# Patient Record
Sex: MALE | Race: WHITE | NOT HISPANIC OR LATINO | Employment: OTHER | URBAN - METROPOLITAN AREA
[De-identification: names, ages, dates, MRNs, and addresses within clinical notes are randomized per-mention and may not be internally consistent; named-entity substitution may affect disease eponyms.]

---

## 2017-02-02 ENCOUNTER — HOSPITAL ENCOUNTER (EMERGENCY)
Facility: HOSPITAL | Age: 52
Discharge: HOME | End: 2017-02-02
Attending: EMERGENCY MEDICINE | Admitting: EMERGENCY MEDICINE

## 2017-02-02 ENCOUNTER — APPOINTMENT (OUTPATIENT)
Dept: GENERAL RADIOLOGY | Facility: HOSPITAL | Age: 52
End: 2017-02-02

## 2017-02-02 VITALS
HEART RATE: 65 BPM | TEMPERATURE: 98.7 F | OXYGEN SATURATION: 97 % | RESPIRATION RATE: 18 BRPM | BODY MASS INDEX: 36.4 KG/M2 | SYSTOLIC BLOOD PRESSURE: 116 MMHG | WEIGHT: 260 LBS | HEIGHT: 71 IN | DIASTOLIC BLOOD PRESSURE: 80 MMHG

## 2017-02-02 DIAGNOSIS — J31.0 OTHER RHINITIS: Primary | ICD-10-CM

## 2017-02-02 LAB
ALBUMIN SERPL-MCNC: 4.5 G/DL (ref 3.5–5.2)
ALBUMIN/GLOB SERPL: 1.4 G/DL
ALP SERPL-CCNC: 53 U/L (ref 39–117)
ALT SERPL W P-5'-P-CCNC: 62 U/L (ref 1–41)
ANION GAP SERPL CALCULATED.3IONS-SCNC: 11.2 MMOL/L
AST SERPL-CCNC: 39 U/L (ref 1–40)
BASOPHILS # BLD AUTO: 0.02 10*3/MM3 (ref 0–0.2)
BASOPHILS NFR BLD AUTO: 0.3 % (ref 0–1.5)
BILIRUB SERPL-MCNC: 0.5 MG/DL (ref 0.1–1.2)
BUN BLD-MCNC: 14 MG/DL (ref 6–20)
BUN/CREAT SERPL: 13.5 (ref 7–25)
CALCIUM SPEC-SCNC: 9.5 MG/DL (ref 8.6–10.5)
CHLORIDE SERPL-SCNC: 102 MMOL/L (ref 98–107)
CO2 SERPL-SCNC: 26.8 MMOL/L (ref 22–29)
CREAT BLD-MCNC: 1.04 MG/DL (ref 0.76–1.27)
DEPRECATED RDW RBC AUTO: 39.8 FL (ref 37–54)
EOSINOPHIL # BLD AUTO: 0.04 10*3/MM3 (ref 0–0.7)
EOSINOPHIL NFR BLD AUTO: 0.7 % (ref 0.3–6.2)
ERYTHROCYTE [DISTWIDTH] IN BLOOD BY AUTOMATED COUNT: 12.6 % (ref 11.5–14.5)
GFR SERPL CREATININE-BSD FRML MDRD: 75 ML/MIN/1.73
GLOBULIN UR ELPH-MCNC: 3.2 GM/DL
GLUCOSE BLD-MCNC: 101 MG/DL (ref 65–99)
HCT VFR BLD AUTO: 44.2 % (ref 40.4–52.2)
HGB BLD-MCNC: 15.6 G/DL (ref 13.7–17.6)
HOLD SPECIMEN: NORMAL
IMM GRANULOCYTES # BLD: 0.02 10*3/MM3 (ref 0–0.03)
IMM GRANULOCYTES NFR BLD: 0.3 % (ref 0–0.5)
LYMPHOCYTES # BLD AUTO: 2.08 10*3/MM3 (ref 0.9–4.8)
LYMPHOCYTES NFR BLD AUTO: 35 % (ref 19.6–45.3)
MCH RBC QN AUTO: 30.5 PG (ref 27–32.7)
MCHC RBC AUTO-ENTMCNC: 35.3 G/DL (ref 32.6–36.4)
MCV RBC AUTO: 86.5 FL (ref 79.8–96.2)
MONOCYTES # BLD AUTO: 0.57 10*3/MM3 (ref 0.2–1.2)
MONOCYTES NFR BLD AUTO: 9.6 % (ref 5–12)
NEUTROPHILS # BLD AUTO: 3.21 10*3/MM3 (ref 1.9–8.1)
NEUTROPHILS NFR BLD AUTO: 54.1 % (ref 42.7–76)
NT-PROBNP SERPL-MCNC: 19 PG/ML (ref 5–900)
PLATELET # BLD AUTO: 195 10*3/MM3 (ref 140–500)
PMV BLD AUTO: 10.4 FL (ref 6–12)
POTASSIUM BLD-SCNC: 4 MMOL/L (ref 3.5–5.2)
PROT SERPL-MCNC: 7.7 G/DL (ref 6–8.5)
RBC # BLD AUTO: 5.11 10*6/MM3 (ref 4.6–6)
SODIUM BLD-SCNC: 140 MMOL/L (ref 136–145)
TROPONIN T SERPL-MCNC: <0.01 NG/ML (ref 0–0.03)
WBC NRBC COR # BLD: 5.94 10*3/MM3 (ref 4.5–10.7)
WHOLE BLOOD HOLD SPECIMEN: NORMAL

## 2017-02-02 PROCEDURE — 71020 HC CHEST PA AND LATERAL: CPT

## 2017-02-02 PROCEDURE — 85025 COMPLETE CBC W/AUTO DIFF WBC: CPT | Performed by: EMERGENCY MEDICINE

## 2017-02-02 PROCEDURE — 84484 ASSAY OF TROPONIN QUANT: CPT | Performed by: EMERGENCY MEDICINE

## 2017-02-02 PROCEDURE — 36415 COLL VENOUS BLD VENIPUNCTURE: CPT | Performed by: EMERGENCY MEDICINE

## 2017-02-02 PROCEDURE — 93010 ELECTROCARDIOGRAM REPORT: CPT | Performed by: INTERNAL MEDICINE

## 2017-02-02 PROCEDURE — 80053 COMPREHEN METABOLIC PANEL: CPT | Performed by: EMERGENCY MEDICINE

## 2017-02-02 PROCEDURE — 99284 EMERGENCY DEPT VISIT MOD MDM: CPT

## 2017-02-02 PROCEDURE — 83880 ASSAY OF NATRIURETIC PEPTIDE: CPT | Performed by: EMERGENCY MEDICINE

## 2017-02-02 PROCEDURE — 93005 ELECTROCARDIOGRAM TRACING: CPT

## 2017-02-02 RX ORDER — FLUTICASONE PROPIONATE 50 MCG
2 SPRAY, SUSPENSION (ML) NASAL DAILY
Qty: 1 EACH | Refills: 0 | Status: SHIPPED | OUTPATIENT
Start: 2017-02-02 | End: 2017-03-04

## 2017-02-02 RX ORDER — SODIUM CHLORIDE 0.9 % (FLUSH) 0.9 %
10 SYRINGE (ML) INJECTION AS NEEDED
Status: DISCONTINUED | OUTPATIENT
Start: 2017-02-02 | End: 2017-02-02 | Stop reason: HOSPADM

## 2017-02-02 NOTE — ED PROVIDER NOTES
Pt c/o productive cough which started in 11/2016. He has also had intermittent trouble breathing through his nose, sinus congestion, and wheezing which are exacerbated by taking breathing treatments. He has been on multiple rounds of abx and steroids without sx relief. He has felt very anxious about his sx and denies chest pain, fevers, chills, abd pain, and N/V/D. He has hx of environmental allergies.     Limited physical exam: lungs are clear to ausculation bilaterally, sinuses are nontender without inflammation, oropharynx is normal, neck is supple without lymphadenopathy, TMs are normal bilaterally     CXR shows no acute process. Labs are stable.     Discussed with pt at length that his ongoing sinus congestion and difficulty breathing through nose is due to seasonal allergies. Advised pt to follow up with his pulmonologist for recheck. RTER warnings given.     I supervised care provided by the midlevel provider.  We have discussed this patient's history, physical exam, and treatment plan.  I have reviewed the note and personally saw and examined the patient and agree with the plan of care.    Documentation assistance provided by vasile Martin for Dr Gan. Information recorded by the scribe was done at my direction and has been verified and validated by me.            Nicholas Martin  02/02/17 6231       Sterling Gan MD  02/02/17 3321

## 2017-02-02 NOTE — DISCHARGE INSTRUCTIONS
May continue Saline irrigations    Return for fever or worsening symptoms    Keep appointment with Pulmonologist

## 2017-02-02 NOTE — ED PROVIDER NOTES
EMERGENCY DEPARTMENT ENCOUNTER    CHIEF COMPLAINT  Chief Complaint: SOA  History given by: Pt  History limited by: nothing  Room Number: 01/01  PMD: Emir Ellis MD      HPI:  Pt is a 51 y.o. male who presents complaining of intermittent SOA two weeks ago.  Pt states he has a several year hx of chronic nasal congestion and post-nasal drainage, which makes it difficult to breath through his nose. Pt c/o diaphoresis of the face and chest pain/pressure secondary to SOA currently. Pt states he was last on abx, Z-pack, one week ago.     Duration:  Two weeks ago  Onset: gradual  Timing: intermittent  Location: nose  Quality: SOA  Intensity/Severity: moderate  Progression: worsening  Associated Symptoms: Chest pain/pressure, diaphoresis of the face.  Aggravating Factors: strong scents  Alleviating Factors: none  Previous Episodes: Pt has a hx of post nasal drip for several decades  Treatment before arrival: none    PAST MEDICAL HISTORY  Active Ambulatory Problems     Diagnosis Date Noted   • Bilateral hip pain 08/10/2016   • Right hip pain 08/10/2016   • Left hip pain 08/10/2016   • Bilateral hip bursitis 08/25/2016     Resolved Ambulatory Problems     Diagnosis Date Noted   • No Resolved Ambulatory Problems     Past Medical History   Diagnosis Date   • Fracture of ankle        PAST SURGICAL HISTORY  Past Surgical History   Procedure Laterality Date   • Tonsillectomy     • Hemorrhoidectomy     • Anal fistula repair         FAMILY HISTORY  History reviewed. No pertinent family history.    SOCIAL HISTORY  Social History     Social History   • Marital status: Single     Spouse name: N/A   • Number of children: N/A   • Years of education: N/A     Occupational History   • Not on file.     Social History Main Topics   • Smoking status: Never Smoker   • Smokeless tobacco: Not on file   • Alcohol use No   • Drug use: No   • Sexual activity: Defer     Other Topics Concern   • Not on file     Social History Narrative        ALLERGIES  Diclofenac; Medrol [methylprednisolone]; Mobic [meloxicam]; and Naproxen    REVIEW OF SYSTEMS  Review of Systems   Constitutional: Positive for diaphoresis (face). Negative for activity change, appetite change and fever.   HENT: Positive for postnasal drip. Negative for congestion and sore throat.    Eyes: Negative.    Respiratory: Positive for chest tightness and shortness of breath. Negative for cough.    Cardiovascular: Positive for chest pain. Negative for leg swelling.   Gastrointestinal: Negative for abdominal pain, diarrhea and vomiting.   Endocrine: Negative.    Genitourinary: Negative for decreased urine volume and dysuria.   Musculoskeletal: Negative for neck pain.   Skin: Negative for rash and wound.   Allergic/Immunologic: Positive for environmental allergies. Negative for food allergies.   Neurological: Negative for weakness, numbness and headaches.   Hematological: Negative.    Psychiatric/Behavioral: Negative.    All other systems reviewed and are negative.      PHYSICAL EXAM  ED Triage Vitals   Temp Heart Rate Resp BP SpO2   02/02/17 1212 02/02/17 1214 02/02/17 1214 02/02/17 1214 02/02/17 1214   98.2 °F (36.8 °C) 84 24 134/94 97 %      Temp src Heart Rate Source Patient Position BP Location FiO2 (%)   -- -- -- -- --              Physical Exam   Constitutional: He is oriented to person, place, and time and well-developed, well-nourished, and in no distress. No distress.   HENT:   Head: Normocephalic and atraumatic.   Right Ear: Tympanic membrane normal.   Left Ear: Tympanic membrane normal.   Nose: Right sinus exhibits no maxillary sinus tenderness and no frontal sinus tenderness. Left sinus exhibits no maxillary sinus tenderness and no frontal sinus tenderness.   Mouth/Throat: Oropharynx is clear and moist.   Eyes: EOM are normal. Pupils are equal, round, and reactive to light.   Neck: Normal range of motion. Neck supple.   Cardiovascular: Normal rate and regular rhythm.     Pulmonary/Chest: Effort normal and breath sounds normal.   Abdominal: Soft.   Musculoskeletal: Normal range of motion.   Neurological: He is alert and oriented to person, place, and time.   Psychiatric: Affect and judgment normal. His mood appears anxious.   Nursing note and vitals reviewed.      LAB RESULTS  Lab Results (last 24 hours)     Procedure Component Value Units Date/Time    CBC & Differential [[id number]] Collected:  02/02/17 1230    Specimen:  Blood Updated:  02/02/17 1257    Narrative:       The following orders were created for panel order CBC & Differential.  Procedure                               Abnormality         Status                     ---------                               -----------         ------                     CBC Auto Differential[[id number]]         Normal              Final result                 Please view results for these tests on the individual orders.    Comprehensive Metabolic Panel [[id number]]  (Abnormal) Collected:  02/02/17 1230    Specimen:  Blood Updated:  02/02/17 1310     Glucose 101 (H) mg/dL      BUN 14 mg/dL      Creatinine 1.04 mg/dL      Sodium 140 mmol/L      Potassium 4.0 mmol/L      Chloride 102 mmol/L      CO2 26.8 mmol/L      Calcium 9.5 mg/dL      Total Protein 7.7 g/dL      Albumin 4.50 g/dL      ALT (SGPT) 62 (H) U/L      AST (SGOT) 39 U/L      Alkaline Phosphatase 53 U/L      Total Bilirubin 0.5 mg/dL      eGFR Non African Amer 75 mL/min/1.73      Globulin 3.2 gm/dL      A/G Ratio 1.4 g/dL      BUN/Creatinine Ratio 13.5      Anion Gap 11.2 mmol/L     BNP [[id number]]  (Normal) Collected:  02/02/17 1230    Specimen:  Blood Updated:  02/02/17 1313     proBNP 19.0 pg/mL     Narrative:       Among patients with dyspnea, NT-proBNP is highly sensitive for the detection of acute congestive heart failure. In addition NT-proBNP of <300 pg/ml effectively rules out acute congestive heart failure with 99% negative predictive value.    Troponin [[id number]]  (Normal)  Collected:  02/02/17 1230    Specimen:  Blood Updated:  02/02/17 1313     Troponin T <0.010 ng/mL     Narrative:       Troponin T Reference Ranges:  Less than 0.03 ng/mL:    Negative for AMI  0.03 to 0.09 ng/mL:      Indeterminant for AMI  Greater than 0.09 ng/mL: Positive for AMI    CBC Auto Differential [[id number]]  (Normal) Collected:  02/02/17 1230    Specimen:  Blood Updated:  02/02/17 1257     WBC 5.94 10*3/mm3      RBC 5.11 10*6/mm3      Hemoglobin 15.6 g/dL      Hematocrit 44.2 %      MCV 86.5 fL      MCH 30.5 pg      MCHC 35.3 g/dL      RDW 12.6 %      RDW-SD 39.8 fl      MPV 10.4 fL      Platelets 195 10*3/mm3      Neutrophil % 54.1 %      Lymphocyte % 35.0 %      Monocyte % 9.6 %      Eosinophil % 0.7 %      Basophil % 0.3 %      Immature Grans % 0.3 %      Neutrophils, Absolute 3.21 10*3/mm3      Lymphocytes, Absolute 2.08 10*3/mm3      Monocytes, Absolute 0.57 10*3/mm3      Eosinophils, Absolute 0.04 10*3/mm3      Basophils, Absolute 0.02 10*3/mm3      Immature Grans, Absolute 0.02 10*3/mm3           I ordered the above labs and reviewed the results    RADIOLOGY  XR Chest 2 View   Final Result   No evidence for acute pulmonary process. Follow-up as   clinical indications persist.       This report was finalized on 2/2/2017 1:34 PM by Dr. Shane Ochoa MD.               I ordered the above noted radiological studies. Interpreted by radiologist. Reviewed by me in PACS.       PROCEDURES  Procedures      PROGRESS AND CONSULTS  ED Course   2:46 PM  Discussed the Pt's case with Dr. Gan, who informed the pt of the plan to discharge.     3:46 PM  Rechecked with pt who is resting comfortably. Discussed negative lab results and plan to discharge with a f/u with a specialist. Discussed plan to treat pt with over the counter afrin. Pt understands and agrees with the plan and all questions were addressed.     MEDICAL DECISION MAKING  Results were reviewed/discussed with the patient and they were also made  aware of online access. Pt also made aware that some labs, such as cultures, will not be resulted during ER visit and follow up with PMD is necessary.     MDM  Number of Diagnoses or Management Options  Other rhinitis:   Diagnosis management comments: Pt really complains of chronic nasal congestion and denies much in the way of true chest pain.  He has a negative cardiac work-up here.  He has an appt with pulmonary next week.  He is very insistent on nasal congestion causing him trouble breathing.  He has stable vitals.  Will discharge.  No suspicion of any cardiac or pulmonary component to his symptoms.         Amount and/or Complexity of Data Reviewed  Clinical lab tests: reviewed and ordered (Troponin and proBNP are normal)  Tests in the radiology section of CPT®: reviewed and ordered (CXR shows no evidence for acute pulmonary process.)           DIAGNOSIS  Final diagnoses:   Other rhinitis       DISPOSITION  DISCHARGE    Patient discharged in stable condition.    Reviewed implications of results, diagnosis, meds, responsibility to follow up, warning signs and symptoms of possible worsening, potential complications and reasons to return to ER.    Patient/Family voiced understanding of above instructions.    Discussed plan for discharge, as there is no emergent indication for admission.  Pt/family is agreeable and understands need for follow up and repeat testing.  Pt is aware that discharge does not mean that nothing is wrong but it indicates no emergency is present that requires admission and they must continue care with follow-up as given below or physician of their choice.     FOLLOW-UP  Emir Ellis MD  [address]  [address]  [place] [address] [phone]      As needed         Medication List      New Prescriptions          fluticasone 50 MCG/ACT nasal spray   Commonly known as:  FLONASE   2 sprays into each nostril Daily for 30 days.         Stop          ibuprofen 200 MG tablet   Commonly  known as:  SPI[place]ES               Latest Documented Vital Signs:  As of 4:09 PM  BP- 116/80 HR- 65 Temp- 98.7 °F (37.1 °C) (Tympanic) O2 sat- 97%    --  Documentation assistance provided by vasile Garner for ADÁN Greenwood.  Information recorded by the scribe was done at my direction and has been verified and validated by me.            Jose Maria Garner  02/02/17 1611       ADÁN Mcnulty  02/02/17 1824

## 2017-02-07 ENCOUNTER — TELEPHONE (OUTPATIENT)
Dept: SOCIAL WORK | Facility: HOSPITAL | Age: 52
End: 2017-02-07

## 2017-04-25 ENCOUNTER — TRANSCRIBE ORDERS (OUTPATIENT)
Dept: PHYSICAL THERAPY | Facility: HOSPITAL | Age: 52
End: 2017-04-25

## 2017-04-25 DIAGNOSIS — Z98.890 STATUS POST ARTHROSCOPY OF HIP: Primary | ICD-10-CM

## 2017-05-02 ENCOUNTER — TELEPHONE (OUTPATIENT)
Dept: ORTHOPEDIC SURGERY | Facility: CLINIC | Age: 52
End: 2017-05-02

## 2017-05-04 ENCOUNTER — HOSPITAL ENCOUNTER (OUTPATIENT)
Dept: PHYSICAL THERAPY | Facility: HOSPITAL | Age: 52
Setting detail: THERAPIES SERIES
Discharge: HOME | End: 2017-05-04
Attending: ORTHOPAEDIC SURGERY

## 2017-05-04 DIAGNOSIS — Z47.89 ORTHOPEDIC AFTERCARE: ICD-10-CM

## 2017-05-04 DIAGNOSIS — R29.898 WEAKNESS OF RIGHT HIP: ICD-10-CM

## 2017-05-04 DIAGNOSIS — R26.9 GAIT DISTURBANCE: ICD-10-CM

## 2017-05-04 DIAGNOSIS — M25.551 RIGHT HIP PAIN: Primary | ICD-10-CM

## 2017-05-04 PROCEDURE — 97161 PT EVAL LOW COMPLEX 20 MIN: CPT

## 2017-05-04 PROCEDURE — 97110 THERAPEUTIC EXERCISES: CPT

## 2017-05-11 ENCOUNTER — HOSPITAL ENCOUNTER (OUTPATIENT)
Dept: PHYSICAL THERAPY | Facility: HOSPITAL | Age: 52
Setting detail: THERAPIES SERIES
Discharge: HOME | End: 2017-05-11
Attending: ORTHOPAEDIC SURGERY

## 2017-05-11 DIAGNOSIS — M25.551 RIGHT HIP PAIN: Primary | ICD-10-CM

## 2017-05-11 DIAGNOSIS — R26.9 GAIT DISTURBANCE: ICD-10-CM

## 2017-05-11 DIAGNOSIS — Z47.89 ORTHOPEDIC AFTERCARE: ICD-10-CM

## 2017-05-11 DIAGNOSIS — R29.898 WEAKNESS OF RIGHT HIP: ICD-10-CM

## 2017-05-11 PROCEDURE — 97110 THERAPEUTIC EXERCISES: CPT

## 2017-05-18 ENCOUNTER — HOSPITAL ENCOUNTER (OUTPATIENT)
Dept: PHYSICAL THERAPY | Facility: HOSPITAL | Age: 52
Setting detail: THERAPIES SERIES
Discharge: HOME | End: 2017-05-18
Attending: ORTHOPAEDIC SURGERY

## 2017-05-18 DIAGNOSIS — Z47.89 ORTHOPEDIC AFTERCARE: ICD-10-CM

## 2017-05-18 DIAGNOSIS — R29.898 WEAKNESS OF RIGHT HIP: ICD-10-CM

## 2017-05-18 DIAGNOSIS — R26.9 GAIT DISTURBANCE: ICD-10-CM

## 2017-05-18 DIAGNOSIS — M25.551 RIGHT HIP PAIN: Primary | ICD-10-CM

## 2017-05-18 PROCEDURE — 97110 THERAPEUTIC EXERCISES: CPT

## 2017-05-18 PROCEDURE — 97116 GAIT TRAINING THERAPY: CPT

## 2017-05-25 ENCOUNTER — APPOINTMENT (OUTPATIENT)
Dept: PHYSICAL THERAPY | Facility: HOSPITAL | Age: 52
End: 2017-05-25
Attending: ORTHOPAEDIC SURGERY

## 2017-06-01 ENCOUNTER — APPOINTMENT (OUTPATIENT)
Dept: PHYSICAL THERAPY | Facility: HOSPITAL | Age: 52
End: 2017-06-01
Attending: ORTHOPAEDIC SURGERY

## 2017-06-08 ENCOUNTER — APPOINTMENT (OUTPATIENT)
Dept: PHYSICAL THERAPY | Facility: HOSPITAL | Age: 52
End: 2017-06-08
Attending: ORTHOPAEDIC SURGERY

## 2017-06-15 ENCOUNTER — APPOINTMENT (OUTPATIENT)
Dept: PHYSICAL THERAPY | Facility: HOSPITAL | Age: 52
End: 2017-06-15
Attending: ORTHOPAEDIC SURGERY

## 2017-06-16 ENCOUNTER — TELEPHONE (OUTPATIENT)
Dept: URGENT CARE | Facility: CLINIC | Age: 52
End: 2017-06-16

## 2017-06-16 PROBLEM — S76.319A HAMSTRING MUSCLE STRAIN: Status: ACTIVE | Noted: 2017-06-16

## 2017-06-16 PROBLEM — K62.89 ANAL BURNING: Status: ACTIVE | Noted: 2017-06-16

## 2017-06-22 ENCOUNTER — APPOINTMENT (OUTPATIENT)
Dept: PHYSICAL THERAPY | Facility: HOSPITAL | Age: 52
End: 2017-06-22
Attending: ORTHOPAEDIC SURGERY

## 2017-06-29 ENCOUNTER — APPOINTMENT (OUTPATIENT)
Dept: PHYSICAL THERAPY | Facility: HOSPITAL | Age: 52
End: 2017-06-29
Attending: ORTHOPAEDIC SURGERY

## 2017-07-06 ENCOUNTER — APPOINTMENT (OUTPATIENT)
Dept: PHYSICAL THERAPY | Facility: HOSPITAL | Age: 52
End: 2017-07-06
Attending: ORTHOPAEDIC SURGERY

## 2017-07-21 ENCOUNTER — OFFICE (OUTPATIENT)
Dept: URBAN - METROPOLITAN AREA CLINIC 75 | Facility: CLINIC | Age: 52
End: 2017-07-21
Payer: COMMERCIAL

## 2017-07-21 VITALS
SYSTOLIC BLOOD PRESSURE: 124 MMHG | HEART RATE: 68 BPM | WEIGHT: 235 LBS | HEIGHT: 71 IN | DIASTOLIC BLOOD PRESSURE: 86 MMHG

## 2017-07-21 DIAGNOSIS — K62.89 OTHER SPECIFIED DISEASES OF ANUS AND RECTUM: ICD-10-CM

## 2017-07-21 DIAGNOSIS — K62.3 RECTAL PROLAPSE: ICD-10-CM

## 2017-07-21 DIAGNOSIS — K60.2 ANAL FISSURE, UNSPECIFIED: ICD-10-CM

## 2017-07-21 PROCEDURE — 99214 OFFICE O/P EST MOD 30 MIN: CPT

## 2017-08-08 ENCOUNTER — OFFICE (OUTPATIENT)
Dept: URBAN - METROPOLITAN AREA CLINIC 75 | Facility: CLINIC | Age: 52
End: 2017-08-08
Payer: COMMERCIAL

## 2017-08-08 VITALS
DIASTOLIC BLOOD PRESSURE: 72 MMHG | WEIGHT: 227 LBS | HEIGHT: 71 IN | HEART RATE: 82 BPM | SYSTOLIC BLOOD PRESSURE: 130 MMHG

## 2017-08-08 DIAGNOSIS — K60.2 ANAL FISSURE, UNSPECIFIED: ICD-10-CM

## 2017-08-08 DIAGNOSIS — K62.89 OTHER SPECIFIED DISEASES OF ANUS AND RECTUM: ICD-10-CM

## 2017-08-08 PROCEDURE — 99214 OFFICE O/P EST MOD 30 MIN: CPT

## 2017-11-07 ENCOUNTER — OFFICE (OUTPATIENT)
Dept: URBAN - METROPOLITAN AREA CLINIC 75 | Facility: CLINIC | Age: 52
End: 2017-11-07
Payer: COMMERCIAL

## 2017-11-07 VITALS
SYSTOLIC BLOOD PRESSURE: 104 MMHG | WEIGHT: 217 LBS | DIASTOLIC BLOOD PRESSURE: 70 MMHG | HEIGHT: 71 IN | HEART RATE: 72 BPM

## 2017-11-07 DIAGNOSIS — K60.2 ANAL FISSURE, UNSPECIFIED: ICD-10-CM

## 2017-11-07 DIAGNOSIS — K62.89 OTHER SPECIFIED DISEASES OF ANUS AND RECTUM: ICD-10-CM

## 2017-11-07 PROCEDURE — 99213 OFFICE O/P EST LOW 20 MIN: CPT

## 2018-01-11 ENCOUNTER — APPOINTMENT (OUTPATIENT)
Dept: GENERAL RADIOLOGY | Facility: HOSPITAL | Age: 53
End: 2018-01-11

## 2018-01-11 PROCEDURE — 73070 X-RAY EXAM OF ELBOW: CPT | Performed by: EMERGENCY MEDICINE

## 2018-01-30 ENCOUNTER — OFFICE (OUTPATIENT)
Dept: URBAN - METROPOLITAN AREA CLINIC 75 | Facility: CLINIC | Age: 53
End: 2018-01-30
Payer: COMMERCIAL

## 2018-01-30 VITALS
WEIGHT: 226 LBS | HEART RATE: 78 BPM | HEIGHT: 71 IN | SYSTOLIC BLOOD PRESSURE: 124 MMHG | DIASTOLIC BLOOD PRESSURE: 78 MMHG

## 2018-01-30 DIAGNOSIS — K60.2 ANAL FISSURE, UNSPECIFIED: ICD-10-CM

## 2018-01-30 DIAGNOSIS — K62.3 RECTAL PROLAPSE: ICD-10-CM

## 2018-01-30 DIAGNOSIS — K62.89 OTHER SPECIFIED DISEASES OF ANUS AND RECTUM: ICD-10-CM

## 2018-01-30 PROCEDURE — 99214 OFFICE O/P EST MOD 30 MIN: CPT

## 2018-02-23 ENCOUNTER — OFFICE (OUTPATIENT)
Dept: URBAN - METROPOLITAN AREA CLINIC 75 | Facility: CLINIC | Age: 53
End: 2018-02-23
Payer: COMMERCIAL

## 2018-02-23 VITALS
DIASTOLIC BLOOD PRESSURE: 828 MMHG | SYSTOLIC BLOOD PRESSURE: 118 MMHG | WEIGHT: 227 LBS | HEART RATE: 84 BPM | HEIGHT: 71 IN

## 2018-02-23 DIAGNOSIS — K62.89 OTHER SPECIFIED DISEASES OF ANUS AND RECTUM: ICD-10-CM

## 2018-02-23 DIAGNOSIS — K62.5 HEMORRHAGE OF ANUS AND RECTUM: ICD-10-CM

## 2018-02-23 PROCEDURE — 99214 OFFICE O/P EST MOD 30 MIN: CPT

## 2018-06-08 ENCOUNTER — DOCUMENTATION (OUTPATIENT)
Dept: PHYSICAL THERAPY | Facility: HOSPITAL | Age: 53
End: 2018-06-08

## 2018-06-08 DIAGNOSIS — M25.551 RIGHT HIP PAIN: Primary | ICD-10-CM

## 2018-06-08 DIAGNOSIS — R26.9 GAIT DISTURBANCE: ICD-10-CM

## 2018-06-08 DIAGNOSIS — Z47.89 ORTHOPEDIC AFTERCARE: ICD-10-CM

## 2018-06-08 DIAGNOSIS — R29.898 WEAKNESS OF RIGHT HIP: ICD-10-CM

## 2018-06-08 NOTE — THERAPY DISCHARGE NOTE
Outpatient Physical Therapy Discharge Summary         Patient Name: Jose Maria Judge  : 1965  MRN: [id number]    Today's Date: 2018    Visit Dx:    ICD-10-CM ICD-9-CM   1. Right hip pain M25.551 719.45   2. Orthopedic aftercare Z47.89 V54.9   3. Weakness of right hip R29.898 729.89   4. Gait disturbance R26.9 781.2             PT OP Goals     Row Name 18 1400          PT Short Term Goals    STG Date to Achieve 17  -LISET     STG 1 Pt will be independent with HEP appropriate for his post-op time.  -LISET     STG 1 Progress Progressing;Partially Met  -LISET     STG 2 Pt will transition gait from bilateral crutches to single crutch.  -LISET     STG 2 Progress Met  -LISET     STG 3 Pt will ambulate 150' with normal heel strike to toe off with symmetrical stride and jerri with one crutch.  -LISET     STG 3 Progress Progressing;Partially Met  -LISET        Long Term Goals    LTG Date to Achieve 17  -LISET     LTG 1 Pt will demonstrate independence with aquatic ex for strengthening.  -LISET     LTG 1 Progress Not Met  -LISET     LTG 2 Pt will demonstrate increased hip flexion AROM to 105 degree, 20 deg ER.  -LISET     LTG 2 Progress Not Met  -JA     LTG 3 Pt will demonstrate increased R hip strength to 4-/5 throughout.  -JA     LTG 3 Progress Not Met  -JA     LTG 4 Pt will demonstrate normalized gait pattern without assistive device for 500'.  -LISET     LTG 4 Progress Not Met  -LISET     LTG 5 Pt will score 42 point or better on LEFS indicating decrease in perceived functional disability.  -LISET     LTG 5 Progress Not Met  -LISET       User Key  (r) = Recorded By, (t) = Taken By, (c) = Cosigned By    Initials Name Provider Type    LISET Dale, PT Physical Therapist          OP PT Discharge Summary  Date of Discharge: 18  Reason for Discharge: Unable to participate  Outcomes Achieved: Patient able to partially acheive established goals  Discharge Destination: Other (comment) (pt had HEP and protocol)  Discharge  Instructions/Additional Comments: Pt was seen for 3 visits from 5/4/2017 to 5/18/2017 but was unable to tolerate therapy at that time.        Time Calculation:                    Kirstin Dale, PT  6/8/2018

## 2018-07-17 ENCOUNTER — TRANSCRIBE ORDERS (OUTPATIENT)
Dept: PHYSICAL THERAPY | Facility: HOSPITAL | Age: 53
End: 2018-07-17

## 2018-07-17 DIAGNOSIS — M54.6 PAIN IN THORACIC SPINE: Primary | ICD-10-CM

## 2018-07-24 ENCOUNTER — HOSPITAL ENCOUNTER (OUTPATIENT)
Dept: PHYSICAL THERAPY | Facility: HOSPITAL | Age: 53
Setting detail: THERAPIES SERIES
Discharge: HOME | End: 2018-07-24

## 2018-07-24 DIAGNOSIS — R29.898 UPPER EXTREMITY WEAKNESS: ICD-10-CM

## 2018-07-24 DIAGNOSIS — M54.6 ACUTE BILATERAL THORACIC BACK PAIN: Primary | ICD-10-CM

## 2018-07-24 PROCEDURE — 97110 THERAPEUTIC EXERCISES: CPT

## 2018-07-24 PROCEDURE — 97161 PT EVAL LOW COMPLEX 20 MIN: CPT

## 2018-07-24 NOTE — THERAPY EVALUATION
Outpatient Physical Therapy Ortho Initial Evaluation  [facility]     Patient Name: Jose Maria Judge  : 1965  MRN: [id number]  Today's Date: 2018      Visit Date: 2018    Patient Active Problem List   Diagnosis   • Bilateral hip pain   • Right hip pain   • Left hip pain   • Trochanteric bursitis   • Tear of acetabular labrum   • Loss of hair   • Anal burning   • Anxiety   • Elevated blood pressure   • Hamstring strain   • Hypertension   • Radiculitis   • Hamstring muscle strain   • Rectal pain   • Herpes zoster   • Varicose veins of other specified sites        Past Medical History:   Diagnosis Date   • Fracture of ankle     Left        Past Surgical History:   Procedure Laterality Date   • ANAL FISTULA REPAIR     • HEMORRHOIDECTOMY     • HIP ARTHROSCOPY W/ LABRAL REPAIR     • TONSILLECTOMY         Visit Dx:     ICD-10-CM ICD-9-CM   1. Acute bilateral thoracic back pain M54.6 724.1   2. Upper extremity weakness R29.898 729.89             Patient History     Row Name 18 1300             History    Chief Complaint Pain;Muscle weakness  -JA      Type of Pain Back pain  -      Date Current Problem(s) Began --   gradual onset 1 month  -      Brief Description of Current Complaint Pt  is well-known to me.  He presents today with c/o pain in thoracic back pain.  PMH labral tear with surgery, anal fissure and surgery, more recent dx of  pudendal neuralgia.  He reports his previous problems makes sitting painful and continues to be painful daily; it is exacerbated and he reports it has caused pain to go up his spine.  He reports mid-back pain that has been severe.  He reports noticing in  that his neck was weak and doesn't know if this is connected.  Has noticed an itching sensation in the shoulder blade areas but knows he has no rash or skin irritation.  He states when he was a  he noticed the muscles of his back were different L and R.  -JA      Patient/Caregiver Goals Relieve  pain  -         Pain     Pain Location Back  -      Pain at Present 2  -JA      Pain at Best 2  -JA      Pain at Worst 7  -JA      Pain Frequency Constant/continuous  -JA         Fall Risk Assessment    Any falls in the past year: No  -JA         Services    Prior Rehab/Home Health Experiences No  -JA      Are you currently receiving Home Health services No  -JA        User Key  (r) = Recorded By, (t) = Taken By, (c) = Cosigned By    Initials Name Provider Type    LISET Dale, PT Physical Therapist                PT Ortho     Row Name 07/24/18 1300       Posture/Observations    Posture/Observations Comments forward head, rounded shoulders, protracted scap, increased thoracic kyphosis, compensatory lumbar lordosis  -       Myotomal Screen- Upper Quarter Clearing    Shoulder flexion (C5) Right:;3+ (Fair +);Left:;4+ (Good +)  -       General ROM    Head/Neck/Trunk Comments  -       Head/Neck/Trunk    Head/Neck/Trunk Comments flexion 75% of full, SB 50% radhames, rotation R 25% increased tightness, L 50%  -       MMT (Manual Muscle Testing)    Additional Documentation General Assessment (Manual Muscle Testing) (Group)  -       Upper Extremity (Manual Muscle Testing)    Comment, MMT: Upper Extremity mid trap R3+/5, L3/5, rhomboids 3/5 radhames; lower trap R3/5, L 3-/5  -      User Key  (r) = Recorded By, (t) = Taken By, (c) = Cosigned By    Initials Name Provider Type    LISET Dale, PT Physical Therapist                                  PT OP Goals     Row Name 07/24/18 1700          PT Short Term Goals    STG Date to Achieve 08/07/18  -     STG 1 Patient will be independent and compliant with initial HEP   -     STG 1 Progress New  -     STG 2 Pt will demonstrate correct posture in sitting and standing.  -LISET     STG 2 Progress New  -        Long Term Goals    LTG Date to Achieve 08/24/18  -     LTG 1 Pt will be independent with advanced HEP for strengthening to increase functional  mobility.  -     LTG 1 Progress New  -     LTG 2 Education for posture and body mechanics with home and work to reduce strain on back.  -     LTG 2 Progress New  -     LTG 3 Pt will score 48% on Oswestry Disability Index indicating decrease in perceived functional disability.   -     LTG 3 Progress New  -JA     LTG 4 Pt will be independent with self-management of symptoms.  -LISET     LTG 4 Progress New  -        Time Calculation    PT Goal Re-Cert Due Date 10/24/18  -LISET       User Key  (r) = Recorded By, (t) = Taken By, (c) = Cosigned By    Initials Name Provider Type    LISET Dale, PT Physical Therapist                PT Assessment/Plan     Row Name 07/24/18 1700          PT Assessment    Functional Limitations Decreased safety during functional activities;Impaired gait;Limitation in home management;Performance in leisure activities;Performance in self-care ADL  -LISET     Impairments Pain;Muscle strength;Posture;Poor body mechanics  -LISET     Assessment Comments Pt is a 52 y/o male with insidious, gradual onset of thoracic pain.  He has history of pudendal neuralgia which make sitting painful and he compensates with his posture.  He has forward head posture, rounded shoulders, increased thoracic kyphosis, compensatory lumbar lordosis.  Trunk ROM is decreased and he demonstrates weakness in radhames shoulder flexion as well as upper-mid back (parascapular) and core muscle weakness.  Pt's pain impairs his ADL's and limits participation in social/family/community activity.  He would benefit from skilled therapy services to address problem list.  -LISET     Please refer to paper survey for additional self-reported information Yes  -     Rehab Potential Good  -     Patient/caregiver participated in establishment of treatment plan and goals Yes  -     Patient would benefit from skilled therapy intervention Yes  -JA        PT Plan    PT Frequency 1x/week  -LISET     Predicted Duration of Therapy Intervention  (Therapy Eval) 4-6 weeks  -     Planned CPT's? PT EVAL LOW COMPLEXITY: 86990;PT THER PROC EA 15 MIN: 88316;PT THER ACT EA 15 MIN: 08783;PT MANUAL THERAPY EA 15 MIN: 85659;PT NEUROMUSC RE-EDUCATION EA 15 MIN: 02342;PT SELF CARE/HOME MGMT/TRAIN EA 15: 76391;PT HOT OR COLD PACK TREAT MCARE;PT ELECTRICAL STIM UNATTEND:   -     PT Plan Comments assess response to HEP, review HEP, posture, body mechanics  -       User Key  (r) = Recorded By, (t) = Taken By, (c) = Cosigned By    Initials Name Provider Type    LISET Dale, PT Physical Therapist                  Exercises     Row Name 07/24/18 1700             Total Minutes    42322 - PT Therapeutic Exercise Minutes 15  -JA         Exercise 1    Exercise Name 1 reverse shld rolls  -      Reps 1 10  -JA      Additional Comments small range  -         Exercise 2    Exercise Name 2 scap ret w/straight elbows  -      Reps 2 10  -JA         Exercise 3    Exercise Name 3 scap ret w/bent elbows  -      Reps 3 10  -JA         Exercise 4    Exercise Name 4 lower trap lift off standing facing the wall  -      Sets 4 3  -JA      Reps 4 3  -JA      Additional Comments do in sets of small reps  -         Exercise 5    Exercise Name 5 t.a.in hooklying  -      Reps 5 10  -JA         Exercise 6    Exercise Name 6 pec stretch in doorway  -      Reps 6 3  -JA      Time 6 20sec  -        User Key  (r) = Recorded By, (t) = Taken By, (c) = Cosigned By    Initials Name Provider Type    LISET Dale PT Physical Therapist                        Outcome Measure Options: Modifed Owestry  Modified Oswestry  Modified Oswestry Score/Comments: 58%      Time Calculation:     Therapy Suggested Charges     Code   Minutes Charges    67959 (CPT®) Hc Pt Neuromusc Re Education Ea 15 Min      57469 (CPT®) Hc Pt Ther Proc Ea 15 Min 15 1    53797 (CPT®) Hc Gait Training Ea 15 Min      31398 (CPT®) Hc Pt Therapeutic Act Ea 15 Min      59073 (CPT®) Hc Pt Manual  Therapy Ea 15 Min      97409 (CPT®) Hc Pt Ther Massage- Per 15 Min      03860 (CPT®) Hc Pt Iontophoresis Ea 15 Min      63419 (CPT®) Hc Pt Elec Stim Ea-Per 15 Min      00629 (CPT®) Hc Pt Ultrasound Ea 15 Min      09544 (CPT®) Hc Pt Self Care/Mgmt/Train Ea 15 Min      Total  15 1          Start Time: 1315  Stop Time: 1400  Time Calculation (min): 45 min     Therapy Charges for Today     Code Description Service Date Service Provider Modifiers Qty    74459871799 HC PT THER PROC EA 15 MIN 7/24/2018 Kirstin Dale, PT GP 1    [id number] HC PT EVAL LOW COMPLEXITY 2 7/24/2018 Kirstin Dale, PT GP 1          PT G-Codes  Outcome Measure Options: Bulmaro Dale, PT  7/24/2018

## 2018-07-30 ENCOUNTER — APPOINTMENT (OUTPATIENT)
Dept: PHYSICAL THERAPY | Facility: HOSPITAL | Age: 53
End: 2018-07-30

## 2018-07-30 ENCOUNTER — OFFICE VISIT (OUTPATIENT)
Dept: SURGERY | Facility: CLINIC | Age: 53
End: 2018-07-30

## 2018-07-30 VITALS — OXYGEN SATURATION: 97 % | HEIGHT: 71 IN | HEART RATE: 70 BPM | WEIGHT: 225 LBS | BODY MASS INDEX: 31.5 KG/M2

## 2018-07-30 DIAGNOSIS — R59.9 PALPABLE LYMPH NODE: Primary | ICD-10-CM

## 2018-07-30 PROCEDURE — 99244 OFF/OP CNSLTJ NEW/EST MOD 40: CPT | Performed by: SURGERY

## 2018-07-30 RX ORDER — LANOLIN ALCOHOL/MO/W.PET/CERES
50 CREAM (GRAM) TOPICAL 2 TIMES DAILY
COMMUNITY
End: 2018-08-29

## 2018-07-30 RX ORDER — ZINC GLUCONATE 50 MG
50 TABLET ORAL DAILY
COMMUNITY
End: 2018-10-10 | Stop reason: HOSPADM

## 2018-07-30 RX ORDER — ACETAMINOPHEN 160 MG
2000 TABLET,DISINTEGRATING ORAL DAILY
COMMUNITY
End: 2018-10-10 | Stop reason: HOSPADM

## 2018-07-31 NOTE — PROGRESS NOTES
SUMMARY (A/P):    53-year-old gentleman with 5 cm firm palpable mass in left axilla which is suspicious by palpable and ultrasonographic features.  Biopsy is warranted and I have scheduled him for ultrasound guided core biopsy of this lesion.  He understands if core biopsy is nondiagnostic open excision will be necessary.      CC:    Referred for consultation by Dr. Ellis regarding axillary adenopathy    HPI:    53-year-old gentleman who noticed mild left axillary pain associated with palpable mass in the left axilla 1 month ago.  Pain is worse with direct contact.    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  Negative for fever or chills.  Positive for mild night sweats for over 15 years. All other systems reviewed and negative other than presenting complaints.    RADIOLOGY/ENDOSCOPY:    Ultrasound left axilla 7/23/2018 [facility]: 5 cm suspicious lymph node    PHYSICAL EXAM:   Constitutional: Well-developed well-nourished, no acute distress  Vital signs: Heart rate 70, weight 225 pounds, height 71 inches, BMI 31.4  Eyes: Conjunctiva normal, sclera nonicteric  ENMT: Hearing grossly normal, oral mucosa moist  Neck: Supple, no palpable mass, normal thyroid, trachea midline  Respiratory: Clear to auscultation, normal inspiratory effort  Cardiovascular: Regular rate, no murmur, no carotid bruit, no peripheral edema, no jugular venous distention  Gastrointestinal: Soft, nontender, no palpable mass, no hepatosplenomegaly  Lymphatics (palpable nodes):  cervical-negative, axillary 5 cm palpable firm mass in left axilla which is mobile, right axilla is normal,  inguinal-negative  Skin:  Warm, dry, no rash on visualized skin surfaces  Musculoskeletal: Symmetric strength, normal gait  Psychiatric: Alert and oriented ×3, normal affect     MELISSA QUINTANILLA M.D.

## 2018-08-01 ENCOUNTER — TELEPHONE (OUTPATIENT)
Dept: SURGERY | Facility: CLINIC | Age: 53
End: 2018-08-01

## 2018-08-01 ENCOUNTER — HOSPITAL ENCOUNTER (OUTPATIENT)
Dept: PHYSICAL THERAPY | Facility: HOSPITAL | Age: 53
Setting detail: THERAPIES SERIES
Discharge: HOME | End: 2018-08-01

## 2018-08-01 DIAGNOSIS — M54.6 ACUTE BILATERAL THORACIC BACK PAIN: Primary | ICD-10-CM

## 2018-08-01 PROCEDURE — 97110 THERAPEUTIC EXERCISES: CPT

## 2018-08-01 NOTE — TELEPHONE ENCOUNTER
Patient called and wanted to relay some information that he forgot to mention at this office visit. Patient reports being bit by a cat approx 8 years ago-he did not have any symptoms then but was wondering if cat scratch fever could be the cause of his enlarged lymph nodes. Is this a possibility?

## 2018-08-02 NOTE — THERAPY TREATMENT NOTE
"    Outpatient Physical Therapy Ortho Treatment Note  [facility]     Patient Name: Jose Maria Judge  : 1965  MRN: [id number]  Today's Date: 2018      Visit Date: 2018    Visit Dx:    ICD-10-CM ICD-9-CM   1. Acute bilateral thoracic back pain M54.6 724.1       Patient Active Problem List   Diagnosis   • Bilateral hip pain   • Right hip pain   • Left hip pain   • Trochanteric bursitis   • Tear of acetabular labrum   • Loss of hair   • Anal burning   • Anxiety   • Elevated blood pressure   • Hamstring strain   • Hypertension   • Radiculitis   • Hamstring muscle strain   • Rectal pain   • Herpes zoster   • Varicose veins of other specified sites        Past Medical History:   Diagnosis Date   • Fracture of ankle     Left   • Pudendal neuralgia         Past Surgical History:   Procedure Laterality Date   • ANAL FISTULA REPAIR N/A 2012, 10/2012   • FLEXIBLE SIGMOIDOSCOPY N/A 2003    Normal-Dr. Cezar Aviles   • HEMORRHOIDECTOMY N/A    • HIP ARTHROSCOPY W/ LABRAL REPAIR Right 2017    Dr. Lonnie Lemons   • TONSILLECTOMY Bilateral                              PT Assessment/Plan     Row Name 18 1500          PT Assessment    Assessment Comments Pt reports increased tightness and tension and discomfort after HEP so he decreased to every other day.  This pt is known to me and we have found that \"less is more\" certainly applies when strengthening to balance the line between progressing and over-extending.  Frequent reminders to keep intensity mild to moderate and never maximal.  Due to his pudendal neuralgia muscle irritation and pain is triggered at a lower level with a greater response, therefore we are keeping reps often below ten but may be able to increase sets as he feels ready.  We will assess response to new ther ex begun today and consider adding more to HEP.  He noted good response to SL thor rotation and radhames shoulder wall slides so these were added to HEP every other day.  " "-JA        PT Plan    PT Plan Comments assess response to last visit, cont posture and body mechanics work   Pt has pudendal neuralgia that has sensitized his general pain reaction to be lower--need to keep intensity and reps lower than typical and assess response next visit before increasing.  -JA       User Key  (r) = Recorded By, (t) = Taken By, (c) = Cosigned By    Initials Name Provider Type    Kirstin Chao, PT Physical Therapist                    Exercises     Row Name 08/01/18 1500             Subjective Comments    Subjective Comments I got more tension and tightness after the exercises.  I had to do them every other day.  My muscle memory is there with some of the exercises.    -JA         Total Minutes    48947 - PT Therapeutic Exercise Minutes 40  -JA         Exercise 1    Exercise Name 1 reverse shld rolls  -JA      Cueing 1 Verbal;Tactile;Demo  -JA      Reps 1 10  -JA      Additional Comments slow, small range, at mirror for visual feedback  -JA         Exercise 2    Exercise Name 2 scap ret w/straight elbows  -JA      Cueing 2 Verbal;Tactile  -JA      Reps 2 10  -JA         Exercise 3    Exercise Name 3 towel slide radhames shld flex on mirror  -JA      Cueing 3 Verbal;Demo  -JA      Reps 3 7  -JA      Additional Comments to tolerance  -JA         Exercise 4    Exercise Name 4 doorway pec stretch  -JA      Reps 4 4   2 with R foot through door, 2 with L  -JA      Time 4 20 sec  -JA      Additional Comments did a few rev rolls after to relax shlds  -JA         Exercise 5    Exercise Name 5 Lower trap lift off/stand at wall both arms in flexion alternating lifting one hand away 1\" then the other  -JA      Cueing 5 Demo;Verbal  -JA      Sets 5 1  -JA      Reps 5 5  -JA      Additional Comments too fatigued to do second set; more difficult on R  -JA         Exercise 6    Exercise Name 6 t.a. in hooklying  -JA      Cueing 6 Verbal  -JA      Reps 6 5  -JA      Time 6 3 sec  -JA         Exercise 7    " Exercise Name 7 elongation stretch radhames shoulder flex in supine  -JA      Cueing 7 Verbal  -JA      Reps 7 10  -JA      Time 7 3 sec  -JA         Exercise 8    Exercise Name 8 Shld Hz Abd in hooklying  -JA      Cueing 8 Verbal  -JA      Reps 8 10  -JA         Exercise 9    Exercise Name 9 SL thoracic rotation  -JA      Cueing 9 Verbal;Demo  -JA      Reps 9 10  -JA      Additional Comments cued to watch hand so that cervical spine stays with thoracic rot   -JA         Exercise 10    Exercise Name 10 Supine Serratus Punch  -JA      Cueing 10 Verbal;Demo  -JA      Reps 10 10  -JA      Time 10 3 sec  -JA      Additional Comments Alternate L/R  -JA        User Key  (r) = Recorded By, (t) = Taken By, (c) = Cosigned By    Initials Name Provider Type    Kirstin Chao, PT Physical Therapist                             Therapy Education  Education Details: Cues to keep intensity lower to reduce risk of increasing irritation/pain.   Given: HEP, Symptoms/condition management, Pain management, Posture/body mechanics  Program: Progressed  How Provided: Verbal, Demonstration, Written  Provided to: Patient  Level of Understanding: Teach back education performed              Time Calculation:   Start Time: 1445  Stop Time: 1530  Time Calculation (min): 45 min  Therapy Suggested Charges     Code   Minutes Charges    26292 (CPT®) Hc Pt Neuromusc Re Education Ea 15 Min      26056 (CPT®) Hc Pt Ther Proc Ea 15 Min 40 3    78089 (CPT®) Hc Gait Training Ea 15 Min      69998 (CPT®) Hc Pt Therapeutic Act Ea 15 Min      41189 (CPT®) Hc Pt Manual Therapy Ea 15 Min      24125 (CPT®) Hc Pt Ther Massage- Per 15 Min      39058 (CPT®) Hc Pt Iontophoresis Ea 15 Min      96423 (CPT®) Hc Pt Elec Stim Ea-Per 15 Min      25285 (CPT®) Hc Pt Ultrasound Ea 15 Min      18505 (CPT®) Hc Pt Self Care/Mgmt/Train Ea 15 Min      Total  40 3        Therapy Charges for Today     Code Description Service Date Service Provider Modifiers Qty    10932977104   PT THER PROC EA 15 MIN 8/1/2018 Amend, Kirstin AZUL, PT GP 3                    Kirstin AZUL. Chito, PT  8/2/2018

## 2018-08-06 ENCOUNTER — APPOINTMENT (OUTPATIENT)
Dept: PHYSICAL THERAPY | Facility: HOSPITAL | Age: 53
End: 2018-08-06

## 2018-08-08 ENCOUNTER — APPOINTMENT (OUTPATIENT)
Dept: PHYSICAL THERAPY | Facility: HOSPITAL | Age: 53
End: 2018-08-08

## 2018-08-10 ENCOUNTER — OFFICE (OUTPATIENT)
Dept: URBAN - METROPOLITAN AREA CLINIC 75 | Facility: CLINIC | Age: 53
End: 2018-08-10
Payer: COMMERCIAL

## 2018-08-10 VITALS
WEIGHT: 225 LBS | HEART RATE: 86 BPM | HEIGHT: 71 IN | SYSTOLIC BLOOD PRESSURE: 126 MMHG | DIASTOLIC BLOOD PRESSURE: 80 MMHG

## 2018-08-10 DIAGNOSIS — K62.89 OTHER SPECIFIED DISEASES OF ANUS AND RECTUM: ICD-10-CM

## 2018-08-10 DIAGNOSIS — K62.5 HEMORRHAGE OF ANUS AND RECTUM: ICD-10-CM

## 2018-08-10 DIAGNOSIS — K60.2 ANAL FISSURE, UNSPECIFIED: ICD-10-CM

## 2018-08-10 PROCEDURE — 99214 OFFICE O/P EST MOD 30 MIN: CPT

## 2018-08-14 ENCOUNTER — APPOINTMENT (OUTPATIENT)
Dept: PHYSICAL THERAPY | Facility: HOSPITAL | Age: 53
End: 2018-08-14

## 2018-08-14 ENCOUNTER — TELEPHONE (OUTPATIENT)
Dept: SURGERY | Facility: CLINIC | Age: 53
End: 2018-08-14

## 2018-08-14 ENCOUNTER — PREP FOR SURGERY (OUTPATIENT)
Dept: OTHER | Facility: HOSPITAL | Age: 53
End: 2018-08-14

## 2018-08-14 DIAGNOSIS — R59.9 LYMPH NODE ENLARGEMENT: Primary | ICD-10-CM

## 2018-08-14 RX ORDER — CEFAZOLIN SODIUM 2 G/100ML
2 INJECTION, SOLUTION INTRAVENOUS ONCE
Status: CANCELLED | OUTPATIENT
Start: 2018-08-14 | End: 2018-08-14

## 2018-08-14 NOTE — TELEPHONE ENCOUNTER
Mr Judge called stating he wanted to talk with Dr Nguyễn prior to scheduling his surgery.  Has multiple questions and concern about not being able to take pain med following surgery. Appt made for 8/20

## 2018-08-14 NOTE — TELEPHONE ENCOUNTER
I spoke with the patient and informed him of Dr. Nguyễn's response. Basically without removing the LN and submitting it for pathology, there can not be a definitive reasoning why the LN under the arm is swollen or if the swollen LN under the arm and the swollen LN in the pelvic region are related.  Patient verbalized understanding.

## 2018-08-14 NOTE — TELEPHONE ENCOUNTER
----- Message from Amando Nguyễn MD sent at 8/14/2018  8:13 AM EDT -----  Spoke with him regarding results of fine-needle aspiration performed at [facility].  Recommended proceeding with excision of left axillary lymph node.  All questions answered.  Please scheduled for excision of left axillary lymph node.

## 2018-08-14 NOTE — TELEPHONE ENCOUNTER
----- Message from Amando Nguyễn MD sent at 8/14/2018  6:21 AM EDT -----  Regarding: RE: additional questions  Contact: [phone]  1. We don't know, that's what we're hoping to find out with the pathology report after we remove it  2. Possibly, but we'll only know after we remove it  ----- Message -----  From: Margareth Eduardo MA  Sent: 8/13/2018  10:19 AM  To: Amando Nguyễn MD  Subject: additional questions                             Patient states he just spoke with you and has a few additional questions:  1.) What could have caused his LN to swell under arm?  2.) Is the swollen LN under his arm related to the swollen LN in the pelvic region?    Margareth

## 2018-08-15 ENCOUNTER — HOSPITAL ENCOUNTER (OUTPATIENT)
Dept: PHYSICAL THERAPY | Facility: HOSPITAL | Age: 53
Setting detail: THERAPIES SERIES
Discharge: HOME | End: 2018-08-15

## 2018-08-15 DIAGNOSIS — M54.6 ACUTE BILATERAL THORACIC BACK PAIN: Primary | ICD-10-CM

## 2018-08-15 DIAGNOSIS — M25.551 RIGHT HIP PAIN: ICD-10-CM

## 2018-08-15 DIAGNOSIS — R29.898 UPPER EXTREMITY WEAKNESS: ICD-10-CM

## 2018-08-15 PROCEDURE — 97110 THERAPEUTIC EXERCISES: CPT

## 2018-08-16 NOTE — THERAPY TREATMENT NOTE
Outpatient Physical Therapy Ortho Treatment Note  [facility]     Patient Name: Jose Maria Judge  : 1965  MRN: [id number]  Today's Date: 8/15/2018      Visit Date: 08/15/2018    Visit Dx:    ICD-10-CM ICD-9-CM   1. Acute bilateral thoracic back pain M54.6 724.1   2. Upper extremity weakness R29.898 729.89   3. Right hip pain M25.551 719.45       Patient Active Problem List   Diagnosis   • Bilateral hip pain   • Right hip pain   • Left hip pain   • Trochanteric bursitis   • Tear of acetabular labrum   • Loss of hair   • Anal burning   • Anxiety   • Elevated blood pressure   • Hamstring strain   • Hypertension   • Radiculitis   • Hamstring muscle strain   • Rectal pain   • Herpes zoster   • Varicose veins of other specified sites        Past Medical History:   Diagnosis Date   • Fracture of ankle     Left   • Pudendal neuralgia         Past Surgical History:   Procedure Laterality Date   • ANAL FISTULA REPAIR N/A 2012, 10/2012   • FLEXIBLE SIGMOIDOSCOPY N/A 2003    Normal-Dr. Cezar Aviles   • HEMORRHOIDECTOMY N/A    • HIP ARTHROSCOPY W/ LABRAL REPAIR Right 2017    Dr. Lonnie Lemons   • TONSILLECTOMY Bilateral                              PT Assessment/Plan     Row Name 08/15/18 1300          PT Assessment    Assessment Comments Mr. Judge reports the exercises have been helping him and notes improving tolerance to standing at the sink doing dishes. He was able to tolerate addition of light resistance to supine shld Hz Abd.  We are continuing to progress slowly due to pudendal neuralgia that is easily exacerbated.   -LISET        PT Plan    PT Plan Comments assess response to new ex's  -LISET       User Key  (r) = Recorded By, (t) = Taken By, (c) = Cosigned By    Initials Name Provider Type    Kirstin Chao, PT Physical Therapist                    Exercises     Row Name 08/15/18 1300             Subjective Comments    Subjective Comments Sometimes I wassh dishes and it's not hurting  "me.  -JA         Total Minutes    67393 - PT Therapeutic Exercise Minutes 40  -JA         Exercise 1    Exercise Name 1 reverse shld rolls  -JA      Cueing 1 Verbal;Tactile;Demo  -JA      Reps 1 10  -JA         Exercise 2    Exercise Name 2 scap ret w/straight elbows  -JA      Cueing 2 Verbal;Tactile  -JA      Reps 2 10  -JA         Exercise 3    Exercise Name 3 towel slide radhames shld flex on mirror  -JA      Cueing 3 Verbal;Demo  -JA      Reps 3 9  -JA         Exercise 4    Exercise Name 4 doorway pec stretch  -JA      Reps 4 2   2 with R foot through door, 2 with L  -JA      Time 4 20 sec  -JA         Exercise 5    Exercise Name 5 Lower trap lift off/stand at wall both arms in flexion alternating lifting one hand away 1\" then the other  -JA      Cueing 5 Demo;Verbal  -JA      Sets 5 1  -JA      Reps 5 9  -JA         Exercise 6    Exercise Name 6 t.a. in hooklying  -JA      Cueing 6 Verbal  -JA      Reps 6 10  -JA      Time 6 3 sec  -JA         Exercise 7    Exercise Name 7 elongation stretch radhames shoulder flex in supine  -JA      Cueing 7 Verbal  -JA      Reps 7 10  -JA      Time 7 3 sec  -JA         Exercise 8    Exercise Name 8 Shld Hz Abd in hooklying  -JA      Cueing 8 Verbal  -JA      Reps 8 10  -JA      Additional Comments progressed w/yellow tband  -JA         Exercise 9    Exercise Name 9 SL thoracic rotation  -JA      Cueing 9 Verbal;Demo  -JA      Reps 9 10  -JA      Additional Comments cued to watch hand so that cervical spine stays with thoracic rot   -JA         Exercise 10    Exercise Name 10 Supine Serratus Punch  -JA      Cueing 10 Verbal;Demo  -JA      Reps 10 10  -JA      Time 10 3 sec  -JA      Additional Comments Alternate L/R  -JA         Exercise 11    Exercise Name 11 Quadruped cat/camel  -JA      Reps 11 5  -JA         Exercise 12    Exercise Name 12 quadruped w/t.a.  -JA      Reps 12 5  -JA         Exercise 13    Exercise Name 13 prayer stretch  -JA      Reps 13 3  -JA         Exercise 14    " Exercise Name 14 supine ER w/t.a.  -LISET      Cueing 14 Verbal;Demo  -LISET      Reps 14 10  -JA      Additional Comments Yellow  -LISET         Exercise 15    Exercise Name 15 t.a. with alt LE's  -LISET      Reps 15 5  -JA      Additional Comments cued mild isometric  -LISET        User Key  (r) = Recorded By, (t) = Taken By, (c) = Cosigned By    Initials Name Provider Type    Kirstin Chao, PT Physical Therapist                               PT OP Goals     Row Name 08/15/18 1300          PT Short Term Goals    STG Date to Achieve 08/07/18  -LISET     STG 1 Patient will be independent and compliant with initial HEP   -LISET     STG 1 Progress Ongoing;Progressing  -LISET     STG 2 Pt will demonstrate correct posture in sitting and standing.  -LISET     STG 2 Progress Partially Met  -LISET     STG 2 Progress Comments observed correct standing posture, pt does not sit much due to pain  -LISET        Long Term Goals    LTG Date to Achieve 08/24/18  -LISET     LTG 1 Pt will be independent with advanced HEP for strengthening to increase functional mobility.  -LISET     LTG 1 Progress New  -LISET     LTG 2 Education for posture and body mechanics with home and work to reduce strain on back.  -LISET     LTG 2 Progress New  -LISET     LTG 3 Pt will score 48% on Oswestry Disability Index indicating decrease in perceived functional disability.   -LISET     LTG 3 Progress New  -LISET     LTG 4 Pt will be independent with self-management of symptoms.  -LISET     LTG 4 Progress New  -LISET       User Key  (r) = Recorded By, (t) = Taken By, (c) = Cosigned By    Initials Name Provider Type    Kirstin Chao, PT Physical Therapist                         Time Calculation:   Start Time: 1315  Stop Time: 1400  Time Calculation (min): 45 min  Therapy Suggested Charges     Code   Minutes Charges    08919 (CPT®) Hc Pt Neuromusc Re Education Ea 15 Min      69322 (CPT®) Hc Pt Ther Proc Ea 15 Min 40 3    36898 (CPT®) Hc Gait Training Ea 15 Min      20785 (CPT®) Hc Pt Therapeutic  Act Ea 15 Min      68303 (CPT®) Hc Pt Manual Therapy Ea 15 Min      98128 (CPT®) Hc Pt Ther Massage- Per 15 Min      15523 (CPT®) Hc Pt Iontophoresis Ea 15 Min      91314 (CPT®) Hc Pt Elec Stim Ea-Per 15 Min      94405 (CPT®) Hc Pt Ultrasound Ea 15 Min      19518 (CPT®) Hc Pt Self Care/Mgmt/Train Ea 15 Min      06464 (CPT®) Hc Pt Prosthetic (S) Train Initial Encounter, Each 15 Min      71356 (CPT®) Hc Orthotic(S) Mgmt/Train Initial Encounter, Each 15min      65607 (CPT®) Hc Pt Aquatic Therapy Ea 15 Min      34985 (CPT®) Hc Pt Orthotic(S)/Prosthetic(S) Encounter, Each 15 Min      Total  40 3        Therapy Charges for Today     Code Description Service Date Service Provider Modifiers Qty    81807392359 HC PT THER PROC EA 15 MIN 8/15/2018 Kirstin Dale, PT GP 3                    Kirstin Dale PT  8/15/2018

## 2018-08-20 ENCOUNTER — TRANSCRIBE ORDERS (OUTPATIENT)
Dept: SURGERY | Facility: CLINIC | Age: 53
End: 2018-08-20

## 2018-08-20 ENCOUNTER — APPOINTMENT (OUTPATIENT)
Dept: PHYSICAL THERAPY | Facility: HOSPITAL | Age: 53
End: 2018-08-20

## 2018-08-20 ENCOUNTER — OFFICE VISIT (OUTPATIENT)
Dept: SURGERY | Facility: CLINIC | Age: 53
End: 2018-08-20

## 2018-08-20 ENCOUNTER — TELEPHONE (OUTPATIENT)
Dept: SURGERY | Facility: CLINIC | Age: 53
End: 2018-08-20

## 2018-08-20 DIAGNOSIS — R59.9 LYMPH NODE ENLARGEMENT: Primary | ICD-10-CM

## 2018-08-20 DIAGNOSIS — G58.8 PUDENDAL NEURALGIA: Primary | ICD-10-CM

## 2018-08-20 PROCEDURE — 99214 OFFICE O/P EST MOD 30 MIN: CPT | Performed by: SURGERY

## 2018-08-20 RX ORDER — ACETAMINOPHEN 500 MG
500 TABLET ORAL EVERY 6 HOURS PRN
COMMUNITY
End: 2018-08-29 | Stop reason: SINTOL

## 2018-08-20 NOTE — PROGRESS NOTES
CC:  Follow-up enlarged left axillary lymph node    HPI:  53-year-old gentleman whom I saw initially on 7/31/2018 with 5 cm lymph node in the left axilla and I sent her for ultrasound guided biopsy.  Ultrasound-guided FNA was performed at [facility] on 8/9/2018 and pathology was not diagnostic of malignancy.  Although morphologic findings were compatible with reactive lymph node the possibility of lymphoma could not be excluded based on the limited tissue sampling.    PE:    Awake, alert  VS: Heart rate 70, weight 225 pounds, height 71 inches, BMI 31.4  Lymph nodes: 5 cm palpable mass in left axilla which remains mobile, no significant change from previous office visit    IMPRESSION & PLAN:  53-year-old gentleman with suspicious lymph node in left axilla for which I have recommended surgical excision.  He has significant anxiety about proceeding with surgical excision based on previous surgical intervention for his hip leading to significant constipation which markedly worsened his pudendal neuralgia.  He was previously referred to pain clinic for evaluation of this but was not satisfied with his experience at that pain clinic.  We discussed the following today:  -Use of MiraLAX starting day before surgical intervention and continuing for at least a week perioperatively to decrease the risk of constipation.  -Performing the surgery under monitored sedation to avoid the impact of general anesthetic and use of short and long-acting local anesthetic to decrease his need for narcotic pain medications perioperatively.  -Percocet has worked well for him in the past, he cannot tolerate hydrocodone.  We discussed using Percocet in limited amounts and nonnarcotic pain medication as an adjunct.  -Pain management appointment with different physician for second opinion regarding pudendal neuralgia-I made arrangements for him.  -Lastly, I discussed with him the option of waiting another month or so to see if there is any clinical  change in the size of this node that would allow us to perhaps avoid surgical intervention (he understands that I do not think that this is likely but in the worst case snare that this represents a lymphoma delaying an additional month should have no real medical impact on him).  Duration of office visit 25 minutes, 90% spent in counseling    Amando Nguyễn M.D.

## 2018-08-20 NOTE — TELEPHONE ENCOUNTER
Called pt informed of recommendation Dr Garcia office hrs 7:15-2:15 will call tomorrow to see about appt.will call pt with status.

## 2018-08-21 NOTE — TELEPHONE ENCOUNTER
That sounds good, just make sure he makes appointment to see me in about a month to make sure the mass is not getting much bigger

## 2018-08-21 NOTE — TELEPHONE ENCOUNTER
Dr. Garcia does not accept his ins.  I can have pt call his ins to see who is in network and schedule accordingly if that is ok and ask if they are able to see for an earlier date. If not please advise

## 2018-08-21 NOTE — TELEPHONE ENCOUNTER
Latex:  Patient denies allergy to latex.  Medications reviewed with patient.  Tobacco use verified.  Allergies verified.      THORACIC SPINE EVALUATION    CHIEF COMPLAINT:   Chief Complaint   Patient presents with   • Back Pain     NP back pain - BSP       HISTORY OF PRESENT ILLNESS    Occupation:         Current work status:  working regular duty  1.  When did the main problem begin?   4 years  2.  Describe the injury and/or pain or other complaints:  Shooting pain between shoulder blades and goes down back  3.  Symptoms:  pain  4.  Is there any other pertinent background information?   None  5.  Has the patient had any treatment yet?   medications including NSAID, acetaminophen for more than six weeks      REFERRING PHYSICIAN:  Dr Reeys  PMD: MARYANNE Navarro       PAST MEDICAL HISTORY  Past Medical History   Diagnosis Date   • ADHD (attention deficit hyperactivity disorder)    • Anxiety    • Depression    • Scoliosis      mild   • Tailbone injury        PAST SURGICAL HISTORY  Past Surgical History   Procedure Laterality Date   • Mouth surgery     • [place] tooth extraction  05/2016          Pt informed of recommendation to call ins for in network provider he said that he would just see  I called office to check if there was any way they could possibly see pt sooner as he was very anxious and having pain. Spoke to Flor she said that if pt wouldn't mind that she would be able to schedule with APRN in 2-3 weeks. I informed pt of this he stated he would do that but wanted to see Dr. Velez at some point I told him when they call with appt to ask their policy. He acknowledged understanding.

## 2018-08-22 ENCOUNTER — HOSPITAL ENCOUNTER (OUTPATIENT)
Dept: PHYSICAL THERAPY | Facility: HOSPITAL | Age: 53
Setting detail: THERAPIES SERIES
Discharge: HOME | End: 2018-08-22

## 2018-08-22 DIAGNOSIS — R29.898 UPPER EXTREMITY WEAKNESS: ICD-10-CM

## 2018-08-22 DIAGNOSIS — M54.6 ACUTE BILATERAL THORACIC BACK PAIN: Primary | ICD-10-CM

## 2018-08-22 PROCEDURE — 97110 THERAPEUTIC EXERCISES: CPT

## 2018-08-23 NOTE — THERAPY TREATMENT NOTE
Outpatient Physical Therapy Ortho Treatment Note  [facility]     Patient Name: Jose Maria Judge  : 1965  MRN: [id number]  Today's Date: 2018      Visit Date: 2018    Visit Dx:    ICD-10-CM ICD-9-CM   1. Acute bilateral thoracic back pain M54.6 724.1   2. Upper extremity weakness R29.898 729.89       Patient Active Problem List   Diagnosis   • Bilateral hip pain   • Right hip pain   • Left hip pain   • Trochanteric bursitis   • Tear of acetabular labrum   • Loss of hair   • Anal burning   • Anxiety   • Elevated blood pressure   • Hamstring strain   • Hypertension   • Radiculitis   • Hamstring muscle strain   • Rectal pain   • Herpes zoster   • Varicose veins of other specified sites        Past Medical History:   Diagnosis Date   • Fracture of ankle     Left   • Pudendal neuralgia         Past Surgical History:   Procedure Laterality Date   • ANAL FISTULA REPAIR N/A 2012, 10/2012   • FINE NEEDLE ASPIRATION Left 2018    Left axillary lymph node FNA   • FLEXIBLE SIGMOIDOSCOPY N/A 2003    Normal-Dr. Cezar Aviles   • HEMORRHOIDECTOMY N/A    • HIP ARTHROSCOPY W/ LABRAL REPAIR Right 2017    Dr. Lonnie Lemons   • TONSILLECTOMY Bilateral                              PT Assessment/Plan     Row Name 18 1400          PT Assessment    Assessment Comments Pt reported increased pudendal neuraliga in response to the new exercises.  We discussed that can happen as exercises affect the core and the close proximity to pelvic floor muscles.  Had him stop the new ex's (he had) and go back to previous program only.  -LISET        PT Plan    PT Plan Comments assess response  -LISET       User Key  (r) = Recorded By, (t) = Taken By, (c) = Cosigned By    Initials Name Provider Type    Kirstin Chao, PT Physical Therapist                    Exercises     Row Name 18 1400             Total Minutes    24645 - PT Therapeutic Exercise Minutes 32  -LISET         Exercise 1    Exercise  "Name 1 reverse shld rolls  -JA      Cueing 1 Verbal;Tactile;Demo  -JA      Reps 1 10  -JA         Exercise 2    Exercise Name 2 scap ret w/straight elbows  -JA      Cueing 2 Verbal;Tactile  -JA      Reps 2 10  -JA         Exercise 3    Exercise Name 3 towel slide radhames shld flex on mirror  -JA      Cueing 3 Verbal;Demo  -JA      Reps 3 8  -JA         Exercise 4    Exercise Name 4 doorway pec stretch  -JA      Reps 4 2   2 with R foot through door, 2 with L  -JA      Time 4 20 sec  -JA         Exercise 6    Exercise Name 6 t.a. in hooklying  -JA      Cueing 6 Verbal  -JA      Reps 6 10  -JA      Time 6 3 sec  -JA         Exercise 7    Exercise Name 7 elongation stretch radhames shoulder flex in supine  -JA      Cueing 7 Verbal  -JA      Reps 7 10  -JA      Time 7 3 sec  -JA         Exercise 8    Exercise Name 8 Shld Hz Abd in hooklying  -JA      Cueing 8 Verbal  -JA      Reps 8 10  -JA         Exercise 9    Exercise Name 9 SL thoracic rotation  -JA      Cueing 9 Verbal;Demo  -JA      Reps 9 10  -JA         Exercise 10    Exercise Name 10 Supine Serratus Punch  -JA      Cueing 10 Verbal;Demo  -JA      Reps 10 10  -JA      Time 10 3 sec  -JA        User Key  (r) = Recorded By, (t) = Taken By, (c) = Cosigned By    Initials Name Provider Type    Kirstin Chao, PT Physical Therapist                             Therapy Education  Education Details: Discussed stopping new ex's and when re-starting to reduce intensity and reps; discussed iiritability of nerve/neuralgia  Given: HEP, Symptoms/condition management, Pain management, Posture/body mechanics  Program: Modified  How Provided: Verbal, Demonstration  Provided to: Patient  Level of Understanding: Teach back education performed      gave pt name of MD who teaches mindfulness meditation and recommended short meditation exercises found on [employer] by \"[employer]         Time Calculation:   Start Time: 1445  Stop Time: 1515  Time Calculation (min): 30 " min  Therapy Suggested Charges     Code   Minutes Charges    08111 (CPT®) Hc Pt Neuromusc Re Education Ea 15 Min      10426 (CPT®) Hc Pt Ther Proc Ea 15 Min 32 2    39120 (CPT®) Hc Gait Training Ea 15 Min      38896 (CPT®) Hc Pt Therapeutic Act Ea 15 Min      03264 (CPT®) Hc Pt Manual Therapy Ea 15 Min      25295 (CPT®) Hc Pt Ther Massage- Per 15 Min      61076 (CPT®) Hc Pt Iontophoresis Ea 15 Min      78256 (CPT®) Hc Pt Elec Stim Ea-Per 15 Min      12084 (CPT®) Hc Pt Ultrasound Ea 15 Min      55403 (CPT®) Hc Pt Self Care/Mgmt/Train Ea 15 Min      02788 (CPT®) Hc Pt Prosthetic (S) Train Initial Encounter, Each 15 Min      31744 (CPT®) Hc Orthotic(S) Mgmt/Train Initial Encounter, Each 15min      35832 (CPT®) Hc Pt Aquatic Therapy Ea 15 Min      26983 (CPT®) Hc Pt Orthotic(S)/Prosthetic(S) Encounter, Each 15 Min      Total  32 2        Therapy Charges for Today     Code Description Service Date Service Provider Modifiers Qty    24760768762 HC PT THER PROC EA 15 MIN 8/22/2018 Kirstin Dale, PT GP 2                    Kirstin Dale PT  8/22/2018

## 2018-08-27 ENCOUNTER — TELEPHONE (OUTPATIENT)
Dept: SURGERY | Facility: CLINIC | Age: 53
End: 2018-08-27

## 2018-08-27 NOTE — TELEPHONE ENCOUNTER
I called the patient and advised per Dr. Nguyễn, that his appointment on Thursday September 6th is appropriate. The patient is very concerned and wants to know if there is something he should be doing in the mean time. Please advise.

## 2018-08-29 ENCOUNTER — OFFICE VISIT (OUTPATIENT)
Dept: PAIN MEDICINE | Facility: CLINIC | Age: 53
End: 2018-08-29

## 2018-08-29 ENCOUNTER — APPOINTMENT (OUTPATIENT)
Dept: PHYSICAL THERAPY | Facility: HOSPITAL | Age: 53
End: 2018-08-29

## 2018-08-29 VITALS
TEMPERATURE: 98.3 F | BODY MASS INDEX: 30.6 KG/M2 | WEIGHT: 218.6 LBS | HEART RATE: 77 BPM | HEIGHT: 71 IN | RESPIRATION RATE: 15 BRPM | OXYGEN SATURATION: 96 % | SYSTOLIC BLOOD PRESSURE: 115 MMHG | DIASTOLIC BLOOD PRESSURE: 85 MMHG

## 2018-08-29 DIAGNOSIS — G58.8 PUDENDAL NEURALGIA: ICD-10-CM

## 2018-08-29 DIAGNOSIS — G89.4 CHRONIC PAIN SYNDROME: Primary | ICD-10-CM

## 2018-08-29 LAB
POC AMPHETAMINES: NEGATIVE
POC BARBITURATES: NEGATIVE
POC BENZODIAZEPHINES: NEGATIVE
POC COCAINE: NEGATIVE
POC METHADONE: NEGATIVE
POC METHAMPHETAMINE SCREEN URINE: NEGATIVE
POC OPIATES: NEGATIVE
POC OXYCODONE: NEGATIVE
POC PHENCYCLIDINE: NEGATIVE
POC PROPOXYPHENE: NEGATIVE
POC THC: NEGATIVE
POC TRICYCLIC ANTIDEPRESSANTS: NEGATIVE

## 2018-08-29 PROCEDURE — 80305 DRUG TEST PRSMV DIR OPT OBS: CPT | Performed by: ANESTHESIOLOGY

## 2018-08-29 PROCEDURE — 99203 OFFICE O/P NEW LOW 30 MIN: CPT | Performed by: ANESTHESIOLOGY

## 2018-08-30 ENCOUNTER — APPOINTMENT (OUTPATIENT)
Dept: PHYSICAL THERAPY | Facility: HOSPITAL | Age: 53
End: 2018-08-30

## 2018-09-03 ENCOUNTER — RESULTS ENCOUNTER (OUTPATIENT)
Dept: PAIN MEDICINE | Facility: CLINIC | Age: 53
End: 2018-09-03

## 2018-09-03 DIAGNOSIS — G89.4 CHRONIC PAIN SYNDROME: ICD-10-CM

## 2018-09-03 DIAGNOSIS — G58.8 PUDENDAL NEURALGIA: ICD-10-CM

## 2018-09-05 ENCOUNTER — APPOINTMENT (OUTPATIENT)
Dept: PHYSICAL THERAPY | Facility: HOSPITAL | Age: 53
End: 2018-09-05

## 2018-09-06 ENCOUNTER — OFFICE VISIT (OUTPATIENT)
Dept: SURGERY | Facility: CLINIC | Age: 53
End: 2018-09-06

## 2018-09-06 VITALS — BODY MASS INDEX: 30.94 KG/M2 | WEIGHT: 221 LBS | HEART RATE: 75 BPM | OXYGEN SATURATION: 98 % | HEIGHT: 71 IN

## 2018-09-06 DIAGNOSIS — K62.5 RECTAL BLEEDING: Primary | ICD-10-CM

## 2018-09-06 PROCEDURE — 99212 OFFICE O/P EST SF 10 MIN: CPT | Performed by: SURGERY

## 2018-09-06 NOTE — PROGRESS NOTES
CC:  Rectal bleeding    HPI:  53-year-old gentleman who is scheduled later this month for axillary mass excision presents with 2 week history of bright red blood per rectum.  Sitz baths and MiraLAX use have resolved the symptoms.  He reports no new or unusual anal pain, no palpable mass.    PE:    Awake, alert  Abdomen: Soft and nontender  Rectal: Normal anal canal with no external anal abnormalities visible or palpable, declines digital exam due to discomfort he associates with pudendal neuralgia    IMPRESSION & PLAN:  Rectal bleeding of short duration which has resolved with conservative measures.  His last colonoscopy in 2013 was at [facility] and was normal per his report.  Recommended expectant observation for now with colonoscopy if bleeding recurs.

## 2018-09-12 ENCOUNTER — HOSPITAL ENCOUNTER (OUTPATIENT)
Dept: PHYSICAL THERAPY | Facility: HOSPITAL | Age: 53
Setting detail: THERAPIES SERIES
Discharge: HOME | End: 2018-09-12

## 2018-09-12 DIAGNOSIS — R29.898 UPPER EXTREMITY WEAKNESS: ICD-10-CM

## 2018-09-12 DIAGNOSIS — M54.6 ACUTE BILATERAL THORACIC BACK PAIN: Primary | ICD-10-CM

## 2018-09-12 PROCEDURE — 97110 THERAPEUTIC EXERCISES: CPT

## 2018-09-12 PROCEDURE — G8978 MOBILITY CURRENT STATUS: HCPCS

## 2018-09-12 PROCEDURE — G8979 MOBILITY GOAL STATUS: HCPCS

## 2018-09-12 NOTE — THERAPY PROGRESS REPORT/RE-CERT
Outpatient Physical Therapy Ortho Progress Note  [facility]     Patient Name: Jose Maria Judge  : 1965  MRN: [id number]  Today's Date: 2018      Visit Date: 2018    Patient Active Problem List   Diagnosis   • Bilateral hip pain   • Right hip pain   • Left hip pain   • Trochanteric bursitis   • Tear of acetabular labrum   • Loss of hair   • Anal burning   • Anxiety   • Elevated blood pressure   • Hamstring strain   • Hypertension   • Radiculitis   • Hamstring muscle strain   • Rectal pain   • Herpes zoster   • Varicose veins of other specified sites        Past Medical History:   Diagnosis Date   • Pudendal neuralgia         Past Surgical History:   Procedure Laterality Date   • ABSCESS DRAINAGE  2012   • ANAL FISTULA REPAIR N/A 2012, 10/2012   • COLONOSCOPY     • FINE NEEDLE ASPIRATION Left 2018    Left axillary lymph node FNA   • FLEXIBLE SIGMOIDOSCOPY N/A 2003    Normal-Dr. Cezar Aviles   • HEMORRHOIDECTOMY N/A    • HIP ARTHROSCOPY W/ LABRAL REPAIR Right 2017    Dr. Lonnie Lemons   • TONSILLECTOMY Bilateral        Visit Dx:     ICD-10-CM ICD-9-CM   1. Acute bilateral thoracic back pain M54.6 724.1   2. Upper extremity weakness R29.898 729.89                           Therapy Education  Education Details: Discussed increasing reps as able at home.  Pt not yet ready to progress tband due to symptoms however we were able to add new strengthening ex's and modified lower trap lift off to rolling ball up wall.  Given: HEP, Symptoms/condition management, Pain management, Posture/body mechanics  Program: Reinforced, Progressed  How Provided: Verbal, Demonstration  Provided to: Patient  Level of Understanding: Teach back education performed           PT OP Goals     Row Name 18 1300          PT Short Term Goals    STG Date to Achieve 18  -JA     STG 1 Patient will be independent and compliant with initial HEP   -JA     STG 1 Progress  Progressing;Partially Met  -LISET     STG 1 Progress Comments required tactile and verbal cuing  -     STG 2 Pt will demonstrate correct posture in sitting and standing.  -     STG 2 Progress Partially Met  -LISET        Long Term Goals    LTG Date to Achieve 08/24/18  -LISET     LTG 1 Pt will be independent with advanced HEP for strengthening to increase functional mobility.  -LISET     LTG 1 Progress Ongoing  -LISET     LTG 1 Progress Comments pain has limited pt, progression has been slow to avoid exacerbating his pain  -     LTG 2 Education for posture and body mechanics with home and work to reduce strain on back.  -     LTG 2 Progress Progressing  -     LTG 2 Progress Comments worked on body mechanics with ADL's such as doing laundry and other household chores  -     LTG 3 Pt will score 48% on Oswestry Disability Index indicating decrease in perceived functional disability.   -LISET     LTG 3 Progress New  -     LTG 4 Pt will be independent with self-management of symptoms.  -     LTG 4 Progress New  -       User Key  (r) = Recorded By, (t) = Taken By, (c) = Cosigned By    Initials Name Provider Type    Kirstin Chao, PT Physical Therapist                PT Assessment/Plan     Row Name 09/12/18 1600          PT Assessment    Functional Limitations Decreased safety during functional activities;Impaired gait;Limitation in home management;Performance in leisure activities;Performance in self-care ADL  -     Impairments Pain;Muscle strength;Posture;Poor body mechanics  -     Assessment Comments Mr Judge has been seen for a total of 5 visits for insidious onset of thoracic pain.  He has hx of pudendal neuralgia that is easily exacerbated by activity and this has caused his progression in therapy to be slow.  He does report that he had less pain over the last week during household ADL's however he is not able to tolerate more than 5-8 reps for strengthening with light or no resistance.  He would benefit  from continuing therapy 1x/week to gradually progress strength to facilitate return to prior level of function.  -LISET     Please refer to paper survey for additional self-reported information Yes  -LISET     Rehab Potential Good  -LISET     Patient/caregiver participated in establishment of treatment plan and goals Yes  -LISET     Patient would benefit from skilled therapy intervention Yes  -JA        PT Plan    PT Frequency 1x/week  -LISET     Predicted Duration of Therapy Intervention (Therapy Eval) 4 weeks  -LISET     Planned CPT's? PT THER PROC EA 15 MIN: 35381;PT THER ACT EA 15 MIN: 39547;PT MANUAL THERAPY EA 15 MIN: 78080;PT NEUROMUSC RE-EDUCATION EA 15 MIN: 64794;PT GAIT TRAINING EA 15 MIN: 62605;PT SELF CARE/HOME MGMT/TRAIN EA 15: 73054;PT HOT OR COLD PACK TREAT MCARE;PT ELECTRICAL STIM UNATTEND:   -LISET     PT Plan Comments assess response to new ex's; cont progressing but be aware of limits to avoid exacerbating pudendal neuralgia  -LISET       User Key  (r) = Recorded By, (t) = Taken By, (c) = Cosigned By    Initials Name Provider Type    Kirstin Chao, PT Physical Therapist                  Exercises     Row Name 09/12/18 1300             Subjective Comments    Subjective Comments I found I had pan after exrcising but day after that it was okay.  -LISET         Subjective Pain    Able to rate subjective pain? yes  -LISET      Pre-Treatment Pain Level 2  -LISET         Total Minutes    77575 - PT Therapeutic Exercise Minutes 45  -JA         Exercise 1    Exercise Name 1 reverse shld rolls  -JA      Cueing 1 Verbal;Tactile;Demo  -JA      Reps 1 10  -JA         Exercise 2    Exercise Name 2 scap ret w/straight elbows  -JA      Cueing 2 Verbal;Tactile  -JA      Reps 2 10  -JA         Exercise 3    Exercise Name 3 towel slide radhames shld flex on mirror  -JA      Cueing 3 Verbal;Demo  -JA      Reps 3 8  -JA         Exercise 4    Exercise Name 4 doorway pec stretch  -JA      Reps 4 2   2 with R foot through door, 2 with L  -JA       Time 4 20 sec  -JA         Exercise 5    Exercise Name 5 rolling ball up wall   small green ball  -JA      Reps 5 5  -JA      Additional Comments alternating hands to roll ball  -JA         Exercise 6    Exercise Name 6 t.a. in hooklying  -JA      Cueing 6 Verbal  -JA      Reps 6 10  -JA      Time 6 3 sec  -JA         Exercise 7    Exercise Name 7 elongation stretch radhames shoulder flex in supine  -JA      Cueing 7 Verbal  -JA      Reps 7 10  -JA      Time 7 3 sec  -JA         Exercise 8    Exercise Name 8 Shld Hz Abd in hooklying  -JA      Cueing 8 Verbal  -JA      Reps 8 5  -JA      Additional Comments YTB, reports he cannot pull full range b/c it increases his pain  -JA         Exercise 9    Exercise Name 9 SL thoracic rotation  -JA      Cueing 9 Verbal;Demo  -JA      Sets 9 1 set ea R/L  -JA      Reps 9 5  -JA         Exercise 10    Exercise Name 10 Supine Serratus Punch  -JA      Cueing 10 Verbal;Demo  -JA      Reps 10 10  -JA      Time 10 3 sec  -JA      Additional Comments Alternate L/R  -JA         Exercise 11    Exercise Name 11 supine ER w/tband  -JA      Cueing 11 Verbal;Demo  -JA      Reps 11 5  -JA      Additional Comments yellow; pt reports 5 was mx he could do without flaring his pain  -JA         Exercise 12    Exercise Name 12 LTR  -JA      Reps 12 5  -JA      Additional Comments small range  -JA         Exercise 13    Exercise Name 13 pt c/o foot/ankle bothering him, discussion of basic ankle circles, towel scrunching and alphabet.  -JA        User Key  (r) = Recorded By, (t) = Taken By, (c) = Cosigned By    Initials Name Provider Type    Kirstin Chao, PT Physical Therapist                        Outcome Measure Options: Bulmaro Sweet         Time Calculation:     Therapy Suggested Charges     Code   Minutes Charges    40032 (CPT®) Hc Pt Neuromusc Re Education Ea 15 Min      10698 (CPT®) Hc Pt Ther Proc Ea 15 Min 45 3    91321 (CPT®) Hc Gait Training Ea 15 Min      35304 (CPT®) Hc Pt  Therapeutic Act Ea 15 Min      32909 (CPT®) Hc Pt Manual Therapy Ea 15 Min      29358 (CPT®) Hc Pt Ther Massage- Per 15 Min      42083 (CPT®) Hc Pt Iontophoresis Ea 15 Min      23537 (CPT®) Hc Pt Elec Stim Ea-Per 15 Min      99095 (CPT®) Hc Pt Ultrasound Ea 15 Min      21808 (CPT®) Hc Pt Self Care/Mgmt/Train Ea 15 Min      96213 (CPT®) Hc Pt Prosthetic (S) Train Initial Encounter, Each 15 Min      40157 (CPT®) Hc Orthotic(S) Mgmt/Train Initial Encounter, Each 15min      87754 (CPT®) Hc Pt Aquatic Therapy Ea 15 Min      31522 (CPT®) Hc Pt Orthotic(S)/Prosthetic(S) Encounter, Each 15 Min      Total  45 3          Start Time: 1315  Stop Time: 1400  Time Calculation (min): 45 min     Therapy Charges for Today     Code Description Service Date Service Provider Modifiers Qty    03177619314 HC PT MOBILITY CURRENT 9/12/2018 Kirstin Dale, PT GP, CL 1    93579052913 HC PT MOBILITY PROJECTED 9/12/2018 Kirstin Dale, PT GP, CJ 1    89353582553 HC PT THER PROC EA 15 MIN 9/12/2018 Kirstin Dale, PT GP 3          PT G-Codes  PT Professional Judgement Used?: Yes  Outcome Measure Options: Bulmaro Sweet  Functional Limitation: Mobility: Walking and moving around  Mobility: Walking and Moving Around Current Status (): At least 60 percent but less than 80 percent impaired, limited or restricted  Mobility: Walking and Moving Around Goal Status (): At least 20 percent but less than 40 percent impaired, limited or restricted         Kirstin Dale PT  9/12/2018

## 2018-09-18 ENCOUNTER — OFFICE VISIT (OUTPATIENT)
Dept: SPORTS MEDICINE | Facility: CLINIC | Age: 53
End: 2018-09-18

## 2018-09-18 VITALS
SYSTOLIC BLOOD PRESSURE: 106 MMHG | WEIGHT: 224 LBS | DIASTOLIC BLOOD PRESSURE: 68 MMHG | HEIGHT: 71 IN | HEART RATE: 73 BPM | BODY MASS INDEX: 31.36 KG/M2 | TEMPERATURE: 98.7 F | OXYGEN SATURATION: 98 %

## 2018-09-18 DIAGNOSIS — M79.671 RIGHT FOOT PAIN: Primary | ICD-10-CM

## 2018-09-18 PROCEDURE — 99214 OFFICE O/P EST MOD 30 MIN: CPT | Performed by: FAMILY MEDICINE

## 2018-09-18 PROCEDURE — 73630 X-RAY EXAM OF FOOT: CPT | Performed by: FAMILY MEDICINE

## 2018-09-18 NOTE — PROGRESS NOTES
"Jose Maria is a 53 y.o. year old male    Chief Complaint   Patient presents with   • Right Foot - Pain, Numbness, Edema       History of Present Illness   HPI   Patient has had a 20 year history of bilateral foot pain.  Over the past several months he has noted increased pain in particular over the right distal lateral forefoot.  Also periods of numbness and tingling between the web space.  He has not been able to wear regular shoes for the past 4 years.  He has tried numerous pairs of shoes which are just all too uncomfortable.  Patient also has a history of significant varicosities bilaterally.  He has tried to wear medium compression stockings.    I have reviewed the patient's medical, family, and social history in detail and updated the computerized patient record.    Review of Systems   Constitutional: Negative for fever.   Skin: Negative for wound.   Neurological: Negative for numbness.   All other systems reviewed and are negative.      /68 (BP Location: Right arm, Patient Position: Sitting, Cuff Size: Large Adult)   Pulse 73   Temp 98.7 °F (37.1 °C) (Oral)   Ht 180.3 cm (70.98\")   Wt 102 kg (224 lb)   SpO2 98%   BMI 31.26 kg/m²      Physical Exam   Constitutional: He is oriented to person, place, and time. He appears well-developed and well-nourished.   HENT:   Head: Normocephalic and atraumatic.   Eyes: Pupils are equal, round, and reactive to light. Conjunctivae and EOM are normal.   Cardiovascular:   No peripheral edema   Pulmonary/Chest: Effort normal.   Musculoskeletal:   Bilateral lower extremities with significant varicosities, also spider veins around the ankles.  Patient has some lateral laxity bilateral ankles.  No effusion.  There is trace of soft tissue edema that is pitting around the ankles.  Examination of the foot otherwise is normal in general appearance.  Patient has tenderness between the fourth webspace and compression here causes paresthesias into the webspace.  He does have mild " pes planus and hindfoot valgus.   Neurological: He is alert and oriented to person, place, and time.   Skin: Skin is warm and dry.   Psychiatric: He has a normal mood and affect. His behavior is normal.   Vitals reviewed.  Right Foot X-Ray  Indication: Pain  AP, Lateral, and Oblique views    Findings:  No fracture  No bony lesion  Normal soft tissues  Mild arthritis changes around the first MTP    No prior studies were available for comparison.       Diagnoses and all orders for this visit:    Right foot pain  -     XR Foot 3+ View Right    Other orders  -     hydrocortisone (ANUSOL-HC) 2.5 % rectal cream;        Patient appears to have possibility of the beginnings of a neuroma in the fourth interdigital space.  He also has some discomfort around the fourth and fifth metatarsal heads.  He has some bilateral ankle laxity.  He also has significant varicosities with trace edema.  I have given him a set of home exercises to see if we can strengthen his ankles but also the intrinsic foot muscles as well.  We'll also try metatarsal pad to see if it is helpful.  If no significant improvement then may need to be referred to podiatry.      EMR Dragon/Transcription disclaimer:    Much of this encounter note is an electronic transcription/translation of spoken language to printed text.  The electronic translation of spoken language may permit erroneous, or at times, nonsensical words or phrases to be inadvertently transcribed.  Although I have reviewed the note for such errors some may still exist.

## 2018-09-24 ENCOUNTER — OFFICE VISIT (OUTPATIENT)
Dept: SURGERY | Facility: CLINIC | Age: 53
End: 2018-09-24

## 2018-09-24 DIAGNOSIS — R59.9 LYMPH NODE ENLARGEMENT: Primary | ICD-10-CM

## 2018-09-24 PROCEDURE — 99212 OFFICE O/P EST SF 10 MIN: CPT | Performed by: SURGERY

## 2018-09-24 NOTE — PROGRESS NOTES
CC:  Follow-up left axillary adenopathy    HPI:  He has noted no significant change on self-examination.    ROS:  Negative for fever or chills    PE:    Awake, alert  Lymphatic: In the left axilla he has what now appears to be a 3-4 cm palpable movable probable lymph node, right axilla is free of palpable adenopathy, no cervical adenopathy    IMPRESSION & PLAN:  53-year-old gentleman with large left axillary lymph node which on previous FNA was benign and on current physical examination appears to be decreasing in size.  I've scheduled him for an ultrasound of the left axilla to evaluate more objectively and if it is indeed decreased from previous measurements than follow-up ultrasound in 2-3 months would also be in order.

## 2018-09-26 ENCOUNTER — HOSPITAL ENCOUNTER (OUTPATIENT)
Dept: PHYSICAL THERAPY | Facility: HOSPITAL | Age: 53
Setting detail: THERAPIES SERIES
Discharge: HOME | End: 2018-09-26

## 2018-09-26 DIAGNOSIS — R29.898 UPPER EXTREMITY WEAKNESS: ICD-10-CM

## 2018-09-26 DIAGNOSIS — M54.6 ACUTE BILATERAL THORACIC BACK PAIN: Primary | ICD-10-CM

## 2018-09-26 PROCEDURE — 97110 THERAPEUTIC EXERCISES: CPT

## 2018-09-26 NOTE — THERAPY TREATMENT NOTE
Outpatient Physical Therapy Ortho Treatment Note  [facility]     Patient Name: Jose Maria Judge  : 1965  MRN: [id number]  Today's Date: 2018      Visit Date: 2018    Visit Dx:    ICD-10-CM ICD-9-CM   1. Acute bilateral thoracic back pain M54.6 724.1   2. Upper extremity weakness R29.898 729.89       Patient Active Problem List   Diagnosis   • Bilateral hip pain   • Right hip pain   • Left hip pain   • Trochanteric bursitis   • Tear of acetabular labrum   • Loss of hair   • Anal burning   • Anxiety   • Elevated blood pressure   • Hamstring strain   • Hypertension   • Radiculitis   • Hamstring muscle strain   • Rectal pain   • Herpes zoster   • Varicose veins of other specified sites        Past Medical History:   Diagnosis Date   • Acid reflux    • Pudendal neuralgia         Past Surgical History:   Procedure Laterality Date   • ABSCESS DRAINAGE  2012   • ANAL FISTULA REPAIR N/A 2012, 10/2012   • COLONOSCOPY N/A     done at [facility]   • FINE NEEDLE ASPIRATION Left 2018    Left axillary lymph node FNA   • FLEXIBLE SIGMOIDOSCOPY N/A 2003    Normal-Dr. Cezar Aviles   • HEMORRHOIDECTOMY N/A    • HIP ARTHROSCOPY W/ LABRAL REPAIR Right 2017    Dr. Lonnie Lemons   • TONSILLECTOMY Bilateral                              PT Assessment/Plan     Row Name 18 1417          PT Assessment    Assessment Comments Pt reports the gentle beginner exercises have been the best and most helpful.  He is able to tolerate HEP every other day without it flaring up.  Observed improved ease with SL trunk rotation and hz abd in supine.  Performing his ther ex every other day has worked the best in preventing exacerbation of his neuralgia as well as thoracic pain.  He stated several times that he can now tell therapy is helping.  -LISET        PT Plan    PT Plan Comments assess response to chin tuck, cont slow/gradual progression for strengthening, educate for body mechanics  -LISET        User Key  (r) = Recorded By, (t) = Taken By, (c) = Cosigned By    Initials Name Provider Type    Kirstin Chao, PT Physical Therapist                    Exercises     Row Name 09/26/18 1400             Subjective Comments    Subjective Comments I saw a special on a football player I admired who is having severe arthritis and going through something as bad as me.  It helped me to see that.  I used to be so strong.  -JA         Subjective Pain    Able to rate subjective pain? yes  -JA      Pre-Treatment Pain Level 3  -JA      Subjective Pain Comment if not moving doesn't hurt  -JA         Total Minutes    25769 - PT Therapeutic Exercise Minutes 43  -JA         Exercise 1    Exercise Name 1 reverse shld rolls  -JA      Cueing 1 Verbal;Tactile;Demo  -JA      Reps 1 10  -JA         Exercise 2    Exercise Name 2 scap ret w/straight elbows  -JA      Cueing 2 Verbal;Tactile  -JA      Reps 2 10  -JA         Exercise 3    Exercise Name 3 towel slide radhames shld flex on mirror  -JA      Cueing 3 Verbal;Demo  -JA      Reps 3 8  -JA         Exercise 4    Exercise Name 4 doorway pec stretch  -JA      Reps 4 2  -JA      Time 4 20sec  -JA         Exercise 5    Exercise Name 5 rolling ball up wall   small green ball  -JA      Reps 5 8  -JA      Additional Comments alternating hands to roll ball  -JA         Exercise 6    Exercise Name 6 t.a. in hooklying  -JA      Cueing 6 Verbal  -JA      Reps 6 10  -JA      Time 6 3 sec  -JA         Exercise 7    Exercise Name 7 elongation stretch radhames shoulder flex in supine  -JA      Cueing 7 Verbal  -JA      Reps 7 10  -JA      Time 7 3 sec  -JA         Exercise 8    Exercise Name 8 Shld Hz Abd in hooklying  -JA      Cueing 8 Verbal  -JA      Reps 8 5  -JA         Exercise 9    Exercise Name 9 SL thoracic rotation  -JA      Cueing 9 Verbal;Demo  -JA      Sets 9 1 set ea R/L  -JA      Reps 9 5  -JA         Exercise 10    Exercise Name 10 Supine Serratus Punch  -JA      Cueing 10  Verbal;Demo  -JA      Reps 10 10  -JA      Time 10 3 sec  -JA         Exercise 11    Exercise Name 11 supine ER w/tband  -JA      Cueing 11 Verbal;Demo  -JA      Additional Comments stopped due to increased pain  -JA         Exercise 12    Exercise Name 12 LTR  -JA      Reps 12 5  -JA      Additional Comments small range  -JA         Exercise 13    Exercise Name 13 added chin tucks in supine  -JA      Reps 13 10  -JA        User Key  (r) = Recorded By, (t) = Taken By, (c) = Cosigned By    Initials Name Provider Type    Kirstin Chao, PT Physical Therapist                               PT OP Goals     Row Name 09/26/18 1600          PT Short Term Goals    STG Date to Achieve 08/07/18  -LISET     STG 1 Patient will be independent and compliant with initial HEP   -LISET     STG 1 Progress Met  -     STG 1 Progress Comments pt demonstrates good understanding of ex's and appropriate frequency  -LISET     STG 2 Pt will demonstrate correct posture in sitting and standing.  -LISET     STG 2 Progress Partially Met  -     STG 2 Progress Comments observed pt with correct posture 75% of time in clinic  -        Long Term Goals    LTG Date to Achieve 08/24/18  -LISET     LTG 1 Pt will be independent with advanced HEP for strengthening to increase functional mobility.  -LISET     LTG 1 Progress Ongoing  -LISET     LTG 2 Education for posture and body mechanics with home and work to reduce strain on back.  -LISET     LTG 2 Progress Progressing  -LISET     LTG 3 Pt will score 48% on Oswestry Disability Index indicating decrease in perceived functional disability.   -LISET     LTG 3 Progress Ongoing  -     LTG 4 Pt will be independent with self-management of symptoms.  -LISET     LTG 4 Progress Ongoing  -       User Key  (r) = Recorded By, (t) = Taken By, (c) = Cosigned By    Initials Name Provider Type    Kirstin Chao, PT Physical Therapist          Therapy Education  Education Details: added chin tuck  Given: Posture/body  mechanics  Program: Reinforced  How Provided: Demonstration, Verbal  Provided to: Patient  Level of Understanding: Teach back education performed              Time Calculation:   Start Time: 1400  Stop Time: 1445  Time Calculation (min): 45 min  Therapy Suggested Charges     Code   Minutes Charges    59420 (CPT®) Hc Pt Neuromusc Re Education Ea 15 Min      83465 (CPT®) Hc Pt Ther Proc Ea 15 Min 43 3    17411 (CPT®) Hc Gait Training Ea 15 Min      10971 (CPT®) Hc Pt Therapeutic Act Ea 15 Min      90058 (CPT®) Hc Pt Manual Therapy Ea 15 Min      07671 (CPT®) Hc Pt Ther Massage- Per 15 Min      87909 (CPT®) Hc Pt Iontophoresis Ea 15 Min      05181 (CPT®) Hc Pt Elec Stim Ea-Per 15 Min      31551 (CPT®) Hc Pt Ultrasound Ea 15 Min      64269 (CPT®) Hc Pt Self Care/Mgmt/Train Ea 15 Min      38518 (CPT®) Hc Pt Prosthetic (S) Train Initial Encounter, Each 15 Min      53714 (CPT®) Hc Orthotic(S) Mgmt/Train Initial Encounter, Each 15min      68761 (CPT®) Hc Pt Aquatic Therapy Ea 15 Min      77392 (CPT®) Hc Pt Orthotic(S)/Prosthetic(S) Encounter, Each 15 Min      Total  43 3        Therapy Charges for Today     Code Description Service Date Service Provider Modifiers Qty    87299348711 HC PT THER PROC EA 15 MIN 9/26/2018 Kirstin Dale, PT GP 3                    Kirstin Dale PT  9/26/2018

## 2018-10-01 ENCOUNTER — HOSPITAL ENCOUNTER (OUTPATIENT)
Dept: ULTRASOUND IMAGING | Facility: HOSPITAL | Age: 53
Discharge: HOME | End: 2018-10-01
Attending: SURGERY | Admitting: SURGERY

## 2018-10-01 DIAGNOSIS — R59.9 LYMPH NODE ENLARGEMENT: ICD-10-CM

## 2018-10-01 PROCEDURE — 76882 US LMTD JT/FCL EVL NVASC XTR: CPT

## 2018-10-03 ENCOUNTER — PREP FOR SURGERY (OUTPATIENT)
Dept: OTHER | Facility: HOSPITAL | Age: 53
End: 2018-10-03

## 2018-10-03 ENCOUNTER — APPOINTMENT (OUTPATIENT)
Dept: PHYSICAL THERAPY | Facility: HOSPITAL | Age: 53
End: 2018-10-03

## 2018-10-03 DIAGNOSIS — R59.9 LYMPH NODE ENLARGEMENT: Primary | ICD-10-CM

## 2018-10-03 RX ORDER — CEFAZOLIN SODIUM 2 G/100ML
2 INJECTION, SOLUTION INTRAVENOUS ONCE
Status: CANCELLED | OUTPATIENT
Start: 2018-10-26 | End: 2018-10-26

## 2018-10-04 PROBLEM — R59.9 LYMPH NODE ENLARGEMENT: Status: ACTIVE | Noted: 2018-10-04

## 2018-10-10 ENCOUNTER — OFFICE VISIT (OUTPATIENT)
Dept: PAIN MEDICINE | Facility: CLINIC | Age: 53
End: 2018-10-10

## 2018-10-10 ENCOUNTER — HOSPITAL ENCOUNTER (OUTPATIENT)
Dept: PHYSICAL THERAPY | Facility: HOSPITAL | Age: 53
Setting detail: THERAPIES SERIES
Discharge: HOME | End: 2018-10-10

## 2018-10-10 VITALS
HEIGHT: 71 IN | HEART RATE: 70 BPM | OXYGEN SATURATION: 96 % | SYSTOLIC BLOOD PRESSURE: 119 MMHG | BODY MASS INDEX: 31.22 KG/M2 | TEMPERATURE: 98.1 F | RESPIRATION RATE: 18 BRPM | WEIGHT: 223 LBS | DIASTOLIC BLOOD PRESSURE: 74 MMHG

## 2018-10-10 DIAGNOSIS — G58.8 PUDENDAL NEURALGIA: Primary | ICD-10-CM

## 2018-10-10 DIAGNOSIS — G89.4 CHRONIC PAIN SYNDROME: ICD-10-CM

## 2018-10-10 DIAGNOSIS — M54.6 ACUTE BILATERAL THORACIC BACK PAIN: Primary | ICD-10-CM

## 2018-10-10 DIAGNOSIS — R29.898 UPPER EXTREMITY WEAKNESS: ICD-10-CM

## 2018-10-10 PROCEDURE — 97110 THERAPEUTIC EXERCISES: CPT

## 2018-10-10 PROCEDURE — 99213 OFFICE O/P EST LOW 20 MIN: CPT | Performed by: ANESTHESIOLOGY

## 2018-10-10 NOTE — THERAPY TREATMENT NOTE
Outpatient Physical Therapy Ortho Treatment Note  [facility]     Patient Name: Jose Maria Judge  : 1965  MRN: [id number]  Today's Date: 10/10/2018      Visit Date: 10/10/2018    Visit Dx:    ICD-10-CM ICD-9-CM   1. Acute bilateral thoracic back pain M54.6 724.1   2. Upper extremity weakness R29.898 729.89       Patient Active Problem List   Diagnosis   • Bilateral hip pain   • Right hip pain   • Left hip pain   • Trochanteric bursitis   • Tear of acetabular labrum   • Loss of hair   • Anal burning   • Anxiety   • Elevated blood pressure   • Hamstring strain   • Hypertension   • Radiculitis   • Hamstring muscle strain   • Rectal pain   • Herpes zoster   • Varicose veins of other specified sites   • Lymph node enlargement        Past Medical History:   Diagnosis Date   • Acid reflux    • Pudendal neuralgia         Past Surgical History:   Procedure Laterality Date   • ABSCESS DRAINAGE  2012   • ANAL FISTULA REPAIR N/A 2012, 10/2012   • COLONOSCOPY N/A     done at [facility]   • FINE NEEDLE ASPIRATION Left 2018    Left axillary lymph node FNA   • FLEXIBLE SIGMOIDOSCOPY N/A 2003    Normal-Dr. Cezar Aviles   • HEMORRHOIDECTOMY N/A    • HIP ARTHROSCOPY W/ LABRAL REPAIR Right 2017    Dr. Lonnie Lemons   • TONSILLECTOMY Bilateral                              PT Assessment/Plan     Row Name 10/10/18 1600          PT Assessment    Assessment Comments Pt notes an exacerbation of pudendal neuralgia with rolling ball on wall exercise so he discontinued that.  He did tolerate supine heel slide and DLS balance ex (both for gentle core strengthening).  He notes doing the exercises every other day has been good although he missed a couple days due to other issues going on in life right now.    -JA        PT Plan    PT Plan Comments assess response to heel slides, cont carefull progression of core   -JA       User Key  (r) = Recorded By, (t) = Taken By, (c) = Cosigned By    Initials  Name Provider Type    Kirstin Chao, PT Physical Therapist                    Exercises     Row Name 10/10/18 1000             Subjective Comments    Subjective Comments The ball rolling on the wall caused my pudendal neuralgia to flare up .  -JA         Subjective Pain    Able to rate subjective pain? yes  -JA      Pre-Treatment Pain Level 3  -JA         Total Minutes    05698 - PT Therapeutic Exercise Minutes 40  -JA         Exercise 1    Exercise Name 1 reverse shld rolls  -JA      Cueing 1 Verbal;Tactile;Demo  -JA      Reps 1 10  -JA         Exercise 2    Exercise Name 2 scap ret w/straight elbows  -JA      Cueing 2 Verbal;Tactile  -JA      Reps 2 10  -JA         Exercise 3    Exercise Name 3 towel slide radhames shld flex on mirror  -JA      Cueing 3 Verbal;Demo  -JA      Reps 3 8  -JA         Exercise 4    Exercise Name 4 doorway pec stretch  -JA      Reps 4 2  -JA      Time 4 20sec  -JA      Additional Comments also stretched shld singly  -JA         Exercise 5    Exercise Name 5 rolling ball up wall   small green ball  -JA      Additional Comments pt reports having increased pudendal neuralgia pain after this exercise  -JA         Exercise 6    Exercise Name 6 t.a. in hooklying  -JA      Cueing 6 Verbal  -JA      Additional Comments stopped due to fear of exacerbating pudendal neuralgia pain  -JA         Exercise 7    Exercise Name 7 elongation stretch radhames shoulder flex in supine  -JA      Cueing 7 Verbal  -JA      Reps 7 10  -JA      Time 7 3 sec  -JA         Exercise 8    Exercise Name 8 Shld Hz Abd in hooklying  -JA      Cueing 8 Verbal  -JA      Reps 8 5  -JA         Exercise 9    Exercise Name 9 SL thoracic rotation  -JA      Cueing 9 Verbal;Demo  -JA      Sets 9 1 set ea R/L  -JA      Reps 9 5  -JA         Exercise 10    Exercise Name 10 Supine Serratus Punch  -JA      Cueing 10 Verbal;Demo  -JA      Reps 10 10  -JA      Time 10 3 sec  -JA         Exercise 11    Exercise Name 11 standing radhames ER no  tband  -JA      Cueing 11 Verbal;Demo  -JA      Reps 11 10  -JA         Exercise 12    Exercise Name 12 LTR  -JA      Reps 12 5  -JA      Additional Comments small range  -JA         Exercise 13    Exercise Name 13 chin tucks in supine  -JA      Reps 13 10  -JA         Exercise 14    Exercise Name 14 hooklying heel slide  -JA      Reps 14 5  -JA      Additional Comments had to use black wedge to slide--pt noted no increase in pain  -JA         Exercise 15    Exercise Name 15 DLS balance--with head turns  -JA      Sets 15 4  -JA      Time 15 20 sec  -JA      Additional Comments to engage core in gentle way  -JA        User Key  (r) = Recorded By, (t) = Taken By, (c) = Cosigned By    Initials Name Provider Type    Kirstin Chao, PT Physical Therapist                             Therapy Education  Education Details: heel slides and DLS  Given: HEP, Symptoms/condition management, Posture/body mechanics, Pain management  Program: Progressed  How Provided: Verbal, Demonstration, Written  Provided to: Patient  Level of Understanding: Teach back education performed              Time Calculation:   Start Time: 1000  Stop Time: 1040  Time Calculation (min): 40 min  Therapy Suggested Charges     Code   Minutes Charges    33455 (CPT®) Hc Pt Neuromusc Re Education Ea 15 Min      29365 (CPT®) Hc Pt Ther Proc Ea 15 Min 40 3    82438 (CPT®) Hc Gait Training Ea 15 Min      04173 (CPT®) Hc Pt Therapeutic Act Ea 15 Min      00498 (CPT®) Hc Pt Manual Therapy Ea 15 Min      26816 (CPT®) Hc Pt Ther Massage- Per 15 Min      32124 (CPT®) Hc Pt Iontophoresis Ea 15 Min      04543 (CPT®) Hc Pt Elec Stim Ea-Per 15 Min      14448 (CPT®) Hc Pt Ultrasound Ea 15 Min      03931 (CPT®) Hc Pt Self Care/Mgmt/Train Ea 15 Min      83860 (CPT®) Hc Pt Prosthetic (S) Train Initial Encounter, Each 15 Min      38539 (CPT®) Hc Orthotic(S) Mgmt/Train Initial Encounter, Each 15min      47911 (CPT®) Hc Pt Aquatic Therapy Ea 15 Min      85378 (CPT®) Hc  Pt Orthotic(S)/Prosthetic(S) Encounter, Each 15 Min       (CPT®) Hc Pt Electrical Stim Unattended      Total  40 3        Therapy Charges for Today     Code Description Service Date Service Provider Modifiers Qty    22420491049 HC PT THER PROC EA 15 MIN 10/10/2018 Kirstin Dale, PT GP 3                    Kirstin Dale, PT  10/10/2018

## 2018-10-10 NOTE — PROGRESS NOTES
"CHIEF COMPLAINT  Pudendal neuralgia is unchanged since last office visit.    Subjective   Jose Maria Judge is a 53 y.o. male  who presents to the office for follow-up.He has a history of groin pain.    He was previously scheduled for Pudendal Block, but he decided to cancel and leave the surgery center because he expected deep sedation.  The case was scheduled for normal sedation and not as a MAC anesthesia case.     He had some type of \"tailbone\" injection series in the past at [facility]... Unsure if this was a Caudal or a GOI or a LTFESI or something else.... And it seems that he had MAC anesthesia, propofol seems most likely by his description.    In review... He has been having worsening of perineal pain. Severe.  Burning and stinging.  Increases with valsalva.  The context and history are above.  His pain is worsening over >1 yr.  He can get some relief with lying down in fetal-position.   Increases with ejaculation.  His pain originated as right sided pain, but now he is starting to have left sided pain also.  No pain of the penis.  There is right scrotal pain in addition to perineal pain.    No change since last visit.    History of Present Illness     PEG Assessment   What number best describes your pain on average in the past week?7  What number best describes how, during the past week, pain has interfered with your enjoyment of life?9  What number best describes how, during the past week, pain has interfered with your general activity?  8    --  The aforementioned information the Chief Complaint section and above subjective data including any HPI data has been personally reviewed and affirmed.  --        The following portions of the patient's history were reviewed and updated as appropriate: allergies, current medications, past family history, past medical history, past social history, past surgical history and problem list.    Review of Systems   Constitutional: Negative for chills and fever. " "  Respiratory: Negative for shortness of breath.    Gastrointestinal: Negative for constipation, diarrhea, nausea and vomiting.   Genitourinary: Negative for difficulty urinating, dysuria and enuresis.   Neurological: Negative for dizziness, weakness, light-headedness, numbness and headaches.   Psychiatric/Behavioral: Negative for confusion, hallucinations, self-injury, sleep disturbance and suicidal ideas. The patient is not nervous/anxious.        Vitals:    10/10/18 1504   BP: 119/74   Pulse: 70   Resp: 18   Temp: 98.1 °F (36.7 °C)   SpO2: 96%   Weight: 101 kg (223 lb)   Height: 180.3 cm (71\")   PainSc:   7   PainLoc: Groin         Objective   Physical Exam   Constitutional: He is oriented to person, place, and time. He appears well-developed and well-nourished.   HENT:   Head: Normocephalic.   Neurological: He is alert and oriented to person, place, and time.   Psychiatric: His speech is normal and behavior is normal. Judgment normal. His mood appears anxious.   Nursing note and vitals reviewed.          Assessment/Plan   Diagnoses and all orders for this visit:    Pudendal neuralgia    Chronic pain syndrome    This was an extended visit of 20 minutes in duration.  The entire visit was education and counseling.  We discussed reasonable expectations about anesthetics, which are intended as sedatives, and not general anesthesia.  He expresses extreme anxiety and also high levels of sensitivity.  He feels that he cannot proceed with the procedure with this assurance.  To provide this care, I will need to request assistance as Monitored Anesthesia Care.    Risks and complications were discussed with the patient, including but not to limited bleeding and ecchymosis and hematoma, infection, visceral injury, fistula formaion, localized irritation, risk of nerve injury and paralysis, risk of pudendal artery injection including but not limited to risk of sexual dysfunction, risks of coma and seizure and death, risk of a " lack of pain relief, and risks of postprocedural soreness or painful flare.  Verbalization of Informed consent was obtained.        We discussed the added risks of the procedure with higher levels of anesthetics, which may mask procedural pain, which increases the above risks.  In weighing risk/benefit analysis, he chooses MAC anesthesia care.     --- Follow-up for procedure... Pudendal nerve block (RIght, vs Bilateral) with MAC anesthesia care, x2, 4 wks apart  (case request is in)               EMR Dragon/Transcription disclaimer:   Much of this encounter note is an electronic transcription/translation of spoken language to printed text. The electronic translation of spoken language may permit erroneous, or at times, nonsensical words or phrases to be inadvertently transcribed; Although I have reviewed the note for such errors, some may still exist.

## 2018-10-17 ENCOUNTER — HOSPITAL ENCOUNTER (OUTPATIENT)
Dept: PHYSICAL THERAPY | Facility: HOSPITAL | Age: 53
Setting detail: THERAPIES SERIES
Discharge: HOME | End: 2018-10-17

## 2018-10-17 ENCOUNTER — TRANSCRIBE ORDERS (OUTPATIENT)
Dept: ADMINISTRATIVE | Facility: HOSPITAL | Age: 53
End: 2018-10-17

## 2018-10-17 ENCOUNTER — TELEPHONE (OUTPATIENT)
Dept: SURGERY | Facility: CLINIC | Age: 53
End: 2018-10-17

## 2018-10-17 DIAGNOSIS — R29.898 UPPER EXTREMITY WEAKNESS: ICD-10-CM

## 2018-10-17 DIAGNOSIS — R59.1 LYMPHADENOPATHY: Primary | ICD-10-CM

## 2018-10-17 DIAGNOSIS — M54.6 ACUTE BILATERAL THORACIC BACK PAIN: Primary | ICD-10-CM

## 2018-10-17 PROCEDURE — G8979 MOBILITY GOAL STATUS: HCPCS

## 2018-10-17 PROCEDURE — 97110 THERAPEUTIC EXERCISES: CPT

## 2018-10-17 PROCEDURE — G8978 MOBILITY CURRENT STATUS: HCPCS

## 2018-10-17 NOTE — THERAPY EVALUATION
Outpatient Physical Therapy Ortho Progress Note  [facility]     Patient Name: Jose Maria Judge  : 1965  MRN: [id number]  Today's Date: 10/17/2018      Visit Date: 10/17/2018    Patient Active Problem List   Diagnosis   • Bilateral hip pain   • Right hip pain   • Left hip pain   • Trochanteric bursitis   • Tear of acetabular labrum   • Loss of hair   • Anal burning   • Anxiety   • Elevated blood pressure   • Hamstring strain   • Hypertension   • Radiculitis   • Hamstring muscle strain   • Rectal pain   • Herpes zoster   • Varicose veins of other specified sites   • Lymph node enlargement        Past Medical History:   Diagnosis Date   • Abdominal lymphadenopathy    • Acid reflux    • Anxiety    • Bilateral carpal tunnel syndrome    • Chronic pain    • DDD (degenerative disc disease), lumbar    • Esophageal reflux    • Fatigue    • Hematuria    • Irritable bowel syndrome    • Low back pain    • Male pattern alopecia    • Pudendal neuralgia    • Right hip pain    • Unexplained night sweats         Past Surgical History:   Procedure Laterality Date   • ABSCESS DRAINAGE  2012   • ANAL FISTULA REPAIR N/A 2012, 10/2012   • COLONOSCOPY N/A     done at [facility]   • FINE NEEDLE ASPIRATION Left 2018    Left axillary lymph node FNA   • FLEXIBLE SIGMOIDOSCOPY N/A 2003    Normal-Dr. Cezar Aviles   • HEMORRHOIDECTOMY N/A    • HIP ARTHROSCOPY W/ LABRAL REPAIR Right 2017    Dr. Lonnie Lemons   • TONSILLECTOMY Bilateral        Visit Dx:     ICD-10-CM ICD-9-CM   1. Acute bilateral thoracic back pain M54.6 724.1   2. Upper extremity weakness R29.898 729.89                 PT Ortho     Row Name 10/17/18 1300       Posture/Observations    Posture/Observations Comments improving posture with decreased forward head and rounded shoulders however his sitting posture continues to demosntrate protracted scap and increased kyphosis  -JA       Myotomal Screen- Upper Quarter Clearing    Shoulder  flexion (C5) Right:;4- (Good -);Left:;4+ (Good +)   MMT increased thoracic pain (muscular)  -LISET      User Key  (r) = Recorded By, (t) = Taken By, (c) = Cosigned By    Initials Name Provider Type    Kirstin Chao, PT Physical Therapist                      Therapy Education  Education Details: Added rhomberg and tandem stance for indirect core strengthening. Discussed deep breathing and meditation to reduce stress.  Given: HEP, Symptoms/condition management, Posture/body mechanics, Pain management  Program: Progressed  How Provided: Verbal, Demonstration  Provided to: Patient  Level of Understanding: Teach back education performed           PT OP Goals     Row Name 10/17/18 1700          PT Short Term Goals    STG Date to Achieve 08/07/18  -LISET     STG 1 Patient will be independent and compliant with initial HEP   -LISET     STG 1 Progress Met  -LISET     STG 2 Pt will demonstrate correct posture in sitting and standing.  -LISET     STG 2 Progress Partially Met  -LISET     STG 2 Progress Comments observed poor sitting posture, however standing posture is correct  -LISET        Long Term Goals    LTG Date to Achieve 08/24/18  -LISET     LTG 1 Pt will be independent with advanced HEP for strengthening to increase functional mobility.  -LISET     LTG 1 Progress Progressing  -LISET     LTG 2 Education for posture and body mechanics with home and work to reduce strain on back.  -LISET     LTG 2 Progress Progressing  -LISET     LTG 2 Progress Comments worked on body mechanics with household chores  -LISET     LTG 3 Pt will score 48% on Oswestry Disability Index indicating decrease in perceived functional disability.   -LISET     LTG 3 Progress Ongoing  -LISET     LTG 3 Progress Comments 58%  -LISET     LTG 4 Pt will be independent with self-management of symptoms.  -LISET     LTG 4 Progress Ongoing;Progressing  -LISET     LTG 4 Progress Comments pt reports using some of his HEP stretches to reduce his pain   -LISET       User Key  (r) = Recorded By, (t) = Taken By,  (c) = Cosigned By    Initials Name Provider Type    Kirstin Chao, PT Physical Therapist                PT Assessment/Plan     Row Name 10/17/18 1600          PT Assessment    Functional Limitations Decreased safety during functional activities;Impaired gait;Limitation in home management;Performance in leisure activities;Performance in self-care ADL  -LISET     Impairments Pain;Muscle strength;Posture;Poor body mechanics  -LISET     Assessment Comments Pt has been seen for thoracic pain of insidious and gradual onset.  He has history of pudendal neuralgia that makes sitting painful and is also exacerbated with certain strengthening exercises making progression of ther ex challenging.  He demonstrates slowly progressing strength through increasing tolerance reps of exercise and additional gentle core strengthening, however performing a manual muscle test was enough to exacerbate his pain today.  His Modified Oswestry score is 58%, no change from initial yet.  His thoracic back pain impairs some ADL's and limits his ability to peform household chores.  He would benefit from continuing therapy for 4 more weeks but may have a gap in therapy due to an upcoming surgery.  He will keep us informed.  -LISET     Please refer to paper survey for additional self-reported information Yes  -LISET     Rehab Potential Good  -LISET     Patient/caregiver participated in establishment of treatment plan and goals Yes  -LISET     Patient would benefit from skilled therapy intervention Yes  -LISET        PT Plan    PT Frequency 1x/week  -LISET     Predicted Duration of Therapy Intervention (Therapy Eval) 4 weeks  -LISET     Planned CPT's? PT THER PROC EA 15 MIN: 07111;PT THER ACT EA 15 MIN: 02203;PT MANUAL THERAPY EA 15 MIN: 16781;PT NEUROMUSC RE-EDUCATION EA 15 MIN: 85398;PT SELF CARE/HOME MGMT/TRAIN EA 15: 49223;PT HOT OR COLD PACK TREAT MCARE;PT ELECTRICAL STIM UNATTEND:   -LISET     PT Plan Comments assess response to standing balance/core work  -LISET        User Key  (r) = Recorded By, (t) = Taken By, (c) = Cosigned By    Initials Name Provider Type    Kirstin Chao, PT Physical Therapist                  Exercises     Row Name 10/17/18 1300             Subjective Comments    Subjective Comments I did okay with the heel slides.  I have a lot of stress going on; I was tested for some more things.  -JA         Subjective Pain    Able to rate subjective pain? yes  -JA      Pre-Treatment Pain Level 2  -JA      Post-Treatment Pain Level 1  -JA      Subjective Pain Comment pain increased to 5-6 with exercise but decreased after  -JA         Total Minutes    20264 - PT Therapeutic Exercise Minutes 42  -JA         Exercise 1    Exercise Name 1 reverse shld rolls  -JA      Cueing 1 Verbal;Tactile;Demo  -JA      Reps 1 10  -JA         Exercise 2    Exercise Name 2 scap ret w/straight elbows  -JA      Cueing 2 Verbal;Tactile  -JA      Reps 2 5  -JA      Additional Comments after 5 reps observed pt's UT engage, pt noted increasing pain  -JA         Exercise 3    Exercise Name 3 towel slide radhames shld flex on mirror  -JA      Cueing 3 Verbal;Demo  -JA      Reps 3 10  -JA         Exercise 4    Exercise Name 4 doorway pec stretch and shld flex stretch  -JA      Reps 4 2  -JA      Time 4 10-20sec  -JA      Additional Comments 2 reps ea stretch  -JA         Exercise 5    Exercise Name 5 standing scap ret with bent elbows  -JA      Reps 5 10  -JA      Additional Comments no issue with UT during ret with elbows bent  -JA         Exercise 6    Exercise Name 6 t.a. in hooklying  -JA         Exercise 7    Exercise Name 7 elongation stretch radhames shoulder flex in supine  -JA      Cueing 7 Verbal  -JA      Reps 7 10  -JA      Time 7 3 sec  -JA         Exercise 8    Exercise Name 8 Shld Hz Abd in hooklying  -JA      Cueing 8 Verbal  -JA      Reps 8 5  -JA         Exercise 9    Exercise Name 9 SL thoracic rotation  -JA      Cueing 9 Verbal;Demo  -JA      Sets 9 1 set ea R/L  -JA       Reps 9 5  -JA         Exercise 10    Exercise Name 10 Supine Serratus Punch  -JA      Cueing 10 Verbal;Demo  -JA      Reps 10 10  -JA      Time 10 3 sec  -JA      Additional Comments alternate L/R  -JA         Exercise 11    Exercise Name 11 standing radhames ER no tband  -JA      Cueing 11 Verbal;Demo  -JA      Reps 11 10  -JA         Exercise 12    Exercise Name 12 LTR  -JA      Reps 12 5  -JA      Additional Comments small range  -JA         Exercise 13    Exercise Name 13 chin tucks in supine  -JA      Reps 13 10  -JA         Exercise 14    Exercise Name 14 hooklying heel slide  -JA      Reps 14 5  -JA      Additional Comments black wedge  -JA         Exercise 15    Exercise Name 15 DLS balance--with head turns  -JA      Sets 15 4  -JA      Time 15 20 sec  -JA      Additional Comments to engage core in gentle way  -JA         Exercise 16    Exercise Name 16 rhomberg stance  -JA      Reps 16 10  -JA      Additional Comments head turns  -JA         Exercise 17    Exercise Name 17 tandem stance  -JA      Reps 17 2  -JA      Time 17 60 sec  -JA        User Key  (r) = Recorded By, (t) = Taken By, (c) = Cosigned By    Initials Name Provider Type    Kirstin Chao, PT Physical Therapist                        Outcome Measure Options: Modifed Owestry  Modified Oswestry  Modified Oswestry Score/Comments: 58%      Time Calculation:     Therapy Suggested Charges     Code   Minutes Charges    56648 (CPT®) Hc Pt Neuromusc Re Education Ea 15 Min      21974 (CPT®) Hc Pt Ther Proc Ea 15 Min 42 3    95014 (CPT®) Hc Gait Training Ea 15 Min      19791 (CPT®) Hc Pt Therapeutic Act Ea 15 Min      30757 (CPT®) Hc Pt Manual Therapy Ea 15 Min      67541 (CPT®) Hc Pt Ther Massage- Per 15 Min      96737 (CPT®) Hc Pt Iontophoresis Ea 15 Min      77884 (CPT®) Hc Pt Elec Stim Ea-Per 15 Min      39174 (CPT®) Hc Pt Ultrasound Ea 15 Min      48801 (CPT®) Hc Pt Self Care/Mgmt/Train Ea 15 Min      34871 (CPT®) Hc Pt Prosthetic (S) Train  Initial Encounter, Each 15 Min      19331 (CPT®) Hc Orthotic(S) Mgmt/Train Initial Encounter, Each 15min      70463 (CPT®) Hc Pt Aquatic Therapy Ea 15 Min      07803 (CPT®) Hc Pt Orthotic(S)/Prosthetic(S) Encounter, Each 15 Min       (CPT®) Hc Pt Electrical Stim Unattended      Total  42 3          Start Time: 1315  Stop Time: 1400  Time Calculation (min): 45 min     Therapy Charges for Today     Code Description Service Date Service Provider Modifiers Qty    84360613206 HC PT MOBILITY CURRENT 10/17/2018 Kirstin Dale, PT GP, CL 1    56036696159 HC PT MOBILITY PROJECTED 10/17/2018 Kirstin Dale, PT GP, CJ 1    68862961336 HC PT THER PROC EA 15 MIN 10/17/2018 Kirstin Dale, PT GP 3          PT G-Codes  Outcome Measure Options: Modifed Owestry  Modified Oswestry Score/Comments: 58%  Functional Limitation: Mobility: Walking and moving around  Mobility: Walking and Moving Around Current Status (): At least 60 percent but less than 80 percent impaired, limited or restricted  Mobility: Walking and Moving Around Goal Status (): At least 20 percent but less than 40 percent impaired, limited or restricted         Kirstin Dale, PT  10/17/2018

## 2018-10-22 ENCOUNTER — APPOINTMENT (OUTPATIENT)
Dept: PREADMISSION TESTING | Facility: HOSPITAL | Age: 53
End: 2018-10-22

## 2018-10-23 ENCOUNTER — HOSPITAL ENCOUNTER (OUTPATIENT)
Dept: MRI IMAGING | Facility: HOSPITAL | Age: 53
Discharge: HOME | End: 2018-10-23
Admitting: FAMILY MEDICINE

## 2018-10-23 ENCOUNTER — APPOINTMENT (OUTPATIENT)
Dept: PREADMISSION TESTING | Facility: HOSPITAL | Age: 53
End: 2018-10-23

## 2018-10-23 VITALS
RESPIRATION RATE: 18 BRPM | HEIGHT: 71 IN | DIASTOLIC BLOOD PRESSURE: 72 MMHG | BODY MASS INDEX: 31.4 KG/M2 | TEMPERATURE: 99.4 F | HEART RATE: 80 BPM | WEIGHT: 224.3 LBS | SYSTOLIC BLOOD PRESSURE: 113 MMHG | OXYGEN SATURATION: 98 %

## 2018-10-23 DIAGNOSIS — R59.1 LYMPHADENOPATHY: ICD-10-CM

## 2018-10-23 LAB — CREAT BLDA-MCNC: 1 MG/DL (ref 0.6–1.3)

## 2018-10-23 PROCEDURE — A9577 INJ MULTIHANCE: HCPCS | Performed by: FAMILY MEDICINE

## 2018-10-23 PROCEDURE — 0 GADOBENATE DIMEGLUMINE 529 MG/ML SOLUTION: Performed by: FAMILY MEDICINE

## 2018-10-23 PROCEDURE — 72197 MRI PELVIS W/O & W/DYE: CPT

## 2018-10-23 PROCEDURE — 82565 ASSAY OF CREATININE: CPT

## 2018-10-23 RX ADMIN — GADOBENATE DIMEGLUMINE 20 ML: 529 INJECTION, SOLUTION INTRAVENOUS at 07:36

## 2018-10-25 ENCOUNTER — TRANSCRIBE ORDERS (OUTPATIENT)
Dept: ADMINISTRATIVE | Facility: HOSPITAL | Age: 53
End: 2018-10-25

## 2018-10-25 DIAGNOSIS — R59.0 ABDOMINAL LYMPHADENOPATHY: Primary | ICD-10-CM

## 2018-10-31 NOTE — TELEPHONE ENCOUNTER
Please get me his all of his lab work from [facility] (done recently). His PCP is Emir Ellis

## 2018-11-01 ENCOUNTER — HOSPITAL ENCOUNTER (OUTPATIENT)
Dept: MRI IMAGING | Facility: HOSPITAL | Age: 53
Discharge: HOME | End: 2018-11-01

## 2018-11-01 DIAGNOSIS — R59.0 ABDOMINAL LYMPHADENOPATHY: ICD-10-CM

## 2018-11-06 ENCOUNTER — TRANSCRIBE ORDERS (OUTPATIENT)
Dept: ADMINISTRATIVE | Facility: HOSPITAL | Age: 53
End: 2018-11-06

## 2018-11-06 DIAGNOSIS — R59.0 ABDOMINAL LYMPHADENOPATHY: Primary | ICD-10-CM

## 2018-11-07 ENCOUNTER — HOSPITAL ENCOUNTER (OUTPATIENT)
Dept: CT IMAGING | Facility: HOSPITAL | Age: 53
Discharge: HOME | End: 2018-11-07
Admitting: FAMILY MEDICINE

## 2018-11-07 DIAGNOSIS — R59.0 ABDOMINAL LYMPHADENOPATHY: ICD-10-CM

## 2018-11-07 PROCEDURE — 74177 CT ABD & PELVIS W/CONTRAST: CPT

## 2018-11-07 PROCEDURE — 0 DIATRIZOATE MEGLUMINE & SODIUM PER 1 ML: Performed by: FAMILY MEDICINE

## 2018-11-07 PROCEDURE — 82565 ASSAY OF CREATININE: CPT

## 2018-11-07 PROCEDURE — 25010000002 IOPAMIDOL 61 % SOLUTION: Performed by: FAMILY MEDICINE

## 2018-11-07 RX ADMIN — DIATRIZOATE MEGLUMINE AND DIATRIZOATE SODIUM 30 ML: 660; 100 LIQUID ORAL; RECTAL at 08:30

## 2018-11-07 RX ADMIN — IOPAMIDOL 85 ML: 612 INJECTION, SOLUTION INTRAVENOUS at 09:40

## 2018-11-08 LAB — CREAT BLDA-MCNC: 0.9 MG/DL (ref 0.6–1.3)

## 2018-11-09 ENCOUNTER — HOSPITAL ENCOUNTER (OUTPATIENT)
Facility: HOSPITAL | Age: 53
Setting detail: HOSPITAL OUTPATIENT SURGERY
Discharge: HOME | End: 2018-11-09
Attending: SURGERY | Admitting: SURGERY

## 2018-11-09 ENCOUNTER — ANESTHESIA EVENT (OUTPATIENT)
Dept: PERIOP | Facility: HOSPITAL | Age: 53
End: 2018-11-09

## 2018-11-09 ENCOUNTER — ANESTHESIA (OUTPATIENT)
Dept: PERIOP | Facility: HOSPITAL | Age: 53
End: 2018-11-09

## 2018-11-09 VITALS
SYSTOLIC BLOOD PRESSURE: 129 MMHG | HEART RATE: 64 BPM | WEIGHT: 225.75 LBS | RESPIRATION RATE: 16 BRPM | TEMPERATURE: 97.6 F | OXYGEN SATURATION: 96 % | BODY MASS INDEX: 31.49 KG/M2 | DIASTOLIC BLOOD PRESSURE: 92 MMHG

## 2018-11-09 DIAGNOSIS — R59.9 LYMPH NODE ENLARGEMENT: ICD-10-CM

## 2018-11-09 PROCEDURE — 25010000002 MIDAZOLAM PER 1 MG: Performed by: ANESTHESIOLOGY

## 2018-11-09 PROCEDURE — 25010000003 CEFAZOLIN IN DEXTROSE 2-4 GM/100ML-% SOLUTION: Performed by: SURGERY

## 2018-11-09 PROCEDURE — 25010000002 FENTANYL CITRATE (PF) 100 MCG/2ML SOLUTION: Performed by: NURSE ANESTHETIST, CERTIFIED REGISTERED

## 2018-11-09 PROCEDURE — 25010000002 PROPOFOL 10 MG/ML EMULSION: Performed by: NURSE ANESTHETIST, CERTIFIED REGISTERED

## 2018-11-09 PROCEDURE — 25010000002 ONDANSETRON PER 1 MG: Performed by: NURSE ANESTHETIST, CERTIFIED REGISTERED

## 2018-11-09 PROCEDURE — 38525 BIOPSY/REMOVAL LYMPH NODES: CPT | Performed by: SURGERY

## 2018-11-09 PROCEDURE — 25010000002 FENTANYL CITRATE (PF) 100 MCG/2ML SOLUTION: Performed by: ANESTHESIOLOGY

## 2018-11-09 PROCEDURE — 88305 TISSUE EXAM BY PATHOLOGIST: CPT | Performed by: SURGERY

## 2018-11-09 PROCEDURE — 88342 IMHCHEM/IMCYTCHM 1ST ANTB: CPT | Performed by: SURGERY

## 2018-11-09 PROCEDURE — 88341 IMHCHEM/IMCYTCHM EA ADD ANTB: CPT

## 2018-11-09 DEVICE — CLIP LIGAT VASC HORIZON TI MD BLU 6CT: Type: IMPLANTABLE DEVICE | Site: AXILLA | Status: FUNCTIONAL

## 2018-11-09 RX ORDER — LIDOCAINE HYDROCHLORIDE 20 MG/ML
INJECTION, SOLUTION INFILTRATION; PERINEURAL AS NEEDED
Status: DISCONTINUED | OUTPATIENT
Start: 2018-11-09 | End: 2018-11-09 | Stop reason: SURG

## 2018-11-09 RX ORDER — FENTANYL CITRATE 50 UG/ML
100 INJECTION, SOLUTION INTRAMUSCULAR; INTRAVENOUS
Status: DISCONTINUED | OUTPATIENT
Start: 2018-11-09 | End: 2018-11-09 | Stop reason: HOSPADM

## 2018-11-09 RX ORDER — PROMETHAZINE HYDROCHLORIDE 25 MG/ML
6.25 INJECTION, SOLUTION INTRAMUSCULAR; INTRAVENOUS ONCE AS NEEDED
Status: DISCONTINUED | OUTPATIENT
Start: 2018-11-09 | End: 2018-11-09 | Stop reason: HOSPADM

## 2018-11-09 RX ORDER — ONDANSETRON 2 MG/ML
4 INJECTION INTRAMUSCULAR; INTRAVENOUS ONCE AS NEEDED
Status: DISCONTINUED | OUTPATIENT
Start: 2018-11-09 | End: 2018-11-09 | Stop reason: HOSPADM

## 2018-11-09 RX ORDER — SODIUM CHLORIDE 0.9 % (FLUSH) 0.9 %
3 SYRINGE (ML) INJECTION EVERY 12 HOURS SCHEDULED
Status: DISCONTINUED | OUTPATIENT
Start: 2018-11-09 | End: 2018-11-09 | Stop reason: HOSPADM

## 2018-11-09 RX ORDER — TRAMADOL HYDROCHLORIDE 50 MG/1
50 TABLET ORAL
Qty: 24 TABLET | Refills: 0 | Status: SHIPPED | OUTPATIENT
Start: 2018-11-09 | End: 2018-11-15

## 2018-11-09 RX ORDER — SODIUM CHLORIDE, SODIUM LACTATE, POTASSIUM CHLORIDE, CALCIUM CHLORIDE 600; 310; 30; 20 MG/100ML; MG/100ML; MG/100ML; MG/100ML
9 INJECTION, SOLUTION INTRAVENOUS CONTINUOUS
Status: DISCONTINUED | OUTPATIENT
Start: 2018-11-09 | End: 2018-11-09 | Stop reason: HOSPADM

## 2018-11-09 RX ORDER — ONDANSETRON 2 MG/ML
INJECTION INTRAMUSCULAR; INTRAVENOUS AS NEEDED
Status: DISCONTINUED | OUTPATIENT
Start: 2018-11-09 | End: 2018-11-09 | Stop reason: SURG

## 2018-11-09 RX ORDER — OXYCODONE HYDROCHLORIDE AND ACETAMINOPHEN 5; 325 MG/1; MG/1
TABLET ORAL
Qty: 40 TABLET | Refills: 0 | Status: SHIPPED | OUTPATIENT
Start: 2018-11-09 | End: 2018-11-15

## 2018-11-09 RX ORDER — ONDANSETRON 4 MG/1
4 TABLET, FILM COATED ORAL EVERY 6 HOURS PRN
Qty: 10 TABLET | Refills: 1 | Status: SHIPPED | OUTPATIENT
Start: 2018-11-09 | End: 2018-12-20

## 2018-11-09 RX ORDER — FLUMAZENIL 0.1 MG/ML
0.2 INJECTION INTRAVENOUS AS NEEDED
Status: DISCONTINUED | OUTPATIENT
Start: 2018-11-09 | End: 2018-11-09 | Stop reason: HOSPADM

## 2018-11-09 RX ORDER — CEFAZOLIN SODIUM 2 G/100ML
2 INJECTION, SOLUTION INTRAVENOUS ONCE
Status: COMPLETED | OUTPATIENT
Start: 2018-11-09 | End: 2018-11-09

## 2018-11-09 RX ORDER — BUPIVACAINE HYDROCHLORIDE AND EPINEPHRINE 5; 5 MG/ML; UG/ML
INJECTION, SOLUTION PERINEURAL AS NEEDED
Status: DISCONTINUED | OUTPATIENT
Start: 2018-11-09 | End: 2018-11-09 | Stop reason: HOSPADM

## 2018-11-09 RX ORDER — OXYCODONE HYDROCHLORIDE AND ACETAMINOPHEN 5; 325 MG/1; MG/1
1 TABLET ORAL ONCE
Status: COMPLETED | OUTPATIENT
Start: 2018-11-09 | End: 2018-11-09

## 2018-11-09 RX ORDER — PROMETHAZINE HYDROCHLORIDE 25 MG/1
25 TABLET ORAL ONCE AS NEEDED
Status: DISCONTINUED | OUTPATIENT
Start: 2018-11-09 | End: 2018-11-09 | Stop reason: HOSPADM

## 2018-11-09 RX ORDER — SODIUM CHLORIDE 0.9 % (FLUSH) 0.9 %
1-10 SYRINGE (ML) INJECTION AS NEEDED
Status: DISCONTINUED | OUTPATIENT
Start: 2018-11-09 | End: 2018-11-09 | Stop reason: HOSPADM

## 2018-11-09 RX ORDER — EPHEDRINE SULFATE 50 MG/ML
5 INJECTION, SOLUTION INTRAVENOUS ONCE AS NEEDED
Status: DISCONTINUED | OUTPATIENT
Start: 2018-11-09 | End: 2018-11-09 | Stop reason: HOSPADM

## 2018-11-09 RX ORDER — PROPOFOL 10 MG/ML
VIAL (ML) INTRAVENOUS AS NEEDED
Status: DISCONTINUED | OUTPATIENT
Start: 2018-11-09 | End: 2018-11-09 | Stop reason: SURG

## 2018-11-09 RX ORDER — MAGNESIUM HYDROXIDE 1200 MG/15ML
LIQUID ORAL AS NEEDED
Status: DISCONTINUED | OUTPATIENT
Start: 2018-11-09 | End: 2018-11-09 | Stop reason: HOSPADM

## 2018-11-09 RX ORDER — FENTANYL CITRATE 50 UG/ML
50 INJECTION, SOLUTION INTRAMUSCULAR; INTRAVENOUS
Status: COMPLETED | OUTPATIENT
Start: 2018-11-09 | End: 2018-11-09

## 2018-11-09 RX ORDER — LIDOCAINE HYDROCHLORIDE 10 MG/ML
0.5 INJECTION, SOLUTION EPIDURAL; INFILTRATION; INTRACAUDAL; PERINEURAL ONCE AS NEEDED
Status: DISCONTINUED | OUTPATIENT
Start: 2018-11-09 | End: 2018-11-09 | Stop reason: HOSPADM

## 2018-11-09 RX ORDER — PROMETHAZINE HYDROCHLORIDE 25 MG/1
25 SUPPOSITORY RECTAL ONCE AS NEEDED
Status: DISCONTINUED | OUTPATIENT
Start: 2018-11-09 | End: 2018-11-09 | Stop reason: HOSPADM

## 2018-11-09 RX ORDER — MIDAZOLAM HYDROCHLORIDE 1 MG/ML
2 INJECTION INTRAMUSCULAR; INTRAVENOUS
Status: DISCONTINUED | OUTPATIENT
Start: 2018-11-09 | End: 2018-11-09 | Stop reason: HOSPADM

## 2018-11-09 RX ORDER — FAMOTIDINE 10 MG/ML
20 INJECTION, SOLUTION INTRAVENOUS ONCE
Status: COMPLETED | OUTPATIENT
Start: 2018-11-09 | End: 2018-11-09

## 2018-11-09 RX ORDER — MIDAZOLAM HYDROCHLORIDE 1 MG/ML
1 INJECTION INTRAMUSCULAR; INTRAVENOUS
Status: DISCONTINUED | OUTPATIENT
Start: 2018-11-09 | End: 2018-11-09 | Stop reason: HOSPADM

## 2018-11-09 RX ORDER — LABETALOL HYDROCHLORIDE 5 MG/ML
5 INJECTION, SOLUTION INTRAVENOUS
Status: DISCONTINUED | OUTPATIENT
Start: 2018-11-09 | End: 2018-11-09 | Stop reason: HOSPADM

## 2018-11-09 RX ORDER — FENTANYL CITRATE 50 UG/ML
INJECTION, SOLUTION INTRAMUSCULAR; INTRAVENOUS AS NEEDED
Status: DISCONTINUED | OUTPATIENT
Start: 2018-11-09 | End: 2018-11-09 | Stop reason: SURG

## 2018-11-09 RX ADMIN — FENTANYL CITRATE 50 MCG: 50 INJECTION, SOLUTION INTRAMUSCULAR; INTRAVENOUS at 12:16

## 2018-11-09 RX ADMIN — FENTANYL CITRATE 50 MCG: 50 INJECTION, SOLUTION INTRAMUSCULAR; INTRAVENOUS at 11:34

## 2018-11-09 RX ADMIN — MIDAZOLAM 1 MG: 1 INJECTION INTRAMUSCULAR; INTRAVENOUS at 09:10

## 2018-11-09 RX ADMIN — ONDANSETRON 4 MG: 2 INJECTION INTRAMUSCULAR; INTRAVENOUS at 10:34

## 2018-11-09 RX ADMIN — SODIUM CHLORIDE, POTASSIUM CHLORIDE, SODIUM LACTATE AND CALCIUM CHLORIDE 9 ML/HR: 600; 310; 30; 20 INJECTION, SOLUTION INTRAVENOUS at 09:09

## 2018-11-09 RX ADMIN — FENTANYL CITRATE 50 MCG: 50 INJECTION, SOLUTION INTRAMUSCULAR; INTRAVENOUS at 11:43

## 2018-11-09 RX ADMIN — OXYCODONE HYDROCHLORIDE AND ACETAMINOPHEN 1 TABLET: 5; 325 TABLET ORAL at 11:44

## 2018-11-09 RX ADMIN — LIDOCAINE HYDROCHLORIDE 60 MG: 20 INJECTION, SOLUTION INFILTRATION; PERINEURAL at 10:24

## 2018-11-09 RX ADMIN — FENTANYL CITRATE 50 MCG: 50 INJECTION INTRAMUSCULAR; INTRAVENOUS at 11:02

## 2018-11-09 RX ADMIN — FENTANYL CITRATE 50 MCG: 50 INJECTION, SOLUTION INTRAMUSCULAR; INTRAVENOUS at 12:01

## 2018-11-09 RX ADMIN — PROPOFOL 200 MG: 10 INJECTION, EMULSION INTRAVENOUS at 10:24

## 2018-11-09 RX ADMIN — FAMOTIDINE 20 MG: 10 INJECTION, SOLUTION INTRAVENOUS at 09:10

## 2018-11-09 RX ADMIN — FENTANYL CITRATE 100 MCG: 50 INJECTION INTRAMUSCULAR; INTRAVENOUS at 10:24

## 2018-11-09 RX ADMIN — CEFAZOLIN SODIUM 2 G: 2 INJECTION, SOLUTION INTRAVENOUS at 10:29

## 2018-11-09 RX ADMIN — FENTANYL CITRATE 50 MCG: 50 INJECTION INTRAMUSCULAR; INTRAVENOUS at 11:16

## 2018-11-09 RX ADMIN — SODIUM CHLORIDE, POTASSIUM CHLORIDE, SODIUM LACTATE AND CALCIUM CHLORIDE 9 ML/HR: 600; 310; 30; 20 INJECTION, SOLUTION INTRAVENOUS at 12:15

## 2018-11-09 NOTE — ANESTHESIA PROCEDURE NOTES
Airway  Urgency: elective      General Information and Staff    Patient location during procedure: OR  Anesthesiologist: ANA CALLEJAS  CRNA: SARAH BEASLEY    Indications and Patient Condition    Preoxygenated: yes  Mask difficulty assessment: 0 - not attempted    Final Airway Details  Final airway type: supraglottic airway      Successful airway: unique  Size 5    Number of attempts at approach: 1    Additional Comments  Atraumatic, adeq seal, secured, MV/AV until SV

## 2018-11-09 NOTE — OP NOTE
PREOPERATIVE DIAGNOSIS:  Left axillary pathologic adenopathy    POSTOPERATIVE DIAGNOSIS (FINDINGS):  Same    PROCEDURE:  Excision of deep left axillary lymph node    SURGEON:  Amando Nguyễn MD    ASSISTANT:  None    ANESTHESIA:  General    EBL:  Minimal    SPECIMEN(S):  Lymph node    DESCRIPTION:  In supine position under general anesthetic prepped and draped usual sterile manner.  Half percent Marcaine with epinephrine infiltrated locally.  Transverse incision made at the axillary hairline and carried through the clavipectoral fascia.  A deep approximately 5 cm lymph node was visible and palpable.  A traction stitch was placed and the lymph node was progressively dissected from the surrounding tissue with electrocautery staying immediately adjacent to the lymph node and clipping all possible efferent and affarent lymphatics.  Axilla was irrigated and good hemostasis was noted.  15 Italian Ronal drain was placed.  Skin was closed with 3-0 Vicryl deep dermal and 5-0 Vicryl subcuticular followed by Dermabond.  Tolerated well.    Amando Nguyễn M.D.

## 2018-11-09 NOTE — ANESTHESIA POSTPROCEDURE EVALUATION
Patient: Jose Maria Judge    Procedure Summary     Date:  11/09/18 Room / Location:   [facility]  [facility]    Anesthesia Start:  1021 Anesthesia Stop:  1120    Procedure:  AXILLARY LYMPH NODE BIOPSY/EXCISION (Left Axilla) Diagnosis:       Lymph node enlargement      (Lymph node enlargement [R59.9])    Surgeon:  Amando Nguyễn MD Provider:  Singh Germain MD    Anesthesia Type:  general ASA Status:  2          Anesthesia Type: general  Last vitals  BP   129/92 (11/09/18 1245)   Temp   36.4 °C (97.6 °F) (11/09/18 1200)   Pulse   64 (11/09/18 1245)   Resp   16 (11/09/18 1245)     SpO2   96 % (11/09/18 1245)     Post Anesthesia Care and Evaluation    Patient location during evaluation: bedside  Patient participation: complete - patient participated  Level of consciousness: awake  Pain score: 1  Pain management: adequate  Airway patency: patent  Anesthetic complications: No anesthetic complications    Cardiovascular status: acceptable  Respiratory status: acceptable  Hydration status: acceptable    Comments: --------------------            11/09/18               1245     --------------------   BP:       129/92     Pulse:      64       Resp:       16       Temp:                SpO2:      96%      --------------------

## 2018-11-09 NOTE — H&P
HPI:  Progressive enlargement of pathologic left axillary lymph node    PMH, PSH, MEDS AND ALLERGIES reviewed and reconciled with EPIC    PHYSICAL EXAM:  -  Constitutional:  no acute distress  -  Respiratory:  normal inspiratory effort  -  Cardiovascular: regular rate  -  Gastrointestinal: Soft    ASSESSMENT/PLAN:    Excision left axillary lymph node    Amando Nguyễn M.D.

## 2018-11-09 NOTE — DISCHARGE INSTRUCTIONS
Dr. Amando Nguyễn  [address]  [place], [place] [address] [phone]    Discharge Instructions for Minor Surgery      1. Go home, rest and take it easy today; however, you should get up and move about several times today to reduce the risk of developing a clot in your legs.      2. You may experience some dizziness or memory loss from the anesthesia.  This may last for the next 24 hours.  Someone should plan on staying with you for the first 24 hours for your safety.    3. Do not make any important legal decisions or sign any legal papers for the next 24 hours.      4. Eat and drink lightly today.  Start off with liquids, jello, soup, crackers or other bland foods at first. You may advance your diet tomorrow as tolerated as long as you do not experience any nausea or vomiting.     5. If skin glue (Dermabond) was used, your incisions are protected and covered.  The invisible glue will dissolve on its own as your incision heals. If dressings were used, you may remove your outer dressings in 3-4 days.   Please leave the little white tapes known as steri-strips alone.  They usually fall off in 1-2 weeks.  Do not worry if they come off sooner.     6. If dressings were used, you may notice some bleeding/drainage on your outer dressings. A little bloody drainage is normal. If the bleeding/drainage is such that the bandage cannot absorb it, remove the dressing, apply clean gauze and apply firm pressure for a full 15 minutes.  If the bleeding continues, please call me.    7. You may shower tomorrow allowing water to run over your incisions; however, do not scrub your incisions.  No tub baths until your incisions are completely healed.    8. You have received a prescription for a narcotic pain medicine, as you may have some pain/discomfort following surgery.   You will not be totally pain free, but your pain medicine should make the pain tolerable.  Please take your pain medicine as prescribed and always take  your pills with food to prevent nausea. If you are having severe pain that cannot be controlled by the pain medicine, please contact me.  If the pain is such that narcotic pain medicine is not required, you may take Tylenol or Ibuprofen as directed unless indicated otherwise.      9. You have also received a prescription for an anti-nausea medicine.  Please take this as prescribed for any nausea or vomiting.  Nausea could be a result of the anesthesia or a result of the narcotic pain medicine.  If you experience severe nausea and vomiting that cannot be controlled by the nausea medicine, please call me.      10. No driving for 24 hours and for as long as you are taking your prescription pain medicine.      11. Please call the office at [phone] to schedule a follow-up in about 1 week.      12. Remember to contact me for any of the following:    • Fever> 101 degrees  • Severe pain that cannot be controlled by taking your pain pills  • Severe nausea or vomiting that cannot be controlled by taking your nausea medicine  • Significant bleeding from your incision  • Drainage that has a bad smell or is yellow or green in appearance  • Any other questions or concerns      JEFF Drain Log      You received pain medication, oxycodone-acetaminophen 5-325 mg, one tablet, at 11:44 am.

## 2018-11-12 LAB — REF LAB TEST METHOD: NORMAL

## 2018-11-15 ENCOUNTER — OFFICE VISIT (OUTPATIENT)
Dept: SURGERY | Facility: CLINIC | Age: 53
End: 2018-11-15

## 2018-11-15 DIAGNOSIS — Z09 FOLLOW UP: Primary | ICD-10-CM

## 2018-11-15 PROCEDURE — 99024 POSTOP FOLLOW-UP VISIT: CPT | Performed by: SURGERY

## 2018-11-15 NOTE — PROGRESS NOTES
Excision of deep left axillary lymph node 11/9/2018    Pathology: Follicular hyperplasia, no evidence of lymphoma on flow cytometry, outside report from [place] pending    Incision is healing well, JEFF is putting out minimal serous fluid and was removed.  He'll follow-up as needed.

## 2018-11-21 LAB
CYTO UR: NORMAL
LAB AP CASE REPORT: NORMAL
LAB AP CLINICAL INFORMATION: NORMAL
LAB AP DIAGNOSIS COMMENT: NORMAL
LAB AP FLOW CYTOMETRY SUMMARY: NORMAL
LAB AP SPECIAL STAINS: NORMAL
PATH REPORT.ADDENDUM SPEC: NORMAL
PATH REPORT.FINAL DX SPEC: NORMAL
PATH REPORT.GROSS SPEC: NORMAL

## 2018-12-04 ENCOUNTER — TRANSCRIBE ORDERS (OUTPATIENT)
Dept: SURGERY | Facility: CLINIC | Age: 53
End: 2018-12-04

## 2018-12-04 DIAGNOSIS — C85.90 LYMPHOMA, UNSPECIFIED BODY REGION, UNSPECIFIED LYMPHOMA TYPE (HCC): Primary | ICD-10-CM

## 2018-12-14 ENCOUNTER — OFFICE (OUTPATIENT)
Dept: URBAN - METROPOLITAN AREA CLINIC 75 | Facility: CLINIC | Age: 53
End: 2018-12-14
Payer: COMMERCIAL

## 2018-12-14 ENCOUNTER — APPOINTMENT (OUTPATIENT)
Dept: LAB | Facility: HOSPITAL | Age: 53
End: 2018-12-14

## 2018-12-14 ENCOUNTER — APPOINTMENT (OUTPATIENT)
Dept: ONCOLOGY | Facility: CLINIC | Age: 53
End: 2018-12-14

## 2018-12-14 VITALS
SYSTOLIC BLOOD PRESSURE: 128 MMHG | HEIGHT: 71 IN | WEIGHT: 221 LBS | RESPIRATION RATE: 16 BRPM | DIASTOLIC BLOOD PRESSURE: 88 MMHG | HEART RATE: 78 BPM

## 2018-12-14 DIAGNOSIS — R11.0 NAUSEA: ICD-10-CM

## 2018-12-14 DIAGNOSIS — K62.89 OTHER SPECIFIED DISEASES OF ANUS AND RECTUM: ICD-10-CM

## 2018-12-14 DIAGNOSIS — K62.5 HEMORRHAGE OF ANUS AND RECTUM: ICD-10-CM

## 2018-12-14 DIAGNOSIS — R10.13 EPIGASTRIC PAIN: ICD-10-CM

## 2018-12-14 DIAGNOSIS — K62.3 RECTAL PROLAPSE: ICD-10-CM

## 2018-12-14 DIAGNOSIS — K60.2 ANAL FISSURE, UNSPECIFIED: ICD-10-CM

## 2018-12-14 PROCEDURE — 99214 OFFICE O/P EST MOD 30 MIN: CPT

## 2018-12-17 ENCOUNTER — TELEPHONE (OUTPATIENT)
Dept: SURGERY | Facility: CLINIC | Age: 53
End: 2018-12-17

## 2018-12-20 ENCOUNTER — OFFICE VISIT (OUTPATIENT)
Dept: SURGERY | Facility: CLINIC | Age: 53
End: 2018-12-20

## 2018-12-20 VITALS — HEART RATE: 74 BPM | BODY MASS INDEX: 31.05 KG/M2 | WEIGHT: 221.8 LBS | OXYGEN SATURATION: 96 % | HEIGHT: 71 IN

## 2018-12-20 DIAGNOSIS — R10.13 EPIGASTRIC PAIN: Primary | ICD-10-CM

## 2018-12-20 PROCEDURE — 99214 OFFICE O/P EST MOD 30 MIN: CPT | Performed by: SURGERY

## 2018-12-21 NOTE — PROGRESS NOTES
SUMMARY (A/P):    53-year-old gentleman with fairly typical symptoms of biliary colic and questionable cholelithiasis on CT scan.  I believe further workup is in order and have scheduled him for ultrasound of the gallbladder.  If he does indeed have gallstones on ultrasound and I think laparoscopic cholecystectomy would be appropriate next step.  Additionally, I have ordered a scheduled him appointment with [facility] oncology for further opinion regarding findings on his recently excised lymph node pathology (he had an appointment scheduled for earlier this month that he canceled).  Lastly, I've asked him to start taking Zantac twice a day and will determine whether this helps at all with his symptoms when I call him with results of his ultrasound.      CC:    Abdominal pain    HPI:    53-year-old gentleman who started having moderately severe midepigastric abdominal pain following Thanksgiving.  He's had similar symptoms since the summer on an intermittent basis usually associated with fattier foods.  Associated symptoms include excess belching and bloating.    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  All other systems reviewed and negative other than presenting complaints.    RADIOLOGY/ENDOSCOPY:    CT abdomen pelvis 11/7/2018 demonstrated no acute intra-abdominal pathology, incidental note of cholelithiasis made I can see what is being referred to his cholelithiasis I'm not completely convinced that the findings are definitive.    PHYSICAL EXAM:    Constitutional: Well-developed well-nourished, no acute distress  Vital signs: Heart rate 74, weight 221 pounds, height 71 inches  Eyes: Conjunctiva normal, sclera nonicteric  ENMT: Hearing grossly normal, oral mucosa moist  Neck: Supple, no palpable mass, normal thyroid, trachea midline  Respiratory: Clear to auscultation, normal inspiratory effort  Cardiovascular: Regular rate, no murmur, no carotid bruit, no peripheral edema, no jugular venous distention  Gastrointestinal: Soft, nontender, no palpable mass, no hepatosplenomegaly, negative for hernia, bowel sounds normal  Lymphatics (palpable nodes):  cervical-negative, axillary-negative (well-healed incision in the left axilla from recent node excision), inguinal-negative  Skin:  Warm, dry, no rash on visualized skin surfaces  Musculoskeletal: Symmetric strength, normal gait  Psychiatric: Alert and oriented ×3, normal affect     MELISSA QUINTANILLA M.D.

## 2018-12-26 ENCOUNTER — HOSPITAL ENCOUNTER (OUTPATIENT)
Dept: ULTRASOUND IMAGING | Facility: HOSPITAL | Age: 53
Discharge: HOME | End: 2018-12-26
Attending: SURGERY | Admitting: SURGERY

## 2018-12-26 DIAGNOSIS — R10.13 EPIGASTRIC PAIN: ICD-10-CM

## 2018-12-26 PROCEDURE — 76705 ECHO EXAM OF ABDOMEN: CPT

## 2018-12-28 DIAGNOSIS — R10.13 EPIGASTRIC PAIN: Primary | ICD-10-CM

## 2018-12-29 ENCOUNTER — HOSPITAL ENCOUNTER (OUTPATIENT)
Facility: HOSPITAL | Age: 53
Setting detail: OBSERVATION
Discharge: HOME | End: 2018-12-31
Attending: SURGERY | Admitting: SURGERY

## 2018-12-29 ENCOUNTER — APPOINTMENT (OUTPATIENT)
Dept: GENERAL RADIOLOGY | Facility: HOSPITAL | Age: 53
End: 2018-12-29

## 2018-12-29 PROBLEM — K56.600 PARTIAL SMALL BOWEL OBSTRUCTION: Status: ACTIVE | Noted: 2018-12-29

## 2018-12-29 LAB
ADV 40+41 DNA STL QL NAA+NON-PROBE: NOT DETECTED
ALBUMIN SERPL-MCNC: 4 G/DL (ref 3.5–5.2)
ALBUMIN/GLOB SERPL: 1.2 G/DL
ALP SERPL-CCNC: 48 U/L (ref 39–117)
ALT SERPL W P-5'-P-CCNC: 11 U/L (ref 1–41)
AMYLASE SERPL-CCNC: 49 U/L (ref 28–100)
ANION GAP SERPL CALCULATED.3IONS-SCNC: 11 MMOL/L
AST SERPL-CCNC: 13 U/L (ref 1–40)
ASTRO TYP 1-8 RNA STL QL NAA+NON-PROBE: NOT DETECTED
BASOPHILS # BLD AUTO: 0.01 10*3/MM3 (ref 0–0.2)
BASOPHILS NFR BLD AUTO: 0.2 % (ref 0–1.5)
BILIRUB SERPL-MCNC: 1 MG/DL (ref 0.1–1.2)
BUN BLD-MCNC: 15 MG/DL (ref 6–20)
BUN/CREAT SERPL: 16.3 (ref 7–25)
C CAYETANENSIS DNA STL QL NAA+NON-PROBE: NOT DETECTED
CALCIUM SPEC-SCNC: 9.2 MG/DL (ref 8.6–10.5)
CAMPY SP DNA.DIARRHEA STL QL NAA+PROBE: NOT DETECTED
CHLORIDE SERPL-SCNC: 105 MMOL/L (ref 98–107)
CO2 SERPL-SCNC: 25 MMOL/L (ref 22–29)
CREAT BLD-MCNC: 0.92 MG/DL (ref 0.76–1.27)
CRYPTOSP STL CULT: NOT DETECTED
DEPRECATED RDW RBC AUTO: 40.6 FL (ref 37–54)
E COLI DNA SPEC QL NAA+PROBE: NOT DETECTED
E HISTOLYT AG STL-ACNC: NOT DETECTED
EAEC PAA PLAS AGGR+AATA ST NAA+NON-PRB: NOT DETECTED
EC STX1 + STX2 GENES STL NAA+PROBE: NOT DETECTED
EOSINOPHIL # BLD AUTO: 0.03 10*3/MM3 (ref 0–0.7)
EOSINOPHIL NFR BLD AUTO: 0.6 % (ref 0.3–6.2)
EPEC EAE GENE STL QL NAA+NON-PROBE: NOT DETECTED
ERYTHROCYTE [DISTWIDTH] IN BLOOD BY AUTOMATED COUNT: 12.7 % (ref 11.5–14.5)
ETEC LTA+ST1A+ST1B TOX ST NAA+NON-PROBE: NOT DETECTED
G LAMBLIA DNA SPEC QL NAA+PROBE: NOT DETECTED
GFR SERPL CREATININE-BSD FRML MDRD: 86 ML/MIN/1.73
GLOBULIN UR ELPH-MCNC: 3.4 GM/DL
GLUCOSE BLD-MCNC: 89 MG/DL (ref 65–99)
HCT VFR BLD AUTO: 43.1 % (ref 40.4–52.2)
HGB BLD-MCNC: 15 G/DL (ref 13.7–17.6)
IMM GRANULOCYTES # BLD AUTO: 0.01 10*3/MM3 (ref 0–0.03)
IMM GRANULOCYTES NFR BLD AUTO: 0.2 % (ref 0–0.5)
LIPASE SERPL-CCNC: 13 U/L (ref 13–60)
LYMPHOCYTES # BLD AUTO: 0.61 10*3/MM3 (ref 0.9–4.8)
LYMPHOCYTES NFR BLD AUTO: 11.7 % (ref 19.6–45.3)
MCH RBC QN AUTO: 30.2 PG (ref 27–32.7)
MCHC RBC AUTO-ENTMCNC: 34.8 G/DL (ref 32.6–36.4)
MCV RBC AUTO: 86.9 FL (ref 79.8–96.2)
MONOCYTES # BLD AUTO: 0.4 10*3/MM3 (ref 0.2–1.2)
MONOCYTES NFR BLD AUTO: 7.6 % (ref 5–12)
NEUTROPHILS # BLD AUTO: 4.18 10*3/MM3 (ref 1.9–8.1)
NEUTROPHILS NFR BLD AUTO: 79.9 % (ref 42.7–76)
NOROVIRUS GI+II RNA STL QL NAA+NON-PROBE: DETECTED
P SHIGELLOIDES DNA STL QL NAA+NON-PROBE: NOT DETECTED
PLATELET # BLD AUTO: 156 10*3/MM3 (ref 140–500)
PMV BLD AUTO: 10.6 FL (ref 6–12)
POTASSIUM BLD-SCNC: 4.1 MMOL/L (ref 3.5–5.2)
PROT SERPL-MCNC: 7.4 G/DL (ref 6–8.5)
RBC # BLD AUTO: 4.96 10*6/MM3 (ref 4.6–6)
RV RNA STL NAA+PROBE: NOT DETECTED
SALMONELLA DNA SPEC QL NAA+PROBE: NOT DETECTED
SAPO I+II+IV+V RNA STL QL NAA+NON-PROBE: NOT DETECTED
SHIGELLA SP+EIEC IPAH STL QL NAA+PROBE: NOT DETECTED
SODIUM BLD-SCNC: 141 MMOL/L (ref 136–145)
V CHOLERAE DNA SPEC QL NAA+PROBE: NOT DETECTED
VIBRIO DNA SPEC NAA+PROBE: NOT DETECTED
WBC NRBC COR # BLD: 5.23 10*3/MM3 (ref 4.5–10.7)
YERSINIA STL CULT: NOT DETECTED

## 2018-12-29 PROCEDURE — 82150 ASSAY OF AMYLASE: CPT | Performed by: SURGERY

## 2018-12-29 PROCEDURE — 74019 RADEX ABDOMEN 2 VIEWS: CPT

## 2018-12-29 PROCEDURE — G0379 DIRECT REFER HOSPITAL OBSERV: HCPCS

## 2018-12-29 PROCEDURE — 83690 ASSAY OF LIPASE: CPT | Performed by: SURGERY

## 2018-12-29 PROCEDURE — G0378 HOSPITAL OBSERVATION PER HR: HCPCS

## 2018-12-29 PROCEDURE — 87507 IADNA-DNA/RNA PROBE TQ 12-25: CPT | Performed by: SURGERY

## 2018-12-29 PROCEDURE — 80053 COMPREHEN METABOLIC PANEL: CPT | Performed by: SURGERY

## 2018-12-29 PROCEDURE — 85025 COMPLETE CBC W/AUTO DIFF WBC: CPT | Performed by: SURGERY

## 2018-12-29 PROCEDURE — 99219 PR INITIAL OBSERVATION CARE/DAY 50 MINUTES: CPT | Performed by: SURGERY

## 2018-12-29 RX ORDER — HYDROMORPHONE HYDROCHLORIDE 1 MG/ML
0.5 INJECTION, SOLUTION INTRAMUSCULAR; INTRAVENOUS; SUBCUTANEOUS
Status: DISCONTINUED | OUTPATIENT
Start: 2018-12-29 | End: 2018-12-31 | Stop reason: HOSPADM

## 2018-12-29 RX ORDER — SODIUM CHLORIDE 0.9 % (FLUSH) 0.9 %
3-10 SYRINGE (ML) INJECTION AS NEEDED
Status: DISCONTINUED | OUTPATIENT
Start: 2018-12-29 | End: 2018-12-31 | Stop reason: HOSPADM

## 2018-12-29 RX ORDER — SODIUM CHLORIDE, SODIUM LACTATE, POTASSIUM CHLORIDE, CALCIUM CHLORIDE 600; 310; 30; 20 MG/100ML; MG/100ML; MG/100ML; MG/100ML
100 INJECTION, SOLUTION INTRAVENOUS CONTINUOUS
Status: DISCONTINUED | OUTPATIENT
Start: 2018-12-29 | End: 2018-12-31 | Stop reason: HOSPADM

## 2018-12-29 RX ORDER — ONDANSETRON 2 MG/ML
4 INJECTION INTRAMUSCULAR; INTRAVENOUS EVERY 6 HOURS PRN
Status: DISCONTINUED | OUTPATIENT
Start: 2018-12-29 | End: 2018-12-31 | Stop reason: HOSPADM

## 2018-12-29 RX ORDER — SODIUM CHLORIDE 0.9 % (FLUSH) 0.9 %
3 SYRINGE (ML) INJECTION EVERY 12 HOURS SCHEDULED
Status: DISCONTINUED | OUTPATIENT
Start: 2018-12-29 | End: 2018-12-31 | Stop reason: HOSPADM

## 2018-12-29 RX ORDER — ACETAMINOPHEN 325 MG/1
650 TABLET ORAL EVERY 4 HOURS PRN
Status: DISCONTINUED | OUTPATIENT
Start: 2018-12-29 | End: 2018-12-31 | Stop reason: HOSPADM

## 2018-12-29 RX ADMIN — SODIUM CHLORIDE, POTASSIUM CHLORIDE, SODIUM LACTATE AND CALCIUM CHLORIDE 100 ML/HR: 600; 310; 30; 20 INJECTION, SOLUTION INTRAVENOUS at 13:31

## 2018-12-29 RX ADMIN — ACETAMINOPHEN 650 MG: 325 TABLET, FILM COATED ORAL at 10:42

## 2018-12-29 RX ADMIN — ACETAMINOPHEN 650 MG: 325 TABLET, FILM COATED ORAL at 18:00

## 2018-12-29 RX ADMIN — SODIUM CHLORIDE, PRESERVATIVE FREE 3 ML: 5 INJECTION INTRAVENOUS at 13:31

## 2018-12-29 RX ADMIN — SODIUM CHLORIDE, PRESERVATIVE FREE 3 ML: 5 INJECTION INTRAVENOUS at 20:43

## 2018-12-30 LAB
ANION GAP SERPL CALCULATED.3IONS-SCNC: 13.2 MMOL/L
BASOPHILS # BLD AUTO: 0.01 10*3/MM3 (ref 0–0.2)
BASOPHILS NFR BLD AUTO: 0.2 % (ref 0–1.5)
BUN BLD-MCNC: 13 MG/DL (ref 6–20)
BUN/CREAT SERPL: 13.4 (ref 7–25)
CALCIUM SPEC-SCNC: 9.4 MG/DL (ref 8.6–10.5)
CHLORIDE SERPL-SCNC: 103 MMOL/L (ref 98–107)
CO2 SERPL-SCNC: 22.8 MMOL/L (ref 22–29)
CREAT BLD-MCNC: 0.97 MG/DL (ref 0.76–1.27)
DEPRECATED RDW RBC AUTO: 42 FL (ref 37–54)
EOSINOPHIL # BLD AUTO: 0.07 10*3/MM3 (ref 0–0.7)
EOSINOPHIL NFR BLD AUTO: 1.6 % (ref 0.3–6.2)
ERYTHROCYTE [DISTWIDTH] IN BLOOD BY AUTOMATED COUNT: 12.6 % (ref 11.5–14.5)
GFR SERPL CREATININE-BSD FRML MDRD: 81 ML/MIN/1.73
GLUCOSE BLD-MCNC: 83 MG/DL (ref 65–99)
HCT VFR BLD AUTO: 45.1 % (ref 40.4–52.2)
HGB BLD-MCNC: 15.1 G/DL (ref 13.7–17.6)
IMM GRANULOCYTES # BLD AUTO: 0 10*3/MM3 (ref 0–0.03)
IMM GRANULOCYTES NFR BLD AUTO: 0 % (ref 0–0.5)
LYMPHOCYTES # BLD AUTO: 1.36 10*3/MM3 (ref 0.9–4.8)
LYMPHOCYTES NFR BLD AUTO: 30.7 % (ref 19.6–45.3)
MCH RBC QN AUTO: 30.4 PG (ref 27–32.7)
MCHC RBC AUTO-ENTMCNC: 33.5 G/DL (ref 32.6–36.4)
MCV RBC AUTO: 90.9 FL (ref 79.8–96.2)
MONOCYTES # BLD AUTO: 0.64 10*3/MM3 (ref 0.2–1.2)
MONOCYTES NFR BLD AUTO: 14.4 % (ref 5–12)
NEUTROPHILS # BLD AUTO: 2.35 10*3/MM3 (ref 1.9–8.1)
NEUTROPHILS NFR BLD AUTO: 53.1 % (ref 42.7–76)
PLATELET # BLD AUTO: 161 10*3/MM3 (ref 140–500)
PMV BLD AUTO: 10.3 FL (ref 6–12)
POTASSIUM BLD-SCNC: 3.8 MMOL/L (ref 3.5–5.2)
RBC # BLD AUTO: 4.96 10*6/MM3 (ref 4.6–6)
SODIUM BLD-SCNC: 139 MMOL/L (ref 136–145)
WBC NRBC COR # BLD: 4.43 10*3/MM3 (ref 4.5–10.7)

## 2018-12-30 PROCEDURE — 85025 COMPLETE CBC W/AUTO DIFF WBC: CPT | Performed by: SURGERY

## 2018-12-30 PROCEDURE — G0378 HOSPITAL OBSERVATION PER HR: HCPCS

## 2018-12-30 PROCEDURE — 80048 BASIC METABOLIC PNL TOTAL CA: CPT | Performed by: SURGERY

## 2018-12-30 PROCEDURE — 99225 PR SBSQ OBSERVATION CARE/DAY 25 MINUTES: CPT | Performed by: SURGERY

## 2018-12-31 ENCOUNTER — APPOINTMENT (OUTPATIENT)
Dept: NUCLEAR MEDICINE | Facility: HOSPITAL | Age: 53
End: 2018-12-31

## 2018-12-31 VITALS
RESPIRATION RATE: 18 BRPM | OXYGEN SATURATION: 99 % | TEMPERATURE: 98.1 F | SYSTOLIC BLOOD PRESSURE: 130 MMHG | HEART RATE: 72 BPM | WEIGHT: 212.96 LBS | HEIGHT: 71 IN | BODY MASS INDEX: 29.81 KG/M2 | DIASTOLIC BLOOD PRESSURE: 66 MMHG

## 2018-12-31 LAB
ANION GAP SERPL CALCULATED.3IONS-SCNC: 12.5 MMOL/L
BASOPHILS # BLD AUTO: 0 10*3/MM3 (ref 0–0.2)
BASOPHILS NFR BLD AUTO: 0 % (ref 0–1.5)
BUN BLD-MCNC: 12 MG/DL (ref 6–20)
BUN/CREAT SERPL: 13.8 (ref 7–25)
CALCIUM SPEC-SCNC: 9 MG/DL (ref 8.6–10.5)
CHLORIDE SERPL-SCNC: 103 MMOL/L (ref 98–107)
CO2 SERPL-SCNC: 26.5 MMOL/L (ref 22–29)
CREAT BLD-MCNC: 0.87 MG/DL (ref 0.76–1.27)
DEPRECATED RDW RBC AUTO: 41.3 FL (ref 37–54)
EOSINOPHIL # BLD AUTO: 0.06 10*3/MM3 (ref 0–0.7)
EOSINOPHIL NFR BLD AUTO: 1.8 % (ref 0.3–6.2)
ERYTHROCYTE [DISTWIDTH] IN BLOOD BY AUTOMATED COUNT: 12.4 % (ref 11.5–14.5)
GFR SERPL CREATININE-BSD FRML MDRD: 91 ML/MIN/1.73
GLUCOSE BLD-MCNC: 81 MG/DL (ref 65–99)
HCT VFR BLD AUTO: 43 % (ref 40.4–52.2)
HGB BLD-MCNC: 14.3 G/DL (ref 13.7–17.6)
IMM GRANULOCYTES # BLD AUTO: 0 10*3/MM3 (ref 0–0.03)
IMM GRANULOCYTES NFR BLD AUTO: 0 % (ref 0–0.5)
LYMPHOCYTES # BLD AUTO: 1.28 10*3/MM3 (ref 0.9–4.8)
LYMPHOCYTES NFR BLD AUTO: 39.3 % (ref 19.6–45.3)
MCH RBC QN AUTO: 30.1 PG (ref 27–32.7)
MCHC RBC AUTO-ENTMCNC: 33.3 G/DL (ref 32.6–36.4)
MCV RBC AUTO: 90.5 FL (ref 79.8–96.2)
MONOCYTES # BLD AUTO: 0.59 10*3/MM3 (ref 0.2–1.2)
MONOCYTES NFR BLD AUTO: 18.1 % (ref 5–12)
NEUTROPHILS # BLD AUTO: 1.33 10*3/MM3 (ref 1.9–8.1)
NEUTROPHILS NFR BLD AUTO: 40.8 % (ref 42.7–76)
PLATELET # BLD AUTO: 151 10*3/MM3 (ref 140–500)
PMV BLD AUTO: 9.9 FL (ref 6–12)
POTASSIUM BLD-SCNC: 3.8 MMOL/L (ref 3.5–5.2)
RBC # BLD AUTO: 4.75 10*6/MM3 (ref 4.6–6)
SODIUM BLD-SCNC: 142 MMOL/L (ref 136–145)
WBC NRBC COR # BLD: 3.26 10*3/MM3 (ref 4.5–10.7)

## 2018-12-31 PROCEDURE — A9537 TC99M MEBROFENIN: HCPCS | Performed by: SURGERY

## 2018-12-31 PROCEDURE — 96360 HYDRATION IV INFUSION INIT: CPT

## 2018-12-31 PROCEDURE — G0378 HOSPITAL OBSERVATION PER HR: HCPCS

## 2018-12-31 PROCEDURE — 0 TECHNETIUM TC 99M MEBROFENIN KIT: Performed by: SURGERY

## 2018-12-31 PROCEDURE — 78227 HEPATOBIL SYST IMAGE W/DRUG: CPT

## 2018-12-31 PROCEDURE — 78226 HEPATOBILIARY SYSTEM IMAGING: CPT

## 2018-12-31 PROCEDURE — 85025 COMPLETE CBC W/AUTO DIFF WBC: CPT | Performed by: SURGERY

## 2018-12-31 PROCEDURE — 99217 PR OBSERVATION CARE DISCHARGE MANAGEMENT: CPT | Performed by: SURGERY

## 2018-12-31 PROCEDURE — 80048 BASIC METABOLIC PNL TOTAL CA: CPT | Performed by: SURGERY

## 2018-12-31 RX ORDER — KIT FOR THE PREPARATION OF TECHNETIUM TC 99M MEBROFENIN 45 MG/10ML
1 INJECTION, POWDER, LYOPHILIZED, FOR SOLUTION INTRAVENOUS
Status: COMPLETED | OUTPATIENT
Start: 2018-12-31 | End: 2018-12-31

## 2018-12-31 RX ADMIN — SODIUM CHLORIDE, PRESERVATIVE FREE 3 ML: 5 INJECTION INTRAVENOUS at 08:46

## 2018-12-31 RX ADMIN — MEBROFENIN 1 DOSE: 45 INJECTION, POWDER, LYOPHILIZED, FOR SOLUTION INTRAVENOUS at 06:30

## 2018-12-31 RX ADMIN — SODIUM CHLORIDE, POTASSIUM CHLORIDE, SODIUM LACTATE AND CALCIUM CHLORIDE 100 ML/HR: 600; 310; 30; 20 INJECTION, SOLUTION INTRAVENOUS at 10:09

## 2019-01-02 ENCOUNTER — TELEPHONE (OUTPATIENT)
Dept: SURGERY | Facility: CLINIC | Age: 54
End: 2019-01-02

## 2019-01-02 NOTE — TELEPHONE ENCOUNTER
Patient would like to know how he can differentiate between the Norovirus and Gallstones since both have similar symptoms. I informed him of the symptoms of Gallstones and that the only way to determine if his symptoms were still from the Norovirus would be to do a GI panel recheck. He also asked about his Hida scan results, which I informed him off and per Dr. Nguyễn note, the patient wanted to go home and think about surgery then would contact us if he wished to proceed.

## 2019-01-03 ENCOUNTER — TELEPHONE (OUTPATIENT)
Dept: SURGERY | Facility: CLINIC | Age: 54
End: 2019-01-03

## 2019-01-04 PROBLEM — R10.13 EPIGASTRIC PAIN: Status: ACTIVE | Noted: 2019-01-04

## 2019-01-09 ENCOUNTER — HOSPITAL ENCOUNTER (OUTPATIENT)
Facility: HOSPITAL | Age: 54
Setting detail: HOSPITAL OUTPATIENT SURGERY
Discharge: HOME | End: 2019-01-09
Attending: SURGERY | Admitting: SURGERY

## 2019-01-09 ENCOUNTER — ANESTHESIA EVENT (OUTPATIENT)
Dept: GASTROENTEROLOGY | Facility: HOSPITAL | Age: 54
End: 2019-01-09

## 2019-01-09 ENCOUNTER — ANESTHESIA (OUTPATIENT)
Dept: GASTROENTEROLOGY | Facility: HOSPITAL | Age: 54
End: 2019-01-09

## 2019-01-09 VITALS
HEART RATE: 53 BPM | RESPIRATION RATE: 14 BRPM | OXYGEN SATURATION: 95 % | BODY MASS INDEX: 29.5 KG/M2 | SYSTOLIC BLOOD PRESSURE: 111 MMHG | DIASTOLIC BLOOD PRESSURE: 59 MMHG | TEMPERATURE: 98.3 F | HEIGHT: 71 IN | WEIGHT: 210.7 LBS

## 2019-01-09 PROCEDURE — 25010000002 PROPOFOL 10 MG/ML EMULSION: Performed by: ANESTHESIOLOGY

## 2019-01-09 PROCEDURE — 45378 DIAGNOSTIC COLONOSCOPY: CPT | Performed by: SURGERY

## 2019-01-09 RX ORDER — LIDOCAINE HYDROCHLORIDE 20 MG/ML
INJECTION, SOLUTION INFILTRATION; PERINEURAL AS NEEDED
Status: DISCONTINUED | OUTPATIENT
Start: 2019-01-09 | End: 2019-01-09 | Stop reason: SURG

## 2019-01-09 RX ORDER — PROPOFOL 10 MG/ML
VIAL (ML) INTRAVENOUS AS NEEDED
Status: DISCONTINUED | OUTPATIENT
Start: 2019-01-09 | End: 2019-01-09 | Stop reason: SURG

## 2019-01-09 RX ORDER — SODIUM CHLORIDE, SODIUM LACTATE, POTASSIUM CHLORIDE, CALCIUM CHLORIDE 600; 310; 30; 20 MG/100ML; MG/100ML; MG/100ML; MG/100ML
30 INJECTION, SOLUTION INTRAVENOUS CONTINUOUS PRN
Status: DISCONTINUED | OUTPATIENT
Start: 2019-01-09 | End: 2019-01-09 | Stop reason: HOSPADM

## 2019-01-09 RX ORDER — SODIUM CHLORIDE 0.9 % (FLUSH) 0.9 %
3-10 SYRINGE (ML) INJECTION AS NEEDED
Status: DISCONTINUED | OUTPATIENT
Start: 2019-01-09 | End: 2019-01-09 | Stop reason: HOSPADM

## 2019-01-09 RX ORDER — SODIUM CHLORIDE 0.9 % (FLUSH) 0.9 %
3 SYRINGE (ML) INJECTION EVERY 12 HOURS SCHEDULED
Status: DISCONTINUED | OUTPATIENT
Start: 2019-01-09 | End: 2019-01-09 | Stop reason: HOSPADM

## 2019-01-09 RX ADMIN — LIDOCAINE HYDROCHLORIDE 50 MG: 20 INJECTION, SOLUTION INFILTRATION; PERINEURAL at 08:35

## 2019-01-09 RX ADMIN — SODIUM CHLORIDE, POTASSIUM CHLORIDE, SODIUM LACTATE AND CALCIUM CHLORIDE: 600; 310; 30; 20 INJECTION, SOLUTION INTRAVENOUS at 08:35

## 2019-01-09 RX ADMIN — PROPOFOL 150 MG: 10 INJECTION, EMULSION INTRAVENOUS at 08:35

## 2019-01-09 RX ADMIN — SODIUM CHLORIDE, POTASSIUM CHLORIDE, SODIUM LACTATE AND CALCIUM CHLORIDE 30 ML/HR: 600; 310; 30; 20 INJECTION, SOLUTION INTRAVENOUS at 07:52

## 2019-01-09 NOTE — ANESTHESIA PREPROCEDURE EVALUATION
Anesthesia Evaluation     Patient summary reviewed and Nursing notes reviewed   NPO Solid Status: > 8 hours  NPO Liquid Status: > 8 hours           Airway   Mallampati: II  TM distance: >3 FB  Neck ROM: full  no difficulty expected  Dental - normal exam     Pulmonary - normal exam   Cardiovascular - normal exam    (+) hypertension,       Neuro/Psych  (+) numbness, psychiatric history Anxiety,     GI/Hepatic/Renal/Endo    (+)  GERD,      Musculoskeletal     Abdominal  - normal exam   Substance History      OB/GYN          Other   (+) arthritis                     Anesthesia Plan    ASA 2     MAC     Anesthetic plan, all risks, benefits, and alternatives have been provided, discussed and informed consent has been obtained with: patient.

## 2019-01-09 NOTE — OP NOTE
PREOPERATIVE DIAGNOSIS:  Epigastric and right upper quadrant pain    POSTOPERATIVE DIAGNOSIS AND FINDINGS:  Normal    PROCEDURE:  EGD    SURGEON:  Amando Nguyễn MD    ANESTHESIA:  MAC    SPECIMEN:  none    DESCRIPTION:  In decubitus position endoscope was inserted into the esophagus, stomach and duodenum. Antegrade and retroflex view of stomach performed.    There were no gross abnormalities of the first and second  portions of the duodenum.    Gastric antrum, body and retroflexed view of stomach were normal.    GE junction and esophagus were normal.    Tolerated well.    Amando Nguyễn M.D.

## 2019-01-09 NOTE — ANESTHESIA POSTPROCEDURE EVALUATION
"Patient: Jose Maria Judge    Procedure Summary     Date:  01/09/19 Room / Location:   [facility]    Anesthesia Start:  0836 Anesthesia Stop:  0846    Procedure:  ESOPHAGOGASTRODUODENOSCOPY (N/A Esophagus) Diagnosis:       Epigastric pain      (Epigastric pain [R10.13])    Surgeon:  Amando Nguyễn MD Provider:  Singh Brown MD    Anesthesia Type:  MAC ASA Status:  2          Anesthesia Type: MAC  Last vitals  BP   98/65 (01/09/19 0901)   Temp   36.8 °C (98.3 °F) (01/09/19 0901)   Pulse   50 (01/09/19 0901)   Resp   14 (01/09/19 0742)     SpO2   96 % (01/09/19 0901)     Post Anesthesia Care and Evaluation    Patient location during evaluation: bedside  Patient participation: complete - patient participated  Level of consciousness: awake and alert  Pain management: adequate  Airway patency: patent  Anesthetic complications: No anesthetic complications    Cardiovascular status: acceptable  Respiratory status: acceptable  Hydration status: acceptable    Comments: BP 98/65 (BP Location: Left arm, Patient Position: Lying)   Pulse 50   Temp 36.8 °C (98.3 °F) (Oral)   Resp 14   Ht 180.3 cm (71\")   Wt 95.6 kg (210 lb 11.2 oz)   SpO2 96%   BMI 29.39 kg/m²       "

## 2019-03-11 ENCOUNTER — TELEPHONE (OUTPATIENT)
Dept: SURGERY | Facility: CLINIC | Age: 54
End: 2019-03-11

## 2019-04-15 ENCOUNTER — APPOINTMENT (OUTPATIENT)
Dept: LAB | Facility: HOSPITAL | Age: 54
End: 2019-04-15

## 2019-04-15 ENCOUNTER — APPOINTMENT (OUTPATIENT)
Dept: ONCOLOGY | Facility: CLINIC | Age: 54
End: 2019-04-15

## 2019-04-28 ENCOUNTER — HOSPITAL ENCOUNTER (EMERGENCY)
Facility: HOSPITAL | Age: 54
Discharge: HOME | End: 2019-04-28
Attending: EMERGENCY MEDICINE | Admitting: EMERGENCY MEDICINE

## 2019-04-28 VITALS
TEMPERATURE: 97.8 F | RESPIRATION RATE: 18 BRPM | BODY MASS INDEX: 30.66 KG/M2 | HEIGHT: 71 IN | SYSTOLIC BLOOD PRESSURE: 108 MMHG | WEIGHT: 219 LBS | OXYGEN SATURATION: 96 % | HEART RATE: 62 BPM | DIASTOLIC BLOOD PRESSURE: 64 MMHG

## 2019-04-28 DIAGNOSIS — R11.0 NAUSEA: ICD-10-CM

## 2019-04-28 DIAGNOSIS — R10.13 EPIGASTRIC PAIN: Primary | ICD-10-CM

## 2019-04-28 LAB
ADV 40+41 DNA STL QL NAA+NON-PROBE: NOT DETECTED
ALBUMIN SERPL-MCNC: 4.3 G/DL (ref 3.5–5.2)
ALBUMIN/GLOB SERPL: 1.3 G/DL
ALP SERPL-CCNC: 51 U/L (ref 39–117)
ALT SERPL W P-5'-P-CCNC: 20 U/L (ref 1–41)
ANION GAP SERPL CALCULATED.3IONS-SCNC: 12.1 MMOL/L
AST SERPL-CCNC: 19 U/L (ref 1–40)
ASTRO TYP 1-8 RNA STL QL NAA+NON-PROBE: NOT DETECTED
BACTERIA UR QL AUTO: ABNORMAL /HPF
BASOPHILS # BLD AUTO: 0.02 10*3/MM3 (ref 0–0.2)
BASOPHILS NFR BLD AUTO: 0.4 % (ref 0–1.5)
BILIRUB SERPL-MCNC: 0.6 MG/DL (ref 0.2–1.2)
BILIRUB UR QL STRIP: NEGATIVE
BUN BLD-MCNC: 10 MG/DL (ref 6–20)
BUN/CREAT SERPL: 11.1 (ref 7–25)
C CAYETANENSIS DNA STL QL NAA+NON-PROBE: NOT DETECTED
CALCIUM SPEC-SCNC: 9.7 MG/DL (ref 8.6–10.5)
CAMPY SP DNA.DIARRHEA STL QL NAA+PROBE: NOT DETECTED
CHLORIDE SERPL-SCNC: 105 MMOL/L (ref 98–107)
CLARITY UR: CLEAR
CO2 SERPL-SCNC: 23.9 MMOL/L (ref 22–29)
COLOR UR: YELLOW
CREAT BLD-MCNC: 0.9 MG/DL (ref 0.76–1.27)
CRYPTOSP STL CULT: NOT DETECTED
DEPRECATED RDW RBC AUTO: 41 FL (ref 37–54)
E COLI DNA SPEC QL NAA+PROBE: NOT DETECTED
E HISTOLYT AG STL-ACNC: NOT DETECTED
EAEC PAA PLAS AGGR+AATA ST NAA+NON-PRB: NOT DETECTED
EC STX1 + STX2 GENES STL NAA+PROBE: NOT DETECTED
EOSINOPHIL # BLD AUTO: 0.05 10*3/MM3 (ref 0–0.4)
EOSINOPHIL NFR BLD AUTO: 1.1 % (ref 0.3–6.2)
EPEC EAE GENE STL QL NAA+NON-PROBE: NOT DETECTED
ERYTHROCYTE [DISTWIDTH] IN BLOOD BY AUTOMATED COUNT: 12.7 % (ref 12.3–15.4)
ETEC LTA+ST1A+ST1B TOX ST NAA+NON-PROBE: NOT DETECTED
G LAMBLIA DNA SPEC QL NAA+PROBE: NOT DETECTED
GFR SERPL CREATININE-BSD FRML MDRD: 88 ML/MIN/1.73
GLOBULIN UR ELPH-MCNC: 3.4 GM/DL
GLUCOSE BLD-MCNC: 87 MG/DL (ref 65–99)
GLUCOSE UR STRIP-MCNC: NEGATIVE MG/DL
HCT VFR BLD AUTO: 44.7 % (ref 37.5–51)
HGB BLD-MCNC: 14.9 G/DL (ref 13–17.7)
HGB UR QL STRIP.AUTO: ABNORMAL
HOLD SPECIMEN: NORMAL
HOLD SPECIMEN: NORMAL
HYALINE CASTS UR QL AUTO: ABNORMAL /LPF
IMM GRANULOCYTES # BLD AUTO: 0.01 10*3/MM3 (ref 0–0.05)
IMM GRANULOCYTES NFR BLD AUTO: 0.2 % (ref 0–0.5)
KETONES UR QL STRIP: NEGATIVE
LEUKOCYTE ESTERASE UR QL STRIP.AUTO: NEGATIVE
LIPASE SERPL-CCNC: 17 U/L (ref 13–60)
LYMPHOCYTES # BLD AUTO: 1.33 10*3/MM3 (ref 0.7–3.1)
LYMPHOCYTES NFR BLD AUTO: 28.4 % (ref 19.6–45.3)
MCH RBC QN AUTO: 29.5 PG (ref 26.6–33)
MCHC RBC AUTO-ENTMCNC: 33.3 G/DL (ref 31.5–35.7)
MCV RBC AUTO: 88.5 FL (ref 79–97)
MONOCYTES # BLD AUTO: 0.54 10*3/MM3 (ref 0.1–0.9)
MONOCYTES NFR BLD AUTO: 11.5 % (ref 5–12)
NEUTROPHILS # BLD AUTO: 2.74 10*3/MM3 (ref 1.7–7)
NEUTROPHILS NFR BLD AUTO: 58.4 % (ref 42.7–76)
NITRITE UR QL STRIP: NEGATIVE
NOROVIRUS GI+II RNA STL QL NAA+NON-PROBE: NOT DETECTED
NRBC BLD AUTO-RTO: 0 /100 WBC (ref 0–0.2)
P SHIGELLOIDES DNA STL QL NAA+NON-PROBE: NOT DETECTED
PH UR STRIP.AUTO: 6.5 [PH] (ref 5–8)
PLATELET # BLD AUTO: 170 10*3/MM3 (ref 140–450)
PMV BLD AUTO: 10.6 FL (ref 6–12)
POTASSIUM BLD-SCNC: 4 MMOL/L (ref 3.5–5.2)
PROT SERPL-MCNC: 7.7 G/DL (ref 6–8.5)
PROT UR QL STRIP: NEGATIVE
RBC # BLD AUTO: 5.05 10*6/MM3 (ref 4.14–5.8)
RBC # UR: ABNORMAL /HPF
REF LAB TEST METHOD: ABNORMAL
RV RNA STL NAA+PROBE: NOT DETECTED
SALMONELLA DNA SPEC QL NAA+PROBE: NOT DETECTED
SAPO I+II+IV+V RNA STL QL NAA+NON-PROBE: NOT DETECTED
SHIGELLA SP+EIEC IPAH STL QL NAA+PROBE: NOT DETECTED
SODIUM BLD-SCNC: 141 MMOL/L (ref 136–145)
SP GR UR STRIP: 1.02 (ref 1–1.03)
SQUAMOUS #/AREA URNS HPF: ABNORMAL /HPF
UROBILINOGEN UR QL STRIP: ABNORMAL
V CHOLERAE DNA SPEC QL NAA+PROBE: NOT DETECTED
VIBRIO DNA SPEC NAA+PROBE: NOT DETECTED
WBC NRBC COR # BLD: 4.69 10*3/MM3 (ref 3.4–10.8)
WBC UR QL AUTO: ABNORMAL /HPF
WHOLE BLOOD HOLD SPECIMEN: NORMAL
WHOLE BLOOD HOLD SPECIMEN: NORMAL
YERSINIA STL CULT: NOT DETECTED

## 2019-04-28 PROCEDURE — 81001 URINALYSIS AUTO W/SCOPE: CPT | Performed by: PHYSICIAN ASSISTANT

## 2019-04-28 PROCEDURE — 83690 ASSAY OF LIPASE: CPT | Performed by: PHYSICIAN ASSISTANT

## 2019-04-28 PROCEDURE — 96374 THER/PROPH/DIAG INJ IV PUSH: CPT

## 2019-04-28 PROCEDURE — 85025 COMPLETE CBC W/AUTO DIFF WBC: CPT | Performed by: PHYSICIAN ASSISTANT

## 2019-04-28 PROCEDURE — 87507 IADNA-DNA/RNA PROBE TQ 12-25: CPT | Performed by: PHYSICIAN ASSISTANT

## 2019-04-28 PROCEDURE — 99283 EMERGENCY DEPT VISIT LOW MDM: CPT

## 2019-04-28 PROCEDURE — 80053 COMPREHEN METABOLIC PANEL: CPT | Performed by: PHYSICIAN ASSISTANT

## 2019-04-28 PROCEDURE — 25010000002 ONDANSETRON PER 1 MG: Performed by: PHYSICIAN ASSISTANT

## 2019-04-28 RX ORDER — ONDANSETRON 2 MG/ML
4 INJECTION INTRAMUSCULAR; INTRAVENOUS ONCE
Status: COMPLETED | OUTPATIENT
Start: 2019-04-28 | End: 2019-04-28

## 2019-04-28 RX ORDER — ONDANSETRON 4 MG/1
4-8 TABLET, ORALLY DISINTEGRATING ORAL EVERY 8 HOURS PRN
Qty: 10 TABLET | Refills: 0 | Status: SHIPPED | OUTPATIENT
Start: 2019-04-28 | End: 2019-05-30

## 2019-04-28 RX ORDER — FAMOTIDINE 20 MG/1
20 TABLET, FILM COATED ORAL 2 TIMES DAILY
Qty: 20 TABLET | Refills: 0 | Status: SHIPPED | OUTPATIENT
Start: 2019-04-28 | End: 2019-05-30

## 2019-04-28 RX ADMIN — SODIUM CHLORIDE, POTASSIUM CHLORIDE, SODIUM LACTATE AND CALCIUM CHLORIDE 1000 ML: 600; 310; 30; 20 INJECTION, SOLUTION INTRAVENOUS at 10:39

## 2019-04-28 RX ADMIN — ONDANSETRON 4 MG: 2 INJECTION INTRAMUSCULAR; INTRAVENOUS at 10:41

## 2019-05-08 ENCOUNTER — TRANSCRIBE ORDERS (OUTPATIENT)
Dept: ADMINISTRATIVE | Facility: HOSPITAL | Age: 54
End: 2019-05-08

## 2019-05-08 DIAGNOSIS — M54.5 LOW BACK PAIN, UNSPECIFIED BACK PAIN LATERALITY, UNSPECIFIED CHRONICITY, WITH SCIATICA PRESENCE UNSPECIFIED: Primary | ICD-10-CM

## 2019-05-15 NOTE — PROGRESS NOTES
Subjective Describes somewhat convoluted history beginning November 2018.    REASON FOR CONSULTATION: Left axillary adenopathy with potential evidence of lymph abdominal Hodgkin's disease  Provide an opinion on any further workup or treatment                             REQUESTING PHYSICIAN: Emir Ellis MD, Amando Nguyễn MD    RECORDS OBTAINED:  Records of the patients history including those obtained from the referring provider were reviewed and summarized in detail.    HISTORY OF PRESENT ILLNESS:  The patient is a 53 y.o. year old male who is here for an opinion about the above issue.    History of Present Illness      The patient is a 53-year-old male who was recently been assessed by surgery having worsening epigastric discomfort since November.  He been seen by Dr. Nguyễn with gallbladder ultrasound and CT scan of abdomen pelvis done in late November demonstrating a possible calcified tiny gallstones within the lumen of the gallbladder.  The same report documents enlarged right external iliac adenopathy that was unchanged from January 2016. he had associated intermittent nausea and ultimately required admission to [facility].  He was admitted December 29-December 31.  His subsequent testing revealed a GI panel that was positive for norovirus and his symptoms improved with gut rest and hydration.  HIDA scan redemonstrated 50% ejection fraction and cholecystectomy was discussed with follow-up planned.   Additional testing continued as outpatient in January including EGD that demonstrated no gross abnormalities of the first and second portions of the duodenum nor the gastric antrum, body and retroflexed view of the stomach which were thought to be normal.      Importantly the patient and also been reviewed for enlarged left axillary lymph node in November leading to an excision of a deep left axillary lymph node November 9, 2018.  This had initially been evaluated by needle biopsy August  2018 revealing a polymorphous lymphoid population with histiocytes.  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  We have discussed the patient's history extensively and that we were to see him potentially initially in November but as a result of his additional issues medically he is only been seen now approximately 6 months later.  He has no fevers, chills, does have weight loss result of change in his diet.  He describes in particular worsening of a anal fissure that has caused him pain for quite some time as well as symptoms that could potentially be referable to pudendal neuralgia.    Past Medical History:   Diagnosis Date   • Abdominal lymphadenopathy    • Acid reflux     HX   • Anxiety     R/T PAIN   • Carpal tunnel syndrome, left    • Chronic pain    • DDD (degenerative disc disease), lumbar    • Fatigue    • H/O Hypersensitivity to odor     Burning sensation in nasal passage   • Hemorrhoids     internal fistula   • Irritable bowel syndrome    • Low back pain    • Lymphadenopathy, axillary     LEFT   • Male pattern alopecia    • Pudendal neuralgia    • Right hip pain    • Unexplained night sweats         Past Surgical History:   Procedure Laterality Date   • ABSCESS DRAINAGE  08/2012   • ANAL FISTULA REPAIR N/A 09/2012, 10/2012   • AXILLARY LYMPH NODE BIOPSY/EXCISION Left 11/9/2018    Procedure: AXILLARY LYMPH NODE BIOPSY/EXCISION;  Surgeon: Amando Nguyễn MD;  Location: [facility] [place] [facility];  Service: General   • COLONOSCOPY N/A 2013    done at [facility]   • ENDOSCOPY N/A 1/9/2019    Procedure: ESOPHAGOGASTRODUODENOSCOPY;  Surgeon: Amando Nguyễn MD;  Location: [facility] ENDOSCOPY;  Service: General   • FINE NEEDLE ASPIRATION Left 08/09/2018    Left axillary lymph node FNA   • FLEXIBLE SIGMOIDOSCOPY N/A 06/25/2003    Normal-Dr. Robb  Traci   • HEMORRHOIDECTOMY N/A 1996   • HIP ARTHROSCOPY W/ LABRAL REPAIR Right 04/03/2017    Dr. Lonnie Lemons   • TONSILLECTOMY Bilateral         Current Outpatient Medications on File Prior to Visit   Medication Sig Dispense Refill   • famotidine (PEPCID) 20 MG tablet Take 1 tablet by mouth 2 (Two) Times a Day. 20 tablet 0   • ondansetron ODT (ZOFRAN-ODT) 4 MG disintegrating tablet Take 1-2 tablets by mouth Every 8 (Eight) Hours As Needed for Nausea or Vomiting. 10 tablet 0     No current facility-administered medications on file prior to visit.         ALLERGIES:    Allergies   Allergen Reactions   • Bactrim [Sulfamethoxazole-Trimethoprim] Shortness Of Breath   • Hydrocodone Shortness Of Breath   • Naproxen Shortness Of Breath   • Nsaids Other (See Comments)     Can take Ibuprofen.. Causes constipation   • Diclofenac Nausea Only and Other (See Comments)     Severe heartburn   • Levaquin [Levofloxacin] Other (See Comments)     Made tendons tight   • Medrol [Methylprednisolone] Other (See Comments)     Rectal bleeding   • Mobic [Meloxicam] Nausea Only and Other (See Comments)     Severe heartburn        Social History     Socioeconomic History   • Marital status: Single     Spouse name: Not on file   • Number of children: Not on file   • Years of education: Not on file   • Highest education level: Not on file   Tobacco Use   • Smoking status: Never Smoker   • Smokeless tobacco: Never Used   • Tobacco comment: caff use   Substance and Sexual Activity   • Alcohol use: No   • Drug use: No   • Sexual activity: Defer        Family History   Problem Relation Age of Onset   • Atrial fibrillation Father    • Aneurysm Father    • Hypertension Father    • Malig Hyperthermia Neg Hx         Review of Systems   Constitutional: Positive for activity change, appetite change and unexpected weight change.   HENT: Positive for congestion and rhinorrhea.    Respiratory: Negative.    Cardiovascular: Negative.    Gastrointestinal:  "Positive for rectal pain.   Genitourinary: Negative.    Musculoskeletal: Positive for back pain.   Skin: Negative.    Neurological: Negative.    Hematological: Negative.    Psychiatric/Behavioral: Negative.              Objective     Vitals:    05/16/19 1523   BP: 136/84   Pulse: 60   Resp: 18   Temp: 98.6 °F (37 °C)   TempSrc: Oral   SpO2: 96%   Weight: 98.7 kg (217 lb 9.6 oz)   Height: 180.3 cm (70.98\")  Comment: new patient height   PainSc: 0-No pain     Current Status 5/16/2019   ECOG score 0       Physical Exam   Constitutional: He is oriented to person, place, and time. He appears well-developed and well-nourished.   HENT:   Head: Normocephalic and atraumatic.   Right Ear: External ear normal.   Left Ear: External ear normal.   Nose: Nose normal.   Mouth/Throat: Oropharynx is clear and moist.   Eyes: EOM are normal. Pupils are equal, round, and reactive to light.   Neck: Normal range of motion. Neck supple. No JVD present. No thyromegaly present.   No additional adenopathy in evaluating bilateral infraclavicular, supraclavicular, axillary regions, inguinal regions or femoral regions.   Cardiovascular: Normal rate, regular rhythm, normal heart sounds and intact distal pulses.   Pulmonary/Chest: Effort normal and breath sounds normal.   Abdominal: Soft. Bowel sounds are normal.   Musculoskeletal: Normal range of motion.   Lymphadenopathy:     He has no cervical adenopathy.   Neurological: He is alert and oriented to person, place, and time.   Skin: Skin is warm and dry.           RECENT LABS:  Hematology WBC   Date Value Ref Range Status   05/16/2019 6.15 3.40 - 10.80 10*3/mm3 Final     RBC   Date Value Ref Range Status   05/16/2019 5.31 4.14 - 5.80 10*6/mm3 Final     Hemoglobin   Date Value Ref Range Status   05/16/2019 15.6 13.0 - 17.7 g/dL Final     Hematocrit   Date Value Ref Range Status   05/16/2019 45.7 37.5 - 51.0 % Final     Platelets   Date Value Ref Range Status   05/16/2019 174 140 - 450 10*3/mm3 " Final          Assessment/Plan      The patient is a 53-year-old male who had previously been assessed for enlarged left axillary adenopathy in November leading to a biopsy that was nondiagnostic but suspicious and eventually an excisional biopsy that was eventually felt to be consistent with follicular hyperplasia and progressive transformation of germinal centers with 2 foci suspicious for early involvement by nodules lymphocyte predominant Hodgkin's lymphoma.  This was likely treated by excision and further therapy is not necessary in this patient though he has a number of symptoms that are at least suspicious for potential recurrence.  As he was admitted for his GI symptoms, described above, he underwent additional testing that demonstrated right external iliac adenopathy that have been unchanged from 2016 and further endoscopy was negative as well.  Again we had a long discussion today as to how he might of already been treated under the circumstances but that additional surveillance would be necessary in approximately 6 months from his last exam and he is now seen May 16 thus allowing us to reexamine him via scan in the next several months.  He and his mother are agreeable with this plan.  Otherwise plan:    *Baseline CMP, sed rate, CRP, TONY, PE and serum free light chains  *CT scan of chest abdomen pelvis in approximately 7 weeks  *8-week follow-up

## 2019-05-16 ENCOUNTER — APPOINTMENT (OUTPATIENT)
Dept: LAB | Facility: HOSPITAL | Age: 54
End: 2019-05-16

## 2019-05-16 ENCOUNTER — CONSULT (OUTPATIENT)
Dept: ONCOLOGY | Facility: CLINIC | Age: 54
End: 2019-05-16

## 2019-05-16 VITALS
HEART RATE: 60 BPM | HEIGHT: 71 IN | WEIGHT: 217.6 LBS | OXYGEN SATURATION: 96 % | DIASTOLIC BLOOD PRESSURE: 84 MMHG | BODY MASS INDEX: 30.46 KG/M2 | SYSTOLIC BLOOD PRESSURE: 136 MMHG | RESPIRATION RATE: 18 BRPM | TEMPERATURE: 98.6 F

## 2019-05-16 DIAGNOSIS — C85.90 LYMPHOMA, UNSPECIFIED BODY REGION, UNSPECIFIED LYMPHOMA TYPE (HCC): Primary | ICD-10-CM

## 2019-05-16 DIAGNOSIS — R59.0 LYMPHADENOPATHY, AXILLARY: Primary | ICD-10-CM

## 2019-05-16 LAB
ALBUMIN SERPL-MCNC: 4.6 G/DL (ref 3.5–5.2)
ALBUMIN/GLOB SERPL: 1.4 G/DL (ref 1.1–2.4)
ALP SERPL-CCNC: 51 U/L (ref 38–116)
ALT SERPL W P-5'-P-CCNC: 15 U/L (ref 0–41)
ANION GAP SERPL CALCULATED.3IONS-SCNC: 9.4 MMOL/L
AST SERPL-CCNC: 17 U/L (ref 0–40)
BASOPHILS # BLD AUTO: 0.04 10*3/MM3 (ref 0–0.2)
BASOPHILS NFR BLD AUTO: 0.7 % (ref 0–1.5)
BILIRUB SERPL-MCNC: 0.4 MG/DL (ref 0.2–1.2)
BUN BLD-MCNC: 16 MG/DL (ref 6–20)
BUN/CREAT SERPL: 17.8 (ref 7.3–30)
CALCIUM SPEC-SCNC: 9.6 MG/DL (ref 8.5–10.2)
CHLORIDE SERPL-SCNC: 102 MMOL/L (ref 98–107)
CO2 SERPL-SCNC: 28.6 MMOL/L (ref 22–29)
CREAT BLD-MCNC: 0.9 MG/DL (ref 0.7–1.3)
CRP SERPL-MCNC: <0.03 MG/DL (ref 0–0.5)
DEPRECATED RDW RBC AUTO: 38.5 FL (ref 37–54)
EOSINOPHIL # BLD AUTO: 0.08 10*3/MM3 (ref 0–0.4)
EOSINOPHIL NFR BLD AUTO: 1.3 % (ref 0.3–6.2)
ERYTHROCYTE [DISTWIDTH] IN BLOOD BY AUTOMATED COUNT: 12.4 % (ref 12.3–15.4)
ERYTHROCYTE [SEDIMENTATION RATE] IN BLOOD: 5 MM/HR (ref 0–20)
GFR SERPL CREATININE-BSD FRML MDRD: 88 ML/MIN/1.73
GLOBULIN UR ELPH-MCNC: 3.2 GM/DL (ref 1.8–3.5)
GLUCOSE BLD-MCNC: 83 MG/DL (ref 74–124)
HCT VFR BLD AUTO: 45.7 % (ref 37.5–51)
HGB BLD-MCNC: 15.6 G/DL (ref 13–17.7)
IMM GRANULOCYTES # BLD AUTO: 0.04 10*3/MM3 (ref 0–0.05)
IMM GRANULOCYTES NFR BLD AUTO: 0.7 % (ref 0–0.5)
LYMPHOCYTES # BLD AUTO: 2.19 10*3/MM3 (ref 0.7–3.1)
LYMPHOCYTES NFR BLD AUTO: 35.6 % (ref 19.6–45.3)
MCH RBC QN AUTO: 29.4 PG (ref 26.6–33)
MCHC RBC AUTO-ENTMCNC: 34.1 G/DL (ref 31.5–35.7)
MCV RBC AUTO: 86.1 FL (ref 79–97)
MONOCYTES # BLD AUTO: 0.58 10*3/MM3 (ref 0.1–0.9)
MONOCYTES NFR BLD AUTO: 9.4 % (ref 5–12)
NEUTROPHILS # BLD AUTO: 3.22 10*3/MM3 (ref 1.7–7)
NEUTROPHILS NFR BLD AUTO: 52.3 % (ref 42.7–76)
NRBC BLD AUTO-RTO: 0 /100 WBC (ref 0–0.2)
PLATELET # BLD AUTO: 174 10*3/MM3 (ref 140–450)
PMV BLD AUTO: 10.3 FL (ref 6–12)
POTASSIUM BLD-SCNC: 4.5 MMOL/L (ref 3.5–4.7)
PROT SERPL-MCNC: 7.8 G/DL (ref 6.3–8)
RBC # BLD AUTO: 5.31 10*6/MM3 (ref 4.14–5.8)
SODIUM BLD-SCNC: 140 MMOL/L (ref 134–145)
WBC NRBC COR # BLD: 6.15 10*3/MM3 (ref 3.4–10.8)

## 2019-05-16 PROCEDURE — 86140 C-REACTIVE PROTEIN: CPT | Performed by: INTERNAL MEDICINE

## 2019-05-16 PROCEDURE — 85025 COMPLETE CBC W/AUTO DIFF WBC: CPT | Performed by: INTERNAL MEDICINE

## 2019-05-16 PROCEDURE — 99214 OFFICE O/P EST MOD 30 MIN: CPT | Performed by: INTERNAL MEDICINE

## 2019-05-16 PROCEDURE — 36416 COLLJ CAPILLARY BLOOD SPEC: CPT | Performed by: INTERNAL MEDICINE

## 2019-05-16 PROCEDURE — 85652 RBC SED RATE AUTOMATED: CPT | Performed by: INTERNAL MEDICINE

## 2019-05-16 PROCEDURE — 80053 COMPREHEN METABOLIC PANEL: CPT | Performed by: INTERNAL MEDICINE

## 2019-05-16 PROCEDURE — 36415 COLL VENOUS BLD VENIPUNCTURE: CPT | Performed by: INTERNAL MEDICINE

## 2019-05-17 LAB
ALBUMIN SERPL-MCNC: 4.1 G/DL (ref 2.9–4.4)
ALBUMIN/GLOB SERPL: 1.4 {RATIO} (ref 0.7–1.7)
ALPHA1 GLOB FLD ELPH-MCNC: 0.2 G/DL (ref 0–0.4)
ALPHA2 GLOB SERPL ELPH-MCNC: 0.6 G/DL (ref 0.4–1)
B-GLOBULIN SERPL ELPH-MCNC: 1 G/DL (ref 0.7–1.3)
GAMMA GLOB SERPL ELPH-MCNC: 1.3 G/DL (ref 0.4–1.8)
GLOBULIN SER CALC-MCNC: 3.1 G/DL (ref 2.2–3.9)
IGA SERPL-MCNC: 182 MG/DL (ref 90–386)
IGG SERPL-MCNC: 1296 MG/DL (ref 700–1600)
IGM SERPL-MCNC: 45 MG/DL (ref 20–172)
INTERPRETATION SERPL IEP-IMP: ABNORMAL
KAPPA LC SERPL-MCNC: 17.7 MG/L (ref 3.3–19.4)
KAPPA LC/LAMBDA SER: 1.67 {RATIO} (ref 0.26–1.65)
LAMBDA LC FREE SERPL-MCNC: 10.6 MG/L (ref 5.7–26.3)
Lab: ABNORMAL
M-SPIKE: ABNORMAL G/DL
PROT SERPL-MCNC: 7.2 G/DL (ref 6–8.5)

## 2019-05-25 ENCOUNTER — HOSPITAL ENCOUNTER (EMERGENCY)
Facility: HOSPITAL | Age: 54
Discharge: HOME | End: 2019-05-26
Attending: EMERGENCY MEDICINE | Admitting: EMERGENCY MEDICINE

## 2019-05-25 DIAGNOSIS — K62.89 RECTAL PAIN: Primary | ICD-10-CM

## 2019-05-25 PROCEDURE — 80053 COMPREHEN METABOLIC PANEL: CPT | Performed by: EMERGENCY MEDICINE

## 2019-05-25 PROCEDURE — 85025 COMPLETE CBC W/AUTO DIFF WBC: CPT | Performed by: EMERGENCY MEDICINE

## 2019-05-25 PROCEDURE — 99284 EMERGENCY DEPT VISIT MOD MDM: CPT

## 2019-05-25 RX ORDER — SODIUM CHLORIDE 0.9 % (FLUSH) 0.9 %
10 SYRINGE (ML) INJECTION AS NEEDED
Status: DISCONTINUED | OUTPATIENT
Start: 2019-05-25 | End: 2019-05-26 | Stop reason: HOSPADM

## 2019-05-26 ENCOUNTER — APPOINTMENT (OUTPATIENT)
Dept: CT IMAGING | Facility: HOSPITAL | Age: 54
End: 2019-05-26

## 2019-05-26 VITALS
DIASTOLIC BLOOD PRESSURE: 91 MMHG | OXYGEN SATURATION: 96 % | HEART RATE: 58 BPM | RESPIRATION RATE: 16 BRPM | SYSTOLIC BLOOD PRESSURE: 126 MMHG | TEMPERATURE: 97.4 F | WEIGHT: 216.49 LBS | HEIGHT: 71 IN | BODY MASS INDEX: 30.31 KG/M2

## 2019-05-26 LAB
ALBUMIN SERPL-MCNC: 4.4 G/DL (ref 3.5–5.2)
ALBUMIN/GLOB SERPL: 1.6 G/DL
ALP SERPL-CCNC: 46 U/L (ref 39–117)
ALT SERPL W P-5'-P-CCNC: 13 U/L (ref 1–41)
ANION GAP SERPL CALCULATED.3IONS-SCNC: 11.6 MMOL/L
AST SERPL-CCNC: 14 U/L (ref 1–40)
BASOPHILS # BLD AUTO: 0.02 10*3/MM3 (ref 0–0.2)
BASOPHILS NFR BLD AUTO: 0.4 % (ref 0–1.5)
BILIRUB SERPL-MCNC: 0.3 MG/DL (ref 0.2–1.2)
BUN BLD-MCNC: 13 MG/DL (ref 6–20)
BUN/CREAT SERPL: 14.9 (ref 7–25)
CALCIUM SPEC-SCNC: 9.2 MG/DL (ref 8.6–10.5)
CHLORIDE SERPL-SCNC: 108 MMOL/L (ref 98–107)
CO2 SERPL-SCNC: 25.4 MMOL/L (ref 22–29)
CREAT BLD-MCNC: 0.87 MG/DL (ref 0.76–1.27)
DEPRECATED RDW RBC AUTO: 39.5 FL (ref 37–54)
EOSINOPHIL # BLD AUTO: 0.04 10*3/MM3 (ref 0–0.4)
EOSINOPHIL NFR BLD AUTO: 0.8 % (ref 0.3–6.2)
ERYTHROCYTE [DISTWIDTH] IN BLOOD BY AUTOMATED COUNT: 12.4 % (ref 12.3–15.4)
GFR SERPL CREATININE-BSD FRML MDRD: 91 ML/MIN/1.73
GLOBULIN UR ELPH-MCNC: 2.7 GM/DL
GLUCOSE BLD-MCNC: 107 MG/DL (ref 65–99)
HCT VFR BLD AUTO: 42.2 % (ref 37.5–51)
HGB BLD-MCNC: 14.3 G/DL (ref 13–17.7)
IMM GRANULOCYTES # BLD AUTO: 0.01 10*3/MM3 (ref 0–0.05)
IMM GRANULOCYTES NFR BLD AUTO: 0.2 % (ref 0–0.5)
LYMPHOCYTES # BLD AUTO: 1.39 10*3/MM3 (ref 0.7–3.1)
LYMPHOCYTES NFR BLD AUTO: 26.2 % (ref 19.6–45.3)
MCH RBC QN AUTO: 29.4 PG (ref 26.6–33)
MCHC RBC AUTO-ENTMCNC: 33.9 G/DL (ref 31.5–35.7)
MCV RBC AUTO: 86.7 FL (ref 79–97)
MONOCYTES # BLD AUTO: 0.49 10*3/MM3 (ref 0.1–0.9)
MONOCYTES NFR BLD AUTO: 9.2 % (ref 5–12)
NEUTROPHILS # BLD AUTO: 3.35 10*3/MM3 (ref 1.7–7)
NEUTROPHILS NFR BLD AUTO: 63.2 % (ref 42.7–76)
NRBC BLD AUTO-RTO: 0 /100 WBC (ref 0–0.2)
PLATELET # BLD AUTO: 153 10*3/MM3 (ref 140–450)
PMV BLD AUTO: 11 FL (ref 6–12)
POTASSIUM BLD-SCNC: 4 MMOL/L (ref 3.5–5.2)
PROT SERPL-MCNC: 7.1 G/DL (ref 6–8.5)
RBC # BLD AUTO: 4.87 10*6/MM3 (ref 4.14–5.8)
SODIUM BLD-SCNC: 145 MMOL/L (ref 136–145)
WBC NRBC COR # BLD: 5.3 10*3/MM3 (ref 3.4–10.8)

## 2019-05-26 PROCEDURE — 25010000002 IOPAMIDOL 61 % SOLUTION: Performed by: EMERGENCY MEDICINE

## 2019-05-26 PROCEDURE — 74177 CT ABD & PELVIS W/CONTRAST: CPT

## 2019-05-26 RX ADMIN — IOPAMIDOL 85 ML: 612 INJECTION, SOLUTION INTRAVENOUS at 01:04

## 2019-05-29 ENCOUNTER — DOCUMENTATION (OUTPATIENT)
Dept: ONCOLOGY | Facility: CLINIC | Age: 54
End: 2019-05-29

## 2019-05-29 NOTE — PROGRESS NOTES
OSW initiated a phone call to the pt today to discuss his Distress Questionnaire completed on 5/16/19 on which he scored 6/10. The pt had an initial medical oncology consultation with Dr. Alvarado; he will return on 7/11/19 for results of tests to assist with the diagnostic process. OSW left the pt a voicemail as he did not answer. A second attempt will be made to reach the pt on another day.    Jacquelyn Waldron [facility]  Oncology Social Worker

## 2019-05-29 NOTE — PROGRESS NOTES
OSW received a callback from the pt. The pt stated that he does not have cancer. However, he clearly articulated his meed for mental health counseling and requested assistance with locating a provider who is qualified to assist him. OSW completed a brief needs assessment and agreed to match the pt with a mental health provider who has the necessary expertise and takes [employer]. The pt requested that he have a [employer] counselor, if at all possible. Pt has had counseling in the past at [facility].    The pt has SSI ($771 monthly) and has transportation. He resides locally with his parents who are retired; the pt will be 54 next month. He is currently without a partner. The pt has never been  and hs no children.   He verbalizes eagerness to address the emotional barriers that he states are preventing him from being able to be stable enough to be employed. 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     It was agreed that this OSW will contact him next week with referral for mental health counseling.     Jacquleyn Waldron, [facility]  Oncology Social Worker

## 2019-05-30 ENCOUNTER — OFFICE VISIT (OUTPATIENT)
Dept: SURGERY | Facility: CLINIC | Age: 54
End: 2019-05-30

## 2019-05-30 VITALS — HEART RATE: 66 BPM | WEIGHT: 211 LBS | OXYGEN SATURATION: 98 % | BODY MASS INDEX: 29.54 KG/M2 | HEIGHT: 71 IN

## 2019-05-30 DIAGNOSIS — K62.89 RECTAL PAIN: Primary | ICD-10-CM

## 2019-05-30 PROCEDURE — 99214 OFFICE O/P EST MOD 30 MIN: CPT | Performed by: SURGERY

## 2019-05-30 RX ORDER — ACETAMINOPHEN 500 MG
500 TABLET ORAL EVERY 6 HOURS PRN
COMMUNITY
End: 2023-01-20

## 2019-05-31 NOTE — PROGRESS NOTES
SUMMARY (A/P):    53-year-old gentleman whom I have taken care of in the past for enlarging left axillary lymph node which ultimately was excised and demonstrated small foci of lymphoma.  This is being managed currently with observation given the limited nature of the disease.  He also has a long history of what he calls pudendal neuralgia since undergoing hemorrhoidectomy and anal fistulotomy in the past.  He has been seen by pain management with no benefit and [facility] without findings that would lend to surgical correction.  It has been at least 5 years since his last colonoscopy and given his persistent symptoms of pelvic/rectal discomfort worsened with constipation, I have recommended colonoscopy with anal exam under anesthesia to evaluate further.  He understands the rationale for the procedure, the nature of the procedure, and the risks as well as the possibility if not probability that we are unlikely to find a condition that is amenable to surgical correction.      CC:    Rectal pain    HPI:    53-year-old gentleman who has a several year history of intermittently moderate to severe rectal/pelvic/anal pain that is worse before and after a bowel movement but does not have pain during bowel movement passage.  He has not had associated rectal bleeding.    PSH:    EGD 1/9/2019  Left axillary lymph node excision 11/9/2018  Colonoscopy 2013  Hemorrhoidectomy 1996  Anal fistula x2 in 2012    PMH:    Lymphoma  Chronic pain/pudendal neuralgia  Degenerative disc disease    FAMILY HISTORY:    Negative for colorectal cancer    SOCIAL HISTORY:   Denies tobacco use  Denies alcohol use    ALLERGIES: reviewed, in Epic    MEDICATIONS: reviewed, in Epic    ROS:  No chest pain or shortness of air.  All other systems reviewed and negative other than presenting complaints.    RADIOLOGY/ENDOSCOPY:    CT abdomen pelvis 5/26/2019 demonstrated no acute  intra-abdominal or intrapelvic process, stable pelvic adenopathy.  I reviewed the images and concur that there is no obvious abnormality particularly in the pelvic, rectal or perianal region    PHYSICAL EXAM:   Constitutional: Well-developed well-nourished, no acute distress  Vital signs: HR 66, weight 211 pounds, height 70 inches, BMI 29.8  Eyes: Conjunctiva normal, sclera nonicteric  ENMT: Hearing grossly normal, oral mucosa moist  Neck: Supple, no palpable mass, normal thyroid, trachea midline  Respiratory: Clear to auscultation, normal inspiratory effort  Cardiovascular: Regular rate, no murmur  Gastrointestinal: Soft, nontender, no palpable mass, no hepatosplenomegaly, negative for hernia, bowel sounds normal.  Rectal: Normal anal orifice with no visible or palpable abnormality, digital rectal exam not performed secondary to patient's concern for discomfort  Lymphatics (palpable nodes):  cervical-negative, inguinal-negative  Skin:  Warm, dry, no rash on visualized skin surfaces  Musculoskeletal: Symmetric strength, normal gait  Psychiatric: Alert and oriented ×3, normal affect     MELISSA QUINTANILLA M.D.

## 2019-06-05 ENCOUNTER — DOCUMENTATION (OUTPATIENT)
Dept: ONCOLOGY | Facility: CLINIC | Age: 54
End: 2019-06-05

## 2019-06-10 DIAGNOSIS — G89.29 CHRONIC BILATERAL LOW BACK PAIN, WITH SCIATICA PRESENCE UNSPECIFIED: Primary | ICD-10-CM

## 2019-06-10 DIAGNOSIS — M54.5 CHRONIC BILATERAL LOW BACK PAIN, WITH SCIATICA PRESENCE UNSPECIFIED: Primary | ICD-10-CM

## 2019-07-03 ENCOUNTER — HOSPITAL ENCOUNTER (OUTPATIENT)
Dept: PET IMAGING | Facility: HOSPITAL | Age: 54
Discharge: HOME | End: 2019-07-03
Admitting: INTERNAL MEDICINE

## 2019-07-03 DIAGNOSIS — R59.0 LYMPHADENOPATHY, AXILLARY: ICD-10-CM

## 2019-07-03 LAB — CREAT BLDA-MCNC: 0.9 MG/DL (ref 0.6–1.3)

## 2019-07-03 PROCEDURE — 74177 CT ABD & PELVIS W/CONTRAST: CPT

## 2019-07-03 PROCEDURE — 0 DIATRIZOATE MEGLUMINE & SODIUM PER 1 ML: Performed by: INTERNAL MEDICINE

## 2019-07-03 PROCEDURE — 25010000002 IOPAMIDOL 61 % SOLUTION: Performed by: INTERNAL MEDICINE

## 2019-07-03 PROCEDURE — 82565 ASSAY OF CREATININE: CPT

## 2019-07-03 PROCEDURE — 71260 CT THORAX DX C+: CPT

## 2019-07-03 RX ADMIN — DIATRIZOATE MEGLUMINE AND DIATRIZOATE SODIUM 30 ML: 660; 100 LIQUID ORAL; RECTAL at 07:35

## 2019-07-03 RX ADMIN — IOPAMIDOL 85 ML: 612 INJECTION, SOLUTION INTRAVENOUS at 08:25

## 2019-07-05 NOTE — PROGRESS NOTES
Subjective Hyperosmia    REASON FOR CONSULTATION: Left axillary adenopathy with potential evidence of lymph abdominal Hodgkin's disease  Provide an opinion on any further workup or treatment                             REQUESTING PHYSICIAN: Emir Ellis MD, Amando Nguyễn MD    RECORDS OBTAINED:  Records of the patients history including those obtained from the referring provider were reviewed and summarized in detail.    HISTORY OF PRESENT ILLNESS:  The patient is a 54 y.o. year old male who is here for an opinion about the above issue.    History of Present Illness      The patient is a 53-year-old male who was recently been assessed by surgery having worsening epigastric discomfort since November.  He been seen by Dr. Nguyễn with gallbladder ultrasound and CT scan of abdomen pelvis done in late November demonstrating a possible calcified tiny gallstones within the lumen of the gallbladder.  The same report documents enlarged right external iliac adenopathy that was unchanged from January 2016. he had associated intermittent nausea and ultimately required admission to [facility].  He was admitted December 29-December 31.  His subsequent testing revealed a GI panel that was positive for norovirus and his symptoms improved with gut rest and hydration.  HIDA scan redemonstrated 50% ejection fraction and cholecystectomy was discussed with follow-up planned.   Additional testing continued as outpatient in January including EGD that demonstrated no gross abnormalities of the first and second portions of the duodenum nor the gastric antrum, body and retroflexed view of the stomach which were thought to be normal.      Importantly the patient and also been reviewed for enlarged left axillary lymph node in November leading to an excision of a deep left axillary lymph node November 9, 2018.  This had initially been evaluated by needle biopsy August 2018 revealing a polymorphous lymphoid population with  histiocytes.  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  We have discussed the patient's history extensively and that we were to see him potentially initially in November but as a result of his additional issues medically he is only been seen now approximately 6 months later.  He has no fevers, chills, does have weight loss result of change in his diet.  He describes in particular worsening of a anal fissure that has caused him pain for quite some time as well as symptoms that could potentially be referable to pudendal neuralgia.  The patient was assessed for potential lymphoproliferative disorder with a number of studies including CRP, sed rate, paraprotein analysis,  negative.  Subsequently repeat CT scan of chest and pelvis July 3, 2019 also failed to show any additional lymphadenopathy with a stable appearance to pelvic adenopathy and no intra-abdominal or intrapelvic process seen, no pathologic lymphadenopathy seen in the chest.  He is seen back July 11, 2019 indicating that his major issue has been hyperosmia and he wishes an ENT assessment.    Past Medical History:   Diagnosis Date   • Abdominal lymphadenopathy    • Acid reflux     HX   • Anxiety     R/T PAIN   • Carpal tunnel syndrome, left    • Chronic pain    • DDD (degenerative disc disease), lumbar    • Fatigue    • H/O Hypersensitivity to odor     Burning sensation in nasal passage   • Hemorrhoids     internal fistula   • Irritable bowel syndrome    • Low back pain    • Lymphadenopathy, axillary     LEFT   • Male pattern alopecia    • Pudendal neuralgia    • Right hip pain    • Unexplained night sweats         Past Surgical History:   Procedure Laterality Date   • ABSCESS DRAINAGE  08/2012   • ANAL FISTULA REPAIR N/A 09/2012, 10/2012   • AXILLARY LYMPH NODE BIOPSY/EXCISION Left  11/9/2018    Procedure: AXILLARY LYMPH NODE BIOPSY/EXCISION;  Surgeon: Amando Nguyễn MD;  Location:  [facility];  Service: General   • COLONOSCOPY N/A 2013    done at [facility]   • COLONOSCOPY N/A 7/10/2019    Procedure: COLONOSCOPY to CECUM;  Surgeon: Amando Nguyễn MD;  Location: [facility] ENDOSCOPY;  Service: General   • ENDOSCOPY N/A 1/9/2019    Procedure: ESOPHAGOGASTRODUODENOSCOPY;  Surgeon: Amando Nguyễn MD;  Location: [facility] ENDOSCOPY;  Service: General   • FINE NEEDLE ASPIRATION Left 08/09/2018    Left axillary lymph node FNA   • FLEXIBLE SIGMOIDOSCOPY N/A 06/25/2003    Normal-Dr. Cezar Aviles   • HEMORRHOIDECTOMY N/A 1996   • HIP ARTHROSCOPY W/ LABRAL REPAIR Right 04/03/2017    Dr. Lonnie Lemons   • TONSILLECTOMY Bilateral         Current Outpatient Medications on File Prior to Visit   Medication Sig Dispense Refill   • acetaminophen (TYLENOL) 325 MG tablet Take 650 mg by mouth Every 6 (Six) Hours As Needed for Mild Pain .     • Digestive Enzymes (SUPER ENZYMES PO) Take 1 tablet by mouth As Needed.       No current facility-administered medications on file prior to visit.         ALLERGIES:    Allergies   Allergen Reactions   • Bactrim [Sulfamethoxazole-Trimethoprim] Shortness Of Breath   • Hydrocodone Shortness Of Breath   • Naproxen Shortness Of Breath   • Nsaids Other (See Comments)     Can take Ibuprofen.. Causes constipation   • Diclofenac Nausea Only and Other (See Comments)     Severe heartburn   • Levaquin [Levofloxacin] Other (See Comments)     Made tendons tight   • Medrol [Methylprednisolone] Other (See Comments)     Rectal bleeding   • Mobic [Meloxicam] Nausea Only and Other (See Comments)     Severe heartburn   • Sulfa Antibiotics Other (See Comments)     TROUBLE BREATHING        Social History     Socioeconomic History   • Marital status: Single     Spouse name: Not on file   • Number of children: 0   • Years of education: COLLEGE   • Highest education level: Not asked  "  Occupational History   • Occupation: RETIRED   Social Needs   • Financial resource strain: Patient refused   • Food insecurity:     Worry: Patient refused     Inability: Patient refused   • Transportation needs:     Medical: Patient refused     Non-medical: Patient refused   Tobacco Use   • Smoking status: Never Smoker   • Smokeless tobacco: Never Used   • Tobacco comment: caff use   Substance and Sexual Activity   • Alcohol use: No   • Drug use: No   • Sexual activity: Defer        Family History   Problem Relation Age of Onset   • Atrial fibrillation Father    • Aneurysm Father    • Hypertension Father    • Malig Hyperthermia Neg Hx         Review of Systems   Constitutional: Positive for activity change and unexpected weight change. Negative for appetite change.   HENT: Negative for congestion and rhinorrhea.    Respiratory: Negative.  Negative for chest tightness, shortness of breath and wheezing.    Cardiovascular: Negative.    Gastrointestinal: Positive for rectal pain. Negative for abdominal pain, constipation, diarrhea, nausea and vomiting.   Genitourinary: Negative.    Musculoskeletal: Positive for back pain.   Skin: Negative.    Neurological: Negative.    Hematological: Negative.    Psychiatric/Behavioral: Negative.              Objective     Vitals:    07/11/19 1526   BP: 114/77   Pulse: 62   Resp: 18   Temp: 98.8 °F (37.1 °C)   TempSrc: Oral   SpO2: 95%   Weight: 97.7 kg (215 lb 4.8 oz)   Height: 179.1 cm (70.51\")   PainSc: 0-No pain     Current Status 7/11/2019   ECOG score 0       Physical Exam   Constitutional: He is oriented to person, place, and time. He appears well-developed and well-nourished.   HENT:   Head: Normocephalic and atraumatic.   Right Ear: External ear normal.   Left Ear: External ear normal.   Nose: Nose normal.   Mouth/Throat: Oropharynx is clear and moist.   Eyes: EOM are normal. Pupils are equal, round, and reactive to light.   Neck: Normal range of motion. Neck supple. No JVD " present. No thyromegaly present.   No additional adenopathy in evaluating bilateral infraclavicular, supraclavicular, axillary regions, inguinal regions or femoral regions.   Cardiovascular: Normal rate, regular rhythm, normal heart sounds and intact distal pulses.   Pulmonary/Chest: Effort normal and breath sounds normal.   Abdominal: Soft. Bowel sounds are normal.   Musculoskeletal: Normal range of motion.   Lymphadenopathy:     He has no cervical adenopathy.   Neurological: He is alert and oriented to person, place, and time.   Skin: Skin is warm and dry.           RECENT LABS:  Hematology WBC   Date Value Ref Range Status   07/11/2019 5.39 3.40 - 10.80 10*3/mm3 Final     RBC   Date Value Ref Range Status   07/11/2019 4.94 4.14 - 5.80 10*6/mm3 Final     Hemoglobin   Date Value Ref Range Status   07/11/2019 14.7 13.0 - 17.7 g/dL Final     Hematocrit   Date Value Ref Range Status   07/11/2019 42.8 37.5 - 51.0 % Final     Platelets   Date Value Ref Range Status   07/11/2019 157 140 - 450 10*3/mm3 Final      CT OF THE CHEST, ABDOMEN AND PELVIS WITH CONTRAST 07/03/2019    FINDINGS:   Axial images were obtained from the lung apices through the symphysis  pubis after intravenous and oral contrast.     No lung masses or significant pulmonary infiltrates are seen. There  appear to be surgical clips in the left axilla. No pathologically  enlarged bilateral axillary lymph nodes are seen.     Shotty mediastinal nodes are seen. No pathologically enlarged hilar or  mediastinal lymph nodes are seen.     The liver, gallbladder, spleen, pancreas, adrenals and kidneys appear  unremarkable except for a tiny left renal cyst.     No bowel wall thickening or bowel dilatation is seen.     A few shotty mesenteric nodes are seen. These appear stable from the  previous study of 05/26/2019. Small amount of right iliac  lymphadenopathy is seen. Largest node measures 1.9 cm x 2.5 cm and  appears similar in size to the previous study of  05/26/2019. A 1.5 cm  lymph node is seen slightly more inferiorly on image 132 also stable.  Urinary bladder and prostate gland appear normal.     IMPRESSION:  1. No pathologic lymphadenopathy is seen in the chest.  2. Right pelvic lymphadenopathy as described. This appears stable from  the previous study of 05/26/2019 and could be related to  lymphoproliferative disease.  3. Several small mesenteric nodes largest measuring up to approximately  11 mm. These also appear stable.    Assessment/Plan      The patient is a 54-year-old male who had previously been assessed for enlarged left axillary adenopathy in November leading to a biopsy that was nondiagnostic but suspicious and eventually an excisional biopsy that was eventually felt to be consistent with follicular hyperplasia and progressive transformation of germinal centers with 2 foci suspicious for early involvement by nodules lymphocyte predominant Hodgkin's lymphoma.  This was likely treated by excision and further therapy is not necessary in this patient though he has a number of symptoms that are at least suspicious for potential recurrence.  As he was admitted for his GI symptoms, described above, he underwent additional testing that demonstrated right external iliac adenopathy that have been unchanged from 2016 and further endoscopy was negative as well.  Again we had a long discussion today as to how he might of already been treated under the circumstances but that additional surveillance would be necessary in approximately 6 months from his last exam and he is now seen May 16 thus allowing us to reexamine him via scan in the next several months.  He and his mother are agreeable with this plan.  We had the patient proceed with a number of studies that were fortunately negative for evidence of lymphoproliferative disorder.  As he is seen July 11, 2019 his physical exam again does not reveal new lymphadenopathy or hepatosplenomegaly and CBC is normal.   After discussion plan:    *Refer to ENT  *6-month MD CBC  *Consider reevaluation via CT in the next 12 months

## 2019-07-10 ENCOUNTER — ANESTHESIA (OUTPATIENT)
Dept: GASTROENTEROLOGY | Facility: HOSPITAL | Age: 54
End: 2019-07-10

## 2019-07-10 ENCOUNTER — TELEPHONE (OUTPATIENT)
Dept: SURGERY | Facility: CLINIC | Age: 54
End: 2019-07-10

## 2019-07-10 ENCOUNTER — ANESTHESIA EVENT (OUTPATIENT)
Dept: GASTROENTEROLOGY | Facility: HOSPITAL | Age: 54
End: 2019-07-10

## 2019-07-10 ENCOUNTER — HOSPITAL ENCOUNTER (OUTPATIENT)
Facility: HOSPITAL | Age: 54
Setting detail: HOSPITAL OUTPATIENT SURGERY
Discharge: HOME | End: 2019-07-10
Attending: SURGERY | Admitting: SURGERY

## 2019-07-10 VITALS
HEART RATE: 58 BPM | RESPIRATION RATE: 16 BRPM | BODY MASS INDEX: 29.62 KG/M2 | WEIGHT: 209.38 LBS | TEMPERATURE: 98.6 F | DIASTOLIC BLOOD PRESSURE: 82 MMHG | SYSTOLIC BLOOD PRESSURE: 107 MMHG | OXYGEN SATURATION: 96 %

## 2019-07-10 PROCEDURE — 45378 DIAGNOSTIC COLONOSCOPY: CPT | Performed by: SURGERY

## 2019-07-10 PROCEDURE — 25010000002 PROPOFOL 10 MG/ML EMULSION: Performed by: ANESTHESIOLOGY

## 2019-07-10 RX ORDER — LIDOCAINE HYDROCHLORIDE 20 MG/ML
INJECTION, SOLUTION INFILTRATION; PERINEURAL AS NEEDED
Status: DISCONTINUED | OUTPATIENT
Start: 2019-07-10 | End: 2019-07-10 | Stop reason: SURG

## 2019-07-10 RX ORDER — PROPOFOL 10 MG/ML
VIAL (ML) INTRAVENOUS AS NEEDED
Status: DISCONTINUED | OUTPATIENT
Start: 2019-07-10 | End: 2019-07-10 | Stop reason: SURG

## 2019-07-10 RX ORDER — PROPOFOL 10 MG/ML
VIAL (ML) INTRAVENOUS CONTINUOUS PRN
Status: DISCONTINUED | OUTPATIENT
Start: 2019-07-10 | End: 2019-07-10 | Stop reason: SURG

## 2019-07-10 RX ORDER — SODIUM CHLORIDE, SODIUM LACTATE, POTASSIUM CHLORIDE, CALCIUM CHLORIDE 600; 310; 30; 20 MG/100ML; MG/100ML; MG/100ML; MG/100ML
1000 INJECTION, SOLUTION INTRAVENOUS CONTINUOUS
Status: DISCONTINUED | OUTPATIENT
Start: 2019-07-10 | End: 2019-07-10 | Stop reason: HOSPADM

## 2019-07-10 RX ADMIN — PROPOFOL 125 MG: 10 INJECTION, EMULSION INTRAVENOUS at 09:48

## 2019-07-10 RX ADMIN — SODIUM CHLORIDE, POTASSIUM CHLORIDE, SODIUM LACTATE AND CALCIUM CHLORIDE 1000 ML: 600; 310; 30; 20 INJECTION, SOLUTION INTRAVENOUS at 09:10

## 2019-07-10 RX ADMIN — PROPOFOL 140 MCG/KG/MIN: 10 INJECTION, EMULSION INTRAVENOUS at 09:48

## 2019-07-10 RX ADMIN — LIDOCAINE HYDROCHLORIDE 50 MG: 20 INJECTION, SOLUTION INFILTRATION; PERINEURAL at 09:48

## 2019-07-10 NOTE — ANESTHESIA POSTPROCEDURE EVALUATION
Patient: Jose Maria Judge    Procedure Summary     Date:  07/10/19 Room / Location:   [facility]    Anesthesia Start:  0944 Anesthesia Stop:  1001    Procedure:  COLONOSCOPY to CECUM (N/A ) Diagnosis:       Rectal pain      (Rectal pain [K62.89])    Surgeon:  Amando Nguyễn MD Provider:  Singh Roberto MD    Anesthesia Type:  MAC ASA Status:  2          Anesthesia Type: MAC  Last vitals  BP   (!) 82/53 (07/10/19 1007)   Temp   37 °C (98.6 °F) (07/10/19 0908)   Pulse   59 (07/10/19 1007)   Resp   14 (07/10/19 1007)     SpO2   96 % (07/10/19 1007)     Post Anesthesia Care and Evaluation    Patient location during evaluation: bedside  Patient participation: complete - patient participated  Level of consciousness: awake and alert  Pain score: 0  Pain management: adequate  Airway patency: patent  Anesthetic complications: No anesthetic complications  PONV Status: none  Cardiovascular status: acceptable  Respiratory status: acceptable  Hydration status: acceptable  Post Neuraxial Block status: Motor and sensory function returned to baseline

## 2019-07-10 NOTE — H&P
HPI: Pelvic pain, discomfort, and constipation    PMH, PSH, MEDS AND ALLERGIES reviewed and reconciled with EPIC    PHYSICAL EXAM:  -  Constitutional:  no acute distress  -  Respiratory:  normal inspiratory effort  -  Cardiovascular: regular rate  -  Gastrointestinal: Soft    ASSESSMENT/PLAN:    Colonoscopy    Amando Nguyễn M.D.

## 2019-07-10 NOTE — ANESTHESIA PREPROCEDURE EVALUATION
Anesthesia Evaluation     Patient summary reviewed   NPO Solid Status: > 8 hours  NPO Liquid Status: > 8 hours           Airway   Mallampati: II  TM distance: >3 FB  Neck ROM: full  No difficulty expected  Dental - normal exam     Pulmonary     breath sounds clear to auscultation  Cardiovascular   Exercise tolerance: good (4-7 METS)    Rhythm: regular  Rate: normal        Neuro/Psych  GI/Hepatic/Renal/Endo      Musculoskeletal     Abdominal    Substance History      OB/GYN          Other                        Anesthesia Plan    ASA 2     MAC     intravenous induction   Anesthetic plan, all risks, benefits, and alternatives have been provided, discussed and informed consent has been obtained with: patient.

## 2019-07-10 NOTE — OP NOTE
PREOPERATIVE DIAGNOSIS:  Pelvic pain  Anal pain  Constipation    POSTOPERATIVE DIAGNOSIS AND FINDINGS:  Normal:  Normal terminal ileum    PROCEDURE:  Colonoscopy to terminal ileum    SURGEON:  Amando Nguyễn MD    ANESTHESIA:  MAC    SPECIMEN(S):  none    DESCRIPTION:  In decubitus position digital rectal exam was normal. Colonoscope inserted under direct visualization of lumen to cecum confirmed by visualization of ileocecal valve and appendiceal orifice.  Ileocecal valve was intubated and terminal ileum was grossly normal.    Scope slowly withdrawn circumferentially examining all mucosal surfaces.    Quality of bowel preparation good.    There were no mucosal abnormalities.     Tolerated well.    RECOMMENDATION FOR FUTURE SURVEILLANCE:  10 years    Amando Nguyễn M.D.

## 2019-07-10 NOTE — DISCHARGE INSTRUCTIONS
For the next 24 hours patient needs to be with a responsible adult.    For 24 hours DO NOT drive, operate machinery, appliances, drink alcohol, make important decisions or sign legal documents.    Start with a light or bland diet if you are feeling sick to your stomach otherwise advance to regular diet as tolerated.    Follow recommendations on procedure report if provided by your doctor.    Call Dr. Nguyễn for problems [phone]    Problems may include but not limited to: large amounts of bleeding, trouble breathing, repeated vomiting, severe unrelieved pain, fever or chills.

## 2019-07-11 ENCOUNTER — OFFICE VISIT (OUTPATIENT)
Dept: ONCOLOGY | Facility: CLINIC | Age: 54
End: 2019-07-11

## 2019-07-11 ENCOUNTER — LAB (OUTPATIENT)
Dept: LAB | Facility: HOSPITAL | Age: 54
End: 2019-07-11

## 2019-07-11 VITALS
SYSTOLIC BLOOD PRESSURE: 114 MMHG | RESPIRATION RATE: 18 BRPM | OXYGEN SATURATION: 95 % | HEIGHT: 71 IN | BODY MASS INDEX: 30.14 KG/M2 | HEART RATE: 62 BPM | DIASTOLIC BLOOD PRESSURE: 77 MMHG | TEMPERATURE: 98.8 F | WEIGHT: 215.3 LBS

## 2019-07-11 DIAGNOSIS — R59.0 LYMPHADENOPATHY, AXILLARY: ICD-10-CM

## 2019-07-11 DIAGNOSIS — R59.9 LYMPH NODE ENLARGEMENT: Primary | ICD-10-CM

## 2019-07-11 DIAGNOSIS — R43.1: Primary | ICD-10-CM

## 2019-07-11 LAB
BASOPHILS # BLD AUTO: 0.03 10*3/MM3 (ref 0–0.2)
BASOPHILS NFR BLD AUTO: 0.6 % (ref 0–1.5)
DEPRECATED RDW RBC AUTO: 39.8 FL (ref 37–54)
EOSINOPHIL # BLD AUTO: 0.11 10*3/MM3 (ref 0–0.4)
EOSINOPHIL NFR BLD AUTO: 2 % (ref 0.3–6.2)
ERYTHROCYTE [DISTWIDTH] IN BLOOD BY AUTOMATED COUNT: 12.7 % (ref 12.3–15.4)
HCT VFR BLD AUTO: 42.8 % (ref 37.5–51)
HGB BLD-MCNC: 14.7 G/DL (ref 13–17.7)
IMM GRANULOCYTES # BLD AUTO: 0.02 10*3/MM3 (ref 0–0.05)
IMM GRANULOCYTES NFR BLD AUTO: 0.4 % (ref 0–0.5)
LYMPHOCYTES # BLD AUTO: 1.92 10*3/MM3 (ref 0.7–3.1)
LYMPHOCYTES NFR BLD AUTO: 35.6 % (ref 19.6–45.3)
MCH RBC QN AUTO: 29.8 PG (ref 26.6–33)
MCHC RBC AUTO-ENTMCNC: 34.3 G/DL (ref 31.5–35.7)
MCV RBC AUTO: 86.6 FL (ref 79–97)
MONOCYTES # BLD AUTO: 0.47 10*3/MM3 (ref 0.1–0.9)
MONOCYTES NFR BLD AUTO: 8.7 % (ref 5–12)
NEUTROPHILS # BLD AUTO: 2.84 10*3/MM3 (ref 1.7–7)
NEUTROPHILS NFR BLD AUTO: 52.7 % (ref 42.7–76)
NRBC BLD AUTO-RTO: 0 /100 WBC (ref 0–0.2)
PLATELET # BLD AUTO: 157 10*3/MM3 (ref 140–450)
PMV BLD AUTO: 10.6 FL (ref 6–12)
RBC # BLD AUTO: 4.94 10*6/MM3 (ref 4.14–5.8)
WBC NRBC COR # BLD: 5.39 10*3/MM3 (ref 3.4–10.8)

## 2019-07-11 PROCEDURE — 36415 COLL VENOUS BLD VENIPUNCTURE: CPT | Performed by: INTERNAL MEDICINE

## 2019-07-11 PROCEDURE — 99214 OFFICE O/P EST MOD 30 MIN: CPT | Performed by: INTERNAL MEDICINE

## 2019-07-11 PROCEDURE — 85025 COMPLETE CBC W/AUTO DIFF WBC: CPT | Performed by: INTERNAL MEDICINE

## 2019-07-19 NOTE — PROGRESS NOTES
Subjective   Patient ID: Jose Maria Judge is a 54 y.o. male is being seen for consultation today at the request of Amando Nguyễn MD for back pain and swelling that radiates into his rectal region and radiates into his posterior right lower extremity. He also has pain in his right foot. He has had CAMELIA in 2016.    History of Present Illness     This patient has been having pain in his back.  Sometimes his back become swollen and the pain radiates into his rectal region.  He has some occasional pain in his right hip but he has had previous surgery on his right hip and he thinks the right hip pain comes from that.  He has some swelling and pain in his feet as well.  He has no difficulty with bowel or bladder control or other associated symptoms.  Activity seems to make the pain worse.    The following portions of the patient's history were reviewed and updated as appropriate: allergies, current medications, past family history, past medical history, past social history, past surgical history and problem list.    Review of Systems   Constitutional: Positive for activity change.   Respiratory: Negative for chest tightness and shortness of breath.    Cardiovascular: Negative for chest pain.   Musculoskeletal: Positive for back pain, gait problem and myalgias.        Right posterior leg pain, right foot pain   All other systems reviewed and are negative.      Objective   Physical Exam   Constitutional: He is oriented to person, place, and time. He appears well-developed and well-nourished.   HENT:   Head: Normocephalic and atraumatic.   Eyes: Conjunctivae and EOM are normal. Pupils are equal, round, and reactive to light.   Fundoscopic exam:       The right eye shows no papilledema. The right eye shows venous pulsations.        The left eye shows no papilledema. The left eye shows venous pulsations.   Neck: Carotid bruit is not present.   Neurological: He is oriented to person, place, and time. He has a normal  Finger-Nose-Finger Test and a normal Heel to Shin Test. Gait normal.   Reflex Scores:       Tricep reflexes are 2+ on the right side and 2+ on the left side.       Bicep reflexes are 2+ on the right side and 2+ on the left side.       Brachioradialis reflexes are 2+ on the right side and 2+ on the left side.       Patellar reflexes are 2+ on the right side and 2+ on the left side.       Achilles reflexes are 2+ on the right side and 2+ on the left side.  Psychiatric: His speech is normal.     Neurologic Exam     Mental Status   Oriented to person, place, and time.   Registration of memory: Good recent and remote memory.   Attention: normal. Concentration: normal.   Speech: speech is normal   Level of consciousness: alert  Knowledge: consistent with education.     Cranial Nerves     CN II   Visual fields full to confrontation.   Visual acuity: normal    CN III, IV, VI   Pupils are equal, round, and reactive to light.  Extraocular motions are normal.     CN V   Facial sensation intact.   Right corneal reflex: normal  Left corneal reflex: normal    CN VII   Facial expression full, symmetric.   Right facial weakness: none  Left facial weakness: none    CN VIII   Hearing: intact    CN IX, X   Palate: symmetric    CN XI   Right sternocleidomastoid strength: normal  Left sternocleidomastoid strength: normal    CN XII   Tongue: not atrophic  Tongue deviation: none    Motor Exam   Muscle bulk: normal  Right arm tone: normal  Left arm tone: normal  Right leg tone: normal  Left leg tone: normal    Strength   Strength 5/5 except as noted.     Sensory Exam   Light touch normal.     Gait, Coordination, and Reflexes     Gait  Gait: normal    Coordination   Finger to nose coordination: normal  Heel to shin coordination: normal    Reflexes   Right brachioradialis: 2+  Left brachioradialis: 2+  Right biceps: 2+  Left biceps: 2+  Right triceps: 2+  Left triceps: 2+  Right patellar: 2+  Left patellar: 2+  Right achilles: 2+  Left  achilles: 2+  Right : 2+  Left : 2+      Assessment/Plan   Independent Review of Radiographic Studies:      I reviewed an MRI of his lumbar spine done in May of this year.  This shows a widely patent canal and neuroforamina at L1-2 and L2-3.  L3-4 is also pretty open as is L4-5.  L5-S1 also is open.    Medical Decision Making:      I told the patient about the MRI and showed him the films.  I told him that from my point of view I do not see anything here that I could fix with surgery.  Consequently I recommended that we consider some nonsurgical treatment in the form of physical therapy.  If that is not helping or making him worse then I recommended we consider adding in a pain management consult.    Jose Maria was seen today for back pain.    Diagnoses and all orders for this visit:    Chronic bilateral low back pain without sciatica  -     Ambulatory Referral to Physical Therapy      Return if symptoms worsen or fail to improve.

## 2019-07-25 ENCOUNTER — OFFICE VISIT (OUTPATIENT)
Dept: NEUROSURGERY | Facility: CLINIC | Age: 54
End: 2019-07-25

## 2019-07-25 VITALS
HEART RATE: 63 BPM | DIASTOLIC BLOOD PRESSURE: 79 MMHG | WEIGHT: 215 LBS | HEIGHT: 71 IN | BODY MASS INDEX: 30.1 KG/M2 | SYSTOLIC BLOOD PRESSURE: 119 MMHG

## 2019-07-25 DIAGNOSIS — M54.50 CHRONIC BILATERAL LOW BACK PAIN WITHOUT SCIATICA: Primary | ICD-10-CM

## 2019-07-25 DIAGNOSIS — G89.29 CHRONIC BILATERAL LOW BACK PAIN WITHOUT SCIATICA: Primary | ICD-10-CM

## 2019-07-25 PROCEDURE — 99203 OFFICE O/P NEW LOW 30 MIN: CPT | Performed by: NEUROLOGICAL SURGERY

## 2019-08-08 ENCOUNTER — HOSPITAL ENCOUNTER (OUTPATIENT)
Dept: PHYSICAL THERAPY | Facility: HOSPITAL | Age: 54
Setting detail: THERAPIES SERIES
Discharge: HOME | End: 2019-08-08

## 2019-08-08 ENCOUNTER — TELEPHONE (OUTPATIENT)
Dept: SURGERY | Facility: CLINIC | Age: 54
End: 2019-08-08

## 2019-08-08 DIAGNOSIS — M54.50 CHRONIC BILATERAL LOW BACK PAIN WITHOUT SCIATICA: Primary | ICD-10-CM

## 2019-08-08 DIAGNOSIS — G89.29 CHRONIC BILATERAL LOW BACK PAIN WITHOUT SCIATICA: Primary | ICD-10-CM

## 2019-08-08 DIAGNOSIS — R29.898 WEAKNESS OF BOTH LOWER EXTREMITIES: ICD-10-CM

## 2019-08-08 DIAGNOSIS — M25.60 RANGE OF MOTION DEFICIT: ICD-10-CM

## 2019-08-08 PROCEDURE — 97110 THERAPEUTIC EXERCISES: CPT

## 2019-08-08 PROCEDURE — 97161 PT EVAL LOW COMPLEX 20 MIN: CPT

## 2019-08-08 NOTE — TELEPHONE ENCOUNTER
Pt is considering removing his varicose veins in his lt leg.  who would you recommend? The [facility]? Dr. Lynn?

## 2019-08-09 NOTE — THERAPY EVALUATION
Outpatient Physical Therapy Ortho Initial Evaluation  [facility]     Patient Name: Jose Maria Judge  : 1965  MRN: [id number]  Today's Date: 2019      Visit Date: 2019    Patient Active Problem List   Diagnosis   • Bilateral hip pain   • Right hip pain   • Left hip pain   • Trochanteric bursitis   • Tear of acetabular labrum   • Loss of hair   • Anal burning   • Anxiety   • Elevated blood pressure   • Hamstring strain   • Hypertension   • Radiculitis   • Hamstring muscle strain   • Rectal pain   • Herpes zoster   • Varicose veins of other specified sites   • Lymph node enlargement   • Partial small bowel obstruction (CMS/HCC)   • Epigastric pain   • Lymphadenopathy, axillary   • Hypersensitivity to odor   • Chronic bilateral low back pain without sciatica        Past Medical History:   Diagnosis Date   • Abdominal lymphadenopathy    • Acid reflux     HX   • Anxiety     R/T PAIN   • Carpal tunnel syndrome, left    • Chronic pain    • DDD (degenerative disc disease), lumbar    • Fatigue    • H/O Hypersensitivity to odor     Burning sensation in nasal passage   • Hemorrhoids     internal fistula   • Irritable bowel syndrome    • Low back pain    • Lymphadenopathy, axillary     LEFT   • Male pattern alopecia    • Pudendal neuralgia    • Right hip pain    • Unexplained night sweats         Past Surgical History:   Procedure Laterality Date   • ABSCESS DRAINAGE  2012   • ANAL FISTULA REPAIR N/A 2012, 10/2012   • AXILLARY LYMPH NODE BIOPSY/EXCISION Left 2018    Procedure: AXILLARY LYMPH NODE BIOPSY/EXCISION;  Surgeon: mAando Nguyễn MD;  [place] [facility] [place] [facility] [place]  Service: General   • COLONOSCOPY N/A     done at [facility]   • COLONOSCOPY N/A 7/10/2019    Procedure: COLONOSCOPY to CECUM;  Surgeon: Amando Nguyễn MD;  Location: [facility] ENDOSCOPY;  Service: General   • ENDOSCOPY N/A 2019    Procedure: ESOPHAGOGASTRODUODENOSCOPY;  Surgeon: Amando Nguyễn MD;  Location: [facility]  ENDOSCOPY;  Service: General   • FINE NEEDLE ASPIRATION Left 08/09/2018    Left axillary lymph node FNA   • FLEXIBLE SIGMOIDOSCOPY N/A 06/25/2003    Normal-Dr. Cezar Aviles   • HEMORRHOIDECTOMY N/A 1996   • HIP ARTHROSCOPY W/ LABRAL REPAIR Right 04/03/2017    Dr. Lonnie Lemons   • TONSILLECTOMY Bilateral        Visit Dx:     ICD-10-CM ICD-9-CM   1. Chronic bilateral low back pain without sciatica M54.5 724.2    G89.29 338.29   2. Range of motion deficit M25.60 719.50   3. Weakness of both lower extremities R29.898 729.89         Patient History     Row Name 08/08/19 1400             History    Chief Complaint  Pain  -JA      Type of Pain  Back pain  -JA      Date Current Problem(s) Began  -- gradual increase in back strain  -JA      Brief Description of Current Complaint  Mr. Judge is well-known to this therapist.  Pt states he strained his back over time--had never fully recovered from pudendal neurlgia pain but had gotten to feeling a little better so he did some work in the yard digging and lifting patch of grass as well as he was lifting water bottles from the grocery--lifting into/out of cart and from truck to home.  Reports his pain is constant and due to history of severe episodes of back pain along with his pudendal neuralgia, he is fearful of doing much activity because of potential for increased pain.  As a young adult he was a  and worked out rregularly and intensely leading to some injuries and pain.  His pudendal neuralgia pain has limited his ability to exercise.  He would like to strengthen his muscles to help reduce his backk pain and pelvic pain.  He c/o pain in radhames lumbar region, occasional swelling in LB as well as in feet at times.   Also notes he has varicose veins that are painful.  Pt states MD read MRI and did not see a need for surgical intervention and recommended physical therapy and if not helpful then possibly pain management.  He has PMH R hip labral surgery and rectal  surgery, L quad injury w/martial arts 2005, latissimus tear/injury during workout years ago.  -JA      Occupation/sports/leisure activities  retired  -JA         Pain     Pain Location  Back  -JA      Pain at Present  3;4  -JA      Pain at Best  2  -JA      Pain at Worst  7;8  -JA         Fall Risk Assessment    Any falls in the past year:  No  -JA         Services    Prior Rehab/Home Health Experiences  No  -JA         Daily Activities    Primary Language  English  -LISET      Does patient have problems with the following?  None  -JA      Recommended Referrals  Physical Therapy  -LISET      Pt Participated in POC and Goals  Yes  -JA         Safety    Are you being hurt, hit, or frightened by anyone at home or in your life?  No  -JA      Are you being neglected by a caregiver  No  -JA        User Key  (r) = Recorded By, (t) = Taken By, (c) = Cosigned By    Initials Name Provider Type    Kirstin Chao, PT Physical Therapist          PT Ortho     Row Name 08/08/19 1400       Subjective Pain    Subjective Pain Comment  c/o pain on R lat when SL on L, from mid to low back  -JA       Quarter Clearing    Quarter Clearing  Lower Quarter Clearing  -JA       Neural Tension Signs- Lower Quarter Clearing    SLR  Bilateral:;Negative  -JA       Myotomal Screen- Lower Quarter Clearing    Hip flexion (L2)  Right:;4 (Good);Left:;4- (Good -) pain on R for ea  -JA    Knee extension (L3)  Right:;4- (Good -);Left:;4 (Good) Rsided pain with R MMT, no pn with L  -JA    Ankle DF (L4)  Bilateral:;4 (Good) no pain  -JA    Great toe extension (L5)  Bilateral:;4 (Good);4+ (Good +)  -JA    Ankle PF (S1)  Bilateral:;4 (Good)  -JA    Knee flexion (S2)  Bilateral:;4 (Good)  -JA       Lumbar ROM Screen- Lower Quarter Clearing    Lumbar Flexion  Impaired 25% pain on R  -JA    Lumbar Extension  Impaired 25% R sided pain  -JA    Lumbar Lateral Flexion  Impaired 25% radhames, pin on R with ea sidebend  -JA    Lumbar Rotation  Impaired 25%radhames, R rot lumb  pain on R, L rot thor/lat pain on L  -JA       Special Tests/Palpation    Special Tests/Palpation  Lumbar/SI  -JA       Lumbosacral Palpation    Lumbosacral Palpation?  -- L pvm facilitated; TTP on R (lack of tone on R?)  -LISET       MMT (Manual Muscle Testing)    Rt Lower Ext  Comments  -    Lt Lower Ext  Comments  -LISET       MMT Right Lower Ext    Rt Lower Extremity Comments   SL abd 4-; pain; prone ext  -       MMT Left Lower Ext    Lt Lower Extremity Comments   SL abd 4-; prone  -      User Key  (r) = Recorded By, (t) = Taken By, (c) = Cosigned By    Initials Name Provider Type    Kirstin Chao, PT Physical Therapist                      Therapy Education  Education Details: Initiated core and LE strengthening.  Discussed use of mindfulness meditation for pain management.  Discussed that he needs to listen to his body while performing HEP to understand where limits are--strengthen in ranges that do not exacerbate pain.  Discussed benefits of gentle aquatic exercise (he just joined [employer]).  Given: HEP, Symptoms/condition management, Pain management  Program: New  How Provided: Verbal, Demonstration, Written  Provided to: Patient     PT OP Goals     Row Name 08/08/19 1500 08/08/19 1400       PT Short Term Goals    STG Date to Achieve  --  -  08/22/19  -LISET    STG 1  --  -LISET  Patient will be independent and compliant with initial HEP   -LISET    STG 1 Progress  --  -LISET  New  -    STG 2  --  Pt will demonstrate correct posture in sitting and standing.  -LISET    STG 2 Progress  --  New  -LISET    STG 3  --  Pt will demonstrate correct forward bend (hip hinge v. spine flexion) to reduce strain to LB.  -LISET    STG 3 Progress  --  New  -LISET       Long Term Goals    LTG Date to Achieve  --  -  09/08/19  -LISET    LTG 1  --  -LISET  Pt will be independent with advanced HEP for strengthening to increase functional mobility.  -LISET    LTG 1 Progress  --  -LISET  New  -LISET    LTG 2  --  Education for posture and body mechanics  with home and work to reduce strain on back.  -LISET    LTG 2 Progress  --  New  -LISET    LTG 3  --  Pt will score 34% on Oswestry Disability Index indicating decrease in perceived functional disability.   -LISET    LTG 3 Progress  --  New  -LISET       Time Calculation    PT Goal Re-Cert Due Date  --  -LISET  11/08/19  -LISET      User Key  (r) = Recorded By, (t) = Taken By, (c) = Cosigned By    Initials Name Provider Type    Kirstin Chao, PT Physical Therapist          PT Assessment/Plan     Row Name 08/08/19 1400          PT Assessment    Functional Limitations  Limitation in home management;Limitations in community activities;Limitations in functional capacity and performance;Performance in leisure activities;Performance in self-care ADL;Impaired gait  -LISET     Impairments  Pain;Muscle strength;Range of motion;Posture;Poor body mechanics  -LISET     Assessment Comments  Jose Maria Judge is a 54 y.o. male referred to outpatient physical therapy for evaluation and treatment of chronic bilateral LBP without sciatica.  Patient presents with pain-limited trunk ROM in all planes, weakness in radhames LE's (R hip muscles particularly), point tenderness in radhames lumbar pvm, R gluteals, palpable facilitation of pvm, and pain with transitional movements.  These symptoms impair his functional mobility and limit participation in community activity. Signs and symptoms are consistent with referring diagnosis.  Pertinent comorbidities and personal factors that may affect progress include, but are not limited to, history of LBP and pudendal neuralgia.  This condition is evolving. Recommend skilled PT to address functional deficits. Thank you for this referral.  -LISET     Please refer to paper survey for additional self-reported information  Yes  -LISET     Rehab Potential  Good  -LISET     Patient/caregiver participated in establishment of treatment plan and goals  Yes  -LISET     Patient would benefit from skilled therapy intervention  Yes  -LISET        PT  Plan    PT Frequency  2x/week;3x/week  -LISET     Predicted Duration of Therapy Intervention (Therapy Eval)  3-4 weeks  -LISET     Planned CPT's?  PT EVAL LOW COMPLEXITY: 73634;PT THER PROC EA 15 MIN: 42984;PT THER ACT EA 15 MIN: 69239;PT MANUAL THERAPY EA 15 MIN: 67549;PT NEUROMUSC RE-EDUCATION EA 15 MIN: 27660;PT AQUATIC THERAPY EA 15 MIN: 02912;PT SELF CARE/HOME MGMT/TRAIN EA 15: 36608;PT HOT OR COLD PACK TREAT MCARE  -JA     PT Plan Comments  consider nustep or bike or TM w/short stride; review HEP; add hip isometrics for abd (with gait belt around ankles and 1set abd in neutral the other in ER) and add with ball; stannding DLS on floor with D2 ball for core work, consider ice after ex, discuss DOMS  -LISET       User Key  (r) = Recorded By, (t) = Taken By, (c) = Cosigned By    Initials Name Provider Type    Kirstin Chao, PT Physical Therapist            Exercises     Row Name 08/08/19 1400             Subjective Comments    Subjective Comments  initial eval  -JA         Subjective Pain    Able to rate subjective pain?  yes  -JA      Pre-Treatment Pain Level  4  -JA      Subjective Pain Comment  c/o pain on R lat when SL on L, from mid to low back  -LISET         Total Minutes    89717 - PT Therapeutic Exercise Minutes  15  -JA         Exercise 1    Exercise Name 1  HL t.a.  -LISET      Reps 1  10  -JA      Time 1  3sec  -JA         Exercise 2    Exercise Name 2  LTR in comfortable range  -LISET      Cueing 2  Verbal;Tactile  -LISET      Reps 2  10  -JA         Exercise 3    Exercise Name 3  prone glute sets  -LISET      Reps 3  10  -JA      Time 3  3sec  -JA      Additional Comments  alternate R/L--difficulty recruiting R; discussed he can do in supine or standing  -LISET        User Key  (r) = Recorded By, (t) = Taken By, (c) = Cosigned By    Initials Name Provider Type    Kirstin Chao, PT Physical Therapist                        Outcome Measure Options: Modifed Owestry  Modified Oswestry  Modified Oswestry  Score/Comments: 44%      Time Calculation:     Start Time: 1400  Stop Time: 1445  Time Calculation (min): 45 min     Therapy Charges for Today     Code Description Service Date Service Provider Modifiers Qty    35494791640 HC PT THER PROC EA 15 MIN 8/8/2019 Kirstin Dale, PT GP 1    37815316776 HC PT EVAL LOW COMPLEXITY 2 8/8/2019 Kirstin Dale, PT GP 1          PT G-Codes  Outcome Measure Options: Modifed Owestry  Modified Oswestry Score/Comments: 44%         Kirstin Dale, PT  8/8/2019

## 2019-08-13 ENCOUNTER — HOSPITAL ENCOUNTER (OUTPATIENT)
Dept: PHYSICAL THERAPY | Facility: HOSPITAL | Age: 54
Setting detail: THERAPIES SERIES
Discharge: HOME | End: 2019-08-13

## 2019-08-13 DIAGNOSIS — G89.29 CHRONIC BILATERAL LOW BACK PAIN WITHOUT SCIATICA: Primary | ICD-10-CM

## 2019-08-13 DIAGNOSIS — M54.50 CHRONIC BILATERAL LOW BACK PAIN WITHOUT SCIATICA: Primary | ICD-10-CM

## 2019-08-13 DIAGNOSIS — M25.60 RANGE OF MOTION DEFICIT: ICD-10-CM

## 2019-08-13 PROCEDURE — 97110 THERAPEUTIC EXERCISES: CPT

## 2019-08-13 NOTE — THERAPY TREATMENT NOTE
Outpatient Physical Therapy Ortho Treatment Note  [facility]     Patient Name: Jose Maria Judge  : 1965  MRN: [id number]  Today's Date: 2019      Visit Date: 2019    Visit Dx:    ICD-10-CM ICD-9-CM   1. Chronic bilateral low back pain without sciatica M54.5 724.2    G89.29 338.29   2. Range of motion deficit M25.60 719.50       Patient Active Problem List   Diagnosis   • Bilateral hip pain   • Right hip pain   • Left hip pain   • Trochanteric bursitis   • Tear of acetabular labrum   • Loss of hair   • Anal burning   • Anxiety   • Elevated blood pressure   • Hamstring strain   • Hypertension   • Radiculitis   • Hamstring muscle strain   • Rectal pain   • Herpes zoster   • Varicose veins of other specified sites   • Lymph node enlargement   • Partial small bowel obstruction (CMS/HCC)   • Epigastric pain   • Lymphadenopathy, axillary   • Hypersensitivity to odor   • Chronic bilateral low back pain without sciatica        Past Medical History:   Diagnosis Date   • Abdominal lymphadenopathy    • Acid reflux     HX   • Anxiety     R/T PAIN   • Carpal tunnel syndrome, left    • Chronic pain    • DDD (degenerative disc disease), lumbar    • Fatigue    • H/O Hypersensitivity to odor     Burning sensation in nasal passage   • Hemorrhoids     internal fistula   • Irritable bowel syndrome    • Low back pain    • Lymphadenopathy, axillary     LEFT   • Male pattern alopecia    • Pudendal neuralgia    • Right hip pain    • Unexplained night sweats         Past Surgical History:   Procedure Laterality Date   • ABSCESS DRAINAGE  2012   • ANAL FISTULA REPAIR N/A 2012, 10/2012   • AXILLARY LYMPH NODE BIOPSY/EXCISION Left 2018    Procedure: AXILLARY LYMPH NODE BIOPSY/EXCISION;  Surgeon: Aamndo Nguyễn MD;  [place] [facility] [place] [facility] [place]: General   • COLONOSCOPY N/A     done at [facility]   • COLONOSCOPY N/A 7/10/2019    Procedure: COLONOSCOPY to CECUM;  Surgeon: Amando Nguyễn MD;   Location: [facility] ENDOSCOPY;  Service: General   • ENDOSCOPY N/A 1/9/2019    Procedure: ESOPHAGOGASTRODUODENOSCOPY;  Surgeon: Amando Nguyễn MD;  Location: [facility] ENDOSCOPY;  Service: General   • FINE NEEDLE ASPIRATION Left 08/09/2018    Left axillary lymph node FNA   • FLEXIBLE SIGMOIDOSCOPY N/A 06/25/2003    Normal-Dr. Cezar Aviles   • HEMORRHOIDECTOMY N/A 1996   • HIP ARTHROSCOPY W/ LABRAL REPAIR Right 04/03/2017    Dr. Lonnie Lemons   • TONSILLECTOMY Bilateral                        PT Assessment/Plan     Row Name 08/13/19 1614          PT Assessment    Assessment Comments  Pt was unable to tolerate gluteal isometrics on HEP.  He did tolerate addition of core strengthening in standing and hooklying positions.  Attempted to add 1-2# weighted ball however it was too irritating to his symptoms.    -JA        PT Plan    PT Plan Comments  assess response to last visit  -LISET       User Key  (r) = Recorded By, (t) = Taken By, (c) = Cosigned By    Initials Name Provider Type    Kirstin Chao, PT Physical Therapist          Modalities     Row Name 08/13/19 1500             Ice    Ice Applied  Yes  -LISET      Location  lats on R (attempted to use on LB/hips but pt noted increasing signs of neuralgia sx)  -JA      Rx Minutes  Other: 8 min  -JA      Ice S/P Rx  Yes  -LISET        User Key  (r) = Recorded By, (t) = Taken By, (c) = Cosigned By    Initials Name Provider Type    Kirstin Chao, PT Physical Therapist        Exercises     Row Name 08/13/19 1500             Subjective Comments    Subjective Comments  The glute sets caused the neuarlgia to feel like a spark, I had to stop that one.  -LISET         Subjective Pain    Able to rate subjective pain?  yes  -LISET      Pre-Treatment Pain Level  4  -JA         Total Minutes    64956 - PT Therapeutic Exercise Minutes  40  -JA         Exercise 1    Exercise Name 1  NUstep  -JA      Time 1  3 1/2 min  -JA      Additional Comments  seat 8, L3  -JA         Exercise  "2    Exercise Name 2  D2 radhames UEs standing t.a./posture  -JA      Additional Comments  sm green ball  -JA         Exercise 3    Exercise Name 3  shld circles on mirror \"wax onwax off\"  -JA      Reps 3  10  -JA      Additional Comments  towel w/each hand  -JA         Exercise 4    Exercise Name 4  standing elongation stretch: radhames shld stretch wall slide  -JA      Reps 4  3  -JA      Time 4  5sec  -JA         Exercise 5    Exercise Name 5  scap ret w/tband  -JA      Reps 5  15  -JA      Additional Comments  rtb  -JA         Exercise 6    Exercise Name 6  LTR in comfortable range  -JA      Reps 6  10  -JA         Exercise 7    Exercise Name 7  HL circles with sm green ball  -JA      Reps 7  10 ea  -JA      Additional Comments  CW/CCW  -JA         Exercise 8    Exercise Name 8  t.a. in HL  -JA      Reps 8  10  -JA         Exercise 9    Exercise Name 9  hip abd iso in 2 angles  -JA      Sets 9  3  -JA      Reps 9  5ea  -JA      Time 9  5sec  -JA      Additional Comments  neutral and ER (cannot do IR due to pudendal neuralgia)  -JA        User Key  (r) = Recorded By, (t) = Taken By, (c) = Cosigned By    Initials Name Provider Type    Kirstin Chao, PT Physical Therapist                           Therapy Education  Education Details: added several new ex's but only assignes 3 for HEP to be done every other day to avoid exacerbation of pudendal neuralgia.  Given: HEP, Symptoms/condition management, Pain management  Program: Reinforced, Progressed, Modified  How Provided: Verbal, Demonstration, Written  Provided to: Patient  Level of Understanding: Teach back education performed              Time Calculation:   Start Time: 1445  Stop Time: 1535  Time Calculation (min): 50 min  Therapy Charges for Today     Code Description Service Date Service Provider Modifiers Qty    19055645192 HC PT THER PROC EA 15 MIN 8/13/2019 Kirstin Dale, PT GP 3    49804887409 HC PT HOT OR COLD PACK TREAT MCARE 8/13/2019 Amend, " Kirstin AZUL, PT GP 1                    Kirstin Dale, PT  8/13/2019

## 2019-08-15 ENCOUNTER — HOSPITAL ENCOUNTER (OUTPATIENT)
Dept: PHYSICAL THERAPY | Facility: HOSPITAL | Age: 54
Setting detail: THERAPIES SERIES
Discharge: HOME | End: 2019-08-15

## 2019-08-15 DIAGNOSIS — G89.29 CHRONIC BILATERAL LOW BACK PAIN WITHOUT SCIATICA: Primary | ICD-10-CM

## 2019-08-15 DIAGNOSIS — R29.898 WEAKNESS OF BOTH LOWER EXTREMITIES: ICD-10-CM

## 2019-08-15 DIAGNOSIS — M54.50 CHRONIC BILATERAL LOW BACK PAIN WITHOUT SCIATICA: Primary | ICD-10-CM

## 2019-08-15 DIAGNOSIS — M25.60 RANGE OF MOTION DEFICIT: ICD-10-CM

## 2019-08-15 PROCEDURE — 97110 THERAPEUTIC EXERCISES: CPT

## 2019-08-15 NOTE — THERAPY TREATMENT NOTE
Outpatient Physical Therapy Ortho Treatment Note  [facility]     Patient Name: Jose Maria Judge  : 1965  MRN: [id number]  Today's Date: 8/15/2019      Visit Date: 08/15/2019    Visit Dx:    ICD-10-CM ICD-9-CM   1. Chronic bilateral low back pain without sciatica M54.5 724.2    G89.29 338.29   2. Range of motion deficit M25.60 719.50   3. Weakness of both lower extremities R29.898 729.89       Patient Active Problem List   Diagnosis   • Bilateral hip pain   • Right hip pain   • Left hip pain   • Trochanteric bursitis   • Tear of acetabular labrum   • Loss of hair   • Anal burning   • Anxiety   • Elevated blood pressure   • Hamstring strain   • Hypertension   • Radiculitis   • Hamstring muscle strain   • Rectal pain   • Herpes zoster   • Varicose veins of other specified sites   • Lymph node enlargement   • Partial small bowel obstruction (CMS/HCC)   • Epigastric pain   • Lymphadenopathy, axillary   • Hypersensitivity to odor   • Chronic bilateral low back pain without sciatica        Past Medical History:   Diagnosis Date   • Abdominal lymphadenopathy    • Acid reflux     HX   • Anxiety     R/T PAIN   • Carpal tunnel syndrome, left    • Chronic pain    • DDD (degenerative disc disease), lumbar    • Fatigue    • H/O Hypersensitivity to odor     Burning sensation in nasal passage   • Hemorrhoids     internal fistula   • Irritable bowel syndrome    • Low back pain    • Lymphadenopathy, axillary     LEFT   • Male pattern alopecia    • Pudendal neuralgia    • Right hip pain    • Unexplained night sweats         Past Surgical History:   Procedure Laterality Date   • ABSCESS DRAINAGE  2012   • ANAL FISTULA REPAIR N/A 2012, 10/2012   • AXILLARY LYMPH NODE BIOPSY/EXCISION Left 2018    Procedure: AXILLARY LYMPH NODE BIOPSY/EXCISION;  Surgeon: Amando Nguyễn MD;  Location: [facility] [place] [facility];  Service: General   • COLONOSCOPY N/A     done at [facility]   • COLONOSCOPY N/A 7/10/2019    Procedure:  COLONOSCOPY to CECUM;  Surgeon: Amando Nguyễn MD;  Location: [facility] ENDOSCOPY;  Service: General   • ENDOSCOPY N/A 1/9/2019    Procedure: ESOPHAGOGASTRODUODENOSCOPY;  Surgeon: Amando Nguyễn MD;  Location: [facility] ENDOSCOPY;  Service: General   • FINE NEEDLE ASPIRATION Left 08/09/2018    Left axillary lymph node FNA   • FLEXIBLE SIGMOIDOSCOPY N/A 06/25/2003    Normal-Dr. Cezar Aviles   • HEMORRHOIDECTOMY N/A 1996   • HIP ARTHROSCOPY W/ LABRAL REPAIR Right 04/03/2017    Dr. Lonnie Lemons   • TONSILLECTOMY Bilateral                        PT Assessment/Plan     Row Name 08/15/19 1300          PT Assessment    Assessment Comments  Pt reports gentle hip isometric ex irritated his R hip (labral repair a couple years ago) so we discontnued it for now; pt does have sensitivity to ex's that we have to carefully design his ther ex program to avoid exacerbating symptoms.   Discussed learning physiological quieting techniqeus for pain control and pt to do some reading about it. Dsicussed mindfulness meditation.   -JA        PT Plan    PT Plan Comments  cont progressing lumbar/core strenthening and flexibility, LE strength; (future progression heel slides with t.a. -partial then full as able; t.a. with alt UE, t.a with marching legs in small range if R hip tolerates, partial brigding)  -LISET       User Key  (r) = Recorded By, (t) = Taken By, (c) = Cosigned By    Initials Name Provider Type    Kirstin Chao, PT Physical Therapist            Exercises     Row Name 08/15/19 1300             Subjective Comments    Subjective Comments  I have been waking up with a tightness in my back.  The exercise (hip isometric) irritated my R hip.  -JA         Subjective Pain    Able to rate subjective pain?  yes  -JA      Pre-Treatment Pain Level  4  -JA      Subjective Pain Comment  6/10 pain this morning  -JA         Total Minutes    53203 - PT Therapeutic Exercise Minutes  40  -JA         Exercise 1    Exercise Name 1   "NUstep  -JA      Time 1  6 min  -JA      Additional Comments  seat 10, UE/LE , L3  -JA         Exercise 2    Exercise Name 2  D2 radhames UEs standing t.a./posture  -JA      Reps 2  10  -JA      Additional Comments  sm green ball  -JA         Exercise 3    Exercise Name 3  shld circles on mirror \"wax onwax off\"  -JA      Sets 3  CW/CCW on R and L  -JA      Reps 3  10  -JA      Additional Comments  towel w/each hand  -JA         Exercise 4    Exercise Name 4  standing elongation stretch: radhames shld stretch wall slide  -JA      Reps 4  5  -JA      Time 4  5sec  -JA      Additional Comments  pt reported this felt good  -JA         Exercise 5    Exercise Name 5  scap ret w/tband  -JA      Reps 5  15  -JA      Additional Comments  RTB  -JA         Exercise 6    Exercise Name 6  LTR in comfortable range  -JA      Reps 6  10  -JA      Time 6  3-5 sec  -JA         Exercise 7    Exercise Name 7  HL circles with sm green ball  -JA      Reps 7  10 ea  -JA      Additional Comments  CW/CCW  -JA         Exercise 8    Exercise Name 8  t.a. in HL w/deep breathing  -JA      Reps 8  10  -JA         Exercise 9    Exercise Name 9  hip abd iso in 2 angles  -JA      Sets 9  --  -JA      Reps 9  --  -JA      Time 9  --  -JA      Additional Comments  stopped due to pain  -JA         Exercise 10    Exercise Name 10  HL chest press w/o wt  -JA      Additional Comments  dowel only  -JA        User Key  (r) = Recorded By, (t) = Taken By, (c) = Cosigned By    Initials Name Provider Type    Kirstin Chao, PT Physical Therapist                       PT OP Goals     Row Name 08/15/19 1300          PT Short Term Goals    STG Date to Achieve  08/22/19  -JA     STG 1  Patient will be independent and compliant with initial HEP   -JA     STG 1 Progress  Ongoing  -JA     STG 1 Progress Comments  modified  -JA     STG 2  Pt will demonstrate correct posture in sitting and standing.  -JA     STG 2 Progress  Ongoing;Progressing  -JA     STG 2 Progress " Comments  increasing awareness verbalized  -LISTE     STG 3  Pt will demonstrate correct forward bend (hip hinge v. spine flexion) to reduce strain to LB.  -LISET     STG 3 Progress  Ongoing  -LISET        Long Term Goals    LTG Date to Achieve  09/08/19  -LISET     LTG 1  Pt will be independent with advanced HEP for strengthening to increase functional mobility.  -LISET     LTG 1 Progress  New  -LISET     LTG 2  Education for posture and body mechanics with home and work to reduce strain on back.  -LISET     LTG 2 Progress  New  -LISET     LTG 3  Pt will score 34% on Oswestry Disability Index indicating decrease in perceived functional disability.   -LISET     LTG 3 Progress  New  -LISET       User Key  (r) = Recorded By, (t) = Taken By, (c) = Cosigned By    Initials Name Provider Type    Kirstin Chao, PT Physical Therapist          Therapy Education  Education Details: Recommended pt read up on physiological quieting.              Time Calculation:   Start Time: 1315  Stop Time: 1400  Time Calculation (min): 45 min  Therapy Charges for Today     Code Description Service Date Service Provider Modifiers Qty    [id number] HC PT THER PROC EA 15 MIN 8/15/2019 Kirstin Dale PT GP 3                    Kirstin Dale PT  8/15/2019

## 2019-08-20 ENCOUNTER — APPOINTMENT (OUTPATIENT)
Dept: PHYSICAL THERAPY | Facility: HOSPITAL | Age: 54
End: 2019-08-20

## 2019-08-22 ENCOUNTER — APPOINTMENT (OUTPATIENT)
Dept: PHYSICAL THERAPY | Facility: HOSPITAL | Age: 54
End: 2019-08-22

## 2019-08-26 ENCOUNTER — APPOINTMENT (OUTPATIENT)
Dept: PHYSICAL THERAPY | Facility: HOSPITAL | Age: 54
End: 2019-08-26

## 2019-08-29 ENCOUNTER — APPOINTMENT (OUTPATIENT)
Dept: PHYSICAL THERAPY | Facility: HOSPITAL | Age: 54
End: 2019-08-29

## 2019-09-03 ENCOUNTER — APPOINTMENT (OUTPATIENT)
Dept: PHYSICAL THERAPY | Facility: HOSPITAL | Age: 54
End: 2019-09-03

## 2019-09-05 ENCOUNTER — APPOINTMENT (OUTPATIENT)
Dept: PHYSICAL THERAPY | Facility: HOSPITAL | Age: 54
End: 2019-09-05

## 2019-09-10 ENCOUNTER — APPOINTMENT (OUTPATIENT)
Dept: PHYSICAL THERAPY | Facility: HOSPITAL | Age: 54
End: 2019-09-10

## 2019-09-12 ENCOUNTER — APPOINTMENT (OUTPATIENT)
Dept: PHYSICAL THERAPY | Facility: HOSPITAL | Age: 54
End: 2019-09-12

## 2019-09-17 ENCOUNTER — APPOINTMENT (OUTPATIENT)
Dept: PHYSICAL THERAPY | Facility: HOSPITAL | Age: 54
End: 2019-09-17

## 2019-09-19 ENCOUNTER — APPOINTMENT (OUTPATIENT)
Dept: PHYSICAL THERAPY | Facility: HOSPITAL | Age: 54
End: 2019-09-19

## 2019-09-19 ENCOUNTER — TELEPHONE (OUTPATIENT)
Dept: PHYSICAL THERAPY | Facility: HOSPITAL | Age: 54
End: 2019-09-19

## 2019-09-19 NOTE — TELEPHONE ENCOUNTER
Spoke with pt who reports he has been really sick and has had other medical appts and that is why he has had to cancel so many appointments.  I mentioned that our clinic policy for cancellations is after 3 we need to cancel appts and schedule one at a time.  He reports he wants to keep the appts but is awaiting a visit to eye doctor tomorrow and he will call us afterward to let us know if he will attend either visit next week.

## 2019-09-24 ENCOUNTER — APPOINTMENT (OUTPATIENT)
Dept: PHYSICAL THERAPY | Facility: HOSPITAL | Age: 54
End: 2019-09-24

## 2019-09-26 ENCOUNTER — APPOINTMENT (OUTPATIENT)
Dept: PHYSICAL THERAPY | Facility: HOSPITAL | Age: 54
End: 2019-09-26

## 2019-09-30 ENCOUNTER — TELEPHONE (OUTPATIENT)
Dept: PHYSICAL THERAPY | Facility: HOSPITAL | Age: 54
End: 2019-09-30

## 2019-10-01 ENCOUNTER — TELEPHONE (OUTPATIENT)
Dept: ONCOLOGY | Facility: HOSPITAL | Age: 54
End: 2019-10-01

## 2019-10-01 ENCOUNTER — APPOINTMENT (OUTPATIENT)
Dept: PHYSICAL THERAPY | Facility: HOSPITAL | Age: 54
End: 2019-10-01

## 2019-10-01 NOTE — TELEPHONE ENCOUNTER
----- Message from Irina Chapa sent at 10/1/2019  2:02 PM EDT -----  Contact: [phone]  Wants to talk to a nurse because ENT did order a MRI

## 2019-10-01 NOTE — TELEPHONE ENCOUNTER
Returned patient's call and he said that he had appointment with  earlier this month and had MRI. He states that when the PA reviewed results of MRI, he was told that he would be referred to a neurologist. He calls here today upset that he has not received call from Dr. Vital's office about neurologist referral. I called Dr. Vital's office and spoke to Michelle. She said patient's chart indicated that he did not want to see neurologist. I told her that he said he was expecting an appointment to be set up. She said she would send message for that to happen. I called patient to let him know. He is unhappy with the treatment he has received in that office, but consents to neurologist referral.               ----- Message from Irina Chapa sent at 10/1/2019  2:02 PM EDT -----  Contact: [phone]  Wants to talk to a nurse because ENT did order a MRI

## 2019-10-09 ENCOUNTER — TELEPHONE (OUTPATIENT)
Dept: ONCOLOGY | Facility: HOSPITAL | Age: 54
End: 2019-10-09

## 2019-10-09 NOTE — TELEPHONE ENCOUNTER
----- Message from Seferino Gilmore sent at 10/9/2019 10:37 AM EDT -----  Pt is calling to update with the nurse about what another discussed with him    [phone]      Pt is very upset with Dr. Vital (ENT) and their office. He states it has been a struggle to get scans scheduled, referrals sent and he feels that if he is not calling and telling them what he needs it will not be done. He states Dr. Vital wants him to see a neurologist and his office was supposed to place the referral but it has been weeks and he has not heard anything. He is asking for Dr. Alvarado's help and opinion. He wants to know who Dr. Alvarado would refer him to as a neurologist and if Dr. Alvarado feels he needs to continue seeing an ENT can he be referred to a new dr. I informed pt that Dr. Alvarado is out of the office until Monday but that I would send him a message and wait for his response. Pt v/u

## 2019-10-14 ENCOUNTER — TELEPHONE (OUTPATIENT)
Dept: ONCOLOGY | Facility: CLINIC | Age: 54
End: 2019-10-14

## 2019-10-14 ENCOUNTER — TELEPHONE (OUTPATIENT)
Dept: GENERAL RADIOLOGY | Facility: HOSPITAL | Age: 54
End: 2019-10-14

## 2019-10-14 DIAGNOSIS — R43.1: Primary | ICD-10-CM

## 2019-10-14 NOTE — TELEPHONE ENCOUNTER
Referral placed to [facility] neurology. Called and informed pt. Gave pt contact info for neuro group. Message sent to scheduling to make pt an appt    ----- Message from Bryan Alvarado MD sent at 10/11/2019 10:46 AM EDT -----  I would consult [facility] if they have not already been contacted. Thanks, MIRNA  ----- Message -----  From: Samreen Lovett, RN  Sent: 10/9/2019  10:52 AM  To: MD Dr. Jenny Santiago,  This is not urgent but I felt you should be aware. Pt is very unhappy with his care at Dr. Vital's office. He states Dr. Vital's office said he needs a referral to neurology but it has been 3 weeks and he has still not received a call from their office letting him know what doctor to see.     The bottom line is wants your recommendation for who to see as a neurologist and if you feel he needs to continue seeing an ENT then can we refer him to any other MD (not Dr. Garner as they are in the same group)     Thanks  Samreen

## 2019-10-14 NOTE — TELEPHONE ENCOUNTER
----- Message from Samreen Lovett RN sent at 10/14/2019  8:06 AM EDT -----  Referral placed to [facility], please call and make appt. Thank you so much

## 2019-10-22 ENCOUNTER — TELEPHONE (OUTPATIENT)
Dept: ONCOLOGY | Facility: CLINIC | Age: 54
End: 2019-10-22

## 2019-10-22 ENCOUNTER — TELEPHONE (OUTPATIENT)
Dept: ONCOLOGY | Facility: HOSPITAL | Age: 54
End: 2019-10-22

## 2019-10-22 NOTE — TELEPHONE ENCOUNTER
----- Message from Margarette Salazar sent at 10/22/2019  3:11 PM EDT -----  Contact: [phone]  Pt calling about referral [facility] .

## 2019-10-22 NOTE — TELEPHONE ENCOUNTER
----- Message from Emilee Guillermo sent at 10/22/2019  3:52 PM EDT -----  Regarding: RE: patient has not heard from neurologist--he called today.  OK  ----- Message -----  From: Shonda Blas  Sent: 10/22/2019   3:48 PM  To: Emilee Guillermo  Subject: RE: patient has not heard from neurologist--#    I was looking at the notes in the and it states you told patient to call if he hasn't heard from them.  ----- Message -----  From: Emilee Guillermo  Sent: 10/22/2019   3:44 PM  To: Shonda Blas  Subject: RE: patient has not heard from neurologist--#    I dont mind to call about this, but I don't know anything about this patient. Should I?  ----- Message -----  From: Shonda Blas  Sent: 10/22/2019   3:30 PM  To: Emilee Guillermo  Subject: patient has not heard from neurologist--he c#    Please check it out and give patient a call, thanks!

## 2019-11-19 ENCOUNTER — TELEPHONE (OUTPATIENT)
Dept: PHYSICAL THERAPY | Facility: HOSPITAL | Age: 54
End: 2019-11-19

## 2019-11-19 ENCOUNTER — APPOINTMENT (OUTPATIENT)
Dept: LAB | Facility: HOSPITAL | Age: 54
End: 2019-11-19

## 2019-11-19 ENCOUNTER — OFFICE VISIT (OUTPATIENT)
Dept: NEUROLOGY | Facility: CLINIC | Age: 54
End: 2019-11-19

## 2019-11-19 VITALS
BODY MASS INDEX: 30.94 KG/M2 | HEART RATE: 65 BPM | SYSTOLIC BLOOD PRESSURE: 124 MMHG | OXYGEN SATURATION: 97 % | HEIGHT: 71 IN | DIASTOLIC BLOOD PRESSURE: 88 MMHG | WEIGHT: 221 LBS

## 2019-11-19 DIAGNOSIS — R68.89 COLD SENSITIVITY: ICD-10-CM

## 2019-11-19 DIAGNOSIS — R43.1 HYPEROSMIA: Primary | ICD-10-CM

## 2019-11-19 LAB
TSH SERPL DL<=0.05 MIU/L-ACNC: 1.75 UIU/ML (ref 0.27–4.2)
VIT B12 BLD-MCNC: 764 PG/ML (ref 211–946)

## 2019-11-19 PROCEDURE — 84443 ASSAY THYROID STIM HORMONE: CPT | Performed by: PSYCHIATRY & NEUROLOGY

## 2019-11-19 PROCEDURE — 99245 OFF/OP CONSLTJ NEW/EST HI 55: CPT | Performed by: PSYCHIATRY & NEUROLOGY

## 2019-11-19 PROCEDURE — 82607 VITAMIN B-12: CPT | Performed by: PSYCHIATRY & NEUROLOGY

## 2019-11-19 PROCEDURE — 36415 COLL VENOUS BLD VENIPUNCTURE: CPT | Performed by: PSYCHIATRY & NEUROLOGY

## 2019-11-19 NOTE — TELEPHONE ENCOUNTER
Returned pt's call.  He reports his LBP has worsened and he would like to restart PT.  He had to stop a couple months ago due to an illness.  I have left message for clinic  to contact him with instructions for restarting therapy.

## 2019-11-19 NOTE — TELEPHONE ENCOUNTER
I gave him HIDA scan results when in hospital and the abnormal result is why we discussed surgery. The norovirus is a temporary problem and likely has resolved at this point. I emphasized I would only recommend surgery if his symptoms were significant enough that he couldn't live with them.   no

## 2019-11-19 NOTE — PROGRESS NOTES
Subjective:     Patient ID: Jose Maria Judge is a 54 y.o. male.    Mr. Judge is a 54 year old male with a history of allergies who is seen in consultation at the request of Dr. Crespo for the evaluation of hypersensitivity to smell.  I reviewed the patient's records.  The patient was seen by [facility] and allergy on September 10, 2019.  Reports that he was therefore hyperosmia.  The symptoms had been going on for years and are constant.  Sinus rinses made his symptoms worse.  He also gets burning pain in his nose with headaches.  Reportedly an MRI of the brain was done that was normal.  There was some difficulty with exam.  Neurology referral was made.  The patient sees orthopedics for chronic bilateral low back pain with sciatica.  The patient follows with neurosurgery as well.  He saw his oncologist on July 11, 2019.  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  He was treated with excision.  I reviewed the patient's labs.  CBC on July 11 was normal, CMP back in May was essentially normal, CRP and ESR in May were normal.  Patient had an MRI of his brain done on September 6, 2019.  It was read as negative.    The patient reports that he has been sensitive to smells since 2008.  That is when strong smell started to bother him.  Then it was started to get really bad in 2017.  He thinks that he is always had a keen sense of smell which also interns affects his sense of taste.  He reports that nasal rinses tend to dry out his nose and make the symptoms worse.  Specifically, when he smells a strong smell he will get a burning sensation in both of his nostrils.  This occurs on a daily basis.  In 2012 he had bad nosebleeds that required cauterization.  He used to clean his nose out with soap and water for 2 to 3 months.  When he was a child he had a lot of postnasal drip and tonsillitis and was on a lot of antibiotics.  He had nasal surgery in 1990.  He  denies any use of nasal sprays.  He reports that it is really starting to affect his social life and is isolating because he cannot be around other people.  The patient is wearing a mask today.  Avoidance makes his symptoms better.  He denies any other associated symptoms.  He also reports that recently he has been experiencing some cold intolerance.  He also reports that he has chronic back pain.  The patient declines any particular goals for today's visit.      The following portions of the patient's history were reviewed and updated as appropriate: allergies, current medications, past family history, past medical history, past social history, past surgical history and problem list.    Review of Systems   Constitutional: Positive for activity change.   Neurological: Negative for dizziness, tremors, seizures, syncope, facial asymmetry, speech difficulty, weakness, light-headedness, numbness and headaches.   Hematological: Does not bruise/bleed easily.   Psychiatric/Behavioral: Negative for agitation, behavioral problems, confusion, decreased concentration, dysphoric mood, hallucinations, self-injury, sleep disturbance and suicidal ideas. The patient is not nervous/anxious and is not hyperactive.    I reviewed the ROS documented by the MA.  All other systems negative.       Objective:    Neurologic Exam    Physical Exam   Constitutional:  Vital signs reviewed.  No apparent distress.  Well groomed.  Eyes:  No injection, no icterus.  Fundoscopic exam performed.  No papilledema appreciated bilaterally.   Respiratory:  Normal effort.  Clear to auscultation bilaterally.  Cardiovascular:  Regular rate and rhythm.  No murmurs.  No carotid bruits. Symmetric radial pulses.  Musculoskeletal: Normal station.  Gait steady.  Normal arm swing.  Patient able to walk on heels and toes.  Tandem gait intact.  Romberg negative.  Muscle tone and bulk normal in the bilateral upper and lower extremities.  Strength is 5/5 in the bilateral  upper and lower extremities proximally and distally unless otherwise specified in the neurological exam.  Skin:  No rashes.  Warm, dry, and intact.  Psychiatric:  Good mood.  Normal affect.  Nose: Patient declines a nasal exam today    Neurologic:  Mental status-  The patient is alert and oriented to person, place and time. Attention/concentration is within normal limits.  Speech is fluent without dysarthria.  The patient is able to name, repeat and follow complex commands without difficulty.  Immediate memory and delayed recall intact (3/3 words immediate and after 4 minutes).  Fund of knowledge normal.  Cranial nerves- Pupils equally round and reactive to light with intact accomodation.  Visual fields intact.  Extraocular movements intact.  Facial sensation intact.  Smile symmetric.  Hearing intact to finger-rub bilaterally.  Palate elevates symmetrically.  SCM and trapezius are 5/5 bilaterally.  Tongue is midline.  Motor-  See musculoskeletal above.  No tremor.  Reflexes- 2+ in the bilateral biceps, brachioradialis, patellar and achilles.  Toes down-going bilaterally.  Sensation- Intact to pinprick and vibration in bilateral upper and lower extremities symmetrically.  Coordination- Intact to finger to nose and heel knee shin bilaterally.   Gait- See musculoskeletal exam above.     Assessment/Plan:  Mr. Judge is a 54-year-old male with a history of allergies and chronic back pain who presents to the neurology clinic today for the evaluation of nasal burning with strong smells.    1.  Burning nasal pain with strong smells-The etiology of the patient's symptoms are unclear.  The MRI of his brain was normal.  His neurological exam is normal.  The patient declined examination of his nose today.  Due to his cold sensitivity and the symptoms, we can check a TSH and a B12.  I recommend referral to a facial pain clinic.  The patient is agreeable to the plan.  He can follow-up with me on an as-needed basis.     Problems  Addressed this Visit     None      Visit Diagnoses     Hyperosmia    -  Primary    Relevant Orders    Vitamin B12    Cold sensitivity        Relevant Orders    TSH Rfx On Abnormal To Free T4

## 2019-11-22 ENCOUNTER — TELEPHONE (OUTPATIENT)
Dept: ONCOLOGY | Facility: CLINIC | Age: 54
End: 2019-11-22

## 2019-11-22 NOTE — TELEPHONE ENCOUNTER
Omar sent to Dr Jenny Alvarado,   Patient saw Neuro this past week (Note in chart) and after talking with them he thinks the sensitivity he has is a local issue and he needs a new ENT referral. Neuro had mentioned a facial pain clinic but he said its in [place] and they don't even know if they could help with this. Patient was wanting to know if there is another ENT that you think he could see? He did not like [facility] (Dr Vital's office). Let us know if you have any other suggestions for patient. Thanks!     PLEASE RESPOND TO CLINICAL POOL

## 2019-11-25 ENCOUNTER — TELEPHONE (OUTPATIENT)
Dept: ONCOLOGY | Facility: HOSPITAL | Age: 54
End: 2019-11-25

## 2019-11-25 ENCOUNTER — TELEPHONE (OUTPATIENT)
Dept: ONCOLOGY | Facility: CLINIC | Age: 54
End: 2019-11-25

## 2019-11-25 DIAGNOSIS — R43.1: Primary | ICD-10-CM

## 2019-11-25 NOTE — TELEPHONE ENCOUNTER
Order placed for referral to see Dr. Bryan. Message sent to scheduling. notified pt.  Then pt wanted to inform Dr. Alvarado that for months (even the summer months) he has had a sensitivity to cold. Not that he feels cold all the time but when he puts his clothes on he blow drys them with the hair dryer so they are warm on his skin. Informed Dr. Alvarado. He states once pt sees Dr. Bryan then we can potentially address this issue.       ----- Message from Bryan Alvarado MD sent at 11/25/2019  3:07 PM EST -----  Dr. Bryan Maximiliano - ENT, [place], MIRNA  ----- Message -----  From: Megha Bustillo RN  Sent: 11/22/2019  12:58 PM  To: MD Dr Jenny Santiago,   Patient saw Neuro this past week (Note in chart) and after talking with them he thinks the sensitivity he has is a local issue and he needs a new ENT referral. Neuro had mentioned a facial pain clinic but he said its in [place] and they don't even know if they could help with this. Patient was wanting to know if there is another ENT that you think he could see? He did not like [facility] (Dr Vital's office). Let us know if you have any other suggestions for patient. Thanks!     PLEASE RESPOND TO CLINICAL POOL

## 2019-11-25 NOTE — TELEPHONE ENCOUNTER
Pt called back stating that he forgot to mention to the other triage nurse that the cold sensitivity also occurred in the summer months. Per last note, we will first address the sensitivity to smell and then the cold sensitivity. He v/u.

## 2019-11-26 ENCOUNTER — TELEPHONE (OUTPATIENT)
Dept: ONCOLOGY | Facility: HOSPITAL | Age: 54
End: 2019-11-26

## 2019-11-26 DIAGNOSIS — R43.1: Primary | ICD-10-CM

## 2019-11-26 NOTE — TELEPHONE ENCOUNTER
Referral placed, message sent to scheduling    ----- Message from Bryan Alvarado MD sent at 11/25/2019  4:50 PM EST -----  Perhaps Garcia Ocampo MD - ENT  ----- Message -----  From: Samreen Lovett, RN  Sent: 11/25/2019   3:58 PM  To: Bryan Alvarado MD    Apparently [facility] does not take his insurance.   Samreen   ----- Message -----  From: Kareen Babcock  Sent: 11/25/2019   3:28 PM  To: Samreen Lovett RN    That office does not take [facility] insurance.  I called the other ENT office, and they have to have a referral from the [facility] primary care physican.  I looked at the card, and it says [facility].        ----- Message -----  From: Samreen Lovett RN  Sent: 11/25/2019   3:13 PM  To: [facility] On[facility]  Pool    Referral placed for pt to see Dr. Bryan (ENT). Please call and get an appt. Thank you

## 2019-12-09 ENCOUNTER — HOSPITAL ENCOUNTER (OUTPATIENT)
Dept: PHYSICAL THERAPY | Facility: HOSPITAL | Age: 54
Setting detail: THERAPIES SERIES
Discharge: HOME | End: 2019-12-09

## 2019-12-09 DIAGNOSIS — M25.60 RANGE OF MOTION DEFICIT: ICD-10-CM

## 2019-12-09 DIAGNOSIS — R29.898 WEAKNESS OF BOTH LOWER EXTREMITIES: ICD-10-CM

## 2019-12-09 DIAGNOSIS — G89.29 CHRONIC BILATERAL LOW BACK PAIN WITHOUT SCIATICA: Primary | ICD-10-CM

## 2019-12-09 DIAGNOSIS — M54.50 CHRONIC BILATERAL LOW BACK PAIN WITHOUT SCIATICA: Primary | ICD-10-CM

## 2019-12-09 PROCEDURE — 97110 THERAPEUTIC EXERCISES: CPT

## 2019-12-09 NOTE — THERAPY RE-EVALUATION
Outpatient Physical Therapy Ortho Re-Evaluation  [facility]     Patient Name: Jose Maria Judge  : 1965  MRN: [id number]  Today's Date: 2019      Visit Date: 2019    Patient Active Problem List   Diagnosis   • Bilateral hip pain   • Right hip pain   • Left hip pain   • Trochanteric bursitis   • Tear of acetabular labrum   • Loss of hair   • Anal burning   • Anxiety   • Elevated blood pressure   • Hamstring strain   • Hypertension   • Radiculitis   • Hamstring muscle strain   • Rectal pain   • Herpes zoster   • Varicose veins of other specified sites   • Lymph node enlargement   • Partial small bowel obstruction (CMS/HCC)   • Epigastric pain   • Lymphadenopathy, axillary   • Hypersensitivity to odor   • Chronic bilateral low back pain without sciatica        Past Medical History:   Diagnosis Date   • Abdominal lymphadenopathy    • Acid reflux     HX   • Anxiety     R/T PAIN   • Carpal tunnel syndrome, left    • Chronic pain    • DDD (degenerative disc disease), lumbar    • Fatigue    • H/O Hypersensitivity to odor     Burning sensation in nasal passage   • Hemorrhoids     internal fistula   • Irritable bowel syndrome    • Low back pain    • Lymphadenopathy, axillary     LEFT   • Male pattern alopecia    • Pudendal neuralgia    • Right hip pain    • Unexplained night sweats         Past Surgical History:   Procedure Laterality Date   • ABSCESS DRAINAGE  2012   • ANAL FISTULA REPAIR N/A 2012, 10/2012   • AXILLARY LYMPH NODE BIOPSY/EXCISION Left 2018    Procedure: AXILLARY LYMPH NODE BIOPSY/EXCISION;  Surgeon: Amando Nguyễn MD;  [place] [facility] [place] [facility] [place]  Service: General   • COLONOSCOPY N/A     done at [facility]   • COLONOSCOPY N/A 7/10/2019    Procedure: COLONOSCOPY to CECUM;  Surgeon: Amando Nguyễn MD;  Location: [facility] ENDOSCOPY;  Service: General   • ENDOSCOPY N/A 2019    Procedure: ESOPHAGOGASTRODUODENOSCOPY;  Surgeon: Amando Nguyễn MD;  Location: [facility]  "ENDOSCOPY;  Service: General   • FINE NEEDLE ASPIRATION Left 08/09/2018    Left axillary lymph node FNA   • FLEXIBLE SIGMOIDOSCOPY N/A 06/25/2003    Normal-Dr. Cezar Aviles   • HEMORRHOIDECTOMY N/A 1996   • HIP ARTHROSCOPY W/ LABRAL REPAIR Right 04/03/2017    Dr. Lonnie Lemons   • TONSILLECTOMY Bilateral        Visit Dx:     ICD-10-CM ICD-9-CM   1. Chronic bilateral low back pain without sciatica M54.5 724.2    G89.29 338.29   2. Range of motion deficit M25.60 719.50   3. Weakness of both lower extremities R29.898 729.89         Patient History     Row Name 12/09/19 1300             History    Chief Complaint  Pain  -JA      Type of Pain  Back pain  -JA      Date Current Problem(s) Began  -- chronic  -JA      Brief Description of Current Complaint  Had been managing his low back pain and started dong more around the house and may have been too much and also switching mattresses to a firmer muscle.  It irritated his pudendal neuralgia.  \"It all started lifting heavy stones, awoke the next morning with pain that radiated down pudendal nerve.\"  2017 hip problem that lead to surgery, had to have a MRI and had to take pain med that constipated and the straining to have BM increases the pain drastically (from 3 to \"11\").  Any strain to LB triggers pain.  Has tried to use a  making a walking cane and it increased his pain.  States he has tried working on abs (t.a.) but as he increases it triggers pain in pudendal n.  Notes pressure in radhames LB and fatigue in standing--can't tolerate standing very long.  -JA      Patient/Caregiver Goals  Improve strength  -JA      Hand Dominance  right-handed  -JA      Occupation/sports/leisure activities  retired  -JA         Pain     Pain Location  Back LBP belt line  -JA      Pain at Present  6;7 sitting  -JA      Pain at Best  5  -JA      Pain at Worst  9 w/stress or bowel movement  -JA      Tolerance Time- Standing  2-3 pain, goes up to 6-7 pain if he eats something that " irritates his colon  -JA      Tolerance Time- Sitting  limited due to icnreased pudendal n. pain  -JA      Tolerance Time- Walking  not walking for exercise at all right now  -JA      Tolerance Time- Lying  sometimes increases his pain  -JA         Fall Risk Assessment    Any falls in the past year:  No  -JA         Services    Prior Rehab/Home Health Experiences  No  -JA         Daily Activities    Primary Language  English  -LISET      Does patient have problems with the following?  None  -JA      Recommended Referrals  Physical Therapy aquatic therapy  -JA      Pt Participated in POC and Goals  Yes  -JA         Safety    Are you being hurt, hit, or frightened by anyone at home or in your life?  No  -JA      Are you being neglected by a caregiver  No  -JA        User Key  (r) = Recorded By, (t) = Taken By, (c) = Cosigned By    Initials Name Provider Type    Kirstin Chao, PT Physical Therapist          PT Ortho     Row Name 12/09/19 1300       Subjective Comments    Subjective Comments  initial gayla  -LISET       Subjective Pain    Able to rate subjective pain?  yes  -JA       Myotomal Screen- Lower Quarter Clearing    Hip flexion (L2)  Bilateral:;3+ (Fair +) unable/did not hold against resistance due to pain  -JA    Knee extension (L3)  Right:;3+ (Fair +);Left:;4- (Good -);4 (Good)  -JA    Ankle DF (L4)  Bilateral:;4 (Good)  -JA    Ankle PF (S1)  Bilateral:;4 (Good)  -JA    Knee flexion (S2)  4- (Good -)  -JA       Lumbar ROM Screen- Lower Quarter Clearing    Lumbar Flexion  Impaired 10% w/increased pain/guarding  -JA    Lumbar Extension  Unable to perform secondary to pain  -JA    Lumbar Lateral Flexion  Impaired 25% pain-limited radhames  -JA    Lumbar Rotation  Impaired 25% L min pain, 30% R inc pain  -JA       MMT (Manual Muscle Testing)    Rt Lower Ext  Rt Hip ABduction;Rt Hip Extension;Rt Hip Internal (Medial) Rotation;Rt Hip External (Lateral) Rotation  -JA    Lt Lower Ext  Lt Hip Extension;Lt Hip  ABduction;Lt Hip Internal (Medial) Rotation;Lt Hip External (Lateral) Rotation  -JA       MMT Right Lower Ext    Rt Hip Internal (Medial) Rotation MMT, Gross Movement  (3+/5) fair plus  -JA    Rt Hip External (Lateral) Rotation MMT, Gross Movement  (3/5) fair  -JA    Rt Lower Extremity Comments   cannot perform ASLR due to significant increase in pain/strain   -JA       MMT Left Lower Ext    Lt Hip Internal (Medial) Rotation MMT, Gross Movement  (3/5) fair  -JA    Lt Hip External (Lateral) Rotation MMT, Gross Movement  (3+/5) fair plus  -JA    Lt Lower Extremity Comments   cannot perform ASLR due to significant increase in pain/strain   -JA      User Key  (r) = Recorded By, (t) = Taken By, (c) = Cosigned By    Initials Name Provider Type    Kirstin Chao, PT Physical Therapist                      Therapy Education  Education Details: Discussed physiological quieting recommended mindfulness meditation.  Initiated HEP for gentle ROM/isometric strengthening  Given: HEP, Symptoms/condition management, Pain management, Posture/body mechanics  Program: Reinforced, Progressed, Modified, New  How Provided: Verbal, Demonstration, Written  Provided to: Patient  Level of Understanding: Teach back education performed, Verbalized     PT OP Goals     Row Name 12/09/19 1600          PT Short Term Goals    STG Date to Achieve  12/23/19  -LISET     STG 1  Patient will be independent and compliant with initial HEP   -LISET     STG 1 Progress  New  -LISET     STG 2  Pt will demonstrate correct posture in sitting and standing.  -LISET     STG 3  Pt will demonstrate correct forward bend (hip hinge v. spine flexion) to reduce strain to LB.  -LISET     STG 3 Progress  New  -LISET     STG 4  Pt will be independent with initial aquatic exercise.  -LISET     STG 4 Progress  New  -LISET        Long Term Goals    LTG Date to Achieve  01/09/20  -LISET     LTG 1  Pt will be independent with advanced HEP for strengthening to increase functional mobility.  -LISET      LTG 1 Progress  New  -LISET     LTG 2  Education for posture and body mechanics with home and work to reduce strain on back.  -LISET     LTG 2 Progress  New  -LISET     LTG 3  Pt will score 34% on Oswestry Disability Index indicating decrease in perceived functional disability.   -LISET     LTG 3 Progress  New  -LISET     LTG 4  Pt will demonstrate independence with aquatic HEP for lumbar stabilization and plan for continuing at warm pool of his choice.  -LISET     LTG 4 Progress  New  -LISET        Time Calculation    PT Goal Re-Cert Due Date  03/09/20  -LISET       User Key  (r) = Recorded By, (t) = Taken By, (c) = Cosigned By    Initials Name Provider Type    Kirstin Chao, PT Physical Therapist          PT Assessment/Plan     Row Name 12/09/19 1600          PT Assessment    Functional Limitations  Impaired gait;Limitation in home management;Limitations in community activities;Limitations in functional capacity and performance;Performance in leisure activities;Performance in self-care ADL  -LISET     Impairments  Pain;Posture;Poor body mechanics;Range of motion;Muscle strength;Gait;Endurance  -LISET     Assessment Comments  Mr. Judge is a 55 y/o male well-known to this therapist.  He has multiple chronic issues including LBP, intermittent hip pain and pudendal neuralgia.  He returns to therapy for chronic LBP having had therapy that was interrupted due to other medical issues that arose.  He demonstrates significantly radhames hip AROM and limited trunk AROM due to muscle guarding to avoid exacerbating the pudendal neuralgia pain.  His R LE is weaker than L, has point tenderness in lumbar pvm and R glutes.  Due to significant muscle guarding and anticipation of pain pt is limited in therapeutic exercise tolerance.  He is a good candidate for aquatic PT in warm water.  He will also be seen in clinic prn.  His condition is evolving.  Recommend skilled therapy services in clinic and pool.  -LISET     Please refer to paper survey for additional  self-reported information  Yes  -JA     Rehab Potential  Good  -JA     Patient/caregiver participated in establishment of treatment plan and goals  Yes  -JA     Patient would benefit from skilled therapy intervention  Yes  -JA        PT Plan    PT Frequency  2x/week  -JA     Predicted Duration of Therapy Intervention (Therapy Eval)  4 weeks  -JA     Planned CPT's?  PT EVAL LOW COMPLEXITY: 07701;PT THER PROC EA 15 MIN: 72692;PT THER ACT EA 15 MIN: 17865;PT NEUROMUSC RE-EDUCATION EA 15 MIN: 70514;PT GAIT TRAINING EA 15 MIN: 64345;PT AQUATIC THERAPY EA 15 MIN: 91650;PT SELF CARE/HOME MGMT/TRAIN EA 15: 89470;PT HOT OR COLD PACK TREAT MCARE;PT ELECTRICAL STIM UNATTEND:   -     Physical Therapy Interventions (Optional Details)  aquatics exercise;balance training;gait training;home exercise program;lumbar stabilization;strengthening  -JA     PT Plan Comments  Note: pt has limited   -       User Key  (r) = Recorded By, (t) = Taken By, (c) = Cosigned By    Initials Name Provider Type    Kirstin Chao, PT Physical Therapist            OP Exercises     Row Name 12/09/19 1300             Subjective Comments    Subjective Comments  initial gayla  -         Subjective Pain    Able to rate subjective pain?  yes  -JA      Pre-Treatment Pain Level  7  -JA         Total Minutes    19372 - PT Therapeutic Exercise Minutes  45  -JA         Exercise 1    Exercise Name 1  t.a. gentle activation with controlled breathing  -JA      Reps 1  5  -JA      Time 1  3-4 sec on/off  -JA         Exercise 2    Exercise Name 2  LTR  -JA      Reps 2  5  -JA      Additional Comments  in gentle, pain-free range  -JA         Exercise 3    Exercise Name 3  small range hip abd in HL  -JA      Reps 3  5  -JA        User Key  (r) = Recorded By, (t) = Taken By, (c) = Cosigned By    Initials Name Provider Type    Kirstin Chao, PT Physical Therapist                                  Time Calculation:     Start Time: 1315  Stop Time:  1400  Time Calculation (min): 45 min     Therapy Charges for Today     Code Description Service Date Service Provider Modifiers Qty    54174579945 HC PT THER PROC EA 15 MIN 12/9/2019 Kirstin Dale, PT GP 3                    Kirstin Dale, PT  12/9/2019

## 2019-12-17 ENCOUNTER — TREATMENT (OUTPATIENT)
Dept: PHYSICAL THERAPY | Facility: CLINIC | Age: 54
End: 2019-12-17

## 2019-12-17 DIAGNOSIS — M54.50 CHRONIC BILATERAL LOW BACK PAIN WITHOUT SCIATICA: Primary | ICD-10-CM

## 2019-12-17 DIAGNOSIS — G89.29 CHRONIC BILATERAL LOW BACK PAIN WITHOUT SCIATICA: Primary | ICD-10-CM

## 2019-12-17 DIAGNOSIS — M25.60 RANGE OF MOTION DEFICIT: ICD-10-CM

## 2019-12-17 DIAGNOSIS — R29.898 WEAKNESS OF BOTH LOWER EXTREMITIES: ICD-10-CM

## 2019-12-17 PROCEDURE — 97113 AQUATIC THERAPY/EXERCISES: CPT | Performed by: PHYSICAL THERAPIST

## 2019-12-17 NOTE — PROGRESS NOTES
"Physical Therapy Daily Progress Note    Patient: Jose Maria Judge   : 1965  Diagnosis/ICD-10 Code:  Chronic bilateral low back pain without sciatica [M54.5, G89.29]  Referring practitioner: Garcia [facility] MD  Date of Initial Visit:   Type: THERAPY  Noted: 2019  Today's Date: 2019  Patient seen for 5 sessions             Subjective   Pain level pre treatment 4-5/10, with LB tightness.    Objective     AQUATICS LE    Water Walk  Fwd X 2 laps   Stretch             DKTC at rail 15” X 2  Stretch 2  Shldr horizon ABD/Add X 10 staggered stance AROM  March in Place Suspended X 10  Toe/Heel Raises -/15X B deep  Bicycle  Pt arrived early and completed ~ 5 min seated cycling in pool on his own.  Flutter/Scissor ---  Exercise 1  TuckUps X 12  Exercise 2  Alt Shldr Flexion/Extension w/ open paddles X 10  Exercise 3  Rows W/ L3 paddles X 15  UE Sweeps               10X  Paddle Stirs               Next*      Assessment/Plan  Today is first appointment in aquatic therapy.  Jose Maria was instructed in aquatic exercise primary focus on core stabilization.  He was quite hesitant to perform LE exercises due to fear of aggravating his condition, therefore worked core via UE exercise with cues for abdominal bracing.  At end of session, Jose Maria reported feeling \"better\".     Progress per Plan of Care and Progress strengthening /stabilization /functional activity           Timed:  Aquatic Therapy    30     mins 41958;    Thad Madrigal, PT  Physical Therapist        "

## 2019-12-19 ENCOUNTER — TELEPHONE (OUTPATIENT)
Dept: NEUROLOGY | Facility: CLINIC | Age: 54
End: 2019-12-19

## 2019-12-19 DIAGNOSIS — G58.8 PUDENDAL NEURALGIA: Primary | ICD-10-CM

## 2019-12-19 NOTE — TELEPHONE ENCOUNTER
Jose Maria stated that he started having symptoms for Pudendal Neurologia in 2014. It has gradually gotten worse. His symptoms are stinging and burning pains when he sits. It feels like he is sitting on a golf ball. Its almost impossible for him to sit.  He also informed me that he has no social life because of the pain. It has also interfered with his sex life. He stated that he researched that Dr Foote treats this. I asked him if he was diagnosed with this and he informed me that he self diagnosed himself.

## 2019-12-19 NOTE — TELEPHONE ENCOUNTER
----- Message from Nida Connell MD sent at 12/19/2019 12:55 PM EST -----  Contact: [phone]  This is not something that I generally treat.  Has he already been diagnosed with this?  What type of symptoms is he experiencing?    ----- Message -----  From: Jaida Garcia MA  Sent: 12/19/2019  12:36 PM EST  To: MD Jose Maria Brito called stating that he called to schedule an appointment with Dr. Foote and someone at the [facility] office told him that you would need to send a referral before they can schedule him. He stated that he wants to see him for Pudendal neurologia. I wasn't sure if this was something you do.

## 2019-12-20 NOTE — TELEPHONE ENCOUNTER
I reached out to Jose Maria and he would be happy if you can send a referral to the [facility].

## 2019-12-20 NOTE — TELEPHONE ENCOUNTER
I spoke with Dr. Foote.  He reports that this is a little out of his scope of expertise.  He would suggest a referral to the neuromuscular department at the [facility].  If the patient is interested, I be happy to facilitate that for him.

## 2019-12-23 ENCOUNTER — TREATMENT (OUTPATIENT)
Dept: PHYSICAL THERAPY | Facility: CLINIC | Age: 54
End: 2019-12-23

## 2019-12-23 DIAGNOSIS — G89.29 CHRONIC BILATERAL LOW BACK PAIN WITHOUT SCIATICA: Primary | ICD-10-CM

## 2019-12-23 DIAGNOSIS — R29.898 WEAKNESS OF BOTH LOWER EXTREMITIES: ICD-10-CM

## 2019-12-23 DIAGNOSIS — M25.60 RANGE OF MOTION DEFICIT: ICD-10-CM

## 2019-12-23 DIAGNOSIS — M54.50 CHRONIC BILATERAL LOW BACK PAIN WITHOUT SCIATICA: Primary | ICD-10-CM

## 2019-12-23 PROCEDURE — 97113 AQUATIC THERAPY/EXERCISES: CPT | Performed by: PHYSICAL THERAPIST

## 2019-12-23 NOTE — PROGRESS NOTES
Physical Therapy Daily Progress Note    Patient: Jose Maria Judge   : 1965  Diagnosis/ICD-10 Code:  Chronic bilateral low back pain without sciatica [M54.5, G89.29]  Referring practitioner: Garcia Roberts MD  Date of Initial Visit:   Type: THERAPY  Noted: 2019  Today's Date: 2019  Patient seen for 6 sessions             Subjective   Felt better after first aquatic session. Feels like his regular shoes are causing a burning pain in his back. Wore flip flops at home. Subjective pain rated 5/10.     Objective     AQUATICS LE    Water Walk  2 laps Fwd, Sideways and backwards  Stretch  HS 20” X 2 each   Stretch 2  Calf 20” X 2 each  Toe/Heel Raises -/15X B, 5X each Singles, Shallow  Stretch                       DKTC at rail 15” X 2  Exercise 1                TuckUps X 10  Exercise 2                Alt Shldr Flexion/Extension w/ open paddles X 10  Exercise 3                Rows W/ L5 paddles X 15  Paddle Stirs             Level 5 (closed) 10/10  Decompression        1 min    Assessment/Plan  Progressed aquatic exercise this visit.  Patient requires frequent rest breaks due to muscle fatigue in the back.  Progressed paddle work today.    Progress per Plan of Care and Progress strengthening /stabilization /functional activity           Timed:  Aquatic Therapy    27     mins 15930;    Thad Madrigal, PT  Physical Therapist

## 2019-12-26 ENCOUNTER — TREATMENT (OUTPATIENT)
Dept: PHYSICAL THERAPY | Facility: CLINIC | Age: 54
End: 2019-12-26

## 2019-12-26 DIAGNOSIS — M25.60 RANGE OF MOTION DEFICIT: ICD-10-CM

## 2019-12-26 DIAGNOSIS — R29.898 WEAKNESS OF BOTH LOWER EXTREMITIES: ICD-10-CM

## 2019-12-26 DIAGNOSIS — M54.50 CHRONIC BILATERAL LOW BACK PAIN WITHOUT SCIATICA: Primary | ICD-10-CM

## 2019-12-26 DIAGNOSIS — G89.29 CHRONIC BILATERAL LOW BACK PAIN WITHOUT SCIATICA: Primary | ICD-10-CM

## 2019-12-26 PROCEDURE — 97113 AQUATIC THERAPY/EXERCISES: CPT | Performed by: PHYSICAL THERAPIST

## 2019-12-26 NOTE — PROGRESS NOTES
Physical Therapy Daily Progress Note    Patient: Jose Maria Judge   : 1965  Diagnosis/ICD-10 Code:  Chronic bilateral low back pain without sciatica [M54.5, G89.29]  Referring practitioner: Garcia Roberts MD  Date of Initial Visit:   Type: THERAPY  Noted: 2019  Today's Date: 2019  Patient seen for 7 sessions             Subjective   I don’t want to do that stretch where I walk my feet up the wall. My back hurt more after the last treatment and I think that was the exercise that bothered it.     Objective     AQUATICS LE    Water Walk  3 laps Fwd  Stretch   Calf 15” X 2 each  Stretch   HS 20” X 2 each  Stretch   Prone float at rail w/ noodle support X 1 min.  Vertical Traction 2 min  Abdominals   Shallow LN X 10  Clams   --  Hip Abd/Add  --  Hip Flex/Ext  --  March in Place --  Mini Squat  --  Toe/Heel Raises -/2X 10 B   Bicycle   4 min suspended w/ 2 noodles  Flutter/Scissor  Prone Flutterkick at rail w/ 2 noodle support X 10  Exercise 1  Rows L5 Paddles X 15  Exercise 2  Paddle Stirs L5 10/10  Exercise 3  Alt Shldr Flex/Ext w/ open paddles Next*      Assessment/Plan  Patient concerned about increasing tension in LB during water walking, therefore he was instructed to move to shallower water and also perform abdominal bracing which resolved his symptoms. Education regarding optimal sitting posture and avoiding bending at the waist. Responding to extension based exercise vs flexion based.    Progress per Plan of Care and Progress strengthening /stabilization /functional activity           Timed:  Aquatic Therapy    30     mins 31718;    Thad Madrigal, PT  Physical Therapist

## 2019-12-31 ENCOUNTER — APPOINTMENT (OUTPATIENT)
Dept: PHYSICAL THERAPY | Facility: HOSPITAL | Age: 54
End: 2019-12-31

## 2020-01-07 ENCOUNTER — TREATMENT (OUTPATIENT)
Dept: PHYSICAL THERAPY | Facility: CLINIC | Age: 55
End: 2020-01-07

## 2020-01-07 DIAGNOSIS — M25.60 RANGE OF MOTION DEFICIT: ICD-10-CM

## 2020-01-07 DIAGNOSIS — R29.898 WEAKNESS OF BOTH LOWER EXTREMITIES: ICD-10-CM

## 2020-01-07 DIAGNOSIS — M54.50 CHRONIC BILATERAL LOW BACK PAIN WITHOUT SCIATICA: Primary | ICD-10-CM

## 2020-01-07 DIAGNOSIS — G89.29 CHRONIC BILATERAL LOW BACK PAIN WITHOUT SCIATICA: Primary | ICD-10-CM

## 2020-01-07 PROCEDURE — 97113 AQUATIC THERAPY/EXERCISES: CPT | Performed by: PHYSICAL THERAPIST

## 2020-01-07 NOTE — PROGRESS NOTES
Physical Therapy Daily Progress Note    Patient: Jose Maria Judge   : 1965  Diagnosis/ICD-10 Code:  Chronic bilateral low back pain without sciatica [M54.5, G89.29]  Referring practitioner: Garcia Roberts MD  Date of Initial Visit:   Type: THERAPY  Noted: 2019  Today's Date: 2020  Patient seen for 8 sessions             Subjective   Patient indicates pain moves around.  States he is going to see a doctor about his feet as he believes that his feet might be contributing to his issues.  Feels like therapy is helping a little.    Objective     AQUATICS LE    Water Walk  3 laps F  Stretch 1  calf 15 sec x 2  Stretch 2  HS 20 sec x 2 ea  Stretch 3  Prone float at rail w/ noodle support x 1 min  Stretch Other 1 -  Stretch Other 2 -  Vertical Traction 2 min  Abdominals   shallow, LN x 12  Clams   -  Hip Abd/Add  -  Hip Flex/Ext  -  March in Place 5x (between reps of HR)  Mini Squat  -  Toe/Heel Raises - / 2 x 10  Uni-Squat  -  Uni-Clock  -  Step Ups  -  Bicycle  suspended (2 noodles) x 4 min  Flutter/Scissor Prone flutter at rail w/ 2 noodle support x 15  Exercise 1  L5 paddle rows x 15  Exercise 2  L5 paddle stirs 10/10  Exercise 3  UE alt shoulder flexion/extension w/ L1 paddles x 10      Assessment/Plan  Patient gradually progressing with aquatic ex/activity and is beginning to note some benefit but state she has to be careful not to do too much.  Continued with previous ex increasing reps on a couple and added alt UE flexion/ext with open paddles today.  He did have some pressure in the pelvic floor region with a couple of ex but was able to reduce/avoid this with cuing for decreased speed/ROM and/or modification with therex.      Progress per Plan of Care and Progress strengthening /stabilization /functional activity           Timed:  Aquatic Therapy    30     mins 86869;    Rosemarie Rahman PT  Physical Therapist

## 2020-01-09 ENCOUNTER — TREATMENT (OUTPATIENT)
Dept: PHYSICAL THERAPY | Facility: CLINIC | Age: 55
End: 2020-01-09

## 2020-01-09 PROCEDURE — 97113 AQUATIC THERAPY/EXERCISES: CPT | Performed by: PHYSICAL THERAPIST

## 2020-01-09 NOTE — PROGRESS NOTES
Physical Therapy Daily Progress Note    Patient: Jose Maria Judge   : 1965  Diagnosis/ICD-10 Code:  No primary diagnosis found.  Referring practitioner: Garcia Roberts MD  Date of Initial Visit: No linked episodes  Today's Date: 2020  Patient seen for Visit count could not be calculated. Make sure you are using a visit which is associated with an episode. sessions             Subjective   Reports 4.5/10 LBP, “it feels tight”. Has MD appt to evaluate feet next week, “ I really think this is contributing to my back pain”.     Objective     AQUATICS LE    Water Walk  3 laps F  Stretch  1  calf stretch 3 x20 sec  Stretch 2  hamstring stretch 3x 20 sec  Stretch 3  prone float with LN support x1 min  Stretch Other 1 -  Stretch Other 2 -  Vertical Traction 2 LN x5 min  Abdominals   shallow x15 LN  Clams   -  Hip Abd/Add  -  Hip Flex/Ext  -  March in Place x10  Mini Squat  -  Toe/Heel Raises -/10  Uni-Squat  -  Uni-Clock  -  Step Ups  -  Bicycle   Suspended 2-3 LN in deep water x 4 min  Flutter/Scissor  Prone flutter kick suspended 2 LN x 2 minutes at rail  Exercise 1  L5 paddle rows x15  Exercise 2  L5 paddle stirs 10/10  Exercise 3  Alt UE flex/ext L1 paddles x10      Assessment/Plan  Pt presents with primary complaint of LBP upon arrival, avoiding pelvic floor discomfort with all ex except mild discomfort  at end of ambulation and end of prone flutter kicks that resolved when ex completed. Intermittent cueing for core stabilization throughout session.  Patient responds with improvement in symptoms following aquatic therapy in therapeutic pool.  Requests sitting in hot pool following treatment, but then became angry when therapist advised patient to limit time to 10 min during use of hot pool. Educated patient on safety with use of hot pool, as well as facility requirements of supervision throughout entire treatment since patient is not a member of [employer].    Progress strengthening /stabilization  /functional activity           Timed:  Aquatic Therapy    30     mins 79011;    Viviana Andrade, NAVEEN  Physical Therapist

## 2020-01-13 ENCOUNTER — TREATMENT (OUTPATIENT)
Dept: PHYSICAL THERAPY | Facility: CLINIC | Age: 55
End: 2020-01-13

## 2020-01-13 DIAGNOSIS — M25.60 RANGE OF MOTION DEFICIT: ICD-10-CM

## 2020-01-13 DIAGNOSIS — M54.50 CHRONIC BILATERAL LOW BACK PAIN WITHOUT SCIATICA: Primary | ICD-10-CM

## 2020-01-13 DIAGNOSIS — M54.6 ACUTE BILATERAL THORACIC BACK PAIN: ICD-10-CM

## 2020-01-13 DIAGNOSIS — R29.898 WEAKNESS OF BOTH LOWER EXTREMITIES: ICD-10-CM

## 2020-01-13 DIAGNOSIS — G89.29 CHRONIC BILATERAL LOW BACK PAIN WITHOUT SCIATICA: Primary | ICD-10-CM

## 2020-01-13 PROCEDURE — 97113 AQUATIC THERAPY/EXERCISES: CPT | Performed by: PHYSICAL THERAPIST

## 2020-01-13 NOTE — PROGRESS NOTES
Physical Therapy Daily Progress Note / Recertification     Patient: Jose Maria Judge   : 1965  Diagnosis/ICD-10 Code:  Chronic bilateral low back pain without sciatica [M54.5, G89.29]  Referring practitioner: Garcia Roberts MD  Date of Initial Visit:   Type: THERAPY  Noted: 2019  Today's Date: 2020  Patient seen for 9 sessions             Subjective   Tried to work on my mom’s tub over weekend and tried to be really careful with my positioning for my back.  Afterward could feel it in my back so took 400mg Ibuprofen which helped a little but yesterday it really bothered me in my back and into the pelvic floor.    Objective   Guarded mobility/gait  Trunk ROM:  </= 25% - limited by pain/guarding  Hip MMT:  Grossly 3/5 to 3+/5 limited by pain/apprehension of pain with resistance      AQUATICS LE    Water Walk  fwd x 3 laps  Stretch 1  calf 3 x 20”  Stretch 2  HS 20” x 3  Stretch 3  prone float 2 x 1 min, LN support  Stretch Other 1 suspended tuck ups at rail x 10, comfortable range  Stretch Other 2 -  Vertical Traction 2-3 LN x 5 min  Abdominals   shallow, LN x 15  Clams   suspended x 10, comfortable ROM keeping feet close together.  Hip Abd/Add  -  Hip Flex/Ext  -  March in Place 12x   Mini Squat  -  Toe/Heel Raises 12x - reports burning sensation in calves and states it seems to happen every time he does this ex.  Uni-Squat  -  Uni-Clock  -  Step Ups  -  Bicycle  suspended 2-3 LN x 5 min  Flutter/Scissor prone flutter at rail x 2 min suspended w/ 2-3 LN, cervical float collar to help support head/neck (placed under front of    neck (open at back of neck)  Exercise 1  L5 paddle rows x15  Exercise 2  L5 paddle stirs cw/ccw, 12/12  Exercise 3  L1 paddles - UE alt shldr flex/ext x 10      Assessment/Plan  Patient has attended 6 aquatic therapy sessions with gradual progression of aquatic ex/activity noting slight overall improvement.  Performance of ex being modified as necessary to reduce/avoid pelvic  "floor discomfort/pressure.  He continues to be limited with daily function and is easily flared with what seems to normal, simple activities.  Increased time spent with decompression and suspended activity today due to \"bad weekend\" after trying to do a few things around the house. He noted being more off balance with MIP today w/o UE support.  All goals are ongoing.  Patient does have an appt. this week for assessment of his feet as he believes this might be contributing to his back issues.  He will continue to benefit from skilled PT for ongoing education and ex progression to help reduce pain, improve functional abilities/activity tolerance, and facilitate independent self management of symptoms/condition.      Short Term Goals:  Patient will be independent and compliant with initial HEP.  Ongoing - patient reports not really doing ex/stretches at home as he’s afraid it will aggravate  Pt will demonstrate correct posture in sitting and standing.  Ongoing -Fair posture in standing, no real observation of sitting posture to date (by this PT) but reviewed correct sitting posture with patient.  Stiff/guarded mobility  Pt will demonstrate correct forward bend (hip hinge v. spine flexion) to reduce strain to LB.  Ongoing  Pt will be independent with initial aquatic exercise.  Ongoing - Continues to need some cuing with basic aquatic ex     Long Term Goals:    Pt will be independent with advanced HEP for strengthening to increase functional mobility.  Ongoing   Education for posture and body mechanics with home and work to reduce strain on back.  Ongoing   Pt will score 34% on Oswestry Disability Index indicating decrease in perceived functional disability.  Ongoing - Not reassessed today 2/2 report of “not a good weekend” with increased pain  Pt will demonstrate independence with aquatic HEP for lumbar stabilization and plan for continuing at warm pool of his choice.  Ongoing - discussed options with patient this " session.    Plan:  Continue with education and aquatic ex/activity working on mobility, flexibility, functional movements, core/LE strengthening, and gait to improve QOL.    Progress per Plan of Care and Progress strengthening /stabilization /functional activity           Timed:  Aquatic Therapy    39     mins [id number];    Rosemarie Rahman, PT  Physical Therapist

## 2020-01-16 ENCOUNTER — TREATMENT (OUTPATIENT)
Dept: PHYSICAL THERAPY | Facility: CLINIC | Age: 55
End: 2020-01-16

## 2020-01-16 DIAGNOSIS — R29.898 WEAKNESS OF BOTH LOWER EXTREMITIES: ICD-10-CM

## 2020-01-16 DIAGNOSIS — M54.6 ACUTE BILATERAL THORACIC BACK PAIN: ICD-10-CM

## 2020-01-16 DIAGNOSIS — G89.29 CHRONIC BILATERAL LOW BACK PAIN WITHOUT SCIATICA: Primary | ICD-10-CM

## 2020-01-16 DIAGNOSIS — M54.50 CHRONIC BILATERAL LOW BACK PAIN WITHOUT SCIATICA: Primary | ICD-10-CM

## 2020-01-16 DIAGNOSIS — M25.60 RANGE OF MOTION DEFICIT: ICD-10-CM

## 2020-01-16 PROCEDURE — 97113 AQUATIC THERAPY/EXERCISES: CPT | Performed by: PHYSICAL THERAPIST

## 2020-01-16 NOTE — PROGRESS NOTES
Physical Therapy Daily Progress Note    Patient: Jose Maria Judge   : 1965  Diagnosis/ICD-10 Code:  Chronic bilateral low back pain without sciatica [M54.5, G89.29]  Referring practitioner: Garcia Roberts MD  Date of Initial Visit:   Type: THERAPY  Noted: 2019  Today's Date: 2020  Patient seen for 10 sessions             Subjective   Saw Dr. Garcia’s assistant for my feet and she recommended going back to Dr. Roberts for my back.  My back seems to be getting worse, can’t hardly do anything without getting burning, stinging, and pelvic floor pressure.  Tomorrow I go back to my PCP and plan to discuss my  Issues and concerns.    Objective     AQUATICS LE    Water Walk  fwd x 3 laps  Stretch 1  calf 20” x 3  Stretch 2  HS 20” x 3  Stretch 3  prone float 2 x 1 min  Stretch Other 1 suspended tuck ups at rail x10, comfortable range  Stretch Other 2 -  Vertical Traction 2-3 LN x 5 min  Abdominals   shallow x 15, LN  Clams   suspended LN at rail x10, comfortable range keeping feet close together  Hip Abd/Add  -  Hip Flex/Ext  -  March in Place 15  Mini Squat  -  Toe/Heel Raises suspended AP x 12  Uni-Squat  -  Uni-Clock  -  Step Ups  -  Bicycle  suspended 2-3 LN at rail x 5 min  Flutter/Scissor prone flutter at rail x 1 min (suspended w/ 2-3 LN and cervical float to help support head/neck) - voiced c/o burning/pinching in lower back.  Exercise 1  L3 paddle rows x 15  Exercise 2  L3 paddle stirs cw/ccw, 12/12  Exercise 3  L1 paddles - UE alt shldr flex/ext x 10 - voiced starting to feel pelvic floor pressure after 7th rep.      Assessment/Plan  Patient noting some temporary benefit with aquatic therapy and use of hot pool but hasn’t really noticed any carryover with everyday functional mobility/activity.  He continues to c/o “burning, stinging, and pelvic floor pressure” even with the simplest of activities and states he is not even able to lift light weight (3-4#) without pain/discomfort.  He has been to ortho  MD office for his feet and was informed that they feel his foot issues are stemming from his back and recommended he f/u with spine MD.  Discussed option of holding formal therapy at this time due to little to no carryover and will await MD recommendation.  Patient may consider looking into 3 month rehab membership in the meantime.       Other   Plan:  Hold formal PT at this time until after patient f/u with spine MD, may consider resuming in future pending MD recommendations.                Timed:  Aquatic Therapy    32     mins [id number];    Rosemarie Rahman, PT  Physical Therapist

## 2020-01-22 NOTE — PROGRESS NOTES
Subjective   Patient ID: Jose Maria Judge is a 54 y.o. male is here today for follow-up. He was last seen in this office by Dr. Enzo Roberts July 2019.  He was seen for complaints of worsening low back pain and R leg pain. He was referred to PT and has been going for a few weeks.  He recently stopped PT because of increased pain.  He was then referred for aqua therapy which did not help either.       History of Present Illness     Mr. Judge returns to the office today for complaints of worsening pain in his back described as an intense burning sensation.  It radiates into his buttocks and the pelvic floor.  He feels a pressure-like sensation in his pelvis. He denies any significant leg pain other than occasional discomfort in the lateral aspect of his right knee. He states that the knee pain is from an old football  injury. He also describes a 4-year history of persistent burning in the bottoms of both feet. He cannot tolerate wearing shoes for prolonged periods. He also has sensitivity to cold. He feels cold despite wearing thermal underwear. He denies nausea, vomiting, fever, chills, dizziness or lightheadedness.    Mr. Judge states that the pelvic pain is most problematic for him because it prevents him from sitting, standing for prolonged periods. Even lifting a gallon of milk worsens his pain. He denies any bowel or bladder incontinence but does note that he has had severe discomfort in his urethra after ejaculation. For this reason, he no longer engages in sexual activity. Mr. Judge believes that these symptoms have been triggered from previous rectal surgeries discussed below.    He has a history of prior rectal surgeries.  He underwent hemorrhoidectomy well over 20 years ago and then subsequent surgery for rectal abscess in 2012.  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      About 4 years ago, after lifting some heavy  stones while working, he developed worsening rectal pain and radiation into the pelvic floor, hip, and gluteal regions.  He has had multiple evaluations by various physicians.   be stemming from the previous rectal surgery.     Mr. Judge is not taking any pain medications for fear of constipation. He has failed anti-inflammatory medications and oral steroids.  He has seen multiple doctors for these complaints.     He has seen 4 separate orthopedic surgeons.  He ultimately underwent arthroscopic repair of a right labral tear and acetabuloplasty by Dr. Lonnie Lemons April 3, 2017.  He also tried outpatient physical therapy.  Symptoms did not improve.  He also saw Dr. Saul Cutler, a spine surgeon, who found no abnormality in the lumbar spine to explain his symptoms.    He initially saw Dr. Efren Aldana with pain management.  He underwent injections: two in the tailbone and one in his low back. He stated that he got about 2 hours of relief.     He saw Dr. Jacobo Anand colorectal surgeon at the [facility]. Rectal exam  performed under general anesthesia October 2017 revealed no abnormal findings.  Also saw Dr. Hook with urology.  Work-up there revealed no urologic abnormalities.     By September 2018 the patient had been diagnosed with pudendal neuralgia. It was discussed by Dr. Francisco Velez when he saw him in his office. who recommended and scheduled pudendal nerve blocks.  The patient did not proceed with the block when he found out that he was not going to receive anesthesia for the procedure. The pudendal nerve block was to be rescheduled with anesthesia but there was no further follow-up from there. He states that he was scheduled for another appointment but it was cancelled.  He states that he was not contacted to reschedule.      Mr. Judge underwent numerous imaging studies of the abdomen and pelvis.  There was a finding of lymphadenopathy the right external iliac chain.  In reviewing the notes,  it appears that subsequent images showed improvement in the right external iliac lymphadenopathy. There was also a finding of palpable left axillary lymphadenopathy.  This was initially biopsied but then required subsequent excision for diagnostic purposes.  There was no evidence of malignancy or lymphoma in the axillary lymph node.  Mr. Judge is still being followed by hematology/oncology, Dr. Alvarado.     His most recent evaluation was with Dr. Roberts back in July of 2019. He was seen for the above complaints with an MRI lumbar spine performed in May 2019. According to the office note, the MRI showed degenerative changes and some facet arthropathy in the lumbar spine but no evidence of severe canal stenosis and certainly nothing that was surgical. Dr. Roberts recommended physical therapy and/or pain management consult.  The patient chose physical therapy which we now now has failed.  His last physical therapy note on January 16, 2020.  Due to his worsening symptoms, physical therapy was put on hold.    The following portions of the patient's history were reviewed and updated as appropriate: allergies, current medications, past family history, past medical history, past social history, past surgical history and problem list.    Review of Systems   Constitutional: Positive for activity change.   Gastrointestinal: Positive for constipation (occurred from pain medication after pain med from hip surgery only) and rectal pain.   Endocrine: Positive for cold intolerance.   Genitourinary: Positive for penile pain (After ejaculation only).   Musculoskeletal: Positive for back pain and joint swelling.   All other systems reviewed and are negative.      Objective   Physical Exam   Constitutional: He is oriented to person, place, and time. Vital signs are normal. He appears well-developed and well-nourished. He is cooperative.  Non-toxic appearance. He does not have a sickly appearance. No distress.   HENT:   Head: Normocephalic  and atraumatic.   Eyes: Conjunctivae are normal. Right eye exhibits no discharge. Left eye exhibits no discharge.   Neck: Normal range of motion. Neck supple. No tracheal deviation present.   Cardiovascular: Normal rate and intact distal pulses.   Pulses:       Popliteal pulses are 2+ on the right side, and 2+ on the left side.        Dorsalis pedis pulses are 2+ on the right side, and 2+ on the left side.        Posterior tibial pulses are 2+ on the right side, and 2+ on the left side.   Pulmonary/Chest: Effort normal. No respiratory distress.   Abdominal: Soft. He exhibits no distension. There is no tenderness.   Musculoskeletal: Normal range of motion. He exhibits tenderness. He exhibits no edema.        Right lower leg: He exhibits no tenderness.        Left lower leg: He exhibits no tenderness.   Mildly tender to palpation in the lower lumbar spine. Lower legs with mild swelling; varicose veins No noted. No tenderness with palpation in the lower legs.    Neurological: He is alert and oriented to person, place, and time. He has normal reflexes. He displays normal reflexes. No sensory deficit. He exhibits normal muscle tone. Coordination normal. GCS eye subscore is 4. GCS verbal subscore is 5. GCS motor subscore is 6.   No motor or sensory deficits. DTR's normal. Negative Paul's; negative clonus. SLR and Vin's negative bilaterally.  Able to bear weight on heels; unable to completely bear weight on his toes due to burning in feet.      Skin: Skin is warm, dry and intact. Capillary refill takes less than 2 seconds. He is not diaphoretic. No cyanosis or erythema. Nails show no clubbing.   Psychiatric: He has a normal mood and affect. Thought content normal.   Vitals reviewed.    Neurologic Exam     Mental Status   Oriented to person, place, and time.       Assessment/Plan   Independent Review of Radiographic Studies:      I reviewed the radiographic report of a previous lumbar MRI performed May 11, 2019 today  in the office.  These images have been reviewed previously by Dr. Roberts as well.  The MRI showed multilevel degenerative changes with associated facet arthropathy and foraminal narrowing.  The foraminal narrowing seems to be most pronounced at L4-5 however there is no evidence of significant canal compromise or nerve root compression.    Medical Decision Making:      Mr. Judge returns to the office today for the above complaints.  He is concerned about his low back pain but more so about the pelvic and rectal pain he is experiencing.  The pelvic pain has been ongoing for many years.  He states that the low back pain is beginning to worsen.  Denies any radiating pain in his legs or numbness or tingling.    By history, he certainly has all of the symptoms consistent with pudendal neuralgia. I texplained to the patient that this is a very difficult condition to treat.  I will need to investigate as to whether there are any physicians locally who treat this. He stated that he would be willing to go for evaluation at a tertiary care center however he has no means of transportation, or lodging if required.    I have spent a total of 90 minutes reviewing records. 35 minutes of that time was spent speaking to the patient to obtain history, physical examination. I told him I would need to speak with Dr. Roberts to determine our next steps from here. Once I have reviewed exam findings, radiographic findings, and history with Dr. Roberts, the patient will be contacted by phone with a plan.     Jose Maria was seen today for back pain and leg pain.    Diagnoses and all orders for this visit:    Chronic bilateral low back pain without sciatica    Lumbar degenerative disc disease    Rectal pain    Paresthesia of both feet      Return for call patient with recommendations. .

## 2020-01-24 ENCOUNTER — OFFICE VISIT (OUTPATIENT)
Dept: NEUROSURGERY | Facility: CLINIC | Age: 55
End: 2020-01-24

## 2020-01-24 VITALS
WEIGHT: 213 LBS | SYSTOLIC BLOOD PRESSURE: 114 MMHG | HEART RATE: 61 BPM | BODY MASS INDEX: 29.82 KG/M2 | HEIGHT: 71 IN | DIASTOLIC BLOOD PRESSURE: 79 MMHG

## 2020-01-24 DIAGNOSIS — R20.2 PARESTHESIA OF BOTH FEET: ICD-10-CM

## 2020-01-24 DIAGNOSIS — M51.36 LUMBAR DEGENERATIVE DISC DISEASE: ICD-10-CM

## 2020-01-24 DIAGNOSIS — G89.29 CHRONIC BILATERAL LOW BACK PAIN WITHOUT SCIATICA: Primary | ICD-10-CM

## 2020-01-24 DIAGNOSIS — K62.89 RECTAL PAIN: ICD-10-CM

## 2020-01-24 DIAGNOSIS — M54.50 CHRONIC BILATERAL LOW BACK PAIN WITHOUT SCIATICA: Primary | ICD-10-CM

## 2020-01-24 PROCEDURE — 99214 OFFICE O/P EST MOD 30 MIN: CPT | Performed by: NURSE PRACTITIONER

## 2020-01-24 RX ORDER — DICYCLOMINE HCL 20 MG
20 TABLET ORAL EVERY 6 HOURS
COMMUNITY
Start: 2018-12-08 | End: 2020-03-02

## 2020-01-27 ENCOUNTER — TELEPHONE (OUTPATIENT)
Dept: NEUROSURGERY | Facility: CLINIC | Age: 55
End: 2020-01-27

## 2020-01-27 DIAGNOSIS — M54.50 CHRONIC BILATERAL LOW BACK PAIN WITHOUT SCIATICA: ICD-10-CM

## 2020-01-27 DIAGNOSIS — M51.36 LUMBAR DEGENERATIVE DISC DISEASE: ICD-10-CM

## 2020-01-27 DIAGNOSIS — R20.2 PARESTHESIA OF BOTH FEET: Primary | ICD-10-CM

## 2020-01-27 DIAGNOSIS — K62.89 RECTAL PAIN: ICD-10-CM

## 2020-01-27 DIAGNOSIS — G89.29 CHRONIC BILATERAL LOW BACK PAIN WITHOUT SCIATICA: ICD-10-CM

## 2020-01-27 NOTE — TELEPHONE ENCOUNTER
1/27/2020  ADDENDUM:    I spoke with Mraia Elena Roberts regarding the above. The patient tells me that he is quite bothered by the burning sensations in his feet as well as the low back and gluteal pain. Dr. Roberts recommends and EMG/NCS. I will have my assistant notify Mr. Judge by phone and let him know that he will see Dr. Roberts after the EMG/NCS is completed.       MATT    Please see above schedule f/u with Dr. Roberts and let patient know when EMG will be and when to f/u with Dr. Roberts.

## 2020-01-28 NOTE — TELEPHONE ENCOUNTER
I called the patient and gave him the appointments for his emg and Dr. Roberts.  The patient wanted to know if you were working on anything else you all had talked about on Sunday.

## 2020-01-28 NOTE — TELEPHONE ENCOUNTER
Let the patient know that  when we spoke on the phone Sunday, I asked him more about his symptoms and the timeline in which things had occurred for him that led up to the symptoms he is having now.  While I am not 100% sure that he has pudendal neuralgia, I discussed tertiary care centers with him again that may be able to help him however he is concerned about transportation there.  There is nothing that I can do in that regard.  I also told him that I would be speaking with Dr. Roberts about his complaints of persistent back pain and the burning in his feet.  That is why we are getting the EMG/NCS and he is following up with Dr. Mendez thereafter.

## 2020-02-03 ENCOUNTER — LAB (OUTPATIENT)
Dept: LAB | Facility: HOSPITAL | Age: 55
End: 2020-02-03

## 2020-02-03 ENCOUNTER — OFFICE VISIT (OUTPATIENT)
Dept: ONCOLOGY | Facility: CLINIC | Age: 55
End: 2020-02-03

## 2020-02-03 VITALS
BODY MASS INDEX: 30.25 KG/M2 | SYSTOLIC BLOOD PRESSURE: 104 MMHG | TEMPERATURE: 99.5 F | HEART RATE: 62 BPM | DIASTOLIC BLOOD PRESSURE: 76 MMHG | WEIGHT: 216.1 LBS | HEIGHT: 71 IN | OXYGEN SATURATION: 96 % | RESPIRATION RATE: 16 BRPM

## 2020-02-03 DIAGNOSIS — R59.9 LYMPH NODE ENLARGEMENT: Primary | ICD-10-CM

## 2020-02-03 LAB
BASOPHILS # BLD AUTO: 0.03 10*3/MM3 (ref 0–0.2)
BASOPHILS NFR BLD AUTO: 0.6 % (ref 0–1.5)
DEPRECATED RDW RBC AUTO: 39.2 FL (ref 37–54)
EOSINOPHIL # BLD AUTO: 0.07 10*3/MM3 (ref 0–0.4)
EOSINOPHIL NFR BLD AUTO: 1.5 % (ref 0.3–6.2)
ERYTHROCYTE [DISTWIDTH] IN BLOOD BY AUTOMATED COUNT: 12.2 % (ref 12.3–15.4)
HCT VFR BLD AUTO: 43.6 % (ref 37.5–51)
HGB BLD-MCNC: 14.9 G/DL (ref 13–17.7)
IMM GRANULOCYTES # BLD AUTO: 0.02 10*3/MM3 (ref 0–0.05)
IMM GRANULOCYTES NFR BLD AUTO: 0.4 % (ref 0–0.5)
LYMPHOCYTES # BLD AUTO: 1.64 10*3/MM3 (ref 0.7–3.1)
LYMPHOCYTES NFR BLD AUTO: 34.1 % (ref 19.6–45.3)
MCH RBC QN AUTO: 30 PG (ref 26.6–33)
MCHC RBC AUTO-ENTMCNC: 34.2 G/DL (ref 31.5–35.7)
MCV RBC AUTO: 87.9 FL (ref 79–97)
MONOCYTES # BLD AUTO: 0.42 10*3/MM3 (ref 0.1–0.9)
MONOCYTES NFR BLD AUTO: 8.7 % (ref 5–12)
NEUTROPHILS # BLD AUTO: 2.63 10*3/MM3 (ref 1.7–7)
NEUTROPHILS NFR BLD AUTO: 54.7 % (ref 42.7–76)
NRBC BLD AUTO-RTO: 0 /100 WBC (ref 0–0.2)
PLATELET # BLD AUTO: 161 10*3/MM3 (ref 140–450)
PMV BLD AUTO: 10.2 FL (ref 6–12)
RBC # BLD AUTO: 4.96 10*6/MM3 (ref 4.14–5.8)
WBC NRBC COR # BLD: 4.81 10*3/MM3 (ref 3.4–10.8)

## 2020-02-03 PROCEDURE — 85025 COMPLETE CBC W/AUTO DIFF WBC: CPT

## 2020-02-03 PROCEDURE — 99213 OFFICE O/P EST LOW 20 MIN: CPT | Performed by: INTERNAL MEDICINE

## 2020-02-03 PROCEDURE — 36416 COLLJ CAPILLARY BLOOD SPEC: CPT

## 2020-02-03 RX ORDER — BUSPIRONE HYDROCHLORIDE 5 MG/1
5 TABLET ORAL 3 TIMES DAILY
COMMUNITY
Start: 2020-01-24 | End: 2020-03-02

## 2020-02-14 ENCOUNTER — APPOINTMENT (OUTPATIENT)
Dept: INFUSION THERAPY | Facility: HOSPITAL | Age: 55
End: 2020-02-14

## 2020-02-14 ENCOUNTER — HOSPITAL ENCOUNTER (EMERGENCY)
Facility: HOSPITAL | Age: 55
Discharge: LEFT BEFORE DISCHARGE | End: 2020-02-14

## 2020-02-14 VITALS
RESPIRATION RATE: 16 BRPM | TEMPERATURE: 98.2 F | DIASTOLIC BLOOD PRESSURE: 91 MMHG | SYSTOLIC BLOOD PRESSURE: 130 MMHG | HEART RATE: 78 BPM | BODY MASS INDEX: 29.31 KG/M2 | HEIGHT: 72 IN | OXYGEN SATURATION: 96 %

## 2020-02-15 NOTE — ED NOTES
"Pt back up to triage desk angry with triage nurse, states \"you are not taking me back based on what the ambulance people told you. I don't believe you are even trying to get me back.\" Security called to ask pt to have a seat after pt became more verbal with IZZY.     Mai Baez RN  02/14/20 [id number]    "

## 2020-02-15 NOTE — ED NOTES
Pt once again up to triage desk stating that his pain is getting much worse, explained to pt that we are doing everything we can to get people seen.     Mai Baez, RN  02/14/20 0897

## 2020-02-15 NOTE — ED NOTES
Pt from home, called EMS for worsening low back pain. Pt saw his PMD Wed and given steroids and Flexeril. Meds initally helped some, but now pain has gotten worse, meds not helping.     Mai Baez RN  02/14/20 [id number]

## 2020-02-15 NOTE — ED NOTES
Pt up to triage nurse, asking why he has to wait since he came in by ambulance. Explained to pt the process in triage, and explained that there were no available beds. Pt walking around lobby without any noted distress.     Mai Baez, RN  02/14/20 1875

## 2020-02-17 ENCOUNTER — APPOINTMENT (OUTPATIENT)
Dept: INFUSION THERAPY | Facility: HOSPITAL | Age: 55
End: 2020-02-17

## 2020-02-24 ENCOUNTER — TRANSCRIBE ORDERS (OUTPATIENT)
Dept: PHYSICAL THERAPY | Facility: HOSPITAL | Age: 55
End: 2020-02-24

## 2020-02-24 DIAGNOSIS — M51.36 DDD (DEGENERATIVE DISC DISEASE), LUMBAR: Primary | ICD-10-CM

## 2020-02-26 ENCOUNTER — APPOINTMENT (OUTPATIENT)
Dept: INFUSION THERAPY | Facility: HOSPITAL | Age: 55
End: 2020-02-26

## 2020-03-12 ENCOUNTER — APPOINTMENT (OUTPATIENT)
Dept: INFUSION THERAPY | Facility: HOSPITAL | Age: 55
End: 2020-03-12

## 2020-03-16 ENCOUNTER — TELEPHONE (OUTPATIENT)
Dept: SURGERY | Facility: CLINIC | Age: 55
End: 2020-03-16

## 2020-03-16 NOTE — TELEPHONE ENCOUNTER
Pain in upper stomach w/ nausea, went to [facility] ER, they said it biliary dyskinesia. He does not want to proceed with surgery at this time. What would you recommend? He wonders if he should go see a gastro doctor.

## 2020-03-18 ENCOUNTER — APPOINTMENT (OUTPATIENT)
Dept: INFUSION THERAPY | Facility: HOSPITAL | Age: 55
End: 2020-03-18

## 2020-04-02 ENCOUNTER — TELEPHONE (OUTPATIENT)
Dept: NEUROLOGY | Facility: CLINIC | Age: 55
End: 2020-04-02

## 2020-04-02 NOTE — TELEPHONE ENCOUNTER
Call Back:[phone]     Patient is scheduled with  on 4/28/2020.   Per request of  can  be scheduled with  before 04/28/2020 so that he may have EMG to review.

## 2020-04-07 ENCOUNTER — PROCEDURE VISIT (OUTPATIENT)
Dept: NEUROLOGY | Facility: CLINIC | Age: 55
End: 2020-04-07

## 2020-04-07 DIAGNOSIS — M54.50 LOW BACK PAIN POTENTIALLY ASSOCIATED WITH RADICULOPATHY: Primary | ICD-10-CM

## 2020-04-07 PROCEDURE — 95885 MUSC TST DONE W/NERV TST LIM: CPT | Performed by: PSYCHIATRY & NEUROLOGY

## 2020-04-07 PROCEDURE — 95909 NRV CNDJ TST 5-6 STUDIES: CPT | Performed by: PSYCHIATRY & NEUROLOGY

## 2020-04-07 NOTE — PROGRESS NOTES
EMG and Nerve Conduction Studies    I.      Instrument used: Neuromax 1002  II.     Please see data sheets for tabular summary of NCS and details on methods, temperatures and lab standards.   III.    EMG muscles tested for upper extremity studies include the deltoid, biceps, triceps, pronator teres, extensor digitorum communis, first dorsal interosseous and abductor pollicis brevis.    IV.   EMG muscles tested for lower extremity studies include the vastus lateralis, tibialis anterior, peroneus longus, medial gastrocnemius and extensor digitorum brevis.    V.    Additional muscles tested as needed.  Paraspinal muscles tested as needed.   VI.   Please see data sheets for tabular summary of EMG findings.   VII. The complete report includes the data sheets.      Indication: Low back pain radiating into the groin on both sides; pain in the left much greater than right foot radiating upward to the back and groin.  History: 54-year-old white male with history of low back pain which radiates to the groin on both sides as well as pain in each foot much more on the left radiating upward to the back and groin.  He has history of a perirectal abscess with apparent pudendal nerve syndrome.  He denies history of diabetes.  He has notable venous varicosities in the left leg greater than right.      Ht: 182.9 cm  Wt: 95.3 kg; BMI 28.5  HbA1C: No results found for: HGBA1C  TSH:   Lab Results   Component Value Date    TSH 1.750 11/19/2019       Technical summary:  Nerve conduction studies were obtained in the left leg with 1 comparison on the right.  The feet were cold but no temperature correction was needed since the distal latencies were normal.  Limited needle examination was obtained on the left leg as the patient complained of excessive pain and refused further investigation.    Results:  1.  Normal left sural sensory study.  2.  Normal superficial peroneal sensory studies bilaterally.  3.  Normal left peroneal motor  study.  4.  Normal left tibial motor study.  5.  Needle examination of 3 muscles in the left leg showed normal insertional activities with normal motor units.  Recruitment was full or reduced with complaints of pain.  The patient refused further investigation with complaints of pain radiating from the leg up to the back and groin.    Impression:  Normal limited study.  No evidence of peripheral neuropathy.  No evidence on limited needle examination of an L4, L5 or S1 radiculopathy in the left leg.  Study results were discussed with the patient    Elijah Foote M.D.              Dictated utilizing Dragon dictation.

## 2020-04-17 ENCOUNTER — APPOINTMENT (OUTPATIENT)
Dept: INFUSION THERAPY | Facility: HOSPITAL | Age: 55
End: 2020-04-17

## 2020-04-24 NOTE — PROGRESS NOTES
Subjective   History of Present Illness: Jose Maria Judge is a 54 y.o. male is at his    today for follow-up via Telephone Visit. Mr. Judge was last seen 1/24/2020 for back pain that radiated into his right lower extremity.    You have chosen to receive care through a telephone visit. Do you consent to use a telephone visit for your medical care today? Yes    We did a television visit because we could not arrange a video visit.  The patient was at home and I was in my office and we talked for 7-1/2 minutes.    History of Present Illness    This patient continues with severe pain in his lower back on both sides with radiation primarily into his rectum.  This is the same complaint that he had when I saw him in July of last year.  He was seen by my nurse practitioner in January of this year and sent for a EMG.  He could not finish the EMG because it was triggering his back and rectal pain.  What was done was normal.    The following portions of the patient's history were reviewed and updated as appropriate: allergies, current medications, past family history, past medical history, past social history, past surgical history and problem list.    Review of Systems   Constitutional: Positive for activity change.   Respiratory: Negative for chest tightness and shortness of breath.    Cardiovascular: Negative for chest pain.   Musculoskeletal: Positive for back pain and myalgias.        Right leg pain       Objective         Physical Exam   Constitutional: He is oriented to person, place, and time.   Neurological: He is oriented to person, place, and time.     Neurologic Exam     Mental Status   Oriented to person, place, and time.       Assessment/Plan   Independent Review of Radiographic Studies:      I personally reviewed the images from the following studies.    I reviewed his MRI from last year.  This does show some degenerative change but no evidence of significant pressure on the nerves.    Medical Decision Making:      I  told the patient that I would initially recommend a myelogram but I think the chances of the myelogram flaring up his pain and making it completely intolerable would be very high.  Therefore I think we should avoid the myelogram.  I did recommend that we repeat the MRI since the one we have been looking at is almost a year old.  I will call him with those results when they become available.  Jose Maria was seen today for follow-up.    Diagnoses and all orders for this visit:    Chronic bilateral low back pain without sciatica  -     MRI Lumbar Spine With & Without Contrast; Future      Return for After radiology test.

## 2020-04-28 ENCOUNTER — OFFICE VISIT (OUTPATIENT)
Dept: NEUROSURGERY | Facility: CLINIC | Age: 55
End: 2020-04-28

## 2020-04-28 DIAGNOSIS — M54.50 CHRONIC BILATERAL LOW BACK PAIN WITHOUT SCIATICA: Primary | ICD-10-CM

## 2020-04-28 DIAGNOSIS — G89.29 CHRONIC BILATERAL LOW BACK PAIN WITHOUT SCIATICA: Primary | ICD-10-CM

## 2020-04-28 PROCEDURE — 99213 OFFICE O/P EST LOW 20 MIN: CPT | Performed by: NEUROLOGICAL SURGERY

## 2020-05-12 ENCOUNTER — APPOINTMENT (OUTPATIENT)
Dept: MRI IMAGING | Facility: HOSPITAL | Age: 55
End: 2020-05-12

## 2020-05-16 ENCOUNTER — HOSPITAL ENCOUNTER (OUTPATIENT)
Dept: MRI IMAGING | Facility: HOSPITAL | Age: 55
Discharge: HOME | End: 2020-05-16
Admitting: NEUROLOGICAL SURGERY

## 2020-05-16 DIAGNOSIS — G89.29 CHRONIC BILATERAL LOW BACK PAIN WITHOUT SCIATICA: ICD-10-CM

## 2020-05-16 DIAGNOSIS — M54.50 CHRONIC BILATERAL LOW BACK PAIN WITHOUT SCIATICA: ICD-10-CM

## 2020-05-16 PROCEDURE — 72158 MRI LUMBAR SPINE W/O & W/DYE: CPT

## 2020-05-16 PROCEDURE — A9577 INJ MULTIHANCE: HCPCS | Performed by: NEUROLOGICAL SURGERY

## 2020-05-16 PROCEDURE — 82565 ASSAY OF CREATININE: CPT

## 2020-05-16 PROCEDURE — 0 GADOBENATE DIMEGLUMINE 529 MG/ML SOLUTION: Performed by: NEUROLOGICAL SURGERY

## 2020-05-16 RX ADMIN — GADOBENATE DIMEGLUMINE 19 ML: 529 INJECTION, SOLUTION INTRAVENOUS at 14:11

## 2020-05-17 LAB — CREAT BLDA-MCNC: 0.9 MG/DL (ref 0.6–1.3)

## 2020-05-20 ENCOUNTER — HOSPITAL ENCOUNTER (OUTPATIENT)
Dept: PET IMAGING | Facility: HOSPITAL | Age: 55
Discharge: HOME | End: 2020-05-20
Admitting: INTERNAL MEDICINE

## 2020-05-20 ENCOUNTER — LAB (OUTPATIENT)
Dept: LAB | Facility: HOSPITAL | Age: 55
End: 2020-05-20

## 2020-05-20 DIAGNOSIS — R59.9 LYMPH NODE ENLARGEMENT: ICD-10-CM

## 2020-05-20 LAB
ALBUMIN SERPL-MCNC: 4.7 G/DL (ref 3.5–5.2)
ALBUMIN/GLOB SERPL: 1.5 G/DL (ref 1.1–2.4)
ALP SERPL-CCNC: 46 U/L (ref 38–116)
ALT SERPL W P-5'-P-CCNC: 18 U/L (ref 0–41)
ANION GAP SERPL CALCULATED.3IONS-SCNC: 12.9 MMOL/L (ref 5–15)
AST SERPL-CCNC: 18 U/L (ref 0–40)
BASOPHILS # BLD AUTO: 0.03 10*3/MM3 (ref 0–0.2)
BASOPHILS NFR BLD AUTO: 0.6 % (ref 0–1.5)
BILIRUB SERPL-MCNC: 0.5 MG/DL (ref 0.2–1.2)
BUN BLD-MCNC: 14 MG/DL (ref 6–20)
BUN/CREAT SERPL: 15.7 (ref 7.3–30)
CALCIUM SPEC-SCNC: 9.9 MG/DL (ref 8.5–10.2)
CHLORIDE SERPL-SCNC: 100 MMOL/L (ref 98–107)
CO2 SERPL-SCNC: 27.1 MMOL/L (ref 22–29)
CREAT BLD-MCNC: 0.89 MG/DL (ref 0.7–1.3)
CREAT BLDA-MCNC: 0.9 MG/DL (ref 0.6–1.3)
DEPRECATED RDW RBC AUTO: 41.2 FL (ref 37–54)
EOSINOPHIL # BLD AUTO: 0.09 10*3/MM3 (ref 0–0.4)
EOSINOPHIL NFR BLD AUTO: 1.9 % (ref 0.3–6.2)
ERYTHROCYTE [DISTWIDTH] IN BLOOD BY AUTOMATED COUNT: 12.7 % (ref 12.3–15.4)
GFR SERPL CREATININE-BSD FRML MDRD: 89 ML/MIN/1.73
GLOBULIN UR ELPH-MCNC: 3.1 GM/DL (ref 1.8–3.5)
GLUCOSE BLD-MCNC: 71 MG/DL (ref 74–124)
HCT VFR BLD AUTO: 47.6 % (ref 37.5–51)
HGB BLD-MCNC: 15.4 G/DL (ref 13–17.7)
IMM GRANULOCYTES # BLD AUTO: 0.02 10*3/MM3 (ref 0–0.05)
IMM GRANULOCYTES NFR BLD AUTO: 0.4 % (ref 0–0.5)
LDH SERPL-CCNC: 129 U/L (ref 99–259)
LYMPHOCYTES # BLD AUTO: 1.86 10*3/MM3 (ref 0.7–3.1)
LYMPHOCYTES NFR BLD AUTO: 38.8 % (ref 19.6–45.3)
MCH RBC QN AUTO: 29.1 PG (ref 26.6–33)
MCHC RBC AUTO-ENTMCNC: 32.4 G/DL (ref 31.5–35.7)
MCV RBC AUTO: 89.8 FL (ref 79–97)
MONOCYTES # BLD AUTO: 0.42 10*3/MM3 (ref 0.1–0.9)
MONOCYTES NFR BLD AUTO: 8.8 % (ref 5–12)
NEUTROPHILS # BLD AUTO: 2.38 10*3/MM3 (ref 1.7–7)
NEUTROPHILS NFR BLD AUTO: 49.5 % (ref 42.7–76)
NRBC BLD AUTO-RTO: 0 /100 WBC (ref 0–0.2)
PLATELET # BLD AUTO: 169 10*3/MM3 (ref 140–450)
PMV BLD AUTO: 11 FL (ref 6–12)
POTASSIUM BLD-SCNC: 4.3 MMOL/L (ref 3.5–4.7)
PROT SERPL-MCNC: 7.8 G/DL (ref 6.3–8)
RBC # BLD AUTO: 5.3 10*6/MM3 (ref 4.14–5.8)
SODIUM BLD-SCNC: 140 MMOL/L (ref 134–145)
WBC NRBC COR # BLD: 4.8 10*3/MM3 (ref 3.4–10.8)

## 2020-05-20 PROCEDURE — 71260 CT THORAX DX C+: CPT

## 2020-05-20 PROCEDURE — 82565 ASSAY OF CREATININE: CPT

## 2020-05-20 PROCEDURE — 74177 CT ABD & PELVIS W/CONTRAST: CPT

## 2020-05-20 PROCEDURE — 0 DIATRIZOATE MEGLUMINE & SODIUM PER 1 ML: Performed by: INTERNAL MEDICINE

## 2020-05-20 PROCEDURE — 83615 LACTATE (LD) (LDH) ENZYME: CPT

## 2020-05-20 PROCEDURE — 85025 COMPLETE CBC W/AUTO DIFF WBC: CPT

## 2020-05-20 PROCEDURE — 36415 COLL VENOUS BLD VENIPUNCTURE: CPT

## 2020-05-20 PROCEDURE — 80053 COMPREHEN METABOLIC PANEL: CPT

## 2020-05-20 PROCEDURE — 25010000002 IOPAMIDOL 61 % SOLUTION: Performed by: INTERNAL MEDICINE

## 2020-05-20 RX ADMIN — DIATRIZOATE MEGLUMINE AND DIATRIZOATE SODIUM 30 ML: 660; 100 LIQUID ORAL; RECTAL at 08:08

## 2020-05-20 RX ADMIN — IOPAMIDOL 85 ML: 612 INJECTION, SOLUTION INTRAVENOUS at 08:59

## 2020-05-20 NOTE — PROGRESS NOTES
Subjective Hyperosmia    REASON FOR FOLLOWUP: Left axillary adenopathy with potential evidence of lymph abdominal Hodgkin's disease      History of Present Illness      The patient is a 54-year-old male who was recently been assessed by surgery having worsening epigastric discomfort since November.  He been seen by Dr. Nguyễn with gallbladder ultrasound and CT scan of abdomen pelvis done in late November demonstrating a possible calcified tiny gallstones within the lumen of the gallbladder.  The same report documents enlarged right external iliac adenopathy that was unchanged from January 2016. he had associated intermittent nausea and ultimately required admission to [facility].  He was admitted December 29-December 31.  His subsequent testing revealed a GI panel that was positive for norovirus and his symptoms improved with gut rest and hydration.  HIDA scan redemonstrated 50% ejection fraction and cholecystectomy was discussed with follow-up planned.   Additional testing continued as outpatient in January including EGD that demonstrated no gross abnormalities of the first and second portions of the duodenum nor the gastric antrum, body and retroflexed view of the stomach which were thought to be normal.      Importantly the patient and also been reviewed for enlarged left axillary lymph node in November leading to an excision of a deep left axillary lymph node November 9, 2018.  This had initially been evaluated by needle biopsy August 2018 revealing a polymorphous lymphoid population with histiocytes.  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  We have discussed the patient's history extensively and that we were to see him potentially initially in November but as a result of his additional issues medically he is only been  seen now approximately 6 months later.  He has no fevers, chills, does have weight loss result of change in his diet.  He describes in particular worsening of a anal fissure that has caused him pain for quite some time as well as symptoms that could potentially be referable to pudendal neuralgia.  The patient was assessed for potential lymphoproliferative disorder with a number of studies including CRP, sed rate, paraprotein analysis,  negative.  Subsequently repeat CT scan of chest and pelvis July 3, 2019 also failed to show any additional lymphadenopathy with a stable appearance to pelvic adenopathy and no intra-abdominal or intrapelvic process seen, no pathologic lymphadenopathy seen in the chest.  He is seen back July 11, 2019 indicating that his major issue has been hyperosmia and he wishes an ENT assessment.  The patient did follow-up with ENT and is now seen back December 30, 2020 still recovering from upper respiratory infection that he believes he developed after seeing ENT staff members.  He feels about the same, still with hyperosmia but has no fever, chills, appetite reduction or weight loss.  He remains concerned about his back and numbness in his extremities and is scheduled for NCV testing in mid February and follow-up with neurosurgery.  Patient underwent follow-up CT scans May 20 demonstrating right pelvic lymphadenopathy that was stable, no new abnormalities in the abdomen pelvis and no lymphadenopathy or abnormalities within the chest.      Unfortunately his continue to have severe pain in his lower back leading to neurosurgical assessment and MRI imaging the lumbar spine showing multilevel disc desiccation similar to May 2019.  Patient was contacted by telephone May 27, 2020 and his scan results reviewed with him.  He still has episodes of chilling that he notices but otherwise is doing about the same but still has back pain that is bothering him excessively.  He does not have night sweats,  weight loss, rash or additional systemic symptoms.    Past Medical History:   Diagnosis Date   • Abdominal lymphadenopathy    • Acid reflux     HX   • Anxiety     R/T PAIN   • Carpal tunnel syndrome, left    • Chronic pain    • DDD (degenerative disc disease), lumbar    • Fatigue    • H/O Hypersensitivity to odor     Burning sensation in nasal passage   • Hemorrhoids     internal fistula   • Irritable bowel syndrome    • Low back pain    • Lymphadenopathy, axillary     LEFT   • Male pattern alopecia    • Pudendal neuralgia    • Right hip pain    • Unexplained night sweats         Past Surgical History:   Procedure Laterality Date   • ABSCESS DRAINAGE  08/2012   • ANAL FISTULA REPAIR N/A 09/2012, 10/2012   • AXILLARY LYMPH NODE BIOPSY/EXCISION Left 11/9/2018    Procedure: AXILLARY LYMPH NODE BIOPSY/EXCISION;  Surgeon: Amando Nguyễn MD;  [place] [facility] [place] [facility] [place]  Service: General   • COLONOSCOPY N/A 2013    done at [facility]   • COLONOSCOPY N/A 7/10/2019    Procedure: COLONOSCOPY to CECUM;  Surgeon: Amando Nguyễn MD;  Location: [facility] ENDOSCOPY;  Service: General   • ENDOSCOPY N/A 1/9/2019    Procedure: ESOPHAGOGASTRODUODENOSCOPY;  Surgeon: Amando Nguyễn MD;  Location: [facility] ENDOSCOPY;  Service: General   • FINE NEEDLE ASPIRATION Left 08/09/2018    Left axillary lymph node FNA   • FLEXIBLE SIGMOIDOSCOPY N/A 06/25/2003    Normal-Dr. Cezar Aviles   • HEMORRHOIDECTOMY N/A 1996   • HIP ARTHROSCOPY W/ LABRAL REPAIR Right 04/03/2017    Dr. Lonnie Lemons   • TONSILLECTOMY Bilateral         Current Outpatient Medications on File Prior to Visit   Medication Sig Dispense Refill   • acetaminophen (TYLENOL) 500 MG tablet Take 500 mg by mouth Every 6 (Six) Hours As Needed for Mild Pain .     • ibuprofen (ADVIL,MOTRIN) 200 MG tablet Take 200 mg by mouth Every 6 (Six) Hours As Needed for Mild Pain .       Current Facility-Administered Medications on File Prior to Visit   Medication Dose Route Frequency Provider  Last Rate Last Dose   • [COMPLETED] diatrizoate meglumine-sodium (GASTROGRAFIN) 66-10 % solution 30 mL  30 mL Oral Once in imaging Bryan Alvarado MD   30 mL at 05/20/20 0808   • [COMPLETED] iopamidol (ISOVUE-300) 61 % injection 85 mL  85 mL Intravenous Once in imaging Bryan Alvarado MD   85 mL at 05/20/20 0859        ALLERGIES:    Allergies   Allergen Reactions   • Bactrim [Sulfamethoxazole-Trimethoprim] Shortness Of Breath   • Hydrocodone Shortness Of Breath   • Medrol [Methylprednisolone] Unknown - High Severity     Rectal bleeding   • Mobic [Meloxicam] Nausea Only and Other (See Comments)     Severe heartburn   • Naproxen Shortness Of Breath   • Nsaids Unknown - High Severity     Can take Ibuprofen.. Causes constipation   • Sulfa Antibiotics Shortness Of Breath     TROUBLE BREATHING   • Diclofenac Nausea Only and Other (See Comments)     Severe heartburn   • Levaquin [Levofloxacin] Unknown - Low Severity     Made tendons tight        Social History     Socioeconomic History   • Marital status: Single     Spouse name: Not on file   • Number of children: 0   • Years of education: COLLEGE   • Highest education level: Not asked   Occupational History   • Occupation: RETIRED   Social Needs   • Financial resource strain: Patient refused   • Food insecurity:     Worry: Patient refused     Inability: Patient refused   • Transportation needs:     Medical: Patient refused     Non-medical: Patient refused   Tobacco Use   • Smoking status: Never Smoker   • Smokeless tobacco: Never Used   • Tobacco comment: caff use   Substance and Sexual Activity   • Alcohol use: No   • Drug use: No   • Sexual activity: Defer        Family History   Problem Relation Age of Onset   • Atrial fibrillation Father    • Aneurysm Father    • Hypertension Father    • Malig Hyperthermia Neg Hx         Review of Systems   Constitutional: Positive for activity change. Negative for appetite change and unexpected weight change.   HENT: Negative  for congestion and rhinorrhea.    Respiratory: Negative.  Negative for chest tightness, shortness of breath and wheezing.    Cardiovascular: Negative.    Gastrointestinal: Negative for abdominal pain, constipation, diarrhea, nausea and vomiting.   Genitourinary: Negative.    Musculoskeletal: Positive for back pain.   Skin: Negative.    Neurological: Positive for numbness.   Hematological: Negative.    Psychiatric/Behavioral: Negative.              Objective     There were no vitals filed for this visit.  Current Status 2/3/2020   ECOG score 1   Previous physical examination, no additional examination performed May 27    Physical Exam   Constitutional: He is oriented to person, place, and time. He appears well-developed and well-nourished.   HENT:   Head: Normocephalic and atraumatic.   Right Ear: External ear normal.   Left Ear: External ear normal.   Nose: Nose normal.   Mouth/Throat: Oropharynx is clear and moist.   Eyes: Pupils are equal, round, and reactive to light. EOM are normal.   Neck: Normal range of motion. Neck supple. No JVD present. No thyromegaly present.   No additional adenopathy in evaluating bilateral infraclavicular, supraclavicular, axillary regions, inguinal regions or femoral regions.   Cardiovascular: Normal rate, regular rhythm, normal heart sounds and intact distal pulses.   Pulmonary/Chest: Effort normal and breath sounds normal.   Abdominal: Soft. Bowel sounds are normal.   Musculoskeletal: Normal range of motion.   Lymphadenopathy:     He has no cervical adenopathy.   Neurological: He is alert and oriented to person, place, and time.   Skin: Skin is warm and dry.           RECENT LABS:  Hematology WBC   Date Value Ref Range Status   02/03/2020 4.81 3.40 - 10.80 10*3/mm3 Final     RBC   Date Value Ref Range Status   02/03/2020 4.96 4.14 - 5.80 10*6/mm3 Final     Hemoglobin   Date Value Ref Range Status   02/03/2020 14.9 13.0 - 17.7 g/dL Final     Hematocrit   Date Value Ref Range Status    02/03/2020 43.6 37.5 - 51.0 % Final     Platelets   Date Value Ref Range Status   02/03/2020 161 140 - 450 10*3/mm3 Final      CT OF THE CHEST, ABDOMEN AND PELVIS WITH CONTRAST 5/20/2020    FINDINGS:  CHEST: There is no lymphadenopathy within the chest and there is no  lymphadenopathy at the axilla or subpectoral regions. There are no  pulmonary airspace opacities and there are no pleural or pericardial  effusions.     ABDOMEN/PELVIS: The liver, gallbladder, pancreas, adrenals, and kidneys  appear unremarkable other than a subcentimeter left renal cyst which is  stable. Splenic size is normal. There is no significant change in the  lymphadenopathy within the right aspect of the pelvis. One of the  largest nodes is along the distal right external iliac vessels measuring  2.6 x 1.8 cm. A 2.1 x 1.3 cm right pelvic sidewall node is stable as  well. There is no lymphadenopathy within the left aspect of the pelvis  and there is no lymphadenopathy within the abdomen. There is no groin  lymphadenopathy. No acute bowel abnormality is seen. The appendix is  normal. No free fluid.     IMPRESSION:  1. The right pelvic lymphadenopathy is stable. There is no new  lymphadenopathy within the abdomen or pelvis.  2. There is no lymphadenopathy or acute abnormality within the chest.     This report was finalized on 5/21/2020 6:44 AM by Dr. Terra Rosenthal M.D.    Assessment/Plan      The patient is a 54-year-old male who had previously been assessed for enlarged left axillary adenopathy in November leading to a biopsy that was nondiagnostic but suspicious and eventually an excisional biopsy that was eventually felt to be consistent with follicular hyperplasia and progressive transformation of germinal centers with 2 foci suspicious for early involvement by nodules lymphocyte predominant Hodgkin's lymphoma.  This was likely treated by excision and further therapy is not necessary in this patient though he has a number of symptoms that  are at least suspicious for potential recurrence.  As he was admitted for his GI symptoms, described above, he underwent additional testing that demonstrated right external iliac adenopathy that have been unchanged from 2016 and further endoscopy was negative as well.  Again we had a long discussion today as to how he might of already been treated under the circumstances but that additional surveillance would be necessary in approximately 6 months from his last exam and he is now seen May 16 thus allowing us to reexamine him via scan in the next several months.  He and his mother are agreeable with this plan.  We had the patient proceed with a number of studies that were fortunately negative for evidence of lymphoproliferative disorder.  As he is seen July 11, 2019 his physical exam again does not reveal new lymphadenopathy or hepatosplenomegaly and CBC is normal.    The patient then seen February 3, 2020 doing about the same but having issues with lower extremity numbness which is been reviewed by neurosurgery.  After discussion we had him undergo repeat assessments including CT of chest and pelvis 4 months later.  These are found to be without change and the patient was contacted in that we would like to see him at least on a yearly basis.       Plan:  *Neurosurgical follow-up as planned    *Return 1 year with laboratory studies 1 week prior.

## 2020-05-26 ENCOUNTER — OFFICE VISIT (OUTPATIENT)
Dept: GASTROENTEROLOGY | Facility: CLINIC | Age: 55
End: 2020-05-26

## 2020-05-26 VITALS
HEIGHT: 71 IN | WEIGHT: 216 LBS | SYSTOLIC BLOOD PRESSURE: 116 MMHG | DIASTOLIC BLOOD PRESSURE: 82 MMHG | BODY MASS INDEX: 30.24 KG/M2

## 2020-05-26 DIAGNOSIS — R11.0 NAUSEA: ICD-10-CM

## 2020-05-26 DIAGNOSIS — R10.13 EPIGASTRIC PAIN: Primary | ICD-10-CM

## 2020-05-26 PROCEDURE — 99203 OFFICE O/P NEW LOW 30 MIN: CPT | Performed by: INTERNAL MEDICINE

## 2020-05-26 RX ORDER — ESOMEPRAZOLE MAGNESIUM 40 MG/1
40 CAPSULE, DELAYED RELEASE ORAL DAILY
Qty: 30 CAPSULE | Refills: 5 | Status: SHIPPED | OUTPATIENT
Start: 2020-05-26 | End: 2020-07-06

## 2020-05-26 NOTE — PROGRESS NOTES
Subjective   Jose Maria Judge is a 54 y.o.. male is being seen for consultation today at the request of No ref. provider found    Chief Complaint   Patient presents with   • Nausea     History of Present Illness  This is a gentleman with chronic nausea, epigastric distress, and intermittent right upper quadrant pain.  The pain comes and goes.  It is never severe.  Sometimes drinking coffee and such aggravates this.  He had been on Carafate suspension.  It did not help.  He is undergone extensive evaluation by Dr. Nguyễn.  CT scan of the abdomen was unremarkable except for some right pelvic adenopathy which has been present for some time and is under observation.  Colonoscopy was normal.  Ileal examination was negative.  Upper tract evaluation was normal, biopsies were not obtained.  Gallbladder ultrasound at 1 time suggested a possible small stone, repeat did not bear down.  Radial nuclear gallbladder scan demonstrated an ejection fraction of 15% back in 2018.    The following portions of the patient's history were reviewed and updated as appropriate: allergies, current medications, past family history, past medical history, past social history, past surgical history and problem list.      Current Outpatient Medications:   •  acetaminophen (TYLENOL) 500 MG tablet, Take 500 mg by mouth Every 6 (Six) Hours As Needed for Mild Pain ., Disp: , Rfl:   •  ibuprofen (ADVIL,MOTRIN) 200 MG tablet, Take 200 mg by mouth Every 6 (Six) Hours As Needed for Mild Pain ., Disp: , Rfl:   •  esomeprazole (nexIUM) 40 MG capsule, Take 1 capsule by mouth Daily., Disp: 30 capsule, Rfl: 5    Family History   Problem Relation Age of Onset   • Atrial fibrillation Father    • Aneurysm Father    • Hypertension Father    • Malig Hyperthermia Neg Hx        Review of Systems   Constitutional: Negative for appetite change, diaphoresis, fatigue, fever and unexpected weight change.   HENT: Negative for hearing loss, mouth sores, sore throat and trouble  swallowing.    Eyes: Negative for pain and redness.   Respiratory: Negative for choking and shortness of breath.    Cardiovascular: Negative for chest pain and leg swelling.   Gastrointestinal: Positive for abdominal pain and nausea. Negative for abdominal distention, anal bleeding, blood in stool, constipation, diarrhea, rectal pain and vomiting.   Genitourinary: Negative for flank pain and hematuria.   Musculoskeletal: Positive for back pain. Negative for arthralgias and joint swelling.   Skin: Negative for color change and rash.   Allergic/Immunologic: Negative for food allergies and immunocompromised state.   Neurological: Negative for dizziness, seizures and headaches.   Hematological: Does not bruise/bleed easily.   Psychiatric/Behavioral: Negative for confusion, sleep disturbance and suicidal ideas. The patient is not nervous/anxious.        Objective   Physical Exam   Constitutional: He is oriented to person, place, and time. He appears well-developed and well-nourished.   HENT:   Head: Normocephalic and atraumatic.   Nose: Nose normal.   Eyes: Pupils are equal, round, and reactive to light. Conjunctivae are normal.   Neck: Normal range of motion. Neck supple. No thyromegaly present.   Cardiovascular: Normal heart sounds. Exam reveals no gallop and no friction rub.   No murmur heard.  Pulmonary/Chest: Effort normal and breath sounds normal.   Abdominal: Soft. Bowel sounds are normal. He exhibits no distension and no mass. There is no tenderness.   Musculoskeletal: He exhibits no edema.   Lymphadenopathy:     He has no cervical adenopathy.   Neurological: He is alert and oriented to person, place, and time.   Skin: No rash noted. No erythema.   Psychiatric: He has a normal mood and affect. His behavior is normal.   Nursing note and vitals reviewed.      Pertinent laboratory results were reviewed. , Pertinent old records were reviewed.  and Pertinent radiology results were reviewed.     Assessment/Plan    Problems Addressed this Visit        Digestive    Nausea    Relevant Orders    NM HIDA Scan With Pharmacological Intervention       Nervous and Auditory    Epigastric pain - Primary    Relevant Orders    NM HIDA Scan With Pharmacological Intervention        Chronic nausea and abdominal issues.  This may be a reflux equivalent.  Trial Nexium 40 mg every morning.  Antireflux measures reviewed.  We will also update his HIDA scan.  Return in 4 weeks.

## 2020-05-27 ENCOUNTER — OFFICE VISIT (OUTPATIENT)
Dept: ONCOLOGY | Facility: CLINIC | Age: 55
End: 2020-05-27

## 2020-05-27 ENCOUNTER — APPOINTMENT (OUTPATIENT)
Dept: LAB | Facility: HOSPITAL | Age: 55
End: 2020-05-27

## 2020-05-27 DIAGNOSIS — R59.0 LYMPHADENOPATHY, AXILLARY: Primary | ICD-10-CM

## 2020-05-27 PROCEDURE — 99213 OFFICE O/P EST LOW 20 MIN: CPT | Performed by: INTERNAL MEDICINE

## 2020-06-08 NOTE — PROGRESS NOTES
Subjective   History of Present Illness: Jose Maria Judge is a 55 y.o. male is here today for follow-up via Telephone Visit with a new Lumbar MRI that was ordered at his last visit 4/28/2020 for back pain that radiated into his right lower extremity.    You have chosen to receive care through a telephone visit. Do you consent to use a telephone visit for your medical care today? Yes    We had a telephone visit today with the patient at home and me in the office.  We talked for 9 minutes.    History of Present Illness    This patient continues with fairly severe pain across his lower back.  There is some occasional radiation into his rectum primarily.  He has tried physical therapy and had epidural blocks in the past which really did not help long-term.  He has also tried trigger point injections which did not help at all.    The following portions of the patient's history were reviewed and updated as appropriate: allergies, current medications, past family history, past medical history, past social history, past surgical history and problem list.    Review of Systems   Constitutional: Positive for activity change.   Respiratory: Negative for chest tightness and shortness of breath.    Cardiovascular: Negative for chest pain.   Musculoskeletal: Positive for back pain and myalgias.        Right leg pain   All other systems reviewed and are negative.      Objective         Physical Exam   Constitutional: He is oriented to person, place, and time.   Neurological: He is oriented to person, place, and time.     Neurologic Exam     Mental Status   Oriented to person, place, and time.           Assessment/Plan   Independent Review of Radiographic Studies:      I personally reviewed the images from the following studies.    I reviewed his MRI of the lumbar spine.  This was compared to an MRI done about a year ago.  They look about the same.  There is multilevel degenerative disease but no evidence of any compression of the  nerves.    Medical Decision Making:      Told the patient that from my point of view I would not recommend any surgery on his back.  I think he is a good candidate for a pain management referral.  I told him to call me if the pain management is helping and we can also get him back into physical therapy.    Jose Maria was seen today for follow-up.    Diagnoses and all orders for this visit:    Chronic bilateral low back pain without sciatica  -     Ambulatory Referral to Pain Management      Return for Recheck and call after treatment or consultation.

## 2020-06-09 ENCOUNTER — OFFICE VISIT (OUTPATIENT)
Dept: NEUROSURGERY | Facility: CLINIC | Age: 55
End: 2020-06-09

## 2020-06-09 DIAGNOSIS — M54.50 CHRONIC BILATERAL LOW BACK PAIN WITHOUT SCIATICA: Primary | ICD-10-CM

## 2020-06-09 DIAGNOSIS — G89.29 CHRONIC BILATERAL LOW BACK PAIN WITHOUT SCIATICA: Primary | ICD-10-CM

## 2020-06-09 PROCEDURE — 99213 OFFICE O/P EST LOW 20 MIN: CPT | Performed by: NEUROLOGICAL SURGERY

## 2020-06-23 ENCOUNTER — TELEPHONE (OUTPATIENT)
Dept: NEUROSURGERY | Facility: CLINIC | Age: 55
End: 2020-06-23

## 2020-06-23 NOTE — TELEPHONE ENCOUNTER
Spoke with pt.  He has concerns about Dr Velez's office.  I told him he would need to call them about his concerns.

## 2020-06-30 ENCOUNTER — TELEPHONE (OUTPATIENT)
Dept: NEUROSURGERY | Facility: CLINIC | Age: 55
End: 2020-06-30

## 2020-06-30 RX ORDER — LEG BRACE
1 EACH MISCELLANEOUS TAKE AS DIRECTED
Qty: 1 EACH | Refills: 0 | Status: SHIPPED | OUTPATIENT
Start: 2020-06-30 | End: 2022-08-31

## 2020-06-30 NOTE — TELEPHONE ENCOUNTER
Patient has not had surgery, his last office visit was 6/9/2020 where he was referred to pain management.

## 2020-06-30 NOTE — TELEPHONE ENCOUNTER
Called the patient to let him know I have his order for a lumbar support belt ready. I am not sure if his insurance will cover this as you can purchase them OTC from [employer], [employer] etc.

## 2020-06-30 NOTE — TELEPHONE ENCOUNTER
PATIENT CALLED WANTING O SPEAK WITH  ABOUT GETTING A BACK BELT. HE'S WANTING A PRESCRIPTION WROTE. PLEASE CALL PATIENT BACK [phone]

## 2020-07-06 ENCOUNTER — OFFICE VISIT (OUTPATIENT)
Dept: PAIN MEDICINE | Facility: CLINIC | Age: 55
End: 2020-07-06

## 2020-07-06 VITALS
SYSTOLIC BLOOD PRESSURE: 116 MMHG | OXYGEN SATURATION: 96 % | HEART RATE: 65 BPM | WEIGHT: 209.4 LBS | RESPIRATION RATE: 16 BRPM | DIASTOLIC BLOOD PRESSURE: 72 MMHG | HEIGHT: 72 IN | TEMPERATURE: 96.9 F | BODY MASS INDEX: 28.36 KG/M2

## 2020-07-06 DIAGNOSIS — M47.816 LUMBAR FACET ARTHROPATHY: Primary | ICD-10-CM

## 2020-07-06 PROCEDURE — 99214 OFFICE O/P EST MOD 30 MIN: CPT | Performed by: ANESTHESIOLOGY

## 2020-07-06 RX ORDER — FLUOROMETHOLONE 0.1 %
SUSPENSION, DROPS(FINAL DOSAGE FORM)(ML) OPHTHALMIC (EYE)
COMMUNITY
Start: 2020-06-11 | End: 2020-08-06

## 2020-07-06 NOTE — PROGRESS NOTES
CHIEF COMPLAINT  F/U back and groin pain. Patient states that his pain has worsened since his last visit in 2018. He sttaes that he cannot sit because the pain is so bad.       Subjective   Jose Maria Judge is a 55 y.o. male  who presents for follow-up.  He has a history of rectal and groin area pain and he has a diagnosis previously of pudendal neuralgia and that he carried into his initial evaluation in our clinic.  He is presenting now complaining of back pain in addition to the groin pain.  He notes that when his back pain flares up it will trigger groin pain.  He has been evaluated by my colleague at [facility] neurosurgery who would recommended some interventional management options..      Back Pain   This is a chronic problem. The current episode started more than 1 year ago. The problem occurs constantly. The problem has been gradually worsening since onset. The pain is present in the lumbar spine. The quality of the pain is described as aching. The pain does not radiate (Except for the fact that it radiates in a bandlike distribution across the back.  He also notes some of the back pain flares up it does trigger pelvic pain as noted above.). The pain is severe. The symptoms are aggravated by bending, position, sitting, twisting and standing. Pertinent negatives include no abdominal pain, chest pain, dysuria, fever, headaches, numbness or weakness. Risk factors include lack of exercise and sedentary lifestyle (He complains that his pain is making him sedentary and preventing him from working.  And exercising.). He has tried bed rest for the symptoms. The treatment provided mild relief.        PEG Assessment   What number best describes your pain on average in the past week?9  What number best describes how, during the past week, pain has interfered with your enjoyment of life?10  What number best describes how, during the past week, pain has interfered with your general activity?  10    --  The aforementioned  information the Chief Complaint section and above subjective data including any HPI data has been personally reviewed and affirmed.  --        The following portions of the patient's history were reviewed and updated as appropriate: allergies, current medications, past family history, past medical history, past social history, past surgical history and problem list.    -------    The following portions of the patient's history were reviewed and updated as appropriate: allergies, current medications, past family history, past medical history, past social history, past surgical history and problem list.    Allergies   Allergen Reactions   • Bactrim [Sulfamethoxazole-Trimethoprim] Shortness Of Breath   • Hydrocodone Shortness Of Breath   • Medrol [Methylprednisolone] Unknown - High Severity     Rectal bleeding   • Mobic [Meloxicam] Nausea Only and Other (See Comments)     Severe heartburn   • Naproxen Shortness Of Breath   • Nsaids Unknown - High Severity     Can take Ibuprofen.. Causes constipation   • Sulfa Antibiotics Shortness Of Breath     TROUBLE BREATHING   • Diclofenac Nausea Only and Other (See Comments)     Severe heartburn   • Levaquin [Levofloxacin] Unknown - Low Severity     Made tendons tight         Current Outpatient Medications:   •  acetaminophen (TYLENOL) 500 MG tablet, Take 500 mg by mouth Every 6 (Six) Hours As Needed for Mild Pain ., Disp: , Rfl:   •  ibuprofen (ADVIL,MOTRIN) 200 MG tablet, Take 200 mg by mouth Every 6 (Six) Hours As Needed for Mild Pain ., Disp: , Rfl:   •  Elastic Bandages & Supports (ELASTIC BACK SUPPORT) misc, 1 each Take As Directed., Disp: 1 each, Rfl: 0  •  fluorometholone (FML) 0.1 % ophthalmic suspension, , Disp: , Rfl:     Current Outpatient Medications on File Prior to Visit   Medication Sig Dispense Refill   • acetaminophen (TYLENOL) 500 MG tablet Take 500 mg by mouth Every 6 (Six) Hours As Needed for Mild Pain .     • ibuprofen (ADVIL,MOTRIN) 200 MG tablet Take 200 mg  by mouth Every 6 (Six) Hours As Needed for Mild Pain .     • Elastic Bandages & Supports (ELASTIC BACK SUPPORT) misc 1 each Take As Directed. 1 each 0   • fluorometholone (FML) 0.1 % ophthalmic suspension        No current facility-administered medications on file prior to visit.        Patient Active Problem List   Diagnosis   • Bilateral hip pain   • Right hip pain   • Left hip pain   • Trochanteric bursitis   • Tear of acetabular labrum   • Loss of hair   • Anal burning   • Anxiety   • Elevated blood pressure   • Hamstring strain   • Hypertension   • Radiculitis   • Hamstring muscle strain   • Rectal pain   • Herpes zoster   • Varicose veins of other specified sites   • Lymph node enlargement   • Partial small bowel obstruction (CMS/HCC)   • Epigastric pain   • Lymphadenopathy, axillary   • Hypersensitivity to odor   • Chronic bilateral low back pain without sciatica   • Nausea       Past Medical History:   Diagnosis Date   • Abdominal lymphadenopathy    • Acid reflux     HX   • Anxiety     R/T PAIN   • Carpal tunnel syndrome, left    • Chronic pain    • DDD (degenerative disc disease), lumbar    • Fatigue    • H/O Hypersensitivity to odor     Burning sensation in nasal passage   • Hemorrhoids     internal fistula   • Irritable bowel syndrome    • Low back pain    • Lymphadenopathy, axillary     LEFT   • Male pattern alopecia    • Pudendal neuralgia    • Right hip pain    • Unexplained night sweats        Past Surgical History:   Procedure Laterality Date   • ABSCESS DRAINAGE  08/2012   • ANAL FISTULA REPAIR N/A 09/2012, 10/2012   • AXILLARY LYMPH NODE BIOPSY/EXCISION Left 11/9/2018    Procedure: AXILLARY LYMPH NODE BIOPSY/EXCISION;  Surgeon: Amando Nguyễn MD;  Location: [facility] [place];  Service: General   • COLONOSCOPY N/A 2013    done at [facility]   • COLONOSCOPY N/A 7/10/2019    Procedure: COLONOSCOPY to CECUM;  Surgeon: Amando Nguyễn MD;  Location: [facility] ENDOSCOPY;  Service: General   • ENDOSCOPY  N/A 1/9/2019    Procedure: ESOPHAGOGASTRODUODENOSCOPY;  Surgeon: Amando Nguyễn MD;  Location: [facility] ENDOSCOPY;  Service: General   • FINE NEEDLE ASPIRATION Left 08/09/2018    Left axillary lymph node FNA   • FLEXIBLE SIGMOIDOSCOPY N/A 06/25/2003    Normal-Dr. Cezar Aviles   • HEMORRHOIDECTOMY N/A 1996   • HIP ARTHROSCOPY W/ LABRAL REPAIR Right 04/03/2017    Dr. Lonnie Lemons   • TONSILLECTOMY Bilateral        Family History   Problem Relation Age of Onset   • Atrial fibrillation Father    • Aneurysm Father    • Hypertension Father    • Malig Hyperthermia Neg Hx        Social History     Socioeconomic History   • Marital status: Single     Spouse name: Not on file   • Number of children: 0   • Years of education: COLLEGE   • Highest education level: Not asked   Occupational History   • Occupation: RETIRED   Social Needs   • Financial resource strain: Patient refused   • Food insecurity:     Worry: Patient refused     Inability: Patient refused   • Transportation needs:     Medical: Patient refused     Non-medical: Patient refused   Tobacco Use   • Smoking status: Never Smoker   • Smokeless tobacco: Never Used   • Tobacco comment: caff use   Substance and Sexual Activity   • Alcohol use: No   • Drug use: No   • Sexual activity: Defer       -------        Review of Systems   Constitutional: Positive for activity change (decreased). Negative for chills, fatigue and fever.   HENT: Negative for ear pain, sinus pressure and sinus pain.    Eyes: Negative for pain.   Respiratory: Negative for chest tightness and shortness of breath.    Cardiovascular: Negative for chest pain.   Gastrointestinal: Negative for abdominal pain, constipation, diarrhea, nausea and vomiting.   Genitourinary: Negative for difficulty urinating, dysuria, enuresis and hematuria.   Musculoskeletal: Positive for back pain.   Skin: Negative for rash.   Allergic/Immunologic: Negative for immunocompromised state.   Neurological: Negative for  dizziness, weakness, light-headedness, numbness and headaches.   Hematological: Does not bruise/bleed easily.   Psychiatric/Behavioral: Positive for agitation (r/t pain). Negative for confusion, hallucinations, self-injury, sleep disturbance and suicidal ideas. The patient is nervous/anxious.      ---    Review of neurosurgical consultation from June 9, 2020, which was a neurosurgery follow-up with Dr. Garcia Roberts.  The gentleman had an MRI.  He had been complaining of some right lower extremity radiation.  There was severe pain bilaterally across the low back.  The neurosurgical plan was the pain management referral.    --    MRI 5-...  MRI LUMBAR SPINE WITHOUT CONTRAST     HISTORY: Back pain, bilateral groin pain.     COMPARISON: MRI lumbar spine 05/11/2019 performed at [place] and open  MRI.     FINDINGS: The alignment of the lumbar spine is within normal limits.  There is disc desiccation with relative preservation of disc height from  L2 to S1. The conus is at L1 and the caudal aspect of the spinal cord  appears unremarkable.     L1-L2: There is no evidence of disc bulge or herniation.     L2-L3: Mild facet degenerative disease is present bilaterally. Endplate  degenerative changes are noted anterolaterally to the left, more  prominent as compared to 05/11/2019.     L3-L4: There is a mild central disc osteophyte complex resulting in mild  flattening of the ventral surface of thecal sac. Mild facet degenerative  disease is present bilaterally. There is mild neural foraminal  compromise bilaterally secondary to extension of disc osteophyte complex  into the neural foramen.     L4-L5: There is mild central disc osteophyte complex with no evidence of  herniation. Mild facet degenerative disease is present bilaterally.  There is a mild degree of neural foraminal compromise bilaterally  secondary to extension of a small disc osteophyte complex into the  neural foramen.     L5-S1: Mild facet degenerative  "disease is present bilaterally. There is  no evidence of disc bulge or herniation.     After contrast administration, enhancement of the endplates at L2-L3 are  noted anterolaterally to the left. There was no evidence of abnormal  intradural enhancement.     IMPRESSION:  1. No evidence of disc herniation.   2. Multilevel disc desiccation, broad-based disc osteophyte complexes  and facet degenerative disease as described. Overall, the appearance is  similar to 05/11/2019. Endplate degenerative changes are appreciated at  L2-L3 anterolaterally to the left with mild enhancement, more prominent  as compared to the prior examination.     This report was finalized on 5/20/2020 8:33 AM by Dr. Enzo Guerin M.D.      This image displays the multilevel disc dessication..     -----      Vitals:    07/06/20 1132   BP: 116/72   Pulse: 65   Resp: 16   Temp: 96.9 °F (36.1 °C)   SpO2: 96%   Weight: 95 kg (209 lb 6.4 oz)   Height: 182.9 cm (72\")   PainSc:   7   PainLoc: Back         Objective   Physical Exam   Constitutional: He is oriented to person, place, and time. Vital signs are normal. He appears well-developed and well-nourished.  Non-toxic appearance. No distress.   HENT:   Head: Normocephalic and atraumatic.   Right Ear: Hearing and external ear normal.   Left Ear: Hearing and external ear normal.   Nose: Nose normal.   Eyes: Pupils are equal, round, and reactive to light. Conjunctivae and lids are normal.   Pulmonary/Chest: Effort normal. No respiratory distress.   Abdominal: Normal appearance.   Musculoskeletal:        Lumbar back: He exhibits decreased range of motion and tenderness.   He has lumbar area pain when he extends past the neutral 0 degree position.  There is pain when he flexes past 30 degrees also.  Lumbar loading is positive.  He has increasing back pain with axial loading maneuver on the apex of the skull.   Neurological: He is alert and oriented to person, place, and time. He has normal strength. No " "cranial nerve deficit or sensory deficit. Gait normal.   Reflex Scores:       Patellar reflexes are 2+ on the left side.  Psychiatric: He has a normal mood and affect. His behavior is normal.   Nursing note and vitals reviewed.          Assessment/Plan   Jose Maria was seen today for groin pain.    Diagnoses and all orders for this visit:    Lumbar facet arthropathy  -     Case Request        --- Follow-up for procedure... Bilateral L1-3 LMBB, x2, 2-3 wks apart... [place] [facility] [place]       --I do not think there is any role for narcotic pain medication for this type of pain nor his other painful complaint.      -------  Education about Medial Branch Blockade and RF Therapy:    This medial branch blockade (MBB) suggested is intended for diagnostic purposes, with the intent of offering the patient Radiofrequency thermal rhizotomy (RF) if the MBB is diagnostically effective.  The diagnostic blockade is necessary to determine the likelihood that RF therapy could be efficacious in providing long term relief to the patient.    Medial branches are sensory nerve branches that connect to a facet joint and transmit sensations & pain signals from that joint.  Facet is a term for the type of joints found in the spine.  Medial branches are the nerves that go to a facet, and therefore are also sometimes called \"facet joint nerves\" (FJNs).      In a medial branch blockade procedure, xray fluoroscopy is used to verify the locations of the outside of the joint lines which are being targeted.  Under xray guidance, needles are placed to these areas.  Contrast dye is injected to confirm proper placement, with dye flowing over the joint area, and to ensure that the dye does not flow into unintended areas such as a vein.  When this is confirmed, local anesthetic is injected to block the medial branch at that joint level.      If MBBs are diagnostically successful in blocking pain, then the patient is most likely a great candidate for " Radiofrequency of those facet joint nerves.  In the RF procedure, needles are placed to the joint lines in the same fashion, and after testing, the needle tips are heated to thermally treat the nerves, blocking the nerves by in essence damaging the nerves with the heat treatment.       Medically, a successful RF procedure should provide a patient with 50% pain relief or more for at least 6 months.  Clinical experience suggests that successful patients receive relief more in the range of 12 months on average.  We also discussed that a fortunate minority of patients receive therapeutic success from the MBB, and may not require RF ablation.  If a patient receives more than 8 weeks of relief from MBB, then occasional repeat MBB for therapeutic purposes is a very reasonable alternative therapy.  This course of therapy is consistent with our LCDs according to our CMS  in the area, and therefore other insurance providers should follow accordingly.  We will monitor our patients to screen for these therapeutic responders and will offer RF therapy only when necessary.        We discussed that MBB & RF are not without risks.  Guidelines regarding anticoagulant use & neuraxial procedures will be respected.  Patients that are ill or otherwise may be at risk for sepsis will not have their spines accessed by neuraxial injections of any type.  This patient will not be offered these therapies if there is an increased risk.   We discussed that there is a risk of postprocedural pain and also a risk of worsening of clinical picture with these procedures as with any neuraxial procedure.    -------                EMR Dragon/Transcription disclaimer:   Much of this encounter note is an electronic transcription/translation of spoken language to printed text. The electronic translation of spoken language may permit erroneous, or at times, nonsensical words or phrases to be inadvertently transcribed; Although I have reviewed the  note for such errors, some may still exist.

## 2020-08-06 ENCOUNTER — OFFICE VISIT (OUTPATIENT)
Dept: GASTROENTEROLOGY | Facility: CLINIC | Age: 55
End: 2020-08-06

## 2020-08-06 VITALS — TEMPERATURE: 98.4 F | HEIGHT: 72 IN | BODY MASS INDEX: 28.66 KG/M2 | WEIGHT: 211.6 LBS

## 2020-08-06 DIAGNOSIS — K62.89 ANAL OR RECTAL PAIN: Primary | ICD-10-CM

## 2020-08-06 DIAGNOSIS — G58.8 PUDENDAL NEURALGIA: ICD-10-CM

## 2020-08-06 PROCEDURE — 99213 OFFICE O/P EST LOW 20 MIN: CPT | Performed by: INTERNAL MEDICINE

## 2020-08-06 NOTE — PROGRESS NOTES
Subjective   Chief Complaint   Patient presents with   • Abdominal Pain   • Rectal Pain       Jose Maria Judge is a  55 y.o. male here for an initial visit for .  Patient was previously followed by Dr. Salvador Staley.    He is undergone extensive evaluation by Dr. Nguyễn.  CT scan of the abdomen was unremarkable except for some right pelvic adenopathy which has been present for some time and is under observation.  Colonoscopy was normal.  Ileal examination was negative.  Upper tract evaluation was normal, biopsies were not obtained.  Gallbladder ultrasound at 1 time suggested a possible small stone, repeat did not bear down.  Radial nuclear gallbladder scan demonstrated an ejection fraction of 15% back in 2018.    Long history of postprandial BMs.  Worse with anxiety - caused urgency.    He has a history of IH that required hemorrhoidectomy (h/o body building) - he notes that after this surgery he had more frequent BMs. He did not have diarrhea.  2004 - normal c/s with Dr Aviles.  He feels like his food is rapidly processed.  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  He then had a sphincterotomy and was later diagnosed with pudendal neuralgia.    He reports chronic pain in the perianal area - has to have a very limited diet. Pain is intense when stool arrives in the rectum.  He reports senstivity to most medications.     He saw Dr Staley in May c/o upper abdominal discomfort.  He was prescribed a PPI and asked to repeat HIDSA but his symptmos resolved without intervention or meds.  Care everywhere reviewed post visit - He also saw Dr Karimi in July for same issue - scheduled for flex sig with her also    HPI  Past Medical History:   Diagnosis Date   • Abdominal lymphadenopathy    • Acid reflux     HX   • Anxiety     R/T PAIN   • Carpal tunnel syndrome, left    • Chronic pain    • DDD (degenerative disc disease), lumbar    • Fatigue    • H/O  Hypersensitivity to odor     Burning sensation in nasal passage   • Hemorrhoids     internal fistula   • Irritable bowel syndrome    • Low back pain    • Lymphadenopathy, axillary     LEFT   • Male pattern alopecia    • Pudendal neuralgia    • Right hip pain    • Unexplained night sweats      Past Surgical History:   Procedure Laterality Date   • ABSCESS DRAINAGE  08/2012   • ANAL FISTULA REPAIR N/A 09/2012, 10/2012   • AXILLARY LYMPH NODE BIOPSY/EXCISION Left 11/9/2018    Procedure: AXILLARY LYMPH NODE BIOPSY/EXCISION;  Surgeon: Amando Nguyễn MD;  Location:  [facility];  Service: General   • COLONOSCOPY N/A 2013    done at [facility]   • COLONOSCOPY N/A 7/10/2019    Procedure: COLONOSCOPY to CECUM;  Surgeon: Amando Nguyễn MD;  [facility] END[facility];  Service: General   • ENDOSCOPY N/A 1/9/2019    Procedure: ESOPHAGOGASTRODUODENOSCOPY;  Surgeon: Amando Nguyễn MD;  [facility] END[facility];  Service: General   • FINE NEEDLE ASPIRATION Left 08/09/2018    Left axillary lymph node FNA   • FLEXIBLE SIGMOIDOSCOPY N/A 06/25/2003    Normal-Dr. Cezar Aviles   • HEMORRHOIDECTOMY N/A 1996   • HIP ARTHROSCOPY W/ LABRAL REPAIR Right 04/03/2017    Dr. Lonnie Lemons   • TONSILLECTOMY Bilateral        Current Outpatient Medications:   •  acetaminophen (TYLENOL) 500 MG tablet, Take 500 mg by mouth Every 6 (Six) Hours As Needed for Mild Pain ., Disp: , Rfl:   •  Elastic Bandages & Supports (ELASTIC BACK SUPPORT) misc, 1 each Take As Directed., Disp: 1 each, Rfl: 0  •  ibuprofen (ADVIL,MOTRIN) 200 MG tablet, Take 200 mg by mouth Every 6 (Six) Hours As Needed for Mild Pain ., Disp: , Rfl:   PRN Meds:.  Allergies   Allergen Reactions   • Bactrim [Sulfamethoxazole-Trimethoprim] Shortness Of Breath   • Hydrocodone Shortness Of Breath   • Medrol [Methylprednisolone] Unknown - High Severity     Rectal bleeding   • Mobic [Meloxicam] Nausea Only and Other (See Comments)     Severe heartburn   • Naproxen Shortness Of  Breath   • Nsaids Unknown - High Severity     Can take Ibuprofen.. Causes constipation   • Sulfa Antibiotics Shortness Of Breath     TROUBLE BREATHING   • Diclofenac Nausea Only and Other (See Comments)     Severe heartburn   • Levaquin [Levofloxacin] Unknown - Low Severity     Made tendons tight     Social History     Socioeconomic History   • Marital status: Single     Spouse name: Not on file   • Number of children: 0   • Years of education: COLLEGE   • Highest education level: Not asked   Occupational History   • Occupation: RETIRED   Social Needs   • Financial resource strain: Patient refused   • Food insecurity:     Worry: Patient refused     Inability: Patient refused   • Transportation needs:     Medical: Patient refused     Non-medical: Patient refused   Tobacco Use   • Smoking status: Never Smoker   • Smokeless tobacco: Never Used   • Tobacco comment: caff use   Substance and Sexual Activity   • Alcohol use: No   • Drug use: No   • Sexual activity: Defer     Family History   Problem Relation Age of Onset   • Atrial fibrillation Father    • Aneurysm Father    • Hypertension Father    • Malig Hyperthermia Neg Hx      Review of Systems   Constitutional: Negative for appetite change and unexpected weight change.   Gastrointestinal: Positive for rectal pain. Negative for abdominal pain, blood in stool, constipation and diarrhea.   All other systems reviewed and are negative.    Vitals:    08/06/20 1026   Temp: 98.4 °F (36.9 °C)         08/06/20  1026   Weight: 96 kg (211 lb 9.6 oz)       Objective   Physical Exam   Constitutional: He appears well-developed and well-nourished.   HENT:   Head: Normocephalic and atraumatic.   Eyes: No scleral icterus.   Pulmonary/Chest: Effort normal. No respiratory distress.   Abdominal: Soft. He exhibits no distension.   Neurological: He is alert.   Skin: Skin is warm and dry.   Psychiatric: He has a normal mood and affect.     No radiology results for the last 7  days    Assessment/Plan   Diagnoses and all orders for this visit:    Anal or rectal pain  -     Case Request; Standing  -     Case Request    Pudendal neuralgia    Other orders  -     Follow Anesthesia Guidelines / Standing Orders; Future  -     Obtain Informed Consent; Future  -     Implement Anesthesia orders day of procedure.; Standing  -     Obtain informed consent; Standing  -     Verify bowel prep was successful; Standing  -     Give tap water enema if bowel prep was insufficient; Standing      Plan:  · Trial prep H suppository nightly  · Plan for flexible sigmoidoscopy for further evaluation  · Pain is chronic but worsening- likely related to previous surgeries and pudendal neuralgia but will rule out inflammation  · Seems fairly resistant to trying medication - reports stool changes that worsen symptoms and sensitivity to multiple meds

## 2020-08-10 ENCOUNTER — TELEPHONE (OUTPATIENT)
Dept: NEUROSURGERY | Facility: CLINIC | Age: 55
End: 2020-08-10

## 2020-08-10 NOTE — TELEPHONE ENCOUNTER
"Pt called today because he saw Dr. Velez in office on 7/6/20 and was told that he would be contacted to schedule a procedure. As of today he hasn't heard back from them. [facility]     I contacted Brenda (practice manager) who is going to follow up on this and they will contact pt today.    I then called pt back to give him this info and he told me that he was referred to Dr. Velez's practice before and \"received the same treatment\". He stated \"if they don't want to treat me I wish they would have told me that. I assured him that isn't the case and he would be contacted today. He understands but says he is [facility] to see what they have to say.  "

## 2020-08-17 ENCOUNTER — TELEPHONE (OUTPATIENT)
Dept: PAIN MEDICINE | Facility: CLINIC | Age: 55
End: 2020-08-17

## 2020-08-17 NOTE — TELEPHONE ENCOUNTER
He has diclofenac listed as an allergy.  He should just go through with the 2nd opinion he has scheduled with another pain specialist.

## 2020-08-17 NOTE — TELEPHONE ENCOUNTER
Pt called today requesting a gel or cream to apply to his back to reduce his pain. We received auth for MEDIAL BRANCH BLOCK, contacted pt on 8/10/20 to schedule, however pt would like to try compound cream or gel for now while he thinks about deciding to go through with injection. Pt mentioned diclofenac gel and wanted to know if you can prescribe for him? Please advise. Thank you.

## 2020-08-21 ENCOUNTER — TELEPHONE (OUTPATIENT)
Dept: GASTROENTEROLOGY | Facility: CLINIC | Age: 55
End: 2020-08-21

## 2020-08-21 ENCOUNTER — TELEPHONE (OUTPATIENT)
Dept: PAIN MEDICINE | Facility: CLINIC | Age: 55
End: 2020-08-21

## 2020-08-21 ENCOUNTER — TRANSCRIBE ORDERS (OUTPATIENT)
Dept: ADMINISTRATIVE | Facility: HOSPITAL | Age: 55
End: 2020-08-21

## 2020-08-21 DIAGNOSIS — R22.31 MASS OF RIGHT AXILLA: Primary | ICD-10-CM

## 2020-08-21 DIAGNOSIS — Z85.79 HISTORY OF LYMPHOMA: ICD-10-CM

## 2020-08-21 NOTE — TELEPHONE ENCOUNTER
"Pt called back requesting diclofenac gel or cream for pain. Explained to pt he has a listed allergy to diclofenac and at this time Dr. Velez is not prescribing. Also asked pt if he saw another pain specialist. Pt sts he had appt and was not prescribed gel or cream either. I told pt his auth was approved for MEDIAL BRANCH BLOCK if he would like to schedule I could transfer his call for scheduling. Pt got upset and std \"I've been waiting so long to hear from anyone I got fed up and thought Dr. Velez didn't want to treat me anymore so I called Dr. Roberts' office they called your office to check status then someone from your office finally called me back, but I want to try a conservative approach with cream or gel first before an injection.\" I asked him if he would like to schedule MEDIAL BRANCH BLOCK. Pt sts he wants to think about it and hung up. Please advise. Thank you.   "

## 2020-08-21 NOTE — TELEPHONE ENCOUNTER
----- Message from Garrett Lundberg sent at 8/21/2020  9:58 AM EDT -----  Regarding: Update?  Contact: [phone]  Joseph from [facility] called regarding sigmoidoscopy. States she already spoke to Naye and would like a call back to f/u     [phone]

## 2020-08-24 PROBLEM — K62.89 ANAL OR RECTAL PAIN: Status: ACTIVE | Noted: 2020-08-24

## 2020-08-31 ENCOUNTER — TELEPHONE (OUTPATIENT)
Dept: GASTROENTEROLOGY | Facility: CLINIC | Age: 55
End: 2020-08-31

## 2020-09-01 NOTE — TELEPHONE ENCOUNTER
This is equivalent to a bowel prep of sorts -the problem with this approach is that the constipation will quickly recur.  It would be better for him to take regular medication to prevent constipation.  However would not be opposed to him doing a cleanout and then starting MiraLAX daily in the hopes of preventing further constipation

## 2020-09-01 NOTE — TELEPHONE ENCOUNTER
"Call to pt.  Advise per Dr Ring note.  Verb understanding.     States \"this was a surprise constipation \" 2nd pain med, which has since stopped.     States did do \"cleanse\" yesterday with good results.   "

## 2020-09-03 ENCOUNTER — APPOINTMENT (OUTPATIENT)
Dept: MRI IMAGING | Facility: HOSPITAL | Age: 55
End: 2020-09-03

## 2020-09-03 ENCOUNTER — TELEPHONE (OUTPATIENT)
Dept: GASTROENTEROLOGY | Facility: CLINIC | Age: 55
End: 2020-09-03

## 2020-09-03 NOTE — TELEPHONE ENCOUNTER
Called pt and he reports that he did the miralax  4 servings of miralax every 4 hrs.    He reports that this really helped and cleansed him out.      He reports that yesterday am he saw blood in the stool and when he wiped.  Then later in the day 2 normal bm's and saw no blood. He is asking what are Dr Ring's thoughts on this. Pt is asking if he might need more than the flex sig on 09/23. Advised will send message to Dr Ring. Pt verb understanding.

## 2020-09-03 NOTE — TELEPHONE ENCOUNTER
----- Message from Beatriz Quevedo sent at 9/3/2020  8:19 AM EDT -----  Contact: [phone]  PT HAS A QUESTION ABOUT THE MIRALAX SIDE EFFECTS

## 2020-09-03 NOTE — TELEPHONE ENCOUNTER
Kennedi Solomon, Garrett Irving  [facility] [address] [place]   Phone Number: [phone]             Pt states he forgot to tell you that he just had a bowel movement this morning and there was no blood in it. States you may call him back if you have any questions.

## 2020-09-04 ENCOUNTER — HOSPITAL ENCOUNTER (OUTPATIENT)
Dept: MRI IMAGING | Facility: HOSPITAL | Age: 55
End: 2020-09-04

## 2020-09-04 NOTE — TELEPHONE ENCOUNTER
Called pt and advised of Dr Cooper note. Pt verb understanding. Pt states he wants Dr Ring to know that he has been constipated lately and that is new for him. Advised will update Dr Ring.

## 2020-09-08 ENCOUNTER — TRANSCRIBE ORDERS (OUTPATIENT)
Dept: ADMINISTRATIVE | Facility: HOSPITAL | Age: 55
End: 2020-09-08

## 2020-09-08 DIAGNOSIS — Z86.19 HX OF VIRAL ILLNESS: ICD-10-CM

## 2020-09-08 DIAGNOSIS — Z85.79 HISTORY OF LYMPHOMA: ICD-10-CM

## 2020-09-08 DIAGNOSIS — R22.31 MASS OF AXILLA, RIGHT: Primary | ICD-10-CM

## 2020-09-09 ENCOUNTER — OFFICE VISIT (OUTPATIENT)
Dept: PAIN MEDICINE | Facility: CLINIC | Age: 55
End: 2020-09-09

## 2020-09-09 ENCOUNTER — TELEPHONE (OUTPATIENT)
Dept: GASTROENTEROLOGY | Facility: CLINIC | Age: 55
End: 2020-09-09

## 2020-09-09 DIAGNOSIS — K62.89 ANAL OR RECTAL PAIN: ICD-10-CM

## 2020-09-09 DIAGNOSIS — M47.816 SPONDYLOSIS OF LUMBAR REGION WITHOUT MYELOPATHY OR RADICULOPATHY: Primary | ICD-10-CM

## 2020-09-09 DIAGNOSIS — G89.4 CHRONIC PAIN SYNDROME: ICD-10-CM

## 2020-09-09 PROCEDURE — 99214 OFFICE O/P EST MOD 30 MIN: CPT | Performed by: ANESTHESIOLOGY

## 2020-09-09 NOTE — TELEPHONE ENCOUNTER
----- Message from Garrett Monet sent at 9/9/2020  8:08 AM EDT -----  Regarding: PT HAS QUESTION ABOUT PROCEDURE  PT CALLED HAS QUESTION ON HOW TO PREP FOR PROCEDURE. WOULD LIKE TO TALK TO SOMEONE.  [phone]    FLEXIBLE SIGMOIDOSCOPY WITH BIOPSY [[id number]]     Service: Gastroenterology Location: [facility] [place]

## 2020-09-09 NOTE — PROGRESS NOTES
TELEPHONE VISIT    You have chosen to receive care through a telephone visit. Do you consent to use a telephone visit for your medical care today? Yes      CHIEF COMPLAINT      Subjective   Jose Maria Judge is a 55 y.o. male  who presents for a telephonic follow-up.He has a history of back pain with some axial elements and also stated history of pudendal neuralgia.      Back Pain  This is a chronic problem. The current episode started more than 1 year ago. The problem occurs constantly. The problem is unchanged. The pain is present in the lumbar spine. The quality of the pain is described as aching. The pain does not radiate (Except for the fact that it radiates in a bandlike distribution across the back.  He also notes some of the back pain flares up it does trigger pelvic pain as noted above.). The pain is at a severity of 8/10. The pain is severe. The symptoms are aggravated by bending, position, sitting, twisting and standing. Associated symptoms include pelvic pain. Pertinent negatives include no abdominal pain, chest pain, dysuria, fever, headaches, numbness or weakness. Risk factors include lack of exercise and sedentary lifestyle (He complains that his pain is making him sedentary and preventing him from working.  And exercising.). He has tried bed rest, heat and ice for the symptoms. The treatment provided no relief.   Pelvic Pain  This is a recurrent problem. The current episode started more than 1 year ago. The problem occurs intermittently. The problem has been waxing and waning. Pertinent negatives include no abdominal pain, chest pain, fever, headaches, numbness or weakness. Nothing aggravates the symptoms. He has tried rest, position changes, relaxation, NSAIDs and acetaminophen for the symptoms. The treatment provided mild relief.        PEG Assessment   What number best describes your pain on average in the past week?8  What number best describes how, during the past week, pain has interfered with your  enjoyment of life?8  What number best describes how, during the past week, pain has interfered with your general activity?  8    --  The aforementioned information the Chief Complaint section and above subjective data including any HPI data, and also the Review of Systems data, has been personally reviewed and affirmed.  --        The following portions of the patient's history were reviewed and updated as appropriate: allergies, current medications, past family history, past medical history, past social history, past surgical history and problem list.    -------    The following portions of the patient's history were reviewed and updated as appropriate: allergies, current medications, past family history, past medical history, past social history, past surgical history and problem list.    Allergies   Allergen Reactions   • Bactrim [Sulfamethoxazole-Trimethoprim] Shortness Of Breath   • Hydrocodone Shortness Of Breath   • Medrol [Methylprednisolone] Unknown - High Severity     Rectal bleeding   • Mobic [Meloxicam] Nausea Only and Other (See Comments)     Severe heartburn   • Naproxen Shortness Of Breath   • Nsaids Unknown - High Severity     Can take Ibuprofen.. Causes constipation   • Sulfa Antibiotics Shortness Of Breath     TROUBLE BREATHING   • Diclofenac Nausea Only and Other (See Comments)     Severe heartburn   • Levaquin [Levofloxacin] Unknown - Low Severity     Made tendons tight         Current Outpatient Medications:   •  acetaminophen (TYLENOL) 500 MG tablet, Take 500 mg by mouth Every 6 (Six) Hours As Needed for Mild Pain ., Disp: , Rfl:   •  Elastic Bandages & Supports (ELASTIC BACK SUPPORT) misc, 1 each Take As Directed., Disp: 1 each, Rfl: 0  •  ibuprofen (ADVIL,MOTRIN) 200 MG tablet, Take 200 mg by mouth Every 6 (Six) Hours As Needed for Mild Pain ., Disp: , Rfl:     Current Outpatient Medications on File Prior to Visit   Medication Sig Dispense Refill   • acetaminophen (TYLENOL) 500 MG tablet Take  500 mg by mouth Every 6 (Six) Hours As Needed for Mild Pain .     • Elastic Bandages & Supports (ELASTIC BACK SUPPORT) misc 1 each Take As Directed. 1 each 0   • ibuprofen (ADVIL,MOTRIN) 200 MG tablet Take 200 mg by mouth Every 6 (Six) Hours As Needed for Mild Pain .       No current facility-administered medications on file prior to visit.        Patient Active Problem List   Diagnosis   • Bilateral hip pain   • Right hip pain   • Left hip pain   • Trochanteric bursitis   • Tear of acetabular labrum   • Loss of hair   • Anal burning   • Anxiety   • Elevated blood pressure   • Hamstring strain   • Hypertension   • Radiculitis   • Hamstring muscle strain   • Rectal pain   • Herpes zoster   • Varicose veins of other specified sites   • Lymph node enlargement   • Partial small bowel obstruction (CMS/HCC)   • Epigastric pain   • Lymphadenopathy, axillary   • Hypersensitivity to odor   • Chronic bilateral low back pain without sciatica   • Nausea   • Anal or rectal pain       Past Medical History:   Diagnosis Date   • Abdominal lymphadenopathy    • Acid reflux     HX   • Anxiety     R/T PAIN   • Carpal tunnel syndrome, left    • Chronic pain    • DDD (degenerative disc disease), lumbar    • Fatigue    • H/O Hypersensitivity to odor     Burning sensation in nasal passage   • Hemorrhoids     internal fistula   • Irritable bowel syndrome    • Low back pain    • Lymphadenopathy, axillary     LEFT   • Male pattern alopecia    • Pudendal neuralgia    • Right hip pain    • Unexplained night sweats        Past Surgical History:   Procedure Laterality Date   • ABSCESS DRAINAGE  08/2012   • ANAL FISTULA REPAIR N/A 09/2012, 10/2012   • AXILLARY LYMPH NODE BIOPSY/EXCISION Left 11/9/2018    Procedure: AXILLARY LYMPH NODE BIOPSY/EXCISION;  Surgeon: Amando Nguyễn MD;  [facility] [place] [facility];  Service: General   • COLONOSCOPY N/A 2013    done at [facility]   • COLONOSCOPY N/A 7/10/2019    Procedure: COLONOSCOPY to CECUM;  Surgeon:  Amando Nguyễn MD;  Location: [facility] ENDOSCOPY;  Service: General   • ENDOSCOPY N/A 1/9/2019    Procedure: ESOPHAGOGASTRODUODENOSCOPY;  Surgeon: Amando Nguyễn MD;  Location: [facility] ENDOSCOPY;  Service: General   • FINE NEEDLE ASPIRATION Left 08/09/2018    Left axillary lymph node FNA   • FLEXIBLE SIGMOIDOSCOPY N/A 06/25/2003    Normal-Dr. Cezar Aviles   • HEMORRHOIDECTOMY N/A 1996   • HIP ARTHROSCOPY W/ LABRAL REPAIR Right 04/03/2017    Dr. Lonnie Lemons   • TONSILLECTOMY Bilateral        Family History   Problem Relation Age of Onset   • Atrial fibrillation Father    • Aneurysm Father    • Hypertension Father    • Malig Hyperthermia Neg Hx        Social History     Socioeconomic History   • Marital status: Single     Spouse name: Not on file   • Number of children: 0   • Years of education: COLLEGE   • Highest education level: Not asked   Occupational History   • Occupation: RETIRED     Comment: carpentry, plumbing and remodeling   Social Needs   • Financial resource strain: Patient refused   • Food insecurity     Worry: Patient refused     Inability: Patient refused   • Transportation needs     Medical: Patient refused     Non-medical: Patient refused   Tobacco Use   • Smoking status: Never Smoker   • Smokeless tobacco: Never Used   • Tobacco comment: caff use   Substance and Sexual Activity   • Alcohol use: No   • Drug use: No   • Sexual activity: Defer       -------        Review of Systems   Constitutional: Negative for fever.   Cardiovascular: Negative for chest pain.   Gastrointestinal: Negative for abdominal pain.   Genitourinary: Positive for pelvic pain. Negative for dysuria.   Musculoskeletal: Positive for back pain.   Neurological: Negative for weakness, numbness and headaches.         No Fever, No Chills, No ear pain, No sinus pressure or drainage, No eye pain or drainage, No cough, No SOB, No chest tightness nor chest pain, no palpitations.          Vitals:    09/09/20 1422   PainSc:   8  "        Objective   Physical Exam  As this is a telephone check-in, the ability to perform a routine physical exam is extremely limited. The patient seems alert and is oriented appropriately.   On this phone call there is not any evidence of respiratory distress.   The patient seems of normal mood.   The remainder of a routine physical exam is deferred.      Assessment/Plan   Diagnoses and all orders for this visit:    Spondylosis of lumbar region without myelopathy or radiculopathy    Anal or rectal pain    Chronic pain syndrome          ----------------      Our practice is offering alternative &/or electronic methods to continue to follow our patients while at the same time further the efforts toward social distancing, in accordance with our organizational policies, professional societies' guidance, and gubernatorial mandates.  I support the Healthy at Home campaign and in this visit I have counseled the patient on our needs to limit in-person office visits and physical encounters with medical facilities whenever possible.  I have also educated the patient on the medical necessities of maintaining social distancing while we continue to function during this crisis period.      The patient was counseled on the need to consider telehealth options. The patient had obstacles which preclude consideration for a Video Visit. As a Video Visit was not practical, the patient was offered the option for a Telephone Check-In. The patient agreed to a Telephone Check-In.    TIME:  Total Time:  23 minutes. Time spent in education / counselin minutes. Topics discussed are outlined in the Assessment/Plan section of the note.      ----------------      In summary, this gentleman expresses some significant anxiety about interventional management options.  He states that he does not want to take oral medications either.  He asks about use of topical analgesics.  He states that he would rather treat his pain with \"creams and " "rubs\".      He has used a multimodal compounded pain cream and that he tells me that he feels that this caused him to be constipated.  He also states an allergy to multiple agents including diclofenac and states history of intolerance to NSAIDs.  We spent time in education counseling about the nature of these agents including the now available over-the-counter diclofenac and compounded agents and discussed the risk with his stated intolerance to NSAIDs.    Therefore and are counseling and discussion he decides to defer on further considerations for topical agents like this.    We spent a lot of time talking about his noted 2 different issues in trying to delineate between back pain and the concern about facet mediated axial back pain and the pudendal neuralgia.  It is possible that the neuropathic pain and pudendal neuralgia could cause him to be tense and muscle spasms and could flare in axial back pain, and we talked about this possibility also using the lumbar medial branch blockade diagnostically to differentiate facet mediated pain versus musculoskeletal pain.    We spent a lot of time talking about reasonable expectations.    He has also obtained a second opinion from another pain specialist, which I support and encouraged.  I think that from our perspective the consideration for pudendal nerve blockade is difficult to consider because of the vague nature of his symptoms in the pelvic area.          --- Follow-up for his procedure... Plan is for the lumbar medial branch block             -------  Education about Medial Branch Blockade and RF Therapy:    This medial branch blockade (MBB) suggested is intended for diagnostic purposes, with the intent of offering the patient Radiofrequency thermal rhizotomy (RF) if the MBB is diagnostically effective.  The diagnostic blockade is necessary to determine the likelihood that RF therapy could be efficacious in providing long term relief to the patient.    Medial " "branches are sensory nerve branches that connect to a facet joint and transmit sensations & pain signals from that joint.  Facet is a term for the type of joints found in the spine.  Medial branches are the nerves that go to a facet, and therefore are also sometimes called \"facet joint nerves\" (FJNs).      In a medial branch blockade procedure, xray fluoroscopy is used to verify the locations of the outside of the joint lines which are being targeted.  Under xray guidance, needles are placed to these areas.  Contrast dye is injected to confirm proper placement, with dye flowing over the joint area, and to ensure that the dye does not flow into unintended areas such as a vein.  When this is confirmed, local anesthetic is injected to block the medial branch at that joint level.      If MBBs are diagnostically successful in blocking pain, then the patient is most likely a great candidate for Radiofrequency of those facet joint nerves.  In the RF procedure, needles are placed to the joint lines in the same fashion, and after testing, the needle tips are heated to thermally treat the nerves, blocking the nerves by in essence damaging the nerves with the heat treatment.       Medically, a successful RF procedure should provide a patient with 50% pain relief or more for at least 6 months.  Clinical experience suggests that successful patients receive relief more in the range of 12 months on average.  We also discussed that a fortunate minority of patients receive therapeutic success from the MBB, and may not require RF ablation.  If a patient receives more than 8 weeks of relief from MBB, then occasional repeat MBB for therapeutic purposes is a very reasonable alternative therapy.  This course of therapy is consistent with our LCDs according to our CMS  in the area, and therefore other insurance providers should follow accordingly.  We will monitor our patients to screen for these therapeutic responders and " will offer RF therapy only when necessary.        We discussed that MBB & RF are not without risks.  Guidelines regarding anticoagulant use & neuraxial procedures will be respected.  Patients that are ill or otherwise may be at risk for sepsis will not have their spines accessed by neuraxial injections of any type.  This patient will not be offered these therapies if there is an increased risk.   We discussed that there is a risk of postprocedural pain and also a risk of worsening of clinical picture with these procedures as with any neuraxial procedure.    -------        -------    EMR Dragon/Transcription disclaimer:   Much of this encounter note is an electronic transcription/translation of spoken language to printed text. The electronic translation of spoken language may permit erroneous, or at times, nonsensical words or phrases to be inadvertently transcribed; Although I have reviewed the note for such errors, some may still exist.

## 2020-09-09 NOTE — TELEPHONE ENCOUNTER
----- Message from Garrett Yao Rep sent at 9/9/2020  9:43 AM EDT -----  Regarding: Blood in stool  Contact: [phone]  Pt called and wants to speak to nurse

## 2020-09-09 NOTE — TELEPHONE ENCOUNTER
"Call to pt.  States has noted blood on tissue  first stool of the day for past couple days.  Also saw small blood clot on tissue and in toilet water.  Very concerned about this.   Askin)possibility of having sooner procedure  2)afraid to use fleets enema for flex sig prep - afraid \"will puncture something - isn't there a pill I could take instead?\".    3)should he have a c/s instead    Questions to DR Ring     "

## 2020-09-10 NOTE — TELEPHONE ENCOUNTER
That is fine clear liquid dinner prior, 1 bottle mag citrate at 6 pm night prior, npo after mn for flex sig

## 2020-09-10 NOTE — TELEPHONE ENCOUNTER
Called pt and advised of Dr Ring's note.  Pt verb understanding. Pt states he spoke with his pcp and he had mentioned that he might be able to do the magnesium citrate as a prep for the flex sig.  Advised pt will send message to Dr Ring to see if this is ok.

## 2020-09-10 NOTE — TELEPHONE ENCOUNTER
If he wishes to have a full colonoscopy I am okay with that.  There are no pill preps they are all liquid preps but if he does a full colonoscopy he would not have to do an enema.  I am not aware of any earlier openings-you can check the schedule and move up him up if there is a cancellation

## 2020-09-11 ENCOUNTER — TELEPHONE (OUTPATIENT)
Dept: GASTROENTEROLOGY | Facility: CLINIC | Age: 55
End: 2020-09-11

## 2020-09-11 NOTE — TELEPHONE ENCOUNTER
"Call to pt.  States saw blood and mucous on side of BM today, and some blood on tissue.  Very alarmed about this.  \"what does this mean\".  Asking to move up flex sig.    Advise pt that mucous sometimes seems with stool - have noted blood with stool in several prior calls.  Pt very anxious.      Advise will update  Dr Ring.  Verb understanding.   "

## 2020-09-11 NOTE — TELEPHONE ENCOUNTER
----- Message from Garrett Lauren Rep sent at 9/11/2020 11:20 AM EDT -----  Regarding: blood in stool  Contact: [phone]  Pt is calling wanting to talk to nurse said he has blood and mucus in stool would like to talk to nurse.

## 2020-09-11 NOTE — TELEPHONE ENCOUNTER
If there us a cancellation, move him up otherwise I have nothing sooner  Per dr Ring.     Called pt and advised of Dr Ring's note. Message sent to scheduling to see if pt can be moved up.

## 2020-09-16 ENCOUNTER — TRANSCRIBE ORDERS (OUTPATIENT)
Dept: SLEEP MEDICINE | Facility: HOSPITAL | Age: 55
End: 2020-09-16

## 2020-09-16 DIAGNOSIS — Z01.818 OTHER SPECIFIED PRE-OPERATIVE EXAMINATION: Primary | ICD-10-CM

## 2020-09-21 ENCOUNTER — LAB (OUTPATIENT)
Dept: LAB | Facility: HOSPITAL | Age: 55
End: 2020-09-21

## 2020-09-21 DIAGNOSIS — Z01.818 OTHER SPECIFIED PRE-OPERATIVE EXAMINATION: ICD-10-CM

## 2020-09-21 PROCEDURE — C9803 HOPD COVID-19 SPEC COLLECT: HCPCS

## 2020-09-21 PROCEDURE — U0004 COV-19 TEST NON-CDC HGH THRU: HCPCS

## 2020-09-22 ENCOUNTER — TELEPHONE (OUTPATIENT)
Dept: GASTROENTEROLOGY | Facility: CLINIC | Age: 55
End: 2020-09-22

## 2020-09-22 LAB — SARS-COV-2 RNA RESP QL NAA+PROBE: NOT DETECTED

## 2020-09-22 NOTE — TELEPHONE ENCOUNTER
Called pt and advised per Dr Ring's note that he can have a clear liquid dinner, 1 bottle mag citrate at 6 pm night prior , and npo after mn for flex sig.  Pt verb understanding.

## 2020-09-22 NOTE — TELEPHONE ENCOUNTER
----- Message from Naye Joy sent at 9/22/2020  3:16 PM EDT -----  Contact: [phone]  Returned patients call to answer prep questions, he stated he wanted to speak with a nurse.   ----- Message -----  From: Mindy Fishman  Sent: 9/22/2020   1:51 PM EDT  To: Garrett Sumner Rep, #    Please call patient regarding prep.

## 2020-09-23 ENCOUNTER — TELEPHONE (OUTPATIENT)
Dept: GASTROENTEROLOGY | Facility: CLINIC | Age: 55
End: 2020-09-23

## 2020-09-23 ENCOUNTER — HOSPITAL ENCOUNTER (OUTPATIENT)
Facility: HOSPITAL | Age: 55
Setting detail: HOSPITAL OUTPATIENT SURGERY
Discharge: HOME | End: 2020-09-23
Attending: INTERNAL MEDICINE | Admitting: INTERNAL MEDICINE

## 2020-09-23 ENCOUNTER — ANESTHESIA (OUTPATIENT)
Dept: GASTROENTEROLOGY | Facility: HOSPITAL | Age: 55
End: 2020-09-23

## 2020-09-23 ENCOUNTER — ANESTHESIA EVENT (OUTPATIENT)
Dept: GASTROENTEROLOGY | Facility: HOSPITAL | Age: 55
End: 2020-09-23

## 2020-09-23 VITALS
HEIGHT: 71 IN | DIASTOLIC BLOOD PRESSURE: 80 MMHG | SYSTOLIC BLOOD PRESSURE: 114 MMHG | OXYGEN SATURATION: 95 % | BODY MASS INDEX: 28.7 KG/M2 | HEART RATE: 52 BPM | RESPIRATION RATE: 14 BRPM | WEIGHT: 205 LBS

## 2020-09-23 DIAGNOSIS — K62.89 ANAL OR RECTAL PAIN: ICD-10-CM

## 2020-09-23 PROCEDURE — 25010000002 PROPOFOL 10 MG/ML EMULSION: Performed by: ANESTHESIOLOGY

## 2020-09-23 PROCEDURE — 45331 SIGMOIDOSCOPY AND BIOPSY: CPT | Performed by: INTERNAL MEDICINE

## 2020-09-23 PROCEDURE — S0260 H&P FOR SURGERY: HCPCS | Performed by: INTERNAL MEDICINE

## 2020-09-23 PROCEDURE — 88305 TISSUE EXAM BY PATHOLOGIST: CPT | Performed by: INTERNAL MEDICINE

## 2020-09-23 RX ORDER — PROPOFOL 10 MG/ML
VIAL (ML) INTRAVENOUS AS NEEDED
Status: DISCONTINUED | OUTPATIENT
Start: 2020-09-23 | End: 2020-09-23 | Stop reason: SURG

## 2020-09-23 RX ORDER — PROPOFOL 10 MG/ML
VIAL (ML) INTRAVENOUS CONTINUOUS PRN
Status: DISCONTINUED | OUTPATIENT
Start: 2020-09-23 | End: 2020-09-23 | Stop reason: SURG

## 2020-09-23 RX ORDER — HYDROCORTISONE ACETATE 25 MG/1
25 SUPPOSITORY RECTAL NIGHTLY PRN
Qty: 30 SUPPOSITORY | Refills: 1 | Status: SHIPPED | OUTPATIENT
Start: 2020-09-23 | End: 2021-01-11

## 2020-09-23 RX ORDER — SODIUM CHLORIDE 0.9 % (FLUSH) 0.9 %
10 SYRINGE (ML) INJECTION AS NEEDED
Status: DISCONTINUED | OUTPATIENT
Start: 2020-09-23 | End: 2020-09-23 | Stop reason: HOSPADM

## 2020-09-23 RX ORDER — SODIUM CHLORIDE, SODIUM LACTATE, POTASSIUM CHLORIDE, CALCIUM CHLORIDE 600; 310; 30; 20 MG/100ML; MG/100ML; MG/100ML; MG/100ML
1000 INJECTION, SOLUTION INTRAVENOUS CONTINUOUS
Status: DISCONTINUED | OUTPATIENT
Start: 2020-09-23 | End: 2020-09-23 | Stop reason: HOSPADM

## 2020-09-23 RX ORDER — LIDOCAINE HYDROCHLORIDE 10 MG/ML
0.5 INJECTION, SOLUTION INFILTRATION; PERINEURAL ONCE AS NEEDED
Status: DISCONTINUED | OUTPATIENT
Start: 2020-09-23 | End: 2020-09-23 | Stop reason: HOSPADM

## 2020-09-23 RX ORDER — LIDOCAINE HYDROCHLORIDE 20 MG/ML
INJECTION, SOLUTION INFILTRATION; PERINEURAL AS NEEDED
Status: DISCONTINUED | OUTPATIENT
Start: 2020-09-23 | End: 2020-09-23 | Stop reason: SURG

## 2020-09-23 RX ADMIN — SODIUM CHLORIDE, POTASSIUM CHLORIDE, SODIUM LACTATE AND CALCIUM CHLORIDE: 600; 310; 30; 20 INJECTION, SOLUTION INTRAVENOUS at 09:13

## 2020-09-23 RX ADMIN — PROPOFOL 50 MG: 10 INJECTION, EMULSION INTRAVENOUS at 09:20

## 2020-09-23 RX ADMIN — SODIUM CHLORIDE, POTASSIUM CHLORIDE, SODIUM LACTATE AND CALCIUM CHLORIDE 1000 ML: 600; 310; 30; 20 INJECTION, SOLUTION INTRAVENOUS at 09:10

## 2020-09-23 RX ADMIN — PROPOFOL 140 MCG/KG/MIN: 10 INJECTION, EMULSION INTRAVENOUS at 09:20

## 2020-09-23 RX ADMIN — LIDOCAINE HYDROCHLORIDE 40 MG: 20 INJECTION, SOLUTION INFILTRATION; PERINEURAL at 09:20

## 2020-09-23 NOTE — TELEPHONE ENCOUNTER
PRIOR AUTH NEEDED FOR ANUSOL  Received: Today  Message Contents   Maite Hudson, Garrett Rep  [facility]             PT CALLED BACK STATED PRIOR AUTHORAZATION NEEDED FOR MEDICINE BELOW.       hydrocortisone (ANUSOL-HC) 25 MG suppository 30 suppository 1 9/23/2020   Sig - Route: Insert 1 suppository into the rectum At Night As Needed for Hemorrhoids (rectal discomfort/bleeding). - Rectal   Sent to pharmacy as: Hydrocortisone Acetate 25 MG Rectal Suppository (ANUSOL-HC)   E-Prescribing Status: Receipt confirmed by pharmacy (9/23/2020  9:36 AM EDT)   Pharmacy     [address] AT [facility] [phone] [facility] [phone] FX      **Message to Luz Matta RN.

## 2020-09-23 NOTE — ANESTHESIA POSTPROCEDURE EVALUATION
"Patient: Jose Maria Judge    Procedure Summary     Date: 09/23/20 Room / Location:  [facility]    Anesthesia Start: 0913 Anesthesia Stop: 0934    Procedure: FLEXIBLE SIGMOIDOSCOPY WITH BIOPSY (N/A ) Diagnosis:       Anal or rectal pain      (Anal or rectal pain [K62.89])    Surgeon: Shena Ring MD Provider: Faisal Xavier MD    Anesthesia Type: MAC ASA Status: 2          Anesthesia Type: MAC    Vitals  No vitals data found for the desired time range.          Post Anesthesia Care and Evaluation    Patient location during evaluation: bedside  Patient participation: complete - patient participated  Level of consciousness: awake  Pain score: 2  Pain management: adequate  Airway patency: patent  Anesthetic complications: No anesthetic complications  PONV Status: none  Cardiovascular status: acceptable  Respiratory status: acceptable  Hydration status: acceptable    Comments: /81 (BP Location: Left arm, Patient Position: Lying)   Pulse 57   Resp 16   Ht 180.3 cm (71\")   Wt 93 kg (205 lb)   SpO2 96%   BMI 28.59 kg/m²         "

## 2020-09-23 NOTE — H&P
[facility]  Pre Procedure History & Physical    Chief Complaint:   Rectal pain and bleeding    Subjective     HPI:   Jose Maria Judge is a  55 y.o. male here for an initial visit for .  Patient was previously followed by Dr. Salvador Staley.     He is undergone extensive evaluation by Dr. Nguyễn.  CT scan of the abdomen was unremarkable except for some right pelvic adenopathy which has been present for some time and is under observation.  Colonoscopy was normal.  Ileal examination was negative.  Upper tract evaluation was normal, biopsies were not obtained.  Gallbladder ultrasound at 1 time suggested a possible small stone, repeat did not bear down.  Radial nuclear gallbladder scan demonstrated an ejection fraction of 15% back in 2018.     Long history of postprandial BMs.  Worse with anxiety - caused urgency.     He has a history of IH that required hemorrhoidectomy (h/o body building) - he notes that after this surgery he had more frequent BMs. He did not have diarrhea.  2004 - normal c/s with Dr Aviles.  He feels like his food is rapidly processed.  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  He then had a sphincterotomy and was later diagnosed with pudendal neuralgia.     He reports chronic pain in the perianal area - has to have a very limited diet. Pain is intense when stool arrives in the rectum.  He reports senstivity to most medications.      He saw Dr Staley in May c/o upper abdominal discomfort.  He was prescribed a PPI and asked to repeat HIDSA but his symptmos resolved without intervention or meds.  Care everywhere reviewed post visit - He also saw Dr Karimi in July for same issue - scheduled for flex sig with her also    Past Medical History:   Past Medical History:   Diagnosis Date   • Abdominal lymphadenopathy    • Acid reflux     HX   • Anxiety     R/T PAIN   • Carpal tunnel syndrome, left    • Chronic  pain    • DDD (degenerative disc disease), lumbar    • Fatigue    • H/O Hypersensitivity to odor     Burning sensation in nasal passage   • Hemorrhoids     internal fistula   • Irritable bowel syndrome    • Low back pain    • Lymphadenopathy, axillary     LEFT   • Male pattern alopecia    • Pudendal neuralgia    • Right hip pain    • Unexplained night sweats        Past Surgical History:  Past Surgical History:   Procedure Laterality Date   • ABSCESS DRAINAGE  08/2012   • ANAL FISTULA REPAIR N/A 09/2012, 10/2012   • AXILLARY LYMPH NODE BIOPSY/EXCISION Left 11/9/2018    Procedure: AXILLARY LYMPH NODE BIOPSY/EXCISION;  Surgeon: Amando Nguyễn MD;  [facility];  Service: General   • COLONOSCOPY N/A 2013    done at [facility]   • COLONOSCOPY N/A 7/10/2019    Procedure: COLONOSCOPY to CECUM;  Surgeon: Amando Nguyễn MD;  [facility];  Service: General   • ENDOSCOPY N/A 1/9/2019    Procedure: ESOPHAGOGASTRODUODENOSCOPY;  Surgeon: Amando Nguyễn MD;  [facility];  Service: General   • FINE NEEDLE ASPIRATION Left 08/09/2018    Left axillary lymph node FNA   • FLEXIBLE SIGMOIDOSCOPY N/A 06/25/2003    Normal-Dr. Cezar Aviles   • HEMORRHOIDECTOMY N/A 1996   • HIP ARTHROSCOPY W/ LABRAL REPAIR Right 04/03/2017    Dr. Lonnie Lemons   • TONSILLECTOMY Bilateral        Family History:  Family History   Problem Relation Age of Onset   • Atrial fibrillation Father    • Aneurysm Father    • Hypertension Father    • Malig Hyperthermia Neg Hx        Social History:   reports that he has never smoked. He has never used smokeless tobacco. He reports that he does not drink alcohol or use drugs.    Medications:   Medications Prior to Admission   Medication Sig Dispense Refill Last Dose   • acetaminophen (TYLENOL) 500 MG tablet Take 500 mg by mouth Every 6 (Six) Hours As Needed for Mild Pain .   Past Month at Unknown time   • Elastic Bandages & Supports (ELASTIC BACK SUPPORT) misc 1 each  "Take As Directed. 1 each 0 Past Month at Unknown time   • ibuprofen (ADVIL,MOTRIN) 200 MG tablet Take 200 mg by mouth Every 6 (Six) Hours As Needed for Mild Pain .   Past Month at Unknown time       Allergies:  Bactrim [sulfamethoxazole-trimethoprim], Hydrocodone, Medrol [methylprednisolone], Mobic [meloxicam], Naproxen, Nsaids, Sulfa antibiotics, Diclofenac, and Levaquin [levofloxacin]    ROS:    Pertinent items are noted in HPI, all other systems reviewed and negative     Objective     Blood pressure 119/81, pulse 57, resp. rate 16, height 180.3 cm (71\"), weight 93 kg (205 lb), SpO2 96 %.    Physical Exam   Constitutional: Pt is oriented to person, place, and time and well-developed, well-nourished, and in no distress.   Mouth/Throat: Oropharynx is clear and moist.   Neck: Normal range of motion.   Cardiovascular: Normal rate, regular rhythm    Pulmonary/Chest: Effort normal    Abdominal: Soft. Nontender  Skin: Skin is warm and dry.   Psychiatric: Mood, memory, affect and judgment normal.     Assessment/Plan     Diagnosis:  Rectal pain and bleeding    Anticipated Surgical Procedure:  Flexible sigmoidoscopy    The risks, benefits, and alternatives of this procedure have been discussed with the patient or the responsible party- the patient understands and agrees to proceed.                                                            "

## 2020-09-23 NOTE — TELEPHONE ENCOUNTER
----- Message from Garrett Monet sent at 9/23/2020 12:12 PM EDT -----  Regarding: pt request different medicine hydrocortisone (ANUSOL-HC)  to high  Pt had procedure today and was given hydrocortisone (ANUSOL-HC) . Pt request different medicine hydrocortisone (ANUSOL-HC)  to high, wants to see if he can get a different medicine.      hydrocortisone (ANUSOL-HC) 25 MG suppository 30 suppository 1 9/23/2020    Sig - Route: Insert 1 suppository into the rectum At Night As Needed for Hemorrhoids (rectal discomfort/bleeding). - Rectal   Sent to pharmacy as: Hydrocortisone Acetate 25 MG Rectal Suppository (ANUSOL-HC)   E-Prescribing Status: Receipt confirmed by pharmacy (9/23/2020  9:36 AM EDT)   Pharmacy    [facility] [address] AT [facility] - [phone] [facility] [phone] [phone]

## 2020-09-23 NOTE — TELEPHONE ENCOUNTER
----- Message from Naye Joy sent at 9/22/2020  3:16 PM EDT -----  Contact: [phone]  Returned patients call to answer prep questions, he stated he wanted to speak with a nurse.   ----- Message -----  From: Mindy Fishman  Sent: 9/22/2020   1:51 PM EDT  To: Garrett Sumner Rep, #    Please call patient regarding prep.

## 2020-09-23 NOTE — ANESTHESIA PREPROCEDURE EVALUATION
Anesthesia Evaluation     Patient summary reviewed and Nursing notes reviewed   NPO Solid Status: > 8 hours  NPO Liquid Status: > 2 hours           Airway   Mallampati: II  TM distance: >3 FB  Neck ROM: full  Dental - normal exam     Pulmonary - negative pulmonary ROS and normal exam   Cardiovascular - normal exam    (+) hypertension,       Neuro/Psych  (+) numbness, psychiatric history Anxiety,     GI/Hepatic/Renal/Endo    (+)  GERD,      Musculoskeletal     Abdominal    Substance History - negative use     OB/GYN negative ob/gyn ROS         Other   arthritis,                      Anesthesia Plan    ASA 2     MAC       Anesthetic plan, all risks, benefits, and alternatives have been provided, discussed and informed consent has been obtained with: patient.

## 2020-09-24 ENCOUNTER — TELEPHONE (OUTPATIENT)
Dept: GASTROENTEROLOGY | Facility: CLINIC | Age: 55
End: 2020-09-24

## 2020-09-24 LAB
LAB AP CASE REPORT: NORMAL
PATH REPORT.FINAL DX SPEC: NORMAL
PATH REPORT.GROSS SPEC: NORMAL

## 2020-09-28 NOTE — TELEPHONE ENCOUNTER
Still no answer from [employer] on Anusol HC suppositories. Faxed PA request to [facility] at [phone] with confirmation received. Awaiting decision

## 2020-09-30 RX ORDER — HYDROCORTISONE 25 MG/G
CREAM TOPICAL 2 TIMES DAILY
Qty: 30 G | Refills: 2 | Status: SHIPPED | OUTPATIENT
Start: 2020-09-30 | End: 2021-01-11

## 2020-10-05 NOTE — TELEPHONE ENCOUNTER
"Call to pt.  Advise per Dr Ring note.  Verb understanding.     Lengthy discussion.  States has not started anusol cream.  Does want to pursue neurology eval \"because I'm just sure its pudendal neuralgia.\"  Message to Dr Ring.   "

## 2020-10-06 NOTE — TELEPHONE ENCOUNTER
Recommend he discuss with his pcp - I do not work with neurologists much so his pcp would be better to advise him regarding whom he should see

## 2020-10-08 ENCOUNTER — OFFICE VISIT (OUTPATIENT)
Dept: GASTROENTEROLOGY | Facility: CLINIC | Age: 55
End: 2020-10-08

## 2020-10-08 ENCOUNTER — TELEPHONE (OUTPATIENT)
Dept: GASTROENTEROLOGY | Facility: CLINIC | Age: 55
End: 2020-10-08

## 2020-10-08 VITALS — TEMPERATURE: 97.1 F | WEIGHT: 207.4 LBS | HEIGHT: 71 IN | BODY MASS INDEX: 29.03 KG/M2

## 2020-10-08 DIAGNOSIS — K64.8 INTERNAL HEMORRHOIDS: ICD-10-CM

## 2020-10-08 DIAGNOSIS — K62.89 ANAL OR RECTAL PAIN: Primary | ICD-10-CM

## 2020-10-08 PROCEDURE — 99214 OFFICE O/P EST MOD 30 MIN: CPT | Performed by: INTERNAL MEDICINE

## 2020-10-08 RX ORDER — GUAIFENESIN 600 MG/1
300 TABLET, EXTENDED RELEASE ORAL AS NEEDED
COMMUNITY
End: 2022-10-31

## 2020-10-08 RX ORDER — BROMPHENIRAMINE MALEATE, PSEUDOEPHEDRINE HYDROCHLORIDE, AND DEXTROMETHORPHAN HYDROBROMIDE 2; 30; 10 MG/5ML; MG/5ML; MG/5ML
5 SYRUP ORAL 4 TIMES DAILY PRN
COMMUNITY
Start: 2020-10-02 | End: 2021-11-06

## 2020-10-08 NOTE — TELEPHONE ENCOUNTER
Anucort appeal letter from Dr Ring.     Call to [employer] @ [phone] and spoke with Johan. Per instructions, letter faxed to  - confirmation received.

## 2020-10-08 NOTE — PROGRESS NOTES
Subjective   Chief Complaint   Patient presents with   • Follow-up   • Rectal Pain       Jose Maria Judge is a  55 y.o. male here for a follow up visit for rectal pain.     Patient recently underwent a flexible sigmoidoscopy.  Hemorrhoids were noted on his examination which were nonthrombosed, grade 1-there was one area that appeared slightly irritated.  Biopsies failed to reveal any inflammation.  He was prescribed Proctosol HC rectal cream.  He reports that he use it twice.  He did not use applicator.  He reports that it caused stinging of the skin exterior to the anal canal.  He reports that his pain is really in the posterior aspect of the distal canal.  He has not seen any blood in the stool.  He reports that when he eats he often has a bowel movement but it is not loose.  He is seen multiple specialist for this symptom and has yet to get relief.  I recommended multiple suggestions such as a fiber supplement, RectiCare cream, etc. he typically reports that he is tried and failed most things.  He states that his system is sensitive.  He is concerned that he has pudendal neuralgia.  HPI  Past Medical History:   Diagnosis Date   • Abdominal lymphadenopathy    • Acid reflux     HX   • Anxiety     R/T PAIN   • Carpal tunnel syndrome, left    • Chronic pain    • DDD (degenerative disc disease), lumbar    • Fatigue    • H/O Hypersensitivity to odor     Burning sensation in nasal passage   • Hemorrhoids     internal fistula   • Irritable bowel syndrome    • Low back pain    • Lymphadenopathy, axillary     LEFT   • Male pattern alopecia    • Pudendal neuralgia    • Right hip pain    • Unexplained night sweats      Past Surgical History:   Procedure Laterality Date   • ABSCESS DRAINAGE  08/2012   • ANAL FISTULA REPAIR N/A 09/2012, 10/2012   • AXILLARY LYMPH NODE BIOPSY/EXCISION Left 11/9/2018    Procedure: AXILLARY LYMPH NODE BIOPSY/EXCISION;  Surgeon: Amando Nguyễn MD;  [facility];  Service: General   •  COLONOSCOPY N/A 2013    done at [facility]   • COLONOSCOPY N/A 7/10/2019    Procedure: COLONOSCOPY to CECUM;  Surgeon: Amando Nguyễn MD;  [facility];  Service: General   • ENDOSCOPY N/A 1/9/2019    Procedure: ESOPHAGOGASTRODUODENOSCOPY;  Surgeon: Amando Nguyễn MD;  Location: [facility] ENDOSCOPY;  Service: General   • FINE NEEDLE ASPIRATION Left 08/09/2018    Left axillary lymph node FNA   • FLEXIBLE SIGMOIDOSCOPY N/A 06/25/2003    Normal-Dr. Cezar Aviles   • HEMORRHOIDECTOMY N/A 1996   • HIP ARTHROSCOPY W/ LABRAL REPAIR Right 04/03/2017    Dr. Lonnie Lemons   • SIGMOIDOSCOPY N/A 9/23/2020    Procedure: FLEXIBLE SIGMOIDOSCOPY WITH BIOPSY;  Surgeon: Shena Ring MD;  Location: [facility] ENDOSCOPY;  Service: Gastroenterology;  Laterality: N/A;  pre- anal/rectal pain  post- hemorrhoids   • TONSILLECTOMY Bilateral        Current Outpatient Medications:   •  acetaminophen (TYLENOL) 500 MG tablet, Take 500 mg by mouth Every 6 (Six) Hours As Needed for Mild Pain ., Disp: , Rfl:   •  brompheniramine-pseudoephedrine-DM 30-2-10 MG/5ML syrup, , Disp: , Rfl:   •  guaiFENesin (MUCINEX) 600 MG 12 hr tablet, Take 300 mg by mouth As Needed for Cough., Disp: , Rfl:   •  Elastic Bandages & Supports (ELASTIC BACK SUPPORT) misc, 1 each Take As Directed., Disp: 1 each, Rfl: 0  •  hydrocortisone (ANUSOL-HC) 25 MG suppository, Insert 1 suppository into the rectum At Night As Needed for Hemorrhoids (rectal discomfort/bleeding)., Disp: 30 suppository, Rfl: 1  •  Hydrocortisone, Perianal, (Proctozone-HC) 2.5 % rectal cream, Insert  into the rectum 2 (Two) Times a Day., Disp: 30 g, Rfl: 2  •  ibuprofen (ADVIL,MOTRIN) 200 MG tablet, Take 200 mg by mouth Every 6 (Six) Hours As Needed for Mild Pain ., Disp: , Rfl:   PRN Meds:.  Allergies   Allergen Reactions   • Bactrim [Sulfamethoxazole-Trimethoprim] Shortness Of Breath   • Hydrocodone Shortness Of Breath   • Medrol [Methylprednisolone] Unknown - High Severity      Rectal bleeding   • Mobic [Meloxicam] Nausea Only and Other (See Comments)     Severe heartburn   • Naproxen Shortness Of Breath   • Nsaids Unknown - High Severity     Can take Ibuprofen.. Causes constipation   • Sulfa Antibiotics Shortness Of Breath     TROUBLE BREATHING   • Diclofenac Nausea Only and Other (See Comments)     Severe heartburn   • Levaquin [Levofloxacin] Unknown - Low Severity     Made tendons tight     Social History     Socioeconomic History   • Marital status: Single     Spouse name: Not on file   • Number of children: 0   • Years of education: COLLEGE   • Highest education level: Not asked   Occupational History   • Occupation: RETIRED     Comment: carpentry, plumbing and remodeling   Social Needs   • Financial resource strain: Patient refused   • Food insecurity     Worry: Patient refused     Inability: Patient refused   • Transportation needs     Medical: Patient refused     Non-medical: Patient refused   Tobacco Use   • Smoking status: Never Smoker   • Smokeless tobacco: Never Used   • Tobacco comment: caff use   Substance and Sexual Activity   • Alcohol use: No   • Drug use: No   • Sexual activity: Defer     Family History   Problem Relation Age of Onset   • Atrial fibrillation Father    • Aneurysm Father    • Hypertension Father    • Malig Hyperthermia Neg Hx      Review of Systems   Constitutional: Negative for appetite change and unexpected weight change.   Gastrointestinal: Negative for abdominal pain and blood in stool.        Anorectal pain   Musculoskeletal: Negative for neck pain.   Psychiatric/Behavioral: Positive for dysphoric mood.   All other systems reviewed and are negative.    Vitals:    10/08/20 1126   Temp: 97.1 °F (36.2 °C)         10/08/20  1126   Weight: 94.1 kg (207 lb 6.4 oz)       Objective   Physical Exam  Constitutional:       Appearance: Normal appearance. He is well-developed.   HENT:      Head: Normocephalic and atraumatic.   Eyes:      General: No scleral  icterus.     Conjunctiva/sclera: Conjunctivae normal.   Neck:      Musculoskeletal: Normal range of motion and neck supple.   Pulmonary:      Effort: Pulmonary effort is normal.      Breath sounds: Normal breath sounds.   Abdominal:      General: There is no distension.   Skin:     General: Skin is warm and dry.   Neurological:      Mental Status: He is alert.   Psychiatric:         Mood and Affect: Mood normal.         Behavior: Behavior normal.       No radiology results for the last 7 days    Assessment/Plan   Diagnoses and all orders for this visit:    Anal or rectal pain    Internal hemorrhoids    Other orders  -     brompheniramine-pseudoephedrine-DM 30-2-10 MG/5ML syrup  -     guaiFENesin (MUCINEX) 600 MG 12 hr tablet; Take 300 mg by mouth As Needed for Cough.      Plan:  · Perianal exam was normal on recent flex sig he did have a hemorrhoid.  I encouraged him to try to treat the hemorrhoids to see if at least this will help his pain.  I will appeal the decision to decline coverage of his suppositories as he reports intolerance to the cream.  · I recommended trial of barrier cream such as Vaseline to see if this would help with a stinging sensation he feels with a bowel movement.  Lidocaine and other ointments previously have caused increasing pain.  · He is seeing colorectal surgeons as well as neurologist in [facility].  He continues to be symptomatic and reports that it is negatively affecting his quality of life.  He is also seen a pain specialist.  I think at this point he should pursue evaluation at a specialty center.  I explained to him that based on my examination I do not have any other solid recommendations at this time that may actually provide him relief.  · I recommended that he try an alternative fiber supplement in the hopes this would help him have fewer bowel movements a day and lead to less discomfort    > 25 minutes spent participating in patient care with greater than half spent in face-to-face  contact with the patient in direct patient care and counseling

## 2020-10-12 NOTE — TELEPHONE ENCOUNTER
Call to [employer] and spoke with Meena.  States appeal not received.  Need to fax to  attn :PA.  Request expedite appeal for it to be processed in 72 hours.  Case # [id number] included on cover sheet per instructions - confirmation received.

## 2020-10-13 NOTE — TELEPHONE ENCOUNTER
Call from pt.  Advise per Dr Ring note.  Asking about dermatology referral.  Instruct also discuss this with pcp.  Verb understanding.

## 2020-10-15 NOTE — TELEPHONE ENCOUNTER
Call to [employer] and spoke with Johan.  Appeal has been denied as plan exclusion.  Johan will fax denial to .

## 2020-11-05 ENCOUNTER — TELEPHONE (OUTPATIENT)
Dept: PAIN MEDICINE | Facility: CLINIC | Age: 55
End: 2020-11-05

## 2020-11-16 ENCOUNTER — TELEPHONE (OUTPATIENT)
Dept: PAIN MEDICINE | Facility: CLINIC | Age: 55
End: 2020-11-16

## 2020-11-16 NOTE — TELEPHONE ENCOUNTER
Phoned pt to schedule patient needs mac told patient I will call him back as soon as we get a mac date.

## 2020-12-01 ENCOUNTER — TELEPHONE (OUTPATIENT)
Dept: GASTROENTEROLOGY | Facility: CLINIC | Age: 55
End: 2020-12-01

## 2020-12-01 NOTE — TELEPHONE ENCOUNTER
Pt calling with update: Pt states he made the appointment that was requested by Dr. Ring to see Dr. Mckenzie Wilkins and after reading Dr. Ring's notes there is nothing they can do for patient. Pt's call back number is [phone]

## 2020-12-03 ENCOUNTER — TELEPHONE (OUTPATIENT)
Dept: PAIN MEDICINE | Facility: CLINIC | Age: 55
End: 2020-12-03

## 2020-12-15 ENCOUNTER — LAB REQUISITION (OUTPATIENT)
Dept: LAB | Facility: HOSPITAL | Age: 55
End: 2020-12-15

## 2020-12-15 DIAGNOSIS — Z00.00 ENCOUNTER FOR GENERAL ADULT MEDICAL EXAMINATION WITHOUT ABNORMAL FINDINGS: ICD-10-CM

## 2020-12-31 ENCOUNTER — TELEPHONE (OUTPATIENT)
Dept: ONCOLOGY | Facility: CLINIC | Age: 55
End: 2020-12-31

## 2020-12-31 NOTE — TELEPHONE ENCOUNTER
Returning pt's call. Pt stated he has been having pain in his R hip for the past week. Pt stated it's in his R groin in the crease between his leg and pubis. Pt stated he has a history of swollen lymph nodes in his groin, also has a history of R hip repair, and he has a very small hernia as well so he wasn't sure what the pain was from. Pt states pain is more of an aching pain, not relived by anything. Told pt I would speak with Dr. Alvarado and call pt back. Pt v/u.     Spoke with Dr. Alvarado. Dr. Alvarado does not think it is related to the swollen lymph node, that the pt should f/u with his orthopedic surgeon who did his hip surgery.    Called pt to update him on what Dr. Alvarado said. Pt v/u. Pt stated he no longer sees the surgeon who did his surgery, but he would go to his PCP so they could refer him.

## 2021-01-05 ENCOUNTER — TELEPHONE (OUTPATIENT)
Dept: ONCOLOGY | Facility: CLINIC | Age: 56
End: 2021-01-05

## 2021-01-05 ENCOUNTER — TELEPHONE (OUTPATIENT)
Dept: ONCOLOGY | Facility: HOSPITAL | Age: 56
End: 2021-01-05

## 2021-01-05 NOTE — TELEPHONE ENCOUNTER
Jose Maria was advised by his PCP to schedule an MRI with Dr. Alvarado because insurance might fight him on covering it. Pt has a swollen lymph node on his right hip    Ph# [phone]

## 2021-01-05 NOTE — TELEPHONE ENCOUNTER
Per charting pt called 12/31/20 regarding concerns of hip pain/ lymph node swelling and Ira documented recommendation to see PCP. Pt called triage today for request of MRI order. Since last call pt reports he [facility] in [facility], as his PCP is no longer practicing.  He was told to contact oncology office to get MRI order, as he does not wish to start with xray or CT, any exposure to radiation.  The NP pt saw stated he would not be able to get insurance to cover MRI from him.  Pt is willing to come in to be seen.  Pt was very vague on site of pain or if the NP confirmed it was a lymph node, but pt states that is what he feels it is.  Pt could only describe pain across right hip bone with raised area of concern.  Pt denies mobility issues or any noted swelling to joint or groin area.  Informed pt that message would be sent to Dr. Alvarado for his recommendation/ suggestion or if he would like to see pt in office.

## 2021-01-06 ENCOUNTER — TELEPHONE (OUTPATIENT)
Dept: ONCOLOGY | Facility: CLINIC | Age: 56
End: 2021-01-06

## 2021-01-06 DIAGNOSIS — R59.9 LYMPH NODE ENLARGEMENT: Primary | ICD-10-CM

## 2021-01-06 NOTE — TELEPHONE ENCOUNTER
----- Message from Megha Handley RN sent at 1/6/2021  1:02 PM EST -----  Regarding: Dr. Alvarado  Please schedule pt for CBC and NP visit (2 unit) with NP in 1 week.

## 2021-01-06 NOTE — TELEPHONE ENCOUNTER
Talked to Dr. Alvarado. Per Dr. Alvarado, OK for pt to see NP within the next week to discuss. Called pt and LVM informing him of this. Message sent to scheduling.     ----- Message from Eli Churchill RN sent at 1/5/2021  5:46 PM EST -----  Sending to you too- I'm out Wed.  Aleah Ellsworth  ----- Message -----  From: Eli Churchill RN  Sent: 1/5/2021   4:25 PM EST  To: Bryan Alvarado MD    Per charting pt called regarding concerns of hip pain/ lymph node swelling and Ira spoke with you on 12/31.  Since then pt reports he was [facility] in [facility], as his PCP is no longer practicing.  He was told to contact oncology office to get MRI order, as he does not wish to start with xray or CT, any exposure to radiation.  The NP pt saw stated he would not be able to get insurance to cover MRI from him.  Pt is willing to come in to be seen.  He was very vague on site of pain or if the NP confirmed it was a lymph node, but pt states that is what he feels it is.  Pt could only describe pain across right hip bone with raised area of concern.  Pt denies mobility issues or any noted swelling to joint or groin area.  Please advise if you would like to see pt in office or have other suggestions/recommendations.      Aleah Ellsworth

## 2021-01-11 ENCOUNTER — OFFICE VISIT (OUTPATIENT)
Dept: SURGERY | Facility: CLINIC | Age: 56
End: 2021-01-11

## 2021-01-11 VITALS — HEIGHT: 71 IN | BODY MASS INDEX: 29.12 KG/M2 | WEIGHT: 208 LBS

## 2021-01-11 DIAGNOSIS — Z85.72 HX OF LYMPHOMA: ICD-10-CM

## 2021-01-11 DIAGNOSIS — K40.90 RIGHT INGUINAL HERNIA: Primary | ICD-10-CM

## 2021-01-11 PROCEDURE — 99214 OFFICE O/P EST MOD 30 MIN: CPT | Performed by: SURGERY

## 2021-01-11 RX ORDER — CHOLECALCIFEROL (VITAMIN D3) 50 MCG
2000 TABLET ORAL DAILY
COMMUNITY
End: 2022-08-31

## 2021-01-11 NOTE — PROGRESS NOTES
SUMMARY (A/P):    55-year-old gentleman with right inguinal pain and evidence of small right inguinal hernia on exam.  No evidence of left inguinal hernia.  He does have a history of early lymphoma treated by excision of enlarged left axillary lymph node and subsequent observation.  He has on most recent CT scan in May of this year stable right pelvic lymphadenopathy.  Given his symptoms and known right pelvic lymphadenopathy, I feel that it is important to obtain follow-up CT scan of the abdomen and pelvis to evaluate for any progression of known adenopathy with history of lymphoma prior to recommending surgical repair of his relatively small right inguinal hernia.  He understands the rationale for the recommendation and will schedule CT for later this week.      CC:    Right inguinal pain    HPI:    55-year-old gentleman presents with 3 weeks of burning type mild to moderate right inguinal pain associated with some palpable swelling.    RADIOLOGY/ENDOSCOPY:    • CT chest 5/20/2020: No abnormality  • CT abdomen pelvis 5/20/2020: Right pelvic lymphadenopathy is stable, no new adenopathy within abdomen or pelvis.  I reviewed the images and concur.  I do see more fat in the left inguinal canal that may or may not represent herniation, no definite evidence of right inguinal hernia.    LABS:  • CBC and CMP 5/20/2020-normal    PHYSICAL EXAM:   • Constitutional: Well-developed well-nourished, no acute distress  • Vital signs: Weight 208 lbs, height 71 in, BMI 29  • Eyes: Conjunctiva normal, sclera nonicteric  • ENMT: Hearing grossly normal, oral mucosa moist  • Neck: Supple, no palpable mass, trachea midline  • Respiratory: Clear to auscultation, normal inspiratory effort  • Cardiovascular: Regular rate, no murmur, no carotid bruit, no peripheral edema, no jugular venous distention  • Gastrointestinal: Soft, nontender, no palpable mass, no hepatosplenomegaly, negative for hernia  • Genitourinary: Testicles descended and  normal.  Small reducible right inguinal hernia.  No evidence of left inguinal hernia.  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  I reviewed most recent office note from Dr. Alvarado, felt that excision of prior axillary node likely adequate treatment and thus far no evidence of progressive disease, following stable right external iliac adenopathy.    PSH:    • Excision left axillary lymph node 11/9/2018  • Flexible sigmoidoscopy 9/23/2020 Dr. Ring at [facility]: Nonbleeding internal hemorrhoids.  Normal mucosa in rectum, sigmoid colon, and descending colon.  Biopsies taken.  Pathology-benign colonic mucosa.  • Colonoscopy 7/10/2019: Normal colon, normal terminal ileum  • EGD 1/9/2019: Normal    FAMILY HISTORY:    • Brother with lymphoma  • Negative for colorectal cancer    SOCIAL HISTORY:   • Denies tobacco use  • Denies alcohol use    ROS:    Influenza Like Illness: no fever, no  cough, no  sore throat, no  body aches, no loss of sense of taste or smell, no known exposure to person with Covid-19.  All other systems reviewed and negative other than presenting complaints.    MELISSA QUINTANILLA M.D.

## 2021-01-12 ENCOUNTER — TELEPHONE (OUTPATIENT)
Dept: ONCOLOGY | Facility: CLINIC | Age: 56
End: 2021-01-12

## 2021-01-12 NOTE — TELEPHONE ENCOUNTER
Pt. States he saw dr. Nguyễn yesterday for a dull pain in his rt pelvic area.  States he sees dr. Alvarado on a yrly basis.  Has a hx of hodgkin's disease.  They have been watching a lymph node in that rt groin area..  Dr Nguyễn has ordered a scan of the abd/pelvis which has not been scheduled yet.  Hoping to get a call today.   Has appt to see Letty Jolley NP on Thursday 1/14/21.  Dr. Nguyễn felt like that appt needs to be cancelled until he gets the results of the scans back.  S/w Letty, will cancel Thursday appt.  Will send the appt desk a message to cancel appt and resched. Pt. back in to see Letty after the scans are back.  Needs to be a day where dr. Alvarado is here in the office.  Pt.  Agreeable.

## 2021-01-12 NOTE — TELEPHONE ENCOUNTER
PT: SELF    PT CALLING BECAUSE ANOTHER DR QUINTANILLA HAS HIM GETTING A CT SCAN OF HIS PELVIC AREA AND THEY THINK THAT YOU MAY WANT TO SEE THIS SCAN, SO PT IS WONDERING IF HE SHOULD R/S HIS APPT FOR THURS THE 14TH UNTIL HE HAS SCAN DONE AND GETS RESULTS BACK? PLEASE ADVISE    PT #: [phone]

## 2021-01-14 ENCOUNTER — APPOINTMENT (OUTPATIENT)
Dept: LAB | Facility: HOSPITAL | Age: 56
End: 2021-01-14

## 2021-01-19 ENCOUNTER — HOSPITAL ENCOUNTER (OUTPATIENT)
Dept: CT IMAGING | Facility: HOSPITAL | Age: 56
Discharge: HOME | End: 2021-01-19
Admitting: SURGERY

## 2021-01-19 ENCOUNTER — APPOINTMENT (OUTPATIENT)
Dept: CT IMAGING | Facility: HOSPITAL | Age: 56
End: 2021-01-19

## 2021-01-19 ENCOUNTER — TELEPHONE (OUTPATIENT)
Dept: SURGERY | Facility: CLINIC | Age: 56
End: 2021-01-19

## 2021-01-19 DIAGNOSIS — Z85.72 HX OF LYMPHOMA: ICD-10-CM

## 2021-01-19 DIAGNOSIS — K40.90 RIGHT INGUINAL HERNIA: ICD-10-CM

## 2021-01-19 LAB — CREAT BLDA-MCNC: 0.8 MG/DL (ref 0.6–1.3)

## 2021-01-19 PROCEDURE — 25010000002 IOPAMIDOL 61 % SOLUTION: Performed by: SURGERY

## 2021-01-19 PROCEDURE — 0 DIATRIZOATE MEGLUMINE & SODIUM PER 1 ML: Performed by: SURGERY

## 2021-01-19 PROCEDURE — 82565 ASSAY OF CREATININE: CPT

## 2021-01-19 PROCEDURE — 74177 CT ABD & PELVIS W/CONTRAST: CPT

## 2021-01-19 RX ADMIN — DIATRIZOATE MEGLUMINE AND DIATRIZOATE SODIUM 30 ML: 660; 100 LIQUID ORAL; RECTAL at 08:41

## 2021-01-19 RX ADMIN — IOPAMIDOL 85 ML: 612 INJECTION, SOLUTION INTRAVENOUS at 08:41

## 2021-01-19 NOTE — TELEPHONE ENCOUNTER
Called patient and informed him of his negative CT results. He inquired about his hernia and asked if it determined why he is having pain. Reports continued right inguinal pain with tenderness to touch. CT done today confirmed a LIH. Your OV note on 1/11/21 states a RIH was confirmed upon exam and LIH was ruled out. What are next steps if any? Surgery on RIH?

## 2021-01-19 NOTE — TELEPHONE ENCOUNTER
----- Message from Amando Nguyễn MD sent at 1/19/2021 12:06 PM EST -----  Please let him know that his CT scan looks good-no change or enlargement of any lymph nodes

## 2021-01-20 ENCOUNTER — TELEPHONE (OUTPATIENT)
Dept: ONCOLOGY | Facility: CLINIC | Age: 56
End: 2021-01-20

## 2021-01-20 NOTE — TELEPHONE ENCOUNTER
Spoke with patient.  He has history of hip issues and is concerned that his right inguinal pain may actually be referred pain from his hip and he plans to see an orthopedic surgeon about this.  I discussed the findings on CT scan and physical examination and he will call me back if he wishes to proceed with hernia repair after he has been evaluated by orthopedics.

## 2021-01-20 NOTE — TELEPHONE ENCOUNTER
Caller: PT    Relationship to patient: SELF    Best call back number: [phone]    Chief complaint: PT HAS DECIDED HE WOULD LIKE TO MAKE HIS APPT INTO A TELE-VISIT TODAY IF POSSIBLE.     Type of visit: LAB AND F/U    Requested date: TODAY SAME TIME    If rescheduling, when is the original appointment:      Additional notes:    PT DECIDED NOT TO CANCEL AND MAKE IT INTO A PHONE VISIT

## 2021-01-20 NOTE — TELEPHONE ENCOUNTER
Caller: PT    Relationship to patient: SELF    Best call back number: [phone]    Chief complaint: PELVIC CT CAME BACK WITH NO CHANGES. DOES PT STILL NEED TO KEEP THIS APPT    Type of visit: LAB AND F/U    Requested date: NONE    If rescheduling, when is the original appointment:     Additional notes:    PLEASE ADVISE IF HE SHOULD KEEP THIS APPT.

## 2021-01-21 ENCOUNTER — OFFICE VISIT (OUTPATIENT)
Dept: ONCOLOGY | Facility: CLINIC | Age: 56
End: 2021-01-21

## 2021-01-21 ENCOUNTER — APPOINTMENT (OUTPATIENT)
Dept: LAB | Facility: HOSPITAL | Age: 56
End: 2021-01-21

## 2021-01-21 DIAGNOSIS — R10.31 RIGHT GROIN PAIN: ICD-10-CM

## 2021-01-21 DIAGNOSIS — R59.9 LYMPH NODE ENLARGEMENT: Primary | ICD-10-CM

## 2021-01-21 PROCEDURE — 99442 PR PHYS/QHP TELEPHONE EVALUATION 11-20 MIN: CPT | Performed by: NURSE PRACTITIONER

## 2021-01-21 NOTE — PROGRESS NOTES
You have chosen to receive care through a telephone visit. Do you consent to use a telephone visit for your medical care today? Yes        Subjective     REASON FOR FOLLOWUP: Left axillary adenopathy with potential evidence of lymph abdominal Hodgkin's disease      HISTORY OF PRESENT ILLNESS:  Patient is a 55-year-old male with the above-mentioned history who was called today for a telehealth visit.  He reports pain recently in the right groin area.  He saw his PCP who recommend he follow-up with Dr. Nguyễn.  Dr. Nguyễn ordered a CT scan for further evaluation, which was essentially negative.  The patient reports he continues to have pain.  He does feel a small amount of swelling in this area.  He states he was told by Dr. Nguyễn that he has a small right inguinal hernia.  The patient is planning on following up with his orthopedic doctor, as he does have a history of a previous right hip labral repair.  If that is negative he may consider having the inguinal hernia repaired.    He denies fevers, chills, night sweats.  He states his weight has been stable.  He denies any other palpable adenopathy.      ONCOLOGIC HISTORY:     The patient is a 54-year-old male who was recently been assessed by surgery having worsening epigastric discomfort since November.  He been seen by Dr. Nguyễn with gallbladder ultrasound and CT scan of abdomen pelvis done in late November demonstrating a possible calcified tiny gallstones within the lumen of the gallbladder.  The same report documents enlarged right external iliac adenopathy that was unchanged from January 2016. he had associated intermittent nausea and ultimately required admission to [facility].  He was admitted December 29-December 31.  His subsequent testing revealed a GI panel that was positive for norovirus and his symptoms improved with gut rest and hydration.  HIDA scan redemonstrated 50% ejection fraction and cholecystectomy was discussed with  follow-up planned.   Additional testing continued as outpatient in January including EGD that demonstrated no gross abnormalities of the first and second portions of the duodenum nor the gastric antrum, body and retroflexed view of the stomach which were thought to be normal.      Importantly the patient and also been reviewed for enlarged left axillary lymph node in November leading to an excision of a deep left axillary lymph node November 9, 2018.  This had initially been evaluated by needle biopsy August 2018 revealing a polymorphous lymphoid population with histiocytes.  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  We have discussed the patient's history extensively and that we were to see him potentially initially in November but as a result of his additional issues medically he is only been seen now approximately 6 months later.  He has no fevers, chills, does have weight loss result of change in his diet.  He describes in particular worsening of a anal fissure that has caused him pain for quite some time as well as symptoms that could potentially be referable to pudendal neuralgia.  The patient was assessed for potential lymphoproliferative disorder with a number of studies including CRP, sed rate, paraprotein analysis,  negative.  Subsequently repeat CT scan of chest and pelvis July 3, 2019 also failed to show any additional lymphadenopathy with a stable appearance to pelvic adenopathy and no intra-abdominal or intrapelvic process seen, no pathologic lymphadenopathy seen in the chest.  He is seen back July 11, 2019 indicating that his major issue has been hyperosmia and he wishes an ENT assessment.  The patient did follow-up with ENT and is now seen back December 30, 2020 still recovering from upper respiratory infection that he believes  he developed after seeing ENT staff members.  He feels about the same, still with hyperosmia but has no fever, chills, appetite reduction or weight loss.  He remains concerned about his back and numbness in his extremities and is scheduled for NCV testing in mid February and follow-up with neurosurgery.  Patient underwent follow-up CT scans May 20 demonstrating right pelvic lymphadenopathy that was stable, no new abnormalities in the abdomen pelvis and no lymphadenopathy or abnormalities within the chest.      Unfortunately his continue to have severe pain in his lower back leading to neurosurgical assessment and MRI imaging the lumbar spine showing multilevel disc desiccation similar to May 2019.  Patient was contacted by telephone May 27, 2020 and his scan results reviewed with him.  He still has episodes of chilling that he notices but otherwise is doing about the same but still has back pain that is bothering him excessively.  He does not have night sweats, weight loss, rash or additional systemic symptoms.      Objective     There were no vitals filed for this visit.  Current Status 2/3/2020   ECOG score 1     Exam not a pulse performed today as this was a telephone visit.  Patient did not sound in any acute distress at all during the conversation, breathing was unlabored and normal.          RECENT LABS:  Hematology WBC   Date Value Ref Range Status   05/20/2020 4.80 3.40 - 10.80 10*3/mm3 Final     RBC   Date Value Ref Range Status   05/20/2020 5.30 4.14 - 5.80 10*6/mm3 Final     Hemoglobin   Date Value Ref Range Status   05/20/2020 15.4 13.0 - 17.7 g/dL Final     Hematocrit   Date Value Ref Range Status   05/20/2020 47.6 37.5 - 51.0 % Final     Platelets   Date Value Ref Range Status   05/20/2020 169 140 - 450 10*3/mm3 Final        CT ABDOMEN AND PELVIS WITH IV CONTRAST-1/19/2021     HISTORY: 55-year-old male with a history of lymphoma. Right inguinal  hernia.     TECHNIQUE: Radiation dose reduction  techniques were utilized, including  automated exposure control and exposure modulation based on body size.   3 mm images were obtained through the abdomen and pelvis after the  administration of IV contrast. Compared with previous CT from  05/20/2020.     FINDINGS: There is no significant change in the right pelvic  lymphadenopathy. The dominant node along the distal right external iliac  vessels changes in shape somewhat with slight differences in positioning  and is best seen in its entirety on the coronal reconstruction series  measuring 3.4 x 2.3 cm which is stable when measured along the same  planes. The previously measured component on the axial sequence is  stable measuring 2.6 x 1.9 cm. A proximal right external iliac chain  node measures 1.9 x 0.9 cm which is stable and slightly distally is a  2.0 x 1.0 cm node which is stable as well. There is no new  lymphadenopathy. Splenic size remains within normal limits. The liver,  gallbladder, pancreas, adrenals, and kidneys appear unremarkable. No  acute bowel abnormality is seen. The appendix appears within normal  limits. There is a stable very small left inguinal hernia containing  fat. There are no airspace opacities or effusions at the visualized  lower lung fields.     IMPRESSION:  Stable right pelvic lymphadenopathy. There is no new  abnormality.    Assessment/Plan      The patient is a 54-year-old male who had previously been assessed for enlarged left axillary adenopathy in November leading to a biopsy that was nondiagnostic but suspicious and eventually an excisional biopsy that was eventually felt to be consistent with follicular hyperplasia and progressive transformation of germinal centers with 2 foci suspicious for early involvement by nodules lymphocyte predominant Hodgkin's lymphoma.  This was likely treated by excision and further therapy is not necessary in this patient though he has a number of symptoms that are at least suspicious for  potential recurrence.  As he was admitted for his GI symptoms, described above, he underwent additional testing that demonstrated right external iliac adenopathy that have been unchanged from 2016 and further endoscopy was negative as well.  Again we had a long discussion today as to how he might of already been treated under the circumstances but that additional surveillance would be necessary in approximately 6 months from his last exam and he is now seen May 16 thus allowing us to reexamine him via scan in the next several months.  He and his mother are agreeable with this plan.  We had the patient proceed with a number of studies that were fortunately negative for evidence of lymphoproliferative disorder.  As he is seen July 11, 2019 his physical exam again does not reveal new lymphadenopathy or hepatosplenomegaly and CBC is normal.    The patient then seen February 3, 2020 doing about the same but having issues with lower extremity numbness which is been reviewed by neurosurgery.  After discussion we had him undergo repeat assessments including CT of chest and pelvis 4 months later.  These are found to be without change and the patient was contacted in that we would like to see him at least on a yearly basis.     · Telephone visit 1/21/2021 to discuss patient's recent CT scans, which were ordered because of complaints of right groin pain.  He has been evaluated by Dr. Nguyễn who did find a small right inguinal hernia.  Patient also has a history of right hip surgery.  CT scan was negative for adenopathy.  Patient is otherwise asymptomatic.  He plans on following up with his orthopedic doctor and possibly Dr. Nguyễn.  We will see him back as scheduled in May 2021.      PLAN:  Return for follow-up visit with Dr. Alvarado as scheduled in May, sooner should he develop any new concerns or problems.        Letty Jolley, BOOKER  01/21/2021      This visit has been rescheduled as a phone visit to comply with  patient safety concerns in accordance with CDC recommendations. Total time of discussion was 17 minutes.

## 2021-02-24 ENCOUNTER — TELEPHONE (OUTPATIENT)
Dept: PAIN MEDICINE | Facility: CLINIC | Age: 56
End: 2021-02-24

## 2021-02-24 NOTE — TELEPHONE ENCOUNTER
Caller:DUY HERNANDEZ     Relationship to patient: SELF    Best call back number: [phone]    Chief complaint: BACK PAIN    Type of visit: INJECTION UNDER ANESTHESIA    Requested date: ASAP(POSSIBLYY IN MARCH IF POSSIBLE)       Additional notes:

## 2021-02-25 ENCOUNTER — TELEPHONE (OUTPATIENT)
Dept: PAIN MEDICINE | Facility: CLINIC | Age: 56
End: 2021-02-25

## 2021-03-01 ENCOUNTER — TRANSCRIBE ORDERS (OUTPATIENT)
Dept: SURGERY | Facility: SURGERY CENTER | Age: 56
End: 2021-03-01

## 2021-03-01 DIAGNOSIS — Z41.9 SURGERY, ELECTIVE: Primary | ICD-10-CM

## 2021-03-01 PROBLEM — M47.816 LUMBAR FACET ARTHROPATHY: Status: ACTIVE | Noted: 2021-03-01

## 2021-03-09 ENCOUNTER — TRANSCRIBE ORDERS (OUTPATIENT)
Dept: LAB | Facility: SURGERY CENTER | Age: 56
End: 2021-03-09

## 2021-03-09 DIAGNOSIS — Z01.818 OTHER SPECIFIED PRE-OPERATIVE EXAMINATION: Primary | ICD-10-CM

## 2021-03-10 ENCOUNTER — LAB (OUTPATIENT)
Dept: LAB | Facility: SURGERY CENTER | Age: 56
End: 2021-03-10

## 2021-03-10 DIAGNOSIS — Z01.818 OTHER SPECIFIED PRE-OPERATIVE EXAMINATION: ICD-10-CM

## 2021-03-10 PROCEDURE — C9803 HOPD COVID-19 SPEC COLLECT: HCPCS

## 2021-03-10 PROCEDURE — U0004 COV-19 TEST NON-CDC HGH THRU: HCPCS | Performed by: SURGERY

## 2021-03-11 LAB — SARS-COV-2 ORF1AB RESP QL NAA+PROBE: NOT DETECTED

## 2021-03-11 PROCEDURE — 64495 INJ PARAVERT F JNT L/S 3 LEV: CPT | Performed by: ANESTHESIOLOGY

## 2021-03-11 PROCEDURE — 64494 INJ PARAVERT F JNT L/S 2 LEV: CPT | Performed by: ANESTHESIOLOGY

## 2021-03-11 PROCEDURE — S0260 H&P FOR SURGERY: HCPCS | Performed by: ANESTHESIOLOGY

## 2021-03-11 NOTE — H&P
Brief Pre-procedural / Pre-operative H&P        -----    Pertinent Diagnosis:   Lumbar spondylosis/lumbar facet arthropathy    Proposed Procedure: Diagnostic lumbar medial branch location      Subjective   Jose Maria Judge is a 55 y.o. male  who presents for intervention.  He has a history of low back pain it seems axial in nature, along with other painful problems.      History of Present Illness     He has a history of pain of the anus and I think that that other painful problem is unrelated to his low back pain.  He has pain on palpation in the low back with pain on extension and pain on palpation of the facets.  He has a lack of any clear lumbar radicular symptoms.    -------    The following portions of the patient's history were reviewed and updated as appropriate: allergies, current medications, past family history, past medical history, past social history, past surgical history and problem list.    Allergies   Allergen Reactions   • Bactrim [Sulfamethoxazole-Trimethoprim] Shortness Of Breath   • Hydrocodone Shortness Of Breath   • Medrol [Methylprednisolone] Unknown - High Severity     Rectal bleeding   • Mobic [Meloxicam] Nausea Only and Other (See Comments)     Severe heartburn   • Naproxen Shortness Of Breath   • Nsaids Unknown - High Severity     Can take Ibuprofen.. Causes constipation   • Sulfa Antibiotics Shortness Of Breath     TROUBLE BREATHING   • Diclofenac Nausea Only and Other (See Comments)     Severe heartburn   • Levaquin [Levofloxacin] Unknown - Low Severity     Made tendons tight         Current Facility-Administered Medications:   •  famotidine (PEPCID) injection 20 mg, 20 mg, Intravenous, Once, Shena Ireland MD  •  fentaNYL citrate (PF) (SUBLIMAZE) injection 50 mcg, 50 mcg, Intravenous, Q10 Min PRN, Shena Ireland MD  •  lactated ringers infusion, 9 mL/hr, Intravenous, Continuous PRN, Francisco Velez MD  •  lactated ringers infusion, 9 mL/hr, Intravenous, Continuous, Beka  Shena AZUL MD, Last Rate: 9 mL/hr at 03/12/21 0845, 9 mL/hr at 03/12/21 0845  •  midazolam (VERSED) injection 1 mg, 1 mg, Intravenous, Q10 Min PRN **OR** midazolam (VERSED) injection 2 mg, 2 mg, Intravenous, Q10 Min PRN, Shena Ireland MD  •  sodium chloride 0.9 % flush 10 mL, 10 mL, Intravenous, Q12H, Francisco Velez MD  •  sodium chloride 0.9 % flush 10 mL, 10 mL, Intravenous, PRN, Francisco Velez MD    No current facility-administered medications on file prior to encounter.     Current Outpatient Medications on File Prior to Encounter   Medication Sig Dispense Refill   • acetaminophen (TYLENOL) 500 MG tablet Take 500 mg by mouth Every 6 (Six) Hours As Needed for Mild Pain .     • brompheniramine-pseudoephedrine-DM 30-2-10 MG/5ML syrup Take 5 mL by mouth 4 (Four) Times a Day As Needed.     • Cholecalciferol (Vitamin D) 50 MCG (2000 UT) tablet Take 2,000 Units by mouth Daily.     • guaiFENesin (MUCINEX) 600 MG 12 hr tablet Take 300 mg by mouth As Needed for Cough.     • ibuprofen (ADVIL,MOTRIN) 200 MG tablet Take 200 mg by mouth Every 6 (Six) Hours As Needed for Mild Pain .     • Multiple Vitamins-Minerals (ZINC PO) Take 1 tablet by mouth Daily.     • Elastic Bandages & Supports (ELASTIC BACK SUPPORT) misc 1 each Take As Directed. 1 each 0       Patient Active Problem List   Diagnosis   • Bilateral hip pain   • Right hip pain   • Left hip pain   • Trochanteric bursitis   • Tear of acetabular labrum   • Loss of hair   • Anal burning   • Anxiety   • Elevated blood pressure   • Hamstring strain   • Hypertension   • Radiculitis   • Hamstring muscle strain   • Rectal pain   • Herpes zoster   • Varicose veins of other specified sites   • Lymph node enlargement   • Partial small bowel obstruction (CMS/HCC)   • Epigastric pain   • Lymphadenopathy, axillary   • Hypersensitivity to odor   • Chronic bilateral low back pain without sciatica   • Nausea   • Anal or rectal pain   • Lumbar facet arthropathy       Past  Medical History:   Diagnosis Date   • Abdominal lymphadenopathy    • Acid reflux     HX   • Anxiety     R/T PAIN   • Carpal tunnel syndrome, left    • Chronic pain    • DDD (degenerative disc disease), lumbar    • Fatigue    • H/O Hypersensitivity to odor     Burning sensation in nasal passage   • Hemorrhoids     internal fistula   • Irritable bowel syndrome    • Low back pain    • Lymphadenopathy, axillary     LEFT   • Male pattern alopecia    • Pudendal neuralgia    • Right hip pain    • Unexplained night sweats        Past Surgical History:   Procedure Laterality Date   • ABSCESS DRAINAGE  08/2012   • ANAL FISTULA REPAIR N/A 09/2012, 10/2012   • AXILLARY LYMPH NODE BIOPSY/EXCISION Left 11/9/2018    Procedure: AXILLARY LYMPH NODE BIOPSY/EXCISION;  Surgeon: Amando Nguyễn MD;  [place]  [facility] [place] [facility] [place]  Service: General   • COLONOSCOPY N/A 2013    done at [facility]   • COLONOSCOPY N/A 7/10/2019    Procedure: COLONOSCOPY to CECUM;  Surgeon: Amando Nguyễn MD;  [facility];  Service: General   • ENDOSCOPY N/A 1/9/2019    Procedure: ESOPHAGOGASTRODUODENOSCOPY;  Surgeon: Amando Nguyễn MD;  Location[facility];  Service: General   • FINE NEEDLE ASPIRATION Left 08/09/2018    Left axillary lymph node FNA   • FLEXIBLE SIGMOIDOSCOPY N/A 06/25/2003    Normal-Dr. Cezar Aviles   • HEMORRHOIDECTOMY N/A 1996   • HIP ARTHROSCOPY W/ LABRAL REPAIR Right 04/03/2017    Dr. Lonnie Lemons   • SIGMOIDOSCOPY N/A 9/23/2020    Procedure: FLEXIBLE SIGMOIDOSCOPY WITH BIOPSY;  Surgeon: Shena Ring MD;  Location[facility];  Service: Gastroenterology;  Laterality: N/A;  pre- anal/rectal pain  post- hemorrhoids   • TONSILLECTOMY Bilateral        Family History   Problem Relation Age of Onset   • Atrial fibrillation Father    • Aneurysm Father    • Hypertension Father    • Lymphoma Brother    • Malig Hyperthermia Neg Hx        Social History     Socioeconomic History   • Marital status: Single  "    Spouse name: Not on file   • Number of children: 0   • Years of education: COLLEGE   • Highest education level: Not asked   Tobacco Use   • Smoking status: Never Smoker   • Smokeless tobacco: Never Used   • Tobacco comment: caff use   Vaping Use   • Vaping Use: Never used   Substance and Sexual Activity   • Alcohol use: No   • Drug use: No   • Sexual activity: Defer       -------       Review of Systems  No Fever, No Chills, No ear pain, No sinus pressure or drainage, No eye pain or drainage, No cough, No SOB, No chest tightness nor chest pain, no palpitations.          Vitals:    03/09/21 1437 03/12/21 0849   BP:  128/77   BP Location:  Left arm   Patient Position:  Standing   Pulse:  69   Resp:  20   Temp:  97.2 °F (36.2 °C)   TempSrc:  Tympanic   SpO2:  97%   Weight: 93 kg (205 lb) 93 kg (205 lb)   Height: 180.3 cm (71\") 180.3 cm (71\")         Objective   Physical Exam  VSS, NNR, NCAT, NMNA, NRD, AAOx3.   -See HPI    -------    Assessment & Plan:  - as noted above, the stated intervention is indicated  - Follow-up plan will be noted in the operative report      We will examine it under fluoroscopy to ensure we find the most painful area over the central location of his pain, and identifying that area with the thorough exam under fluoroscopy before we sedate him and proceed to block those branches.    We will plan for office follow-up very soon      EMR Dragon/Transcription disclaimer:   Typed items in this encounter note may have been created by electronic transcription/translation software which converts spoken language to printed text. The electronic translation of spoken language may permit erroneous, or at times, nonsensical words or phrases to be inadvertently transcribed; Although I have reviewed the note for such errors, some may still exist.      "

## 2021-03-12 ENCOUNTER — ANESTHESIA EVENT (OUTPATIENT)
Dept: SURGERY | Facility: SURGERY CENTER | Age: 56
End: 2021-03-12

## 2021-03-12 ENCOUNTER — HOSPITAL ENCOUNTER (OUTPATIENT)
Dept: GENERAL RADIOLOGY | Facility: SURGERY CENTER | Age: 56
Setting detail: HOSPITAL OUTPATIENT SURGERY
End: 2021-03-12

## 2021-03-12 ENCOUNTER — ANESTHESIA (OUTPATIENT)
Dept: SURGERY | Facility: SURGERY CENTER | Age: 56
End: 2021-03-12

## 2021-03-12 ENCOUNTER — HOSPITAL ENCOUNTER (OUTPATIENT)
Facility: SURGERY CENTER | Age: 56
Setting detail: HOSPITAL OUTPATIENT SURGERY
Discharge: HOME | End: 2021-03-12
Attending: ANESTHESIOLOGY | Admitting: ANESTHESIOLOGY

## 2021-03-12 VITALS
OXYGEN SATURATION: 94 % | HEART RATE: 59 BPM | TEMPERATURE: 97.9 F | SYSTOLIC BLOOD PRESSURE: 107 MMHG | RESPIRATION RATE: 16 BRPM | HEIGHT: 71 IN | WEIGHT: 205 LBS | BODY MASS INDEX: 28.7 KG/M2 | DIASTOLIC BLOOD PRESSURE: 84 MMHG

## 2021-03-12 DIAGNOSIS — Z41.9 SURGERY, ELECTIVE: ICD-10-CM

## 2021-03-12 PROCEDURE — 64494 INJ PARAVERT F JNT L/S 2 LEV: CPT | Performed by: ANESTHESIOLOGY

## 2021-03-12 PROCEDURE — 25010000002 PROPOFOL 10 MG/ML EMULSION: Performed by: ANESTHESIOLOGY

## 2021-03-12 PROCEDURE — 64495 INJ PARAVERT F JNT L/S 3 LEV: CPT | Performed by: ANESTHESIOLOGY

## 2021-03-12 PROCEDURE — 25010000003 LIDOCAINE 1 % SOLUTION: Performed by: ANESTHESIOLOGY

## 2021-03-12 PROCEDURE — 64493 INJ PARAVERT F JNT L/S 1 LEV: CPT | Performed by: ANESTHESIOLOGY

## 2021-03-12 PROCEDURE — 0 IOHEXOL 300 MG/ML SOLUTION: Performed by: ANESTHESIOLOGY

## 2021-03-12 PROCEDURE — 3E0T3BZ INTRODUCTION OF ANESTHETIC AGENT INTO PERIPHERAL NERVES AND PLEXI, PERCUTANEOUS APPROACH: ICD-10-PCS | Performed by: ANESTHESIOLOGY

## 2021-03-12 PROCEDURE — 25010000002 FENTANYL CITRATE (PF) 100 MCG/2ML SOLUTION: Performed by: ANESTHESIOLOGY

## 2021-03-12 RX ORDER — BUPIVACAINE HYDROCHLORIDE 5 MG/ML
INJECTION, SOLUTION EPIDURAL; INTRACAUDAL AS NEEDED
Status: DISCONTINUED | OUTPATIENT
Start: 2021-03-12 | End: 2021-03-12 | Stop reason: HOSPADM

## 2021-03-12 RX ORDER — SODIUM CHLORIDE 0.9 % (FLUSH) 0.9 %
10 SYRINGE (ML) INJECTION AS NEEDED
Status: DISCONTINUED | OUTPATIENT
Start: 2021-03-12 | End: 2021-03-12 | Stop reason: HOSPADM

## 2021-03-12 RX ORDER — LIDOCAINE HYDROCHLORIDE 20 MG/ML
INJECTION, SOLUTION INFILTRATION; PERINEURAL AS NEEDED
Status: DISCONTINUED | OUTPATIENT
Start: 2021-03-12 | End: 2021-03-12 | Stop reason: SURG

## 2021-03-12 RX ORDER — SODIUM CHLORIDE, SODIUM LACTATE, POTASSIUM CHLORIDE, CALCIUM CHLORIDE 600; 310; 30; 20 MG/100ML; MG/100ML; MG/100ML; MG/100ML
9 INJECTION, SOLUTION INTRAVENOUS CONTINUOUS
Status: DISCONTINUED | OUTPATIENT
Start: 2021-03-12 | End: 2021-03-12 | Stop reason: HOSPADM

## 2021-03-12 RX ORDER — SODIUM CHLORIDE 0.9 % (FLUSH) 0.9 %
10 SYRINGE (ML) INJECTION EVERY 12 HOURS SCHEDULED
Status: DISCONTINUED | OUTPATIENT
Start: 2021-03-12 | End: 2021-03-12 | Stop reason: HOSPADM

## 2021-03-12 RX ORDER — PROPOFOL 10 MG/ML
VIAL (ML) INTRAVENOUS AS NEEDED
Status: DISCONTINUED | OUTPATIENT
Start: 2021-03-12 | End: 2021-03-12 | Stop reason: SURG

## 2021-03-12 RX ORDER — MIDAZOLAM HYDROCHLORIDE 1 MG/ML
1 INJECTION INTRAMUSCULAR; INTRAVENOUS
Status: DISCONTINUED | OUTPATIENT
Start: 2021-03-12 | End: 2021-03-12 | Stop reason: HOSPADM

## 2021-03-12 RX ORDER — SODIUM CHLORIDE, SODIUM LACTATE, POTASSIUM CHLORIDE, CALCIUM CHLORIDE 600; 310; 30; 20 MG/100ML; MG/100ML; MG/100ML; MG/100ML
9 INJECTION, SOLUTION INTRAVENOUS CONTINUOUS PRN
Status: DISCONTINUED | OUTPATIENT
Start: 2021-03-12 | End: 2021-03-12 | Stop reason: HOSPADM

## 2021-03-12 RX ORDER — LIDOCAINE HYDROCHLORIDE 10 MG/ML
INJECTION, SOLUTION INFILTRATION; PERINEURAL AS NEEDED
Status: DISCONTINUED | OUTPATIENT
Start: 2021-03-12 | End: 2021-03-12 | Stop reason: HOSPADM

## 2021-03-12 RX ORDER — SODIUM CHLORIDE, SODIUM LACTATE, POTASSIUM CHLORIDE, CALCIUM CHLORIDE 600; 310; 30; 20 MG/100ML; MG/100ML; MG/100ML; MG/100ML
INJECTION, SOLUTION INTRAVENOUS CONTINUOUS PRN
Status: DISCONTINUED | OUTPATIENT
Start: 2021-03-12 | End: 2021-03-12 | Stop reason: SURG

## 2021-03-12 RX ORDER — MIDAZOLAM HYDROCHLORIDE 1 MG/ML
2 INJECTION INTRAMUSCULAR; INTRAVENOUS
Status: DISCONTINUED | OUTPATIENT
Start: 2021-03-12 | End: 2021-03-12 | Stop reason: HOSPADM

## 2021-03-12 RX ORDER — FENTANYL CITRATE 50 UG/ML
50 INJECTION, SOLUTION INTRAMUSCULAR; INTRAVENOUS
Status: DISCONTINUED | OUTPATIENT
Start: 2021-03-12 | End: 2021-03-12 | Stop reason: HOSPADM

## 2021-03-12 RX ADMIN — SODIUM CHLORIDE, POTASSIUM CHLORIDE, SODIUM LACTATE AND CALCIUM CHLORIDE 9 ML/HR: 600; 310; 30; 20 INJECTION, SOLUTION INTRAVENOUS at 08:45

## 2021-03-12 RX ADMIN — GLYCOPYRROLATE 0.2 MG: 0.2 INJECTION, SOLUTION INTRAMUSCULAR; INTRAVITREAL at 10:02

## 2021-03-12 RX ADMIN — PROPOFOL 100 MCG/KG/MIN: 10 INJECTION, EMULSION INTRAVENOUS at 10:02

## 2021-03-12 RX ADMIN — LIDOCAINE HYDROCHLORIDE 80 MG: 20 INJECTION, SOLUTION INFILTRATION; PERINEURAL at 10:02

## 2021-03-12 RX ADMIN — SODIUM CHLORIDE, SODIUM LACTATE, POTASSIUM CHLORIDE, AND CALCIUM CHLORIDE: .6; .31; .03; .02 INJECTION, SOLUTION INTRAVENOUS at 09:51

## 2021-03-12 RX ADMIN — FENTANYL CITRATE 50 MCG: 50 INJECTION, SOLUTION INTRAMUSCULAR; INTRAVENOUS at 10:02

## 2021-03-12 RX ADMIN — PROPOFOL 80 MG: 10 INJECTION, EMULSION INTRAVENOUS at 10:02

## 2021-03-12 NOTE — ANESTHESIA PREPROCEDURE EVALUATION
Anesthesia Evaluation                  Airway   Mallampati: II  TM distance: >3 FB  Neck ROM: full  No difficulty expected  Dental - normal exam   (+) poor dentition    Pulmonary - normal exam   Cardiovascular - normal exam    (+) hypertension well controlled,       Neuro/Psych  (+) numbness, psychiatric history,     GI/Hepatic/Renal/Endo    (+)  GERD,      Musculoskeletal     (+) back pain, chronic pain,   Abdominal    Substance History      OB/GYN          Other   arthritis,                    Anesthesia Plan    ASA 2     MAC     intravenous induction     Anesthetic plan, all risks, benefits, and alternatives have been provided, discussed and informed consent has been obtained with: patient.

## 2021-03-12 NOTE — ANESTHESIA POSTPROCEDURE EVALUATION
"Patient: Jose Maria Judge    Procedure Summary     Date: 03/12/21 Room / Location: [facility] OR 02 / [facility]    Anesthesia Start: 0951 Anesthesia Stop: 1019    Procedure: BILATERAL MEDIAL BRANCH BLOCK W/MAC (Bilateral ) Diagnosis:       Lumbar facet arthropathy      (Lumbar facet arthropathy [M47.816])    Surgeons: Francisco Vleez MD Provider: Shena Ireland MD    Anesthesia Type: MAC ASA Status: 2          Anesthesia Type: MAC    Vitals  No vitals data found for the desired time range.          Post Anesthesia Care and Evaluation    Patient location during evaluation: bedside  Patient participation: complete - patient participated  Level of consciousness: awake  Pain management: adequate  Airway patency: patent  Anesthetic complications: No anesthetic complications    Cardiovascular status: acceptable  Respiratory status: acceptable  Hydration status: acceptable    Comments: /77 (BP Location: Left arm, Patient Position: Standing)   Pulse 69   Temp 36.2 °C (97.2 °F) (Tympanic)   Resp 20   Ht 180.3 cm (71\")   Wt 93 kg (205 lb)   SpO2 97%   BMI 28.59 kg/m²       "

## 2021-03-12 NOTE — OP NOTE
Bilateral L2-5 Lumbar Medial Branch Blockade  [facility]    PREOPERATIVE DIAGNOSIS:  Lumbar spondylosis without myelopathy    POSTOPERATIVE DIAGNOSIS:  Lumbar spondylosis without myelopathy    PROCEDURE:   Diagnostic Bilateral Lumbar Medial Branch Nerve Blockades, with fluoroscopy:  L2, L3, L4, and L5 nerves (at the L3, L4, L5 transverse processes and the sacral alar groove) to block facet joints L3-4, L4-5, and L5-S1  1. 98364-22 -- Bilateral Lumbar Facet blocks, 1st Level  2. 58134-71 -- Bilateral Lumbar Facet blocks, 2nd  Level  3. 72283-39 -- Bilateral Lumbar Facet blocks, 3rd Level    PRE-PROCEDURE DISCUSSION WITH PATIENT:    Risks and complications were discussed with the patient prior to starting the procedure and informed consent was obtained.      SURGEON:  Francisco Velez MD    REASON FOR PROCEDURE:   The patient complains of pain that seems to have a significant axial component, Tenderness of the affected facet joints on exam under fluoroscopy, Painful area identified on exam under fluoroscopy and Pain on extension of the lumbar spine    SEDATION:  Monitored Anesthesia Care (MAC) and The use of MAC care was carefully considered, and for this particular patient the need for additional procedural sedation seemed necessary in this instance to safely perform the procedure.  ANESTHETIC:  Marcaine 0.5%  STEROID:   no  TOTAL VOLUME OF SOLUTION: 4ml    DESCRIPTON OF PROCEDURE:  After obtaining informed consent, IV access was obtained in the preoperative area.   The patient was taken to the operating room.  The patient was placed in the prone position with a pillow under the abdomen. All pressure points were well padded.  EKG, blood pressure, and pulse oximeter were monitored.  The patient was monitored and sedated by Dr. Ireland from the anesthesiology group. The lumbosacral area was prepped with Chloraprep and draped in a sterile fashion. Under fluoroscopic guidance the transverse processes  of the L3, L4, and L5 vertebrae at the junctions of the superior articular processes were identified on the right. Also identified was the groove between the ala and the superior articular process of the sacrum on the ipsilateral side.  Skin and subcutaneous tissue were anesthetized with 1% lidocaine above each of these points. A 22-gauge spinal needle was introduced under fluoroscopic guidance at the above junctions. Aspiration was negative for blood and CSF.  After confirming the position of the needle with fluoroscope in all views, 0.25 mL of Omnipaque was injected, and after seeing the proper spread a total of 0.5 mL of the anesthetic solution noted above was injected at each of these points.  Needles were removed intact from each of the areas.  A similar procedure was repeated to block the L2, L3, L4, and L5 nerves on the contralateral side.   Onset of analgesia was noted.  Vital signs remained stable throughout.      ESTIMATED BLOOD LOSS:  <5 mL  SPECIMENS:  none    COMPLICATIONS:   No complications were noted.    TOLERANCE & DISCHARGE CONDITION:    The patient tolerated the procedure well.  The patient was transported to the recovery area without difficulties.  The patient was discharged to home under the care of family in stable and satisfactory condition.    PLAN OF CARE:  1. The patient was given our standard instruction sheet.  2. We discussed that Lumbar Medial Branch Blockade is a diagnostic procedure in consideration for radiofrequency ablation if two diagnostic procedures prove to be positive for significant benefit.  If sustained relief of 6 to eight weeks is obtained, then an alternative plan could be therapeutic lumbar branch blockades.  3. The patient is asked to keep a pain log each hour for 8 hours after the procedure today.  4. The patient will  Return to clinic 1 wk.  5. The patient will resume all medications as per the medication reconciliation sheet.

## 2021-03-12 NOTE — DISCHARGE INSTRUCTIONS
-Feel free to contact us at: [phone] with any additional questions/concerns       ----------------           [facility] - Post-procedure Instructions          --  While there are no absolute restrictions, it is recommended that you do not perform strenuous activity today. In the morning, you may resume your level of activity as before your block.    --  If you have a band-aid at your injection site, please remove it later today. Observe the area for any redness, swelling, pus-like drainage, or a temperature over 101°. If any of these symptoms occur, please call your doctor at [phone]. If after office hours, leave a message and the on-call provider will return your call.    --  Ice may be applied to your injection site. It is recommended you avoid direct heat (heating pad; hot tub) for 1-2 days.    --  Call [facility] at [phone] if you experience persistent headache, persistent bleeding from the injection site, or severe pain not relieved by heat or oral medication.    --  Do not make important decisions today.    --  Due to the effects of the block and/or the I.V. Sedation, DO NOT drive or operate hazardous machinery for 12 hours.    --  Do not drink alcohol for 12 hours.    -- You may return to work tomorrow, or as directed by your referring doctor.    --  Occasionally you may notice a slight increase in your pain after the procedure. This should start to improve within the next 24-48 hours. Radiofrequency ablation procedure pain may last 3-4 weeks.    --  It may take as long as 3-4 days before you notice a gradual improvement in your pain and/or other symptoms.    -- You may continue to take your prescribed pain medication as needed.    --  Some normal possible side effects of steroid use could include fluid retention, increased blood sugar, dull headache, increased sweating, increased appetite, mood swings and flushing.    --  Diabetics are recommended to watch their blood  "glucose level closely for 24-48 hours after the injection.    --  Must stay in PACU for 20 min upon arrival and prove no leg weakness before being discharged.    --  IN THE EVENT OF A LIFE THREATENING EMERGENCY, (CHEST PAIN, BREATHING DIFFICULTIES, PARALYSIS…) YOU SHOULD GO TO YOUR NEAREST EMERGENCY ROOM.    --  You should be contacted by our office within 2-3 days to schedule follow up or next appointment date.  If not contacted within 7 days, please call the office at [phone]      ----    -------  Education about Medial Branch Blockade and RF Therapy:    This medial branch blockade (MBB) suggested is intended for diagnostic purposes, with the intent of offering the patient Radiofrequency thermal rhizotomy (RF) if the MBB is diagnostically effective.  The diagnostic blockade is necessary to determine the likelihood that RF therapy could be efficacious in providing long term relief to the patient.    Medial branches are sensory nerve branches that connect to a facet joint and transmit sensations & pain signals from that joint.  Facet is a term for the type of joints found in the spine.  Medial branches are the nerves that go to a facet, and therefore are also sometimes called \"facet joint nerves\" (FJNs).      In a medial branch blockade procedure, xray fluoroscopy is used to verify the locations of the outside of the joint lines which are being targeted.  Under xray guidance, needles are placed to these areas.  Contrast dye is injected to confirm proper placement, with dye flowing over the joint area, and to ensure that the dye does not flow into unintended areas such as a vein.  When this is confirmed, local anesthetic is injected to block the medial branch at that joint level.      If MBBs are diagnostically successful in blocking pain, then the patient is most likely a great candidate for Radiofrequency of those facet joint nerves.  In the RF procedure, needles are placed to the joint lines in the same " fashion, and after testing, the needle tips are heated to thermally treat the nerves, blocking the nerves by in essence damaging the nerves with the heat treatment.       Medically, a successful RF procedure should provide a patient with 50% pain relief or more for at least 6 months.  Clinical experience suggests that successful patients receive relief more in the range of 12 months on average.  We also discussed that a fortunate minority of patients receive therapeutic success from the MBB, and may not require RF ablation.  If a patient receives more than 8 weeks of relief from MBB, then occasional repeat MBB for therapeutic purposes is a very reasonable alternative therapy.  This course of therapy is consistent with our LCDs according to our CMS  in the area, and therefore other insurance providers should follow accordingly.  We will monitor our patients to screen for these therapeutic responders and will offer RF therapy only when necessary.        We discussed that MBB & RF are not without risks.  Guidelines regarding anticoagulant use & neuraxial procedures will be respected.  Patients that are ill or otherwise may be at risk for sepsis will not have their spines accessed by neuraxial injections of any type.  This patient will not be offered these therapies if there is an increased risk.   We discussed that there is a risk of postprocedural pain and also a risk of worsening of clinical picture with these procedures as with any neuraxial procedure.    -------

## 2021-03-16 ENCOUNTER — TELEPHONE (OUTPATIENT)
Dept: PAIN MEDICINE | Facility: CLINIC | Age: 56
End: 2021-03-16

## 2021-03-16 NOTE — TELEPHONE ENCOUNTER
Caller: DUY HERNANDEZ    Relationship to patient:SELF     Best call back number: [phone]    Chief complaint: PATIENT CALLING TO SCHEDULE HIS FOLLOW UP APPT AFTER PROCEDURE ON 03/12/2021    Type of visit: FOLLOW UP    Requested date: ???

## 2021-03-23 ENCOUNTER — TELEPHONE (OUTPATIENT)
Dept: PAIN MEDICINE | Facility: CLINIC | Age: 56
End: 2021-03-23

## 2021-03-23 NOTE — TELEPHONE ENCOUNTER
Caller: PATIENT      Relationship to patient: SELF      Best call back number: [phone]      Type of visit: FOLLOW UP     Requested date: 03/24/21     If rescheduling, when is the original appointment: 03/24/21     Additional notes: PT. STATES THAT HE HAS A FOLLOW UP WITH [facility] TOMORROW FROM HIS PAIN BLOCK INJECTIONS DONE 03/12/21.   PT. ASKING IF THIS CAN BE CHANGED TO A PHONE VISIT.   STATES THAT HE HAS ANOTHER INJURY THAT MAKES IT HARDER FOR HIM TO DRIVE TO APPT.   PLEASE CALL TO ADVISE.

## 2021-03-23 NOTE — TELEPHONE ENCOUNTER
We can try. If repeat injections are needed insurance may require a physical exam, but we can start with telephone visit for now if he prefers/

## 2021-03-24 ENCOUNTER — TELEPHONE (OUTPATIENT)
Dept: PAIN MEDICINE | Facility: CLINIC | Age: 56
End: 2021-03-24

## 2021-03-24 ENCOUNTER — PREP FOR SURGERY (OUTPATIENT)
Dept: SURGERY | Facility: SURGERY CENTER | Age: 56
End: 2021-03-24

## 2021-03-24 ENCOUNTER — OFFICE VISIT (OUTPATIENT)
Dept: PAIN MEDICINE | Facility: CLINIC | Age: 56
End: 2021-03-24

## 2021-03-24 ENCOUNTER — BULK ORDERING (OUTPATIENT)
Dept: CASE MANAGEMENT | Facility: OTHER | Age: 56
End: 2021-03-24

## 2021-03-24 DIAGNOSIS — M54.50 CHRONIC BILATERAL LOW BACK PAIN WITHOUT SCIATICA: Primary | ICD-10-CM

## 2021-03-24 DIAGNOSIS — G89.29 CHRONIC BILATERAL LOW BACK PAIN WITHOUT SCIATICA: Primary | ICD-10-CM

## 2021-03-24 DIAGNOSIS — M47.816 LUMBAR FACET ARTHROPATHY: Primary | ICD-10-CM

## 2021-03-24 DIAGNOSIS — K62.89 ANAL OR RECTAL PAIN: ICD-10-CM

## 2021-03-24 DIAGNOSIS — Z23 IMMUNIZATION DUE: ICD-10-CM

## 2021-03-24 DIAGNOSIS — M47.816 LUMBAR FACET ARTHROPATHY: ICD-10-CM

## 2021-03-24 PROCEDURE — 99442 PR PHYS/QHP TELEPHONE EVALUATION 11-20 MIN: CPT | Performed by: NURSE PRACTITIONER

## 2021-03-24 RX ORDER — SODIUM CHLORIDE 0.9 % (FLUSH) 0.9 %
10 SYRINGE (ML) INJECTION AS NEEDED
Status: CANCELLED | OUTPATIENT
Start: 2021-03-24

## 2021-03-24 RX ORDER — SODIUM CHLORIDE 0.9 % (FLUSH) 0.9 %
10 SYRINGE (ML) INJECTION EVERY 12 HOURS SCHEDULED
Status: CANCELLED | OUTPATIENT
Start: 2021-03-24

## 2021-03-24 NOTE — TELEPHONE ENCOUNTER
An RF was not ordered. He will need to have a second lumbar MBB, an exact repeat of the last procedure prior to consideration of an RFA.  I discussed this with the patient today in detail.  After the second lumbar MBB we will f/u with the patient to discuss the POTENTIAL for RFA. If he is considered an appropriate candidate, we can go over the procedure in more detail at that time.

## 2021-03-24 NOTE — TELEPHONE ENCOUNTER
Provider: BASIL[facility] MOISE  Caller: UNRULY HERNANDEZ  Relationship to Patient: MOTHER    Phone Number: [phone]    Reason for Call: MOTHER OF PATIENT REQUESTING A CLL BACK FROM [facility]ILIA MOISE- ON 2018  VERBAL- MOTHER STATES PATIENT IS NOT FEELING WELL REGARDING HIS PROCEDURE

## 2021-03-24 NOTE — PROGRESS NOTES
TELEPHONE VISIT  You have chosen to receive care through a telephone visit. Do you consent to use a telephone visit for your medical care today? Yes    CHIEF COMPLAINT  Back pain    Subjective   Jose Maria Judge is a 55 y.o. male  who presents for a telephonic follow-up.He has a history of chronic back pain.    Today's visit is f/u from procedure.  He completed a diagnostic lumbar MBB (bilateral L2-L5) on 3/12/2021. The procedure was done by Dr. Velez. He reports that that he was surprised by the 8 injections.  He reports that he was numb in the back for a few hours following the injection, reporting nearly 100% diagnostic pain relief.  He also reports that this helped some with his pudendal neuralgia pain diagnostically as well.      He has previously seen Dr. Roberts regarding his chronic back pain. No surgery recommended.  Recommended interventional pain management.      Patient also has history of pudendal neuralgia. Dr. Velez has offered the patient nerve block on multiple occasions, he opted to continue with conservative treatment.  Pelvic PT has helped some.      Back Pain  This is a chronic problem. The problem occurs daily. The pain is present in the lumbar spine. The pain does not radiate.     The following portions of the patient's history were reviewed and updated as appropriate: allergies, current medications, past family history, past medical history, past social history, past surgical history and problem list.    Review of Systems   Musculoskeletal: Positive for back pain.     There were no vitals filed for this visit.    Objective   Physical Exam  As this is a telephone check-in, the ability to perform a routine physical exam is extremely limited. The patient seems alert and is oriented appropriately.   On this phone call there is not any evidence of respiratory distress.   The patient seems of normal mood.   The remainder of a routine physical exam is deferred.    Assessment/Plan   Diagnoses and all  orders for this visit:    1. Chronic bilateral low back pain without sciatica (Primary)    2. Lumbar facet arthropathy    3. Anal or rectal pain      ----------------    Our practice is offering alternative &/or electronic methods to continue to follow our patients while at the same time further the efforts toward social distancing, in accordance with our organizational policies, professional societies' guidance, and [facility] mandates.  I support the Healthy at Home campaign and in this visit I have counseled the patient on our needs to limit in-person office visits and physical encounters with medical facilities whenever possible.  I have also educated the patient on the medical necessities of maintaining social distancing while we continue to function during this crisis period.      The patient was counseled on the need to consider telehealth options. The patient was offered the opportunity for a Video Visit using Fullscreen. The patient agreed to a Telephone Encounter. The patient was counseled on the need for a telehealth visit for necessary monitoring. The patient was educated about our efforts to comply with monitoring standards when prescribing potent medications. During these pandemic conditions, telephone encounters are federally mandated as acceptable alternative to videoconference or in-person encounters to ensure that an individual can receive appropriate care and be monitored regardless of access to technology.   Coding & Billing for telehealth encounter is completed based on medical decision making concurrent with the 2021 E&M criteria, based on current federally mandated guidelines.    ----------------  This visit has been rescheduled as a phone visit to comply with patient safety concerns in accordance with CDC recommendations. Total time of discussion was 20 minutes.    --- Bilateral L2-L5 MBB x 1 (diagnostic). If second is diagnostically positive, will plan to proceed with RFA. MAC Anesthesia  "required.    -------  Education about Medial Branch Blockade and RF Therapy:    This medial branch blockade (MBB) suggested is intended for diagnostic purposes, with the intent of offering the patient Radiofrequency thermal rhizotomy (RF) if the MBB is diagnostically effective.  The diagnostic blockade is necessary to determine the likelihood that RF therapy could be efficacious in providing long term relief to the patient.    Medial branches are sensory nerve branches that connect to a facet joint and transmit sensations & pain signals from that joint.  Facet is a term for the type of joints found in the spine.  Medial branches are the nerves that go to a facet, and therefore are also sometimes called \"facet joint nerves\" (FJNs).      In a medial branch blockade procedure, xray fluoroscopy is used to verify the locations of the outside of the joint lines which are being targeted.  Under xray guidance, needles are placed to these areas.  Contrast dye is injected to confirm proper placement, with dye flowing over the joint area, and to ensure that the dye does not flow into unintended areas such as a vein.  When this is confirmed, local anesthetic is injected to block the medial branch at that joint level.      If MBBs are diagnostically successful in blocking pain, then the patient is most likely a great candidate for Radiofrequency of those facet joint nerves.  In the RF procedure, needles are placed to the joint lines in the same fashion, and after testing, the needle tips are heated to thermally treat the nerves, blocking the nerves by in essence damaging the nerves with the heat treatment.       Medically, a successful RF procedure should provide a patient with 50% pain relief or more for at least 6 months.  Clinical experience suggests that successful patients receive relief more in the range of 12 months on average.  We also discussed that a fortunate minority of patients receive therapeutic success from the " MBB, and may not require RF ablation.  If a patient receives more than 8 weeks of relief from MBB, then occasional repeat MBB for therapeutic purposes is a very reasonable alternative therapy.  This course of therapy is consistent with our LCDs according to our CMS  in the area, and therefore other insurance providers should follow accordingly.  We will monitor our patients to screen for these therapeutic responders and will offer RF therapy only when necessary.      We discussed that MBB & RF are not without risks.  Guidelines regarding anticoagulant use & neuraxial procedures will be respected.  Patients that are ill or otherwise may be at risk for sepsis will not have their spines accessed by neuraxial injections of any type.  This patient will not be offered these therapies if there is an increased risk.   We discussed that there is a risk of postprocedural pain and also a risk of worsening of clinical picture with these procedures as with any neuraxial procedure.    -------    --- Follow-up for procedure          -------    EMR Dragon/Transcription disclaimer:   Much of this encounter note is an electronic transcription/translation of spoken language to printed text. The electronic translation of spoken language may permit erroneous, or at times, nonsensical words or phrases to be inadvertently transcribed; Although I have reviewed the note for such errors, some may still exist.

## 2021-04-06 ENCOUNTER — DOCUMENTATION (OUTPATIENT)
Dept: PHYSICAL THERAPY | Facility: CLINIC | Age: 56
End: 2021-04-06

## 2021-04-06 DIAGNOSIS — M25.60 RANGE OF MOTION DEFICIT: ICD-10-CM

## 2021-04-06 DIAGNOSIS — M54.50 CHRONIC BILATERAL LOW BACK PAIN WITHOUT SCIATICA: Primary | ICD-10-CM

## 2021-04-06 DIAGNOSIS — G89.29 CHRONIC BILATERAL LOW BACK PAIN WITHOUT SCIATICA: Primary | ICD-10-CM

## 2021-04-06 DIAGNOSIS — R29.898 WEAKNESS OF BOTH LOWER EXTREMITIES: ICD-10-CM

## 2021-04-06 NOTE — PROGRESS NOTES
Discharge Summary  Discharge Summary from Physical Therapy Report      Dates  PT visit: 8/8/2019 to 1/16/2020  Number of Visits: 10     Discharge Status of Patient:  Patient did not return after f/u with PCP.  At last visit, he reported his back seemed to be getting worse, unable to do hardly anything without getting burning, stinging, and pelvic floor pressure.      Goals: Not Met    Discharge Plan: Patient to return to MD for further assessment.  Chart to be discharged 2/2 inactivity    Comments:  Limited progress 2/2 pain limiting tolerance to ex/activity   Date of Discharge:  4/6/2021        Rosemarie Rahman, PT  Physical Therapist

## 2021-05-20 ENCOUNTER — LAB (OUTPATIENT)
Dept: LAB | Facility: HOSPITAL | Age: 56
End: 2021-05-20

## 2021-05-20 DIAGNOSIS — R59.9 LYMPH NODE ENLARGEMENT: ICD-10-CM

## 2021-05-20 DIAGNOSIS — R59.0 LYMPHADENOPATHY, AXILLARY: ICD-10-CM

## 2021-05-20 LAB
ALBUMIN SERPL-MCNC: 4.4 G/DL (ref 3.5–5.2)
ALBUMIN/GLOB SERPL: 1.5 G/DL (ref 1.1–2.4)
ALP SERPL-CCNC: 45 U/L (ref 38–116)
ALT SERPL W P-5'-P-CCNC: 15 U/L (ref 0–41)
ANION GAP SERPL CALCULATED.3IONS-SCNC: 7.6 MMOL/L (ref 5–15)
AST SERPL-CCNC: 17 U/L (ref 0–40)
BASOPHILS # BLD AUTO: 0.03 10*3/MM3 (ref 0–0.2)
BASOPHILS NFR BLD AUTO: 0.6 % (ref 0–1.5)
BILIRUB SERPL-MCNC: 0.3 MG/DL (ref 0.2–1.2)
BUN SERPL-MCNC: 13 MG/DL (ref 6–20)
BUN/CREAT SERPL: 13.8 (ref 7.3–30)
CALCIUM SPEC-SCNC: 9.4 MG/DL (ref 8.5–10.2)
CHLORIDE SERPL-SCNC: 104 MMOL/L (ref 98–107)
CO2 SERPL-SCNC: 27.4 MMOL/L (ref 22–29)
CREAT SERPL-MCNC: 0.94 MG/DL (ref 0.7–1.3)
DEPRECATED RDW RBC AUTO: 39.1 FL (ref 37–54)
EOSINOPHIL # BLD AUTO: 0.1 10*3/MM3 (ref 0–0.4)
EOSINOPHIL NFR BLD AUTO: 1.9 % (ref 0.3–6.2)
ERYTHROCYTE [DISTWIDTH] IN BLOOD BY AUTOMATED COUNT: 12.1 % (ref 12.3–15.4)
ERYTHROCYTE [SEDIMENTATION RATE] IN BLOOD: 7 MM/HR (ref 0–20)
GFR SERPL CREATININE-BSD FRML MDRD: 83 ML/MIN/1.73
GLOBULIN UR ELPH-MCNC: 3 GM/DL (ref 1.8–3.5)
GLUCOSE SERPL-MCNC: 83 MG/DL (ref 74–124)
HCT VFR BLD AUTO: 42.4 % (ref 37.5–51)
HGB BLD-MCNC: 14.2 G/DL (ref 13–17.7)
IMM GRANULOCYTES # BLD AUTO: 0.01 10*3/MM3 (ref 0–0.05)
IMM GRANULOCYTES NFR BLD AUTO: 0.2 % (ref 0–0.5)
LYMPHOCYTES # BLD AUTO: 1.89 10*3/MM3 (ref 0.7–3.1)
LYMPHOCYTES NFR BLD AUTO: 35.1 % (ref 19.6–45.3)
MCH RBC QN AUTO: 29.4 PG (ref 26.6–33)
MCHC RBC AUTO-ENTMCNC: 33.5 G/DL (ref 31.5–35.7)
MCV RBC AUTO: 87.8 FL (ref 79–97)
MONOCYTES # BLD AUTO: 0.54 10*3/MM3 (ref 0.1–0.9)
MONOCYTES NFR BLD AUTO: 10 % (ref 5–12)
NEUTROPHILS NFR BLD AUTO: 2.81 10*3/MM3 (ref 1.7–7)
NEUTROPHILS NFR BLD AUTO: 52.2 % (ref 42.7–76)
NRBC BLD AUTO-RTO: 0 /100 WBC (ref 0–0.2)
PLATELET # BLD AUTO: 161 10*3/MM3 (ref 140–450)
PMV BLD AUTO: 10.2 FL (ref 6–12)
POTASSIUM SERPL-SCNC: 4.2 MMOL/L (ref 3.5–4.7)
PROT SERPL-MCNC: 7.4 G/DL (ref 6.3–8)
RBC # BLD AUTO: 4.83 10*6/MM3 (ref 4.14–5.8)
SODIUM SERPL-SCNC: 139 MMOL/L (ref 134–145)
WBC # BLD AUTO: 5.38 10*3/MM3 (ref 3.4–10.8)

## 2021-05-20 PROCEDURE — 85025 COMPLETE CBC W/AUTO DIFF WBC: CPT

## 2021-05-20 PROCEDURE — 36415 COLL VENOUS BLD VENIPUNCTURE: CPT

## 2021-05-20 PROCEDURE — 85652 RBC SED RATE AUTOMATED: CPT | Performed by: INTERNAL MEDICINE

## 2021-05-20 PROCEDURE — 80053 COMPREHEN METABOLIC PANEL: CPT

## 2021-05-21 ENCOUNTER — TELEPHONE (OUTPATIENT)
Dept: ONCOLOGY | Facility: CLINIC | Age: 56
End: 2021-05-21

## 2021-05-24 NOTE — TELEPHONE ENCOUNTER
Caller: Jose Maria Judge    Relationship to patient: Self    Best call back number: [phone]    Patient is needing: TO SEE IF THERE IS ANYTHING EARLIER IN THE DAY ON 5- AND IF IT CAN BE A  TELEPHONE VSIT AND NOT A MYCHART VISIT.

## 2021-05-27 ENCOUNTER — APPOINTMENT (OUTPATIENT)
Dept: LAB | Facility: HOSPITAL | Age: 56
End: 2021-05-27

## 2021-05-27 ENCOUNTER — OFFICE VISIT (OUTPATIENT)
Dept: ONCOLOGY | Facility: CLINIC | Age: 56
End: 2021-05-27

## 2021-05-27 DIAGNOSIS — R59.9 LYMPH NODE ENLARGEMENT: Primary | ICD-10-CM

## 2021-05-27 PROCEDURE — 99441 PR PHYS/QHP TELEPHONE EVALUATION 5-10 MIN: CPT | Performed by: INTERNAL MEDICINE

## 2021-06-01 ENCOUNTER — TELEPHONE (OUTPATIENT)
Dept: ONCOLOGY | Facility: HOSPITAL | Age: 56
End: 2021-06-01

## 2021-06-01 ENCOUNTER — TELEPHONE (OUTPATIENT)
Dept: ONCOLOGY | Facility: CLINIC | Age: 56
End: 2021-06-01

## 2021-06-01 NOTE — TELEPHONE ENCOUNTER
Returning call to pt regarding note from earlier. Per Megha who reviewed with Dr. Alvarado, Dr. Alvarado believes it could be d/t pt's pudendal neuralgia and suggested pt need to f/u with pain managements regarding his pain since they are the ones that manage it. Pt was not agreeable with that answer, and stated he does not believe pain is necessarily related to neuralgia, but rather his blood disorder since his heat sensitivity started so long ago. Pt wanted to know if let Dr. Alvarado know how long it had been going on. Told pt that my message stated the heat sensitivity had been going on for 20 years and the cold sensitivity started about 7 years ago when neuralgia started. Pt was not satisfied and is requesting a phone visit with Dr. Alvarado. Told pt that was fine we could get pt set up to talk with Dr. Alvarado.  Also gave pt number to multidisciplinary clinic to discuss his case, but let him know they may not be willing to talk with him about his brother with HIPPA and everything. Pt v/u.     Reviewed with Megha. Will send message to Dr. Alvarado to make him aware.

## 2021-06-01 NOTE — TELEPHONE ENCOUNTER
Provider: AMIE  Caller: PATIENT  Relationship to Patient: SELF  Pharmacy: NA  Phone Number: [phone]    Reason for Call: PATIENT FORGOT TO ASK A FEW QUESTION DURING HIS VISIT LAST WEEK     HE HAS HAD COLD AND HEAT SENSITIVITY FOR ABOUT 10 YEARS, HE CANNOT NOT STAND OUTSIDE IN THE SUN FOR MORE THAN A FEW MINUTES, CANNOT NOT BE COLD EITHER, WANTS TO DISCUSS FURTHER.    HE ALSO SPOKE WITH YOU REGARDING HIS BROTHER AND WOULD LIKE TO RECEIVE A CALL FROM THE COUNSELING CENTER RE: HIM, PLEASE ADVISE?

## 2021-06-01 NOTE — TELEPHONE ENCOUNTER
"Returning call to pt. Pt stated he meant to talk to Dr. Alvarado about his cold/heat sensitivity at his appt on Thursday. Pt stated when he goes out in the sun it physically hurts. Pt states its the worst on his neck and back, that he always has to wear a hat and cannot  direct sun light. Pt stated he has had this sensitivity to sun exposure for almost 20 years. Pt stated he has the same experience with the cold which started about 7 years ago around the time his neuralgic pain started, but has worsened over the past few years. Pt stated he was unsure if it is related to pudendal neuralgia, but wanted to Dr. Alvarado to be aware and advise. Pt stated that he had also sent a message to the \"counseling center\" regarding his brother who is going through chemo tx currently for lymphoma and is wondering if someone could call him and discuss some things about his brother with him. Told pt I would try to look into that for him. Pt v/u.   "

## 2021-08-09 ENCOUNTER — OFFICE VISIT (OUTPATIENT)
Dept: PAIN MEDICINE | Facility: CLINIC | Age: 56
End: 2021-08-09

## 2021-08-09 VITALS
HEART RATE: 73 BPM | BODY MASS INDEX: 29.48 KG/M2 | DIASTOLIC BLOOD PRESSURE: 81 MMHG | SYSTOLIC BLOOD PRESSURE: 120 MMHG | OXYGEN SATURATION: 95 % | WEIGHT: 210.6 LBS | RESPIRATION RATE: 16 BRPM | TEMPERATURE: 98 F | HEIGHT: 71 IN

## 2021-08-09 DIAGNOSIS — K62.89 ANAL OR RECTAL PAIN: Primary | ICD-10-CM

## 2021-08-09 DIAGNOSIS — G58.8 PUDENDAL NEURALGIA: ICD-10-CM

## 2021-08-09 DIAGNOSIS — I86.8 VARICOSE VEINS OF OTHER SPECIFIED SITES: ICD-10-CM

## 2021-08-09 DIAGNOSIS — G89.4 CHRONIC PAIN SYNDROME: ICD-10-CM

## 2021-08-09 DIAGNOSIS — M47.816 LUMBAR FACET ARTHROPATHY: ICD-10-CM

## 2021-08-09 DIAGNOSIS — K62.89 ANAL BURNING: ICD-10-CM

## 2021-08-09 PROCEDURE — 99214 OFFICE O/P EST MOD 30 MIN: CPT | Performed by: ANESTHESIOLOGY

## 2021-08-09 NOTE — PROGRESS NOTES
CHIEF COMPLAINT  F/U back and groin pain- patient states that his pain has worsened since his last visit. Patient states that he had a varicose vein surgery on June th and they gave him a valium and he stated that he didn't notice any pain in his groin area after he had taken the valium.     Subjective   Jose Maria Judge is a 56 y.o. male  who presents for follow-up.  He has a history of multiple painful problems.      Patient remained masked during entire encounter. No cough present. I donned a mask and eye protection throughout entire visit. Prior to donning mask and eye protection, hand hygiene was performed, as well as when it was doffed.  I was closer than 6 feet, but not for an extended period of time. No obvious exposure to any bodily fluids.    History of Present Illness     He has a history of varicosities in the lower extremity and recently had a procedure with Dr. Sonny Lynn in the vascular surgery group.  The patient expressed disappointment in the pain in the lower extremity is only improved by 15 to 20%.  He is seeking a second opinion soon.    He noticed that for the procedure, when he took oral Valium for procedural sedation/anxiolytic, he had several hours of complete pain relief of his pudendal neuralgia.  He presents today asking if he can have Valium prescribed chronically.    He has a history of low back pain that is an aching pain with axial elements consistent with facet arthropathy.  He had diagnostically positive lumbar medial branch blockade's.  He is a candidate to proceed with radiofrequency ablation of the same levels but he expresses anxiety about the nerve ablation procedure.    PEG Assessment   What number best describes your pain on average in the past week?8  What number best describes how, during the past week, pain has interfered with your enjoyment of life?8  What number best describes how, during the past week, pain has interfered with your general activity?  8    --  The  aforementioned information the Chief Complaint section and above subjective data including any HPI data, and also the Review of Systems data, has been personally reviewed and affirmed.  --        The following portions of the patient's history were reviewed and updated as appropriate: allergies, current medications, past family history, past medical history, past social history, past surgical history and problem list.    -------    The following portions of the patient's history were reviewed and updated as appropriate: allergies, current medications, past family history, past medical history, past social history, past surgical history and problem list.    Allergies   Allergen Reactions   • Bactrim [Sulfamethoxazole-Trimethoprim] Shortness Of Breath   • Hydrocodone Shortness Of Breath   • Medrol [Methylprednisolone] Unknown - High Severity     Rectal bleeding   • Mobic [Meloxicam] Nausea Only and Other (See Comments)     Severe heartburn   • Naproxen Shortness Of Breath   • Nsaids Unknown - High Severity     Can take Ibuprofen.. Causes constipation   • Sulfa Antibiotics Shortness Of Breath     TROUBLE BREATHING   • Diclofenac Nausea Only and Other (See Comments)     Severe heartburn   • Levaquin [Levofloxacin] Unknown - Low Severity     Made tendons tight         Current Outpatient Medications:   •  acetaminophen (TYLENOL) 500 MG tablet, Take 500 mg by mouth Every 6 (Six) Hours As Needed for Mild Pain ., Disp: , Rfl:   •  brompheniramine-pseudoephedrine-DM 30-2-10 MG/5ML syrup, Take 5 mL by mouth 4 (Four) Times a Day As Needed., Disp: , Rfl:   •  Cholecalciferol (Vitamin D) 50 MCG (2000 UT) tablet, Take 2,000 Units by mouth Daily., Disp: , Rfl:   •  Elastic Bandages & Supports (ELASTIC BACK SUPPORT) misc, 1 each Take As Directed., Disp: 1 each, Rfl: 0  •  guaiFENesin (MUCINEX) 600 MG 12 hr tablet, Take 300 mg by mouth As Needed for Cough., Disp: , Rfl:   •  ibuprofen (ADVIL,MOTRIN) 200 MG tablet, Take 200 mg by  mouth Every 6 (Six) Hours As Needed for Mild Pain ., Disp: , Rfl:   •  Multiple Vitamins-Minerals (ZINC PO), Take 1 tablet by mouth Daily., Disp: , Rfl:     Current Outpatient Medications on File Prior to Visit   Medication Sig Dispense Refill   • acetaminophen (TYLENOL) 500 MG tablet Take 500 mg by mouth Every 6 (Six) Hours As Needed for Mild Pain .     • brompheniramine-pseudoephedrine-DM 30-2-10 MG/5ML syrup Take 5 mL by mouth 4 (Four) Times a Day As Needed.     • Cholecalciferol (Vitamin D) 50 MCG (2000 UT) tablet Take 2,000 Units by mouth Daily.     • Elastic Bandages & Supports (ELASTIC BACK SUPPORT) misc 1 each Take As Directed. 1 each 0   • guaiFENesin (MUCINEX) 600 MG 12 hr tablet Take 300 mg by mouth As Needed for Cough.     • ibuprofen (ADVIL,MOTRIN) 200 MG tablet Take 200 mg by mouth Every 6 (Six) Hours As Needed for Mild Pain .     • Multiple Vitamins-Minerals (ZINC PO) Take 1 tablet by mouth Daily.       No current facility-administered medications on file prior to visit.       Patient Active Problem List   Diagnosis   • Bilateral hip pain   • Right hip pain   • Left hip pain   • Trochanteric bursitis   • Tear of acetabular labrum   • Loss of hair   • Anal burning   • Anxiety   • Elevated blood pressure   • Hamstring strain   • Hypertension   • Radiculitis   • Hamstring muscle strain   • Rectal pain   • Herpes zoster   • Varicose veins of other specified sites   • Lymph node enlargement   • Partial small bowel obstruction (CMS/HCC)   • Epigastric pain   • Lymphadenopathy, axillary   • Hypersensitivity to odor   • Chronic bilateral low back pain without sciatica   • Nausea   • Anal or rectal pain   • Lumbar facet arthropathy   • Pudendal neuralgia       Past Medical History:   Diagnosis Date   • Abdominal lymphadenopathy    • Acid reflux     HX   • Anxiety     R/T PAIN   • Carpal tunnel syndrome, left    • Chronic pain    • DDD (degenerative disc disease), lumbar    • Fatigue    • H/O Hypersensitivity  to odor     Burning sensation in nasal passage   • Hemorrhoids     internal fistula   • Irritable bowel syndrome    • Low back pain    • Lymphadenopathy, axillary     LEFT   • Male pattern alopecia    • Pudendal neuralgia    • Right hip pain    • Unexplained night sweats        Past Surgical History:   Procedure Laterality Date   • ABSCESS DRAINAGE  08/2012   • ANAL FISTULA REPAIR N/A 09/2012, 10/2012   • AXILLARY LYMPH NODE BIOPSY/EXCISION Left 11/9/2018    Procedure: AXILLARY LYMPH NODE BIOPSY/EXCISION;  Surgeon: Amando Nguyễn MD;  Location: [facility];  Service: General   • COLONOSCOPY N/A 2013    done at [facility]   • COLONOSCOPY N/A 7/10/2019    Procedure: COLONOSCOPY to CECUM;  Surgeon: Amando Nguyễn MD;  Location: [facility] END[facility];  Service: General   • ENDOSCOPY N/A 1/9/2019    Procedure: ESOPHAGOGASTRODUODENOSCOPY;  Surgeon: Amando Nguyễn MD;  Location: [facility] END[facility];  Service: General   • FINE NEEDLE ASPIRATION Left 08/09/2018    Left axillary lymph node FNA   • FLEXIBLE SIGMOIDOSCOPY N/A 06/25/2003    Normal-Dr. Cezar Aviles   • HEMORRHOIDECTOMY N/A 1996   • HIP ARTHROSCOPY W/ LABRAL REPAIR Right 04/03/2017    Dr. Lonnie Lemons   • MEDIAL BRANCH BLOCK Bilateral 3/12/2021    Procedure: BILATERAL MEDIAL BRANCH BLOCK W/MAC;  Surgeon: Francisco Velze MD;  Location: [facility] OR;  Service: Pain Management;  Laterality: Bilateral;   • SIGMOIDOSCOPY N/A 9/23/2020    Procedure: FLEXIBLE SIGMOIDOSCOPY WITH BIOPSY;  Surgeon: Shena Ring MD;  Location: [facility] END[facility];  Service: Gastroenterology;  Laterality: N/A;  pre- anal/rectal pain  post- hemorrhoids   • TONSILLECTOMY Bilateral        Family History   Problem Relation Age of Onset   • Atrial fibrillation Father    • Aneurysm Father    • Hypertension Father    • Lymphoma Brother    • Malig Hyperthermia Neg Hx        Social History     Socioeconomic History   • Marital status: Single     Spouse name: Not on file   • Number of  "children: 0   • Years of education: COLLEGE   • Highest education level: Not asked   Tobacco Use   • Smoking status: Never Smoker   • Smokeless tobacco: Never Used   • Tobacco comment: caff use   Vaping Use   • Vaping Use: Never used   Substance and Sexual Activity   • Alcohol use: No   • Drug use: No   • Sexual activity: Defer       -------        Review of Systems   Constitutional: Positive for activity change (decreased). Negative for chills, fatigue and fever.   HENT: Negative for congestion.    Eyes: Negative for visual disturbance.   Respiratory: Negative for chest tightness and shortness of breath.    Cardiovascular: Negative for chest pain.   Gastrointestinal: Negative for abdominal pain, constipation and diarrhea.   Genitourinary: Negative for difficulty urinating, dysuria and enuresis.   Musculoskeletal: Positive for back pain.   Neurological: Positive for weakness. Negative for dizziness, light-headedness, numbness and headaches.   Psychiatric/Behavioral: Negative for agitation and sleep disturbance. The patient is not nervous/anxious.        Vitals:    08/09/21 1539   BP: 120/81   Pulse: 73   Resp: 16   Temp: 98 °F (36.7 °C)   SpO2: 95%   Weight: 95.5 kg (210 lb 9.6 oz)   Height: 180.3 cm (71\")   PainSc:   8   PainLoc: Groin         Objective   Physical Exam  VSS, NNR, NCAT, NMNA, NRD, AAOx3.  Lower extremity varicosities are tortuous and are noted.  Groin exam was not performed today.  There is some tenderness in his low back diffusely with some paraspinal pain on facet palpation.  Some pain on extension of the lumbar spine even past 0 degrees.  Lumbar loading positive.      Assessment/Plan   Diagnoses and all orders for this visit:    1. Anal or rectal pain (Primary)    2. Chronic pain syndrome    3. Pudendal neuralgia    4. Lumbar facet arthropathy    5. Anal burning    6. Varicose veins of other specified sites      56-year-old man with a few different painful problems that are chronic conditions all " addressed today.    --- Follow-up whenever he chooses to perform radiofrequency ablation of the same lumbar levels.  He is unsure.  Counseling was extensive today.  --He will be following with Mr Hiren in the [facility] vascular group for a new patient consult second opinion soon  --Regarding use of chronic benzodiazepine.... I expressed to him that chronic benzodiazepine use is not a standard of care for neuropathic pain and is also not in my core of expertise for chronic management of this class of medications.  I did explain to him that chronic benzodiazepines are usually managed by psychiatrists or by family practitioners.              Dictated utilizing Dragon dictation.     ---    Vitals:    08/09/21 1539   PainSc:   8   PainLoc: Groin          Jose Maria Judge reports a pain score of 8.  Given his pain assessment as noted, treatment options were discussed and the following options were decided upon as a follow-up plan to address the patient's pain: continuation of current treatment plan for pain and educational materials on pain management.

## 2021-11-01 ENCOUNTER — TRANSCRIBE ORDERS (OUTPATIENT)
Dept: PHYSICAL THERAPY | Facility: HOSPITAL | Age: 56
End: 2021-11-01

## 2021-11-01 DIAGNOSIS — M75.81 TENDONITIS OF BOTH ROTATOR CUFFS: Primary | ICD-10-CM

## 2021-11-01 DIAGNOSIS — M75.82 TENDONITIS OF BOTH ROTATOR CUFFS: Primary | ICD-10-CM

## 2021-11-04 ENCOUNTER — APPOINTMENT (OUTPATIENT)
Dept: PHYSICAL THERAPY | Facility: HOSPITAL | Age: 56
End: 2021-11-04

## 2021-12-28 ENCOUNTER — OFFICE VISIT (OUTPATIENT)
Dept: CARDIOLOGY | Facility: CLINIC | Age: 56
End: 2021-12-28

## 2021-12-28 VITALS
HEART RATE: 61 BPM | WEIGHT: 196.2 LBS | SYSTOLIC BLOOD PRESSURE: 122 MMHG | HEIGHT: 71 IN | BODY MASS INDEX: 27.47 KG/M2 | DIASTOLIC BLOOD PRESSURE: 74 MMHG

## 2021-12-28 DIAGNOSIS — U07.1 COVID-19 VIRUS INFECTION: ICD-10-CM

## 2021-12-28 DIAGNOSIS — R06.02 SHORTNESS OF BREATH: ICD-10-CM

## 2021-12-28 DIAGNOSIS — R53.83 FATIGUE, UNSPECIFIED TYPE: Primary | ICD-10-CM

## 2021-12-28 PROCEDURE — 93000 ELECTROCARDIOGRAM COMPLETE: CPT | Performed by: INTERNAL MEDICINE

## 2021-12-28 PROCEDURE — 99204 OFFICE O/P NEW MOD 45 MIN: CPT | Performed by: INTERNAL MEDICINE

## 2021-12-28 RX ORDER — ALBUTEROL SULFATE 90 UG/1
2 AEROSOL, METERED RESPIRATORY (INHALATION) EVERY 6 HOURS PRN
COMMUNITY
Start: 2021-11-20 | End: 2022-08-31

## 2021-12-28 RX ORDER — PAROXETINE 10 MG/1
10 TABLET, FILM COATED ORAL
COMMUNITY
Start: 2021-12-21 | End: 2022-04-14

## 2021-12-30 ENCOUNTER — LAB (OUTPATIENT)
Dept: LAB | Facility: HOSPITAL | Age: 56
End: 2021-12-30

## 2021-12-30 DIAGNOSIS — R53.83 FATIGUE, UNSPECIFIED TYPE: ICD-10-CM

## 2021-12-30 LAB
25(OH)D3 SERPL-MCNC: 26.3 NG/ML
FOLATE SERPL-MCNC: 8.96 NG/ML (ref 4.78–24.2)
T-UPTAKE NFR SERPL: 1.16 TBI (ref 0.8–1.3)
T4 SERPL-MCNC: 7.46 MCG/DL (ref 4.5–11.7)
TSH SERPL DL<=0.05 MIU/L-ACNC: 1.55 UIU/ML (ref 0.27–4.2)
VIT B12 BLD-MCNC: 545 PG/ML (ref 211–946)

## 2021-12-30 PROCEDURE — 82306 VITAMIN D 25 HYDROXY: CPT

## 2021-12-30 PROCEDURE — 82746 ASSAY OF FOLIC ACID SERUM: CPT

## 2021-12-30 PROCEDURE — 84436 ASSAY OF TOTAL THYROXINE: CPT

## 2021-12-30 PROCEDURE — 84443 ASSAY THYROID STIM HORMONE: CPT

## 2021-12-30 PROCEDURE — 36415 COLL VENOUS BLD VENIPUNCTURE: CPT

## 2021-12-30 PROCEDURE — 84479 ASSAY OF THYROID (T3 OR T4): CPT

## 2021-12-30 PROCEDURE — 82607 VITAMIN B-12: CPT

## 2022-01-01 NOTE — PROGRESS NOTES
Date of Office Visit: 2021  Encounter Provider: Yehuda Bran MD  Place of Service: [facility] CARDIOLOGY  Patient Name: Jose Maria Judge  :1965    Chief complaint: Shortness of breath, recent COVID-19 infection.    History of Present Illness:    I had the pleasure of seeing the patient in cardiology office on 2021.  He is a very pleasant 56 year-old male with a history of lymphoma who presents for evaluation.  I also recently saw the patient's mother.    The patient has had a significant amount of stress in his life recently.  His brother recently  from cancer at age 54.  He likely contracted COVID-19 during the  in 2021.  His mother also contracted COVID-19 at the same time.  He states that ever since he had COVID-19, he has had significant fatigue and shortness of breath with activity.  He is also had a residual cough.  He also tells me that he has had significant tinnitus since that time.  He has not had any significant chest discomfort, and there is no history of coronary artery disease in his family.  His EKG from today is normal.    Past Medical History:   Diagnosis Date   • Abdominal lymphadenopathy    • Acid reflux     HX   • Anxiety     R/T PAIN   • Carpal tunnel syndrome, left    • Chronic pain    • DDD (degenerative disc disease), lumbar    • Fatigue    • H/O Hypersensitivity to odor     Burning sensation in nasal passage   • Hemorrhoids     internal fistula   • Irritable bowel syndrome    • Low back pain    • Lymphadenopathy, axillary     LEFT   • Male pattern alopecia    • Pudendal neuralgia    • Right hip pain    • Unexplained night sweats        Past Surgical History:   Procedure Laterality Date   • ABSCESS DRAINAGE  2012   • ANAL FISTULA REPAIR N/A 2012, 10/2012   • AXILLARY LYMPH NODE BIOPSY/EXCISION Left 2018    Procedure: AXILLARY LYMPH NODE BIOPSY/EXCISION;  Surgeon: Amando Nguyễn MD;  [place] [facility] [place]: General   • COLONOSCOPY N/A 2013    done at [facility]   • COLONOSCOPY N/A 7/10/2019    Procedure: COLONOSCOPY to CECUM;  Surgeon: Amando Nguyễn MD;  Location: [facility] ENDOSCOPY;  Service: General   • ENDOSCOPY N/A 1/9/2019    Procedure: ESOPHAGOGASTRODUODENOSCOPY;  Surgeon: Amando Nguyễn MD;  Location: [facility] ENDOSCOPY;  Service: General   • FINE NEEDLE ASPIRATION Left 08/09/2018    Left axillary lymph node FNA   • FLEXIBLE SIGMOIDOSCOPY N/A 06/25/2003    Normal-Dr. Cezar Aviles   • HEMORRHOIDECTOMY N/A 1996   • HIP ARTHROSCOPY W/ LABRAL REPAIR Right 04/03/2017    Dr. Lonnie Lemons   • MEDIAL BRANCH BLOCK Bilateral 3/12/2021    Procedure: BILATERAL MEDIAL BRANCH BLOCK W/MAC;  Surgeon: Francisco Velez MD;  Location: [facility] OR;  Service: Pain Management;  Laterality: Bilateral;   • SIGMOIDOSCOPY N/A 9/23/2020    Procedure: FLEXIBLE SIGMOIDOSCOPY WITH BIOPSY;  Surgeon: Shena Ring MD;  Location: [facility] ENDOSCOPY;  Service: Gastroenterology;  Laterality: N/A;  pre- anal/rectal pain  post- hemorrhoids   • TONSILLECTOMY Bilateral        Current Outpatient Medications on File Prior to Visit   Medication Sig Dispense Refill   • acetaminophen (TYLENOL) 500 MG tablet Take 500 mg by mouth Every 6 (Six) Hours As Needed for Mild Pain .     • albuterol sulfate  (90 Base) MCG/ACT inhaler Inhale 2 puffs Every 6 (Six) Hours As Needed.     • Azelastine-Fluticasone 137-50 MCG/ACT suspension 1 spray into the nostril(s) as directed by provider 2 (Two) Times a Day. 23 g 0   • Cholecalciferol (Vitamin D) 50 MCG (2000 UT) tablet Take 2,000 Units by mouth Daily.     • guaiFENesin (MUCINEX) 600 MG 12 hr tablet Take 300 mg by mouth As Needed for Cough.     • ibuprofen (ADVIL,MOTRIN) 200 MG tablet Take 200 mg by mouth Every 6 (Six) Hours As Needed for Mild Pain .     • Multiple Vitamins-Minerals (ZINC PO) Take 1 tablet by mouth Daily.     • Elastic Bandages & Supports (ELASTIC BACK SUPPORT) misc 1  "each Take As Directed. 1 each 0   • PARoxetine (PAXIL) 10 MG tablet Take 10 mg by mouth.       No current facility-administered medications on file prior to visit.     Allergies as of 12/28/2021 - Reviewed 12/28/2021   Allergen Reaction Noted   • Bactrim [sulfamethoxazole-trimethoprim] Shortness Of Breath 06/14/2017   • Hydrocodone Shortness Of Breath 06/14/2017   • Medrol [methylprednisolone] Unknown - High Severity 07/14/2016   • Mobic [meloxicam] Nausea Only and Other (See Comments) 08/25/2016   • Naproxen Shortness Of Breath 08/25/2016   • Nsaids Unknown - High Severity 07/30/2018   • Sulfa antibiotics Shortness Of Breath 06/20/2019   • Diclofenac Nausea Only and Other (See Comments) 08/25/2016   • Levaquin [levofloxacin] Unknown - Low Severity 06/14/2017     Social History     Socioeconomic History   • Marital status: Single   • Number of children: 0   • Years of education: COLLEGE   • Highest education level: Not asked   Tobacco Use   • Smoking status: Never Smoker   • Smokeless tobacco: Never Used   • Tobacco comment: caff use   Vaping Use   • Vaping Use: Never used   Substance and Sexual Activity   • Alcohol use: No   • Drug use: No   • Sexual activity: Defer     Family History   Problem Relation Age of Onset   • Atrial fibrillation Father    • Aneurysm Father    • Hypertension Father    • Lymphoma Brother    • Malig Hyperthermia Neg Hx        Review of Systems   Constitutional: Positive for malaise/fatigue and weight loss.   HENT: Positive for tinnitus.    Respiratory: Positive for cough and shortness of breath.    Musculoskeletal: Positive for joint pain.   All other systems reviewed and are negative.     Objective:     Vitals:    12/28/21 1217   BP: 122/74   Pulse: 61   Weight: 89 kg (196 lb 3.2 oz)   Height: 179.1 cm (70.51\")     Body mass index is 27.74 kg/m².    Constitutional:       Appearance: Healthy appearance. Well-developed.   Eyes:      Conjunctiva/sclera: Conjunctivae normal.   HENT:      Head: " Normocephalic and atraumatic.   Pulmonary:      Effort: Pulmonary effort is normal.      Breath sounds: Normal breath sounds.   Cardiovascular:      Normal rate. Regular rhythm.      Murmurs: There is no murmur.      No gallop.   Edema:     Peripheral edema absent.   Abdominal:      Palpations: Abdomen is soft.      Tenderness: There is no abdominal tenderness.   Musculoskeletal:      Cervical back: Neck supple. Skin:     General: Skin is warm.   Neurological:      Mental Status: Alert and oriented to person, place, and time.   Psychiatric:         Behavior: Behavior normal.       Lab Review:     ECG 12 Lead    Date/Time: 12/28/2021 12:27 PM  Performed by: Yehuda Bran MD  Authorized by: Yehuda Bran MD   Comparison: compared with previous ECG from 2/2/2017  Similar to previous ECG  Rhythm: sinus rhythm  Rate: normal  BPM: 61    Clinical impression: normal ECG            Cardiac Procedures:      Assessment:       Diagnosis Plan   1. Fatigue, unspecified type  Vitamin B12    Folate    Vitamin D 25 Hydroxy    Thyroid Panel With TSH   2. Shortness of breath  Adult Transthoracic Echo Complete w/ Color, Spectral and Contrast if Necessary Per Protocol    CT Chest Without Contrast   3. COVID-19 virus infection  Adult Transthoracic Echo Complete w/ Color, Spectral and Contrast if Necessary Per Protocol    CT Chest Without Contrast     Plan:       Again, all of the patient's symptoms have occurred since having COVID-19 in November 2021.  His mother also had COVID-19, and she was found to have residual scarring and lung disease on a CT scan.  He has been fatigued, short of breath, and had a residual cough.  He has also had significant tinnitus.  I am going to check baseline labs, including thyroid function tests, B12 level, and folate level.  However, I am also going to check an echocardiogram to ensure that he has not developed a cardiomyopathy in the setting of a recent COVID-19 infection.  Given his  mother's recent history, I am also going to check a noncontrast CT scan of the chest.  I discussed all this in detail with the patient, and he was in agreement with the plan.  Further plans will be made pending the results of the above testing.

## 2022-01-13 ENCOUNTER — APPOINTMENT (OUTPATIENT)
Dept: CARDIOLOGY | Facility: HOSPITAL | Age: 57
End: 2022-01-13

## 2022-01-14 ENCOUNTER — HOSPITAL ENCOUNTER (OUTPATIENT)
Dept: CT IMAGING | Facility: HOSPITAL | Age: 57
Discharge: HOME | End: 2022-01-14
Admitting: INTERNAL MEDICINE

## 2022-01-14 DIAGNOSIS — U07.1 COVID-19 VIRUS INFECTION: ICD-10-CM

## 2022-01-14 DIAGNOSIS — R06.02 SHORTNESS OF BREATH: ICD-10-CM

## 2022-01-14 PROCEDURE — 71250 CT THORAX DX C-: CPT

## 2022-01-18 ENCOUNTER — TELEPHONE (OUTPATIENT)
Dept: CARDIOLOGY | Facility: CLINIC | Age: 57
End: 2022-01-18

## 2022-01-18 NOTE — TELEPHONE ENCOUNTER
----- Message from Yehuda Bran MD sent at 1/17/2022  3:29 PM EST -----  Can you please let him know this looked good?  Lungs look good.  He was also supposed to have an echo, but I don't see that scheduled.  Thanks   GM  ----- Message -----  From: Interface, Rad Results [place] In  Sent: 1/17/2022  12:54 PM EST  To: Yehuda Bran MD

## 2022-01-19 NOTE — TELEPHONE ENCOUNTER
Pt called back. Went over results. He said he wasn't willing to wear the hospital mask. So, that's why the echo is not scheduled. I told him our mask policy is still the same. He said he will be willing to come in and wear the hospital mask over his own mask.    Scheduling,    Will you call to schedule echo?    Thank you,    Adriana Avina, RN  Triage [facility] [place], [place]

## 2022-02-22 ENCOUNTER — HOSPITAL ENCOUNTER (OUTPATIENT)
Dept: CARDIOLOGY | Facility: HOSPITAL | Age: 57
Discharge: HOME | End: 2022-02-22
Admitting: INTERNAL MEDICINE

## 2022-02-22 VITALS — HEIGHT: 70 IN | BODY MASS INDEX: 28.06 KG/M2 | WEIGHT: 196 LBS

## 2022-02-22 DIAGNOSIS — R06.02 SHORTNESS OF BREATH: ICD-10-CM

## 2022-02-22 DIAGNOSIS — U07.1 COVID-19 VIRUS INFECTION: ICD-10-CM

## 2022-02-22 LAB
AORTIC ARCH: 3.7 CM
AORTIC DIMENSIONLESS INDEX: 0.6 (DI)
ASCENDING AORTA: 3.2 CM
BH CV ECHO LEFT VENTRICLE GLOBAL LONGITUDINAL STRAIN: -21.7 %
BH CV ECHO MEAS - ACS: 2.35 CM
BH CV ECHO MEAS - AO MAX PG: 8.8 MMHG
BH CV ECHO MEAS - AO MEAN PG: 5.1 MMHG
BH CV ECHO MEAS - AO ROOT DIAM: 3.9 CM
BH CV ECHO MEAS - AO V2 MAX: 148 CM/SEC
BH CV ECHO MEAS - AO V2 VTI: 28.3 CM
BH CV ECHO MEAS - AVA(I,D): 1.91 CM2
BH CV ECHO MEAS - EDV(CUBED): 76.9 ML
BH CV ECHO MEAS - EDV(MOD-SP2): 87 ML
BH CV ECHO MEAS - EDV(MOD-SP4): 105 ML
BH CV ECHO MEAS - EF(MOD-BP): 60 %
BH CV ECHO MEAS - EF(MOD-SP2): 63.2 %
BH CV ECHO MEAS - EF(MOD-SP4): 57.1 %
BH CV ECHO MEAS - ESV(CUBED): 31.4 ML
BH CV ECHO MEAS - ESV(MOD-SP2): 32 ML
BH CV ECHO MEAS - ESV(MOD-SP4): 45 ML
BH CV ECHO MEAS - FS: 25.8 %
BH CV ECHO MEAS - IVS/LVPW: 0.91 CM
BH CV ECHO MEAS - IVSD: 1.29 CM
BH CV ECHO MEAS - LAT PEAK E' VEL: 17.3 CM/SEC
BH CV ECHO MEAS - LV DIASTOLIC VOL/BSA (35-75): 50.7 CM2
BH CV ECHO MEAS - LV MASS(C)D: 218.5 GRAMS
BH CV ECHO MEAS - LV MAX PG: 3 MMHG
BH CV ECHO MEAS - LV MEAN PG: 1.73 MMHG
BH CV ECHO MEAS - LV SYSTOLIC VOL/BSA (12-30): 21.7 CM2
BH CV ECHO MEAS - LV V1 MAX: 86.1 CM/SEC
BH CV ECHO MEAS - LV V1 VTI: 17.5 CM
BH CV ECHO MEAS - LVIDD: 4.3 CM
BH CV ECHO MEAS - LVIDS: 3.2 CM
BH CV ECHO MEAS - LVOT AREA: 3.1 CM2
BH CV ECHO MEAS - LVOT DIAM: 1.98 CM
BH CV ECHO MEAS - LVPWD: 1.42 CM
BH CV ECHO MEAS - MED PEAK E' VEL: 12 CM/SEC
BH CV ECHO MEAS - MV A DUR: 0.15 SEC
BH CV ECHO MEAS - MV A MAX VEL: 77.1 CM/SEC
BH CV ECHO MEAS - MV DEC SLOPE: 237.8 CM/SEC2
BH CV ECHO MEAS - MV DEC TIME: 0.21 MSEC
BH CV ECHO MEAS - MV E MAX VEL: 85.3 CM/SEC
BH CV ECHO MEAS - MV E/A: 1.11
BH CV ECHO MEAS - MV MAX PG: 3.5 MMHG
BH CV ECHO MEAS - MV MEAN PG: 1.3 MMHG
BH CV ECHO MEAS - MV V2 VTI: 27.3 CM
BH CV ECHO MEAS - MVA(VTI): 1.98 CM2
BH CV ECHO MEAS - PA ACC TIME: 0.12 SEC
BH CV ECHO MEAS - PA PR(ACCEL): 25.5 MMHG
BH CV ECHO MEAS - PA V2 MAX: 80.1 CM/SEC
BH CV ECHO MEAS - PULM A REVS DUR: 0.18 SEC
BH CV ECHO MEAS - PULM A REVS VEL: 28.8 CM/SEC
BH CV ECHO MEAS - PULM DIAS VEL: 38.8 CM/SEC
BH CV ECHO MEAS - PULM SYS VEL: 52.3 CM/SEC
BH CV ECHO MEAS - RAP SYSTOLE: 3 MMHG
BH CV ECHO MEAS - RV MAX PG: 2.27 MMHG
BH CV ECHO MEAS - RV V1 MAX: 75.4 CM/SEC
BH CV ECHO MEAS - RV V1 VTI: 16.2 CM
BH CV ECHO MEAS - RVOT DIAM: 2.6 CM
BH CV ECHO MEAS - RVSP: 28 MMHG
BH CV ECHO MEAS - SI(MOD-SP2): 26.6 ML/M2
BH CV ECHO MEAS - SI(MOD-SP4): 29 ML/M2
BH CV ECHO MEAS - SUP REN AO DIAM: 2.5 CM
BH CV ECHO MEAS - SV(LVOT): 54 ML
BH CV ECHO MEAS - SV(MOD-SP2): 55 ML
BH CV ECHO MEAS - SV(MOD-SP4): 60 ML
BH CV ECHO MEAS - SV(RVOT): 88.9 ML
BH CV ECHO MEAS - TAPSE (>1.6): 2.4 CM
BH CV ECHO MEAS - TR MAX PG: 25 MMHG
BH CV ECHO MEAS - TR MAX VEL: 249.8 CM/SEC
BH CV ECHO MEASUREMENTS AVERAGE E/E' RATIO: 5.82
BH CV XLRA - RV BASE: 3.7 CM
BH CV XLRA - RV LENGTH: 7.9 CM
BH CV XLRA - RV MID: 3.4 CM
BH CV XLRA - TDI S': 12.6 CM/SEC
LEFT ATRIUM VOLUME INDEX: 15.5 ML/M2
MAXIMAL PREDICTED HEART RATE: 163 BPM
SINUS: 3.5 CM
STJ: 2.9 CM
STRESS TARGET HR: 139 BPM

## 2022-02-22 PROCEDURE — 93356 MYOCRD STRAIN IMG SPCKL TRCK: CPT | Performed by: INTERNAL MEDICINE

## 2022-02-22 PROCEDURE — 93356 MYOCRD STRAIN IMG SPCKL TRCK: CPT

## 2022-02-22 PROCEDURE — 93306 TTE W/DOPPLER COMPLETE: CPT | Performed by: INTERNAL MEDICINE

## 2022-02-22 PROCEDURE — 93306 TTE W/DOPPLER COMPLETE: CPT

## 2022-02-23 ENCOUNTER — TELEPHONE (OUTPATIENT)
Dept: CARDIOLOGY | Facility: CLINIC | Age: 57
End: 2022-02-23

## 2022-02-23 NOTE — TELEPHONE ENCOUNTER
Reviewed results and recommendations with Jose Maria Judge.  Patient verbalized understanding.    Thank you,  Kathia Varela RN  Triage Nurse [facility] [place]

## 2022-02-23 NOTE — PROGRESS NOTES
Left voicemail for Jose Maria Judge requesting callback.    Thank you,  Kathia Varela RN  Triage Nurse [facility]

## 2022-02-23 NOTE — TELEPHONE ENCOUNTER
----- Message from Yehuda Bran MD sent at 2/22/2022  4:45 PM EST -----  Regarding: Echo  Can you please let him know that his echo looks excellent?  Thanks     ----- Message -----  From: Yehuda Bran MD  Sent: 2/22/2022   2:39 PM EST  To: Yehuda Bran MD

## 2022-04-14 ENCOUNTER — OFFICE VISIT (OUTPATIENT)
Dept: FAMILY MEDICINE CLINIC | Facility: CLINIC | Age: 57
End: 2022-04-14

## 2022-04-14 ENCOUNTER — TELEPHONE (OUTPATIENT)
Dept: FAMILY MEDICINE CLINIC | Facility: CLINIC | Age: 57
End: 2022-04-14

## 2022-04-14 VITALS
HEIGHT: 70 IN | BODY MASS INDEX: 29.81 KG/M2 | OXYGEN SATURATION: 96 % | RESPIRATION RATE: 16 BRPM | DIASTOLIC BLOOD PRESSURE: 72 MMHG | HEART RATE: 68 BPM | SYSTOLIC BLOOD PRESSURE: 101 MMHG | WEIGHT: 208.2 LBS | TEMPERATURE: 97.7 F

## 2022-04-14 DIAGNOSIS — G58.8 PUDENDAL NEURALGIA: Primary | ICD-10-CM

## 2022-04-14 DIAGNOSIS — I83.812 VARICOSE VEINS OF LEFT LOWER EXTREMITY WITH PAIN: ICD-10-CM

## 2022-04-14 DIAGNOSIS — H93.12 TINNITUS OF LEFT EAR: ICD-10-CM

## 2022-04-14 DIAGNOSIS — I10 PRIMARY HYPERTENSION: ICD-10-CM

## 2022-04-14 DIAGNOSIS — Z79.899 HIGH RISK MEDICATION USE: ICD-10-CM

## 2022-04-14 PROCEDURE — 99214 OFFICE O/P EST MOD 30 MIN: CPT | Performed by: FAMILY MEDICINE

## 2022-04-14 RX ORDER — PREGABALIN 50 MG/1
50 CAPSULE ORAL 2 TIMES DAILY
Qty: 60 CAPSULE | Refills: 0 | Status: SHIPPED | OUTPATIENT
Start: 2022-04-14 | End: 2022-05-11

## 2022-04-14 NOTE — PROGRESS NOTES
"Chief Complaint  Establish Care    Subjective          Jose Maria Judge presents to [facility] PRIMARY [facility]ARE  History of Present Illness  Changing PMD, has Pudendal neuralgia had Sx 2012, worst after working, no meds, given , saw Neurologist , feels like a burn with frequent BMs, and ringing on Left ear since November ,saw ENT and Audiologist, no hearing loss, was told needs something x nerves, no CT head, non smoker, no ETOH, no drugs , also hyperomsnia, x 4 years, after a nasal rinse, also feet pain since 1995, and varicose veins on left leg had Sx, last year, calves pops, PSA done by [facility], last Colonoscopy Dr Connell 2018, normal , also R shoulder pain x 6 months seen by Ortho, did try gabapentin for neuropathic pain  Objective   Vital Signs:   /72 (BP Location: Left arm, Patient Position: Sitting, Cuff Size: Large Adult)   Pulse 68   Temp 97.7 °F (36.5 °C) (Infrared)   Resp 16   Ht 177.8 cm (70\")   Wt 94.4 kg (208 lb 3.2 oz)   SpO2 96%   BMI 29.87 kg/m²            Physical Exam  Vitals and nursing note reviewed.   Constitutional:       Appearance: He is well-developed.   HENT:      Head: Normocephalic and atraumatic.      Right Ear: Tympanic membrane, ear canal and external ear normal.      Left Ear: Tympanic membrane, ear canal and external ear normal.      Nose: Nose normal.   Eyes:      General: No scleral icterus.        Right eye: No discharge.         Left eye: No discharge.      Conjunctiva/sclera: Conjunctivae normal.      Pupils: Pupils are equal, round, and reactive to light.   Neck:      Thyroid: No thyromegaly.      Vascular: No JVD.      Trachea: No tracheal deviation.   Cardiovascular:      Rate and Rhythm: Normal rate and regular rhythm.      Heart sounds: Normal heart sounds. No murmur heard.    No friction rub. No gallop.      Comments: + LLE varicose veins  Pulmonary:      Effort: Pulmonary effort is normal. No respiratory distress.      Breath sounds: Normal " breath sounds. No wheezing or rales.   Chest:      Chest wall: No tenderness.   Abdominal:      General: Bowel sounds are normal. There is no distension.      Palpations: Abdomen is soft. There is no mass.      Tenderness: There is no abdominal tenderness. There is no guarding or rebound.      Hernia: No hernia is present.   Genitourinary:     Rectum: Normal.   Musculoskeletal:         General: No tenderness. Normal range of motion.      Cervical back: Normal range of motion and neck supple.   Lymphadenopathy:      Cervical: No cervical adenopathy.   Skin:     General: Skin is warm and dry.   Neurological:      General: No focal deficit present.      Mental Status: He is alert.      Cranial Nerves: No cranial nerve deficit.      Sensory: No sensory deficit.      Motor: No abnormal muscle tone.      Coordination: Coordination normal.      Deep Tendon Reflexes: Reflexes normal.   Psychiatric:         Mood and Affect: Mood normal.         Behavior: Behavior normal.         Thought Content: Thought content normal.         Judgment: Judgment normal.        Result Review :                 Assessment and Plan    Diagnoses and all orders for this visit:    1. Pudendal neuralgia (Primary)  -     pregabalin (Lyrica) 50 MG capsule; Take 1 capsule by mouth 2 (Two) Times a Day.  Dispense: 60 capsule; Refill: 0    2. Tinnitus of left ear  -     Ambulatory Referral to ENT (Otolaryngology)    3. Primary hypertension  -     CBC & Differential; Future  -     Comprehensive Metabolic Panel; Future  -     Lipid Panel; Future  -     TSH; Future  -     POC Urinalysis Dipstick, Automated; Future    4. High risk medication use  -     Compliance Drug Analysis, Ur - Urine, Clean Catch    5. Varicose veins of left lower extremity with pain  -     Ambulatory Referral to Vascular Surgery        Follow Up   Return in about 3 months (around 7/14/2022), or if symptoms worsen or fail to improve.  Patient was given instructions and counseling  regarding his condition or for health maintenance advice. Please see specific information pulled into the AVS if appropriate.

## 2022-04-19 ENCOUNTER — TELEPHONE (OUTPATIENT)
Dept: FAMILY MEDICINE CLINIC | Facility: CLINIC | Age: 57
End: 2022-04-19

## 2022-04-19 NOTE — TELEPHONE ENCOUNTER
Caller: NuJose Maria    Relationship: Self    Best call back number: [phone]    What medications are you currently taking:   Current Outpatient Medications on File Prior to Visit   Medication Sig Dispense Refill   • acetaminophen (TYLENOL) 500 MG tablet Take 500 mg by mouth Every 6 (Six) Hours As Needed for Mild Pain .     • albuterol sulfate  (90 Base) MCG/ACT inhaler Inhale 2 puffs Every 6 (Six) Hours As Needed.     • Azelastine-Fluticasone 137-50 MCG/ACT suspension 1 spray into the nostril(s) as directed by provider 2 (Two) Times a Day. 23 g 0   • Cholecalciferol (Vitamin D) 50 MCG (2000 UT) tablet Take 2,000 Units by mouth Daily.     • Elastic Bandages & Supports (ELASTIC BACK SUPPORT) misc 1 each Take As Directed. 1 each 0   • guaiFENesin (MUCINEX) 600 MG 12 hr tablet Take 300 mg by mouth As Needed for Cough.     • ibuprofen (ADVIL,MOTRIN) 200 MG tablet Take 200 mg by mouth Every 6 (Six) Hours As Needed for Mild Pain .     • Multiple Vitamins-Minerals (ZINC PO) Take 1 tablet by mouth Daily.     • pregabalin (Lyrica) 50 MG capsule Take 1 capsule by mouth 2 (Two) Times a Day. 60 capsule 0     No current facility-administered medications on file prior to visit.       Which medication are you concerned about: SHIRIN    Who prescribed you this medication: DR AMADOR    What are your concerns: WHEN PATIENT WAS TAKING TWO PILLS A DAY OF HIS LYRICA, HE WAS GETTING A REALLY BAD PAIN BEHIND HIS EYES. HE WENT DOWN TO ONE PILL A DAY AND NOW IS NOT HAVING ANYMORE PAIN. PLEASE ADVISE IF THIS DOSAGE AMOUNT IS OKAY?

## 2022-04-20 LAB — DRUGS UR: NORMAL

## 2022-04-21 DIAGNOSIS — I10 PRIMARY HYPERTENSION: ICD-10-CM

## 2022-04-21 DIAGNOSIS — R31.21 ASYMPTOMATIC MICROSCOPIC HEMATURIA: Primary | ICD-10-CM

## 2022-04-21 LAB
BILIRUB BLD-MCNC: NEGATIVE MG/DL
CLARITY, POC: CLEAR
COLOR UR: YELLOW
EXPIRATION DATE: ABNORMAL
GLUCOSE UR STRIP-MCNC: NEGATIVE MG/DL
KETONES UR QL: NEGATIVE
LEUKOCYTE EST, POC: NEGATIVE
Lab: ABNORMAL
NITRITE UR-MCNC: NEGATIVE MG/ML
PH UR: 6 [PH] (ref 5–8)
PROT UR STRIP-MCNC: NEGATIVE MG/DL
RBC # UR STRIP: ABNORMAL /UL
SP GR UR: 1.03 (ref 1–1.03)
UROBILINOGEN UR QL: NORMAL

## 2022-04-21 PROCEDURE — 81003 URINALYSIS AUTO W/O SCOPE: CPT | Performed by: FAMILY MEDICINE

## 2022-04-22 LAB
ALBUMIN SERPL-MCNC: 4.6 G/DL (ref 3.8–4.9)
ALBUMIN/GLOB SERPL: 1.7 {RATIO} (ref 1.2–2.2)
ALP SERPL-CCNC: 49 IU/L (ref 44–121)
ALT SERPL-CCNC: 15 IU/L (ref 0–44)
AST SERPL-CCNC: 15 IU/L (ref 0–40)
BASOPHILS # BLD AUTO: 0 X10E3/UL (ref 0–0.2)
BASOPHILS NFR BLD AUTO: 1 %
BILIRUB SERPL-MCNC: 0.5 MG/DL (ref 0–1.2)
BUN SERPL-MCNC: 16 MG/DL (ref 6–24)
BUN/CREAT SERPL: 18 (ref 9–20)
CALCIUM SERPL-MCNC: 9.7 MG/DL (ref 8.7–10.2)
CHLORIDE SERPL-SCNC: 104 MMOL/L (ref 96–106)
CHOLEST SERPL-MCNC: 192 MG/DL (ref 100–199)
CO2 SERPL-SCNC: 23 MMOL/L (ref 20–29)
CREAT SERPL-MCNC: 0.91 MG/DL (ref 0.76–1.27)
EGFRCR SERPLBLD CKD-EPI 2021: 98 ML/MIN/1.73
EOSINOPHIL # BLD AUTO: 0.1 X10E3/UL (ref 0–0.4)
EOSINOPHIL NFR BLD AUTO: 2 %
ERYTHROCYTE [DISTWIDTH] IN BLOOD BY AUTOMATED COUNT: 12.5 % (ref 11.6–15.4)
GLOBULIN SER CALC-MCNC: 2.7 G/DL (ref 1.5–4.5)
GLUCOSE SERPL-MCNC: 84 MG/DL (ref 65–99)
HCT VFR BLD AUTO: 44.9 % (ref 37.5–51)
HDLC SERPL-MCNC: 50 MG/DL
HGB BLD-MCNC: 15 G/DL (ref 13–17.7)
IMM GRANULOCYTES # BLD AUTO: 0 X10E3/UL (ref 0–0.1)
IMM GRANULOCYTES NFR BLD AUTO: 0 %
LDLC SERPL CALC-MCNC: 131 MG/DL (ref 0–99)
LYMPHOCYTES # BLD AUTO: 1.6 X10E3/UL (ref 0.7–3.1)
LYMPHOCYTES NFR BLD AUTO: 33 %
MCH RBC QN AUTO: 29.5 PG (ref 26.6–33)
MCHC RBC AUTO-ENTMCNC: 33.4 G/DL (ref 31.5–35.7)
MCV RBC AUTO: 88 FL (ref 79–97)
MONOCYTES # BLD AUTO: 0.5 X10E3/UL (ref 0.1–0.9)
MONOCYTES NFR BLD AUTO: 10 %
NEUTROPHILS # BLD AUTO: 2.7 X10E3/UL (ref 1.4–7)
NEUTROPHILS NFR BLD AUTO: 54 %
PLATELET # BLD AUTO: 163 X10E3/UL (ref 150–450)
POTASSIUM SERPL-SCNC: 4.5 MMOL/L (ref 3.5–5.2)
PROT SERPL-MCNC: 7.3 G/DL (ref 6–8.5)
RBC # BLD AUTO: 5.08 X10E6/UL (ref 4.14–5.8)
SODIUM SERPL-SCNC: 141 MMOL/L (ref 134–144)
TRIGL SERPL-MCNC: 59 MG/DL (ref 0–149)
TSH SERPL DL<=0.005 MIU/L-ACNC: 2 UIU/ML (ref 0.45–4.5)
VLDLC SERPL CALC-MCNC: 11 MG/DL (ref 5–40)
WBC # BLD AUTO: 5 X10E3/UL (ref 3.4–10.8)

## 2022-04-23 LAB
BACTERIA UR CULT: NO GROWTH
BACTERIA UR CULT: NORMAL

## 2022-04-25 ENCOUNTER — TELEPHONE (OUTPATIENT)
Dept: FAMILY MEDICINE CLINIC | Facility: CLINIC | Age: 57
End: 2022-04-25

## 2022-04-25 NOTE — TELEPHONE ENCOUNTER
Caller: Jose Maria Judge    Relationship: Self    Best call back number: [phone]      What is the best time to reach you: ANYTIME    Who are you requesting to speak with (clinical staff, provider,  specific staff member): MA OR DOCTOR        What was the call regarding: HE HAS SOME QUESTIONS ABOUT HIS LAB RESULTS AND WOULD LIKE CALLBACK TO DISCUSS.    Do you require a callback: YES

## 2022-04-25 NOTE — TELEPHONE ENCOUNTER
LMTCB    OK for HUB to read.    Urine culture was negative, Urologist referral done due to blood in the urine.

## 2022-04-25 NOTE — TELEPHONE ENCOUNTER
Spoke with patient concerning urinalysis results. Patient was concerned about having 2+ (moderate amount) of blood in urine. Urine culture results are finalized. He would like to know the results of the urine culture and what to do from there? Please advise. Thank you.

## 2022-04-27 ENCOUNTER — OFFICE VISIT (OUTPATIENT)
Dept: FAMILY MEDICINE CLINIC | Facility: CLINIC | Age: 57
End: 2022-04-27

## 2022-04-27 VITALS
TEMPERATURE: 98.2 F | OXYGEN SATURATION: 97 % | HEIGHT: 70 IN | HEART RATE: 67 BPM | BODY MASS INDEX: 29.86 KG/M2 | WEIGHT: 208.6 LBS | RESPIRATION RATE: 15 BRPM | DIASTOLIC BLOOD PRESSURE: 75 MMHG | SYSTOLIC BLOOD PRESSURE: 124 MMHG

## 2022-04-27 DIAGNOSIS — R00.1 BRADYCARDIA: ICD-10-CM

## 2022-04-27 DIAGNOSIS — R07.81 RIB PAIN ON RIGHT SIDE: Primary | ICD-10-CM

## 2022-04-27 DIAGNOSIS — G58.8 PUDENDAL NEURALGIA: ICD-10-CM

## 2022-04-27 DIAGNOSIS — R59.1 LYMPHADENOPATHY: ICD-10-CM

## 2022-04-27 PROCEDURE — 71100 X-RAY EXAM RIBS UNI 2 VIEWS: CPT | Performed by: FAMILY MEDICINE

## 2022-04-27 PROCEDURE — 93000 ELECTROCARDIOGRAM COMPLETE: CPT | Performed by: FAMILY MEDICINE

## 2022-04-27 PROCEDURE — 99214 OFFICE O/P EST MOD 30 MIN: CPT | Performed by: FAMILY MEDICINE

## 2022-04-27 NOTE — PROGRESS NOTES
"Chief Complaint  Rib Pain (Right ribs-swelling, tenderness x 3 days)    Subjective          Jose Maria Judge presents to [facility]  History of Present Illness  Tenderness R ribs cage and under R axilla, no fever, no injury, x 3 days, no CP/SOA brother with a unknown lymph node cancer , L side Lymph node removed 2013, also go over labs, Lyrica causing him to be irritated      ECG 12 Lead    Date/Time: 4/27/2022 2:39 PM  Performed by: Bobo Salmon MD  Authorized by: Bobo Salmon MD   Comparison: compared with previous ECG from 12/28/2021  Rhythm: sinus bradycardia  Rate: bradycardic  Conduction: conduction normal  ST Segments: ST segments normal  QRS axis: normal  Other: no other findings    Clinical impression: non-specific ECG          Objective   Vital Signs:   /75 (BP Location: Right arm, Patient Position: Sitting, Cuff Size: Adult)   Pulse 67   Temp 98.2 °F (36.8 °C) (Infrared)   Resp 15   Ht 177.8 cm (70\")   Wt 94.6 kg (208 lb 9.6 oz)   SpO2 97%   BMI 29.93 kg/m²     Physical Exam  Vitals and nursing note reviewed.   Constitutional:       Appearance: He is well-developed.   HENT:      Head: Normocephalic and atraumatic.      Right Ear: External ear normal.      Left Ear: External ear normal.      Nose: Nose normal.   Eyes:      General: No scleral icterus.        Right eye: No discharge.         Left eye: No discharge.      Conjunctiva/sclera: Conjunctivae normal.      Pupils: Pupils are equal, round, and reactive to light.   Neck:      Thyroid: No thyromegaly.      Vascular: No JVD.      Trachea: No tracheal deviation.      Comments: R axillary tender lymphadenopathy  Cardiovascular:      Rate and Rhythm: Normal rate and regular rhythm.      Heart sounds: Normal heart sounds. No murmur heard.    No friction rub. No gallop.   Pulmonary:      Effort: Pulmonary effort is normal. No respiratory distress.      Breath sounds: Normal breath sounds. No " wheezing or rales.   Chest:      Chest wall: No tenderness.   Abdominal:      General: Bowel sounds are normal. There is no distension.      Palpations: Abdomen is soft. There is no mass.      Tenderness: There is no abdominal tenderness. There is no guarding or rebound.      Hernia: No hernia is present.   Musculoskeletal:         General: No tenderness. Normal range of motion.      Cervical back: Normal range of motion and neck supple.   Lymphadenopathy:      Cervical: No cervical adenopathy.   Skin:     General: Skin is warm and dry.   Neurological:      General: No focal deficit present.      Mental Status: He is alert.      Cranial Nerves: No cranial nerve deficit.      Sensory: No sensory deficit.      Motor: No abnormal muscle tone.      Coordination: Coordination normal.      Deep Tendon Reflexes: Reflexes normal.   Psychiatric:         Mood and Affect: Mood normal.         Behavior: Behavior normal.         Thought Content: Thought content normal.         Judgment: Judgment normal.          Result Review :                 Assessment and Plan    Diagnoses and all orders for this visit:    1. Rib pain on right side (Primary)  -     XR Ribs 2 View Right  -     ECG 12 Lead  -     US Axilla Right; Future    2. Pudendal neuralgia  -     Ambulatory Referral to Neurology    3. Lymphadenopathy  -     US Axilla Right; Future    4. Bradycardia  Comments:  seen by Dr Vela               Follow Up   Return if symptoms worsen or fail to improve.  Patient was given instructions and counseling regarding his condition or for health maintenance advice. Please see specific information pulled into the AVS if appropriate.     A Right ribs  X-Ray was ordered. My reading of this film is negative. (No comparison films available: pending review by Radiologist.)

## 2022-04-28 ENCOUNTER — TELEPHONE (OUTPATIENT)
Dept: FAMILY MEDICINE CLINIC | Facility: CLINIC | Age: 57
End: 2022-04-28

## 2022-04-28 DIAGNOSIS — H93.12 TINNITUS OF LEFT EAR: ICD-10-CM

## 2022-04-28 DIAGNOSIS — F43.9 STRESS: Primary | ICD-10-CM

## 2022-04-28 NOTE — TELEPHONE ENCOUNTER
Caller: Jose Maria Judge    Relationship: Self    Best call back number: [phone]    Who are you requesting to speak with (clinical staff, provider,  specific staff member): DR FITZPATRICK    What was the call regarding: PATIENT REQUESTS  A CALL BACK FROM  DR FITZPATRICK ABOUT THE PAIN HE HAS EXPERIENCING. REQUESTS TO SPEAK WITH  DR FITZPATRICK, STATED HE DID NOT WISH TO SPEAK WITH AN MA ABOUT THE ISSUE    Do you require a callback: YES

## 2022-05-04 ENCOUNTER — HOSPITAL ENCOUNTER (OUTPATIENT)
Dept: ULTRASOUND IMAGING | Facility: HOSPITAL | Age: 57
Discharge: HOME | End: 2022-05-04
Admitting: FAMILY MEDICINE

## 2022-05-04 DIAGNOSIS — R59.1 LYMPHADENOPATHY: ICD-10-CM

## 2022-05-04 DIAGNOSIS — R07.81 RIB PAIN ON RIGHT SIDE: ICD-10-CM

## 2022-05-04 PROCEDURE — 76882 US LMTD JT/FCL EVL NVASC XTR: CPT

## 2022-05-05 ENCOUNTER — TELEPHONE (OUTPATIENT)
Dept: FAMILY MEDICINE CLINIC | Facility: CLINIC | Age: 57
End: 2022-05-05

## 2022-05-05 NOTE — TELEPHONE ENCOUNTER
Patient called and states he was retuning a call to Dr Neff.  I didn't see any messages in his chart.  Phone number is [phone].

## 2022-05-09 ENCOUNTER — TELEPHONE (OUTPATIENT)
Dept: FAMILY MEDICINE CLINIC | Facility: CLINIC | Age: 57
End: 2022-05-09

## 2022-05-09 NOTE — TELEPHONE ENCOUNTER
Caller: Jose Maria Judge    Relationship: Self    Best call back number: [phone]    What is the best time to reach you: ANYTIME   Who are you requesting to speak with (clinical staff, provider,  specific staff member): DOCTOR SRIKANTH DALEY         What was the call regarding: PATIENT WOULD LIKE TO SPEAK WITH DOCTOR SRIKANTH DALEY     Do you require a callback: YES

## 2022-05-10 ENCOUNTER — TELEPHONE (OUTPATIENT)
Dept: FAMILY MEDICINE CLINIC | Facility: CLINIC | Age: 57
End: 2022-05-10

## 2022-05-10 NOTE — TELEPHONE ENCOUNTER
PATIENT CALLING STATING THAT HE RECEIVED A BILL FOR THE URINE CULTURE HE SPOKE WITH HIS INSURANCE AND THEY STATED THIS IS COVERED HE WOULD LIKE TO KNOW HOW TO GET THIS TAKEN CARE OF.    PLEASE ADVISE   [phone]

## 2022-05-11 DIAGNOSIS — G58.8 PUDENDAL NEURALGIA: ICD-10-CM

## 2022-05-11 RX ORDER — PREGABALIN 50 MG/1
CAPSULE ORAL
Qty: 60 CAPSULE | Refills: 0 | Status: SHIPPED | OUTPATIENT
Start: 2022-05-11 | End: 2022-05-13 | Stop reason: ALTCHOICE

## 2022-05-11 NOTE — TELEPHONE ENCOUNTER
Rx Refill Note  Requested Prescriptions     Pending Prescriptions Disp Refills   • pregabalin (LYRICA) 50 MG capsule [Pharmacy Med Name: PREGABALIN 50 MG CAPSULE] 60 capsule      Sig: TAKE ONE CAPSULE BY MOUTH TWICE A DAY      Last office visit with prescribing clinician: 4/27/2022      Next office visit with prescribing clinician: 5/13/2022       Last prescribed on 04/14/2022.         Lonnie Santa MA/HARSHAD  05/11/22, 07:20 EDT

## 2022-05-11 NOTE — TELEPHONE ENCOUNTER
Informed patient that he needs to call billing in regards to this problem, number for billing given [phone]

## 2022-05-13 ENCOUNTER — OFFICE VISIT (OUTPATIENT)
Dept: FAMILY MEDICINE CLINIC | Facility: CLINIC | Age: 57
End: 2022-05-13

## 2022-05-13 VITALS
TEMPERATURE: 98.4 F | BODY MASS INDEX: 30.06 KG/M2 | OXYGEN SATURATION: 97 % | HEART RATE: 65 BPM | RESPIRATION RATE: 16 BRPM | SYSTOLIC BLOOD PRESSURE: 116 MMHG | WEIGHT: 210 LBS | DIASTOLIC BLOOD PRESSURE: 81 MMHG | HEIGHT: 70 IN

## 2022-05-13 DIAGNOSIS — G89.29 CHRONIC PAIN OF BOTH FEET: ICD-10-CM

## 2022-05-13 DIAGNOSIS — M79.672 CHRONIC PAIN OF BOTH FEET: ICD-10-CM

## 2022-05-13 DIAGNOSIS — M79.671 CHRONIC PAIN OF BOTH FEET: ICD-10-CM

## 2022-05-13 DIAGNOSIS — M25.511 BILATERAL SHOULDER PAIN, UNSPECIFIED CHRONICITY: Primary | ICD-10-CM

## 2022-05-13 DIAGNOSIS — M25.512 BILATERAL SHOULDER PAIN, UNSPECIFIED CHRONICITY: Primary | ICD-10-CM

## 2022-05-13 PROCEDURE — 73030 X-RAY EXAM OF SHOULDER: CPT | Performed by: FAMILY MEDICINE

## 2022-05-13 PROCEDURE — 99213 OFFICE O/P EST LOW 20 MIN: CPT | Performed by: FAMILY MEDICINE

## 2022-05-13 RX ORDER — TRAMADOL HYDROCHLORIDE 50 MG/1
50 TABLET ORAL EVERY 8 HOURS PRN
Qty: 60 TABLET | Refills: 0 | Status: SHIPPED | OUTPATIENT
Start: 2022-05-13 | End: 2022-05-24

## 2022-05-13 NOTE — PROGRESS NOTES
"Chief Complaint  Shoulder Pain (Left shoulder-fell while lifting father trying to help him, slipped and fell x 2 wks)    Subjective          Jose Maria Judge presents to [facility]  History of Present Illness  Fell and injured l shoulder while helping father, fell on L side, worst with abduction, rated 6/10, no tingling, no numbness, now also R shoulder pain since August wants both Xray , R handed , no CP/SOA , had colonoscopy 2018, wants  podiatrist x B feet apin also  Objective   Vital Signs:  /81 (BP Location: Right arm, Patient Position: Sitting, Cuff Size: Adult)   Pulse 65   Temp 98.4 °F (36.9 °C) (Infrared)   Resp 16   Ht 177.8 cm (70\")   Wt 95.3 kg (210 lb)   SpO2 97%   BMI 30.13 kg/m²           Physical Exam  Vitals and nursing note reviewed.   Constitutional:       General: He is not in acute distress.     Appearance: Normal appearance. He is well-developed. He is not ill-appearing, toxic-appearing or diaphoretic.   HENT:      Head: Normocephalic and atraumatic.      Right Ear: Tympanic membrane, ear canal and external ear normal. There is no impacted cerumen.      Left Ear: Tympanic membrane, ear canal and external ear normal. There is no impacted cerumen.      Nose: Nose normal. No congestion or rhinorrhea.      Mouth/Throat:      Mouth: Mucous membranes are moist.      Pharynx: Oropharynx is clear. No oropharyngeal exudate or posterior oropharyngeal erythema.   Eyes:      General: No scleral icterus.        Right eye: No discharge.         Left eye: No discharge.      Extraocular Movements: Extraocular movements intact.      Conjunctiva/sclera: Conjunctivae normal.      Pupils: Pupils are equal, round, and reactive to light.   Neck:      Thyroid: No thyromegaly.      Vascular: No carotid bruit or JVD.      Trachea: No tracheal deviation.   Cardiovascular:      Rate and Rhythm: Normal rate and regular rhythm.      Heart sounds: Normal heart sounds. No murmur " heard.    No friction rub. No gallop.   Pulmonary:      Effort: Pulmonary effort is normal. No respiratory distress.      Breath sounds: Normal breath sounds. No stridor. No wheezing, rhonchi or rales.   Chest:      Chest wall: No tenderness.   Abdominal:      General: Bowel sounds are normal. There is no distension.      Palpations: Abdomen is soft. There is no mass.      Tenderness: There is no abdominal tenderness. There is no right CVA tenderness, left CVA tenderness, guarding or rebound.      Hernia: No hernia is present.   Musculoskeletal:         General: Tenderness and signs of injury present. Normal range of motion.      Cervical back: Normal range of motion and neck supple. No rigidity or tenderness.      Right lower leg: No edema.      Left lower leg: No edema.      Comments: Tender  bilateral AC joint, pain with abduction both upper extremities 45 degrees, rotator cuff weak on the left upper extremity,   Lymphadenopathy:      Cervical: No cervical adenopathy.   Skin:     General: Skin is warm and dry.      Coloration: Skin is not jaundiced.   Neurological:      General: No focal deficit present.      Mental Status: He is alert and oriented to person, place, and time. Mental status is at baseline.      Sensory: No sensory deficit.      Motor: No weakness or abnormal muscle tone.      Coordination: Coordination normal.      Gait: Gait normal.      Deep Tendon Reflexes: Reflexes normal.   Psychiatric:         Mood and Affect: Mood normal.         Behavior: Behavior normal.         Thought Content: Thought content normal.         Judgment: Judgment normal.        Result Review :                 Assessment and Plan    Diagnoses and all orders for this visit:    1. Bilateral shoulder pain, unspecified chronicity (Primary)  -     XR Shoulder 2+ View Right  -     XR Shoulder 2+ View Left  -     MRI Shoulder Left Without Contrast; Future  -     MRI Shoulder Right Without Contrast; Future  -     traMADol (ULTRAM)  50 MG tablet; Take 1 tablet by mouth Every 8 (Eight) Hours As Needed for Moderate Pain .  Dispense: 60 tablet; Refill: 0    2. Chronic pain of both feet  -     Ambulatory Referral to Podiatry             Follow Up   No follow-ups on file.  Patient was given instructions and counseling regarding his condition or for health maintenance advice. Please see specific information pulled into the AVS if appropriate.     A bilateral shoulder  X-Ray was ordered. My reading of this film is negative. (No comparison films available: pending review by Radiologist.)

## 2022-05-18 DIAGNOSIS — R59.1 LYMPHADENOPATHY: ICD-10-CM

## 2022-05-18 DIAGNOSIS — R59.0 LYMPHADENOPATHY, AXILLARY: Primary | ICD-10-CM

## 2022-05-19 ENCOUNTER — TELEPHONE (OUTPATIENT)
Dept: ONCOLOGY | Facility: OTHER | Age: 57
End: 2022-05-19

## 2022-05-19 ENCOUNTER — LAB (OUTPATIENT)
Dept: LAB | Facility: HOSPITAL | Age: 57
End: 2022-05-19

## 2022-05-19 ENCOUNTER — APPOINTMENT (OUTPATIENT)
Dept: LAB | Facility: HOSPITAL | Age: 57
End: 2022-05-19

## 2022-05-19 DIAGNOSIS — R59.1 LYMPHADENOPATHY: ICD-10-CM

## 2022-05-19 LAB
ALBUMIN SERPL-MCNC: 4.6 G/DL (ref 3.5–5.2)
ALBUMIN/GLOB SERPL: 1.6 G/DL (ref 1.1–2.4)
ALP SERPL-CCNC: 47 U/L (ref 38–116)
ALT SERPL W P-5'-P-CCNC: 15 U/L (ref 0–41)
ANION GAP SERPL CALCULATED.3IONS-SCNC: 9.3 MMOL/L (ref 5–15)
AST SERPL-CCNC: 16 U/L (ref 0–40)
BASOPHILS # BLD AUTO: 0.04 10*3/MM3 (ref 0–0.2)
BASOPHILS NFR BLD AUTO: 0.7 % (ref 0–1.5)
BILIRUB SERPL-MCNC: 0.3 MG/DL (ref 0.2–1.2)
BUN SERPL-MCNC: 16 MG/DL (ref 6–20)
BUN/CREAT SERPL: 17.8 (ref 7.3–30)
CALCIUM SPEC-SCNC: 9.7 MG/DL (ref 8.5–10.2)
CHLORIDE SERPL-SCNC: 104 MMOL/L (ref 98–107)
CO2 SERPL-SCNC: 26.7 MMOL/L (ref 22–29)
CREAT SERPL-MCNC: 0.9 MG/DL (ref 0.7–1.3)
DEPRECATED RDW RBC AUTO: 41.4 FL (ref 37–54)
EGFRCR SERPLBLD CKD-EPI 2021: 99.6 ML/MIN/1.73
EOSINOPHIL # BLD AUTO: 0.1 10*3/MM3 (ref 0–0.4)
EOSINOPHIL NFR BLD AUTO: 1.7 % (ref 0.3–6.2)
ERYTHROCYTE [DISTWIDTH] IN BLOOD BY AUTOMATED COUNT: 12.7 % (ref 12.3–15.4)
ERYTHROCYTE [SEDIMENTATION RATE] IN BLOOD: 9 MM/HR (ref 0–20)
GLOBULIN UR ELPH-MCNC: 2.8 GM/DL (ref 1.8–3.5)
GLUCOSE SERPL-MCNC: 80 MG/DL (ref 74–124)
HCT VFR BLD AUTO: 43.4 % (ref 37.5–51)
HGB BLD-MCNC: 14.5 G/DL (ref 13–17.7)
IMM GRANULOCYTES # BLD AUTO: 0.03 10*3/MM3 (ref 0–0.05)
IMM GRANULOCYTES NFR BLD AUTO: 0.5 % (ref 0–0.5)
LYMPHOCYTES # BLD AUTO: 2.18 10*3/MM3 (ref 0.7–3.1)
LYMPHOCYTES NFR BLD AUTO: 36.5 % (ref 19.6–45.3)
MCH RBC QN AUTO: 29.7 PG (ref 26.6–33)
MCHC RBC AUTO-ENTMCNC: 33.4 G/DL (ref 31.5–35.7)
MCV RBC AUTO: 88.9 FL (ref 79–97)
MONOCYTES # BLD AUTO: 0.55 10*3/MM3 (ref 0.1–0.9)
MONOCYTES NFR BLD AUTO: 9.2 % (ref 5–12)
NEUTROPHILS NFR BLD AUTO: 3.07 10*3/MM3 (ref 1.7–7)
NEUTROPHILS NFR BLD AUTO: 51.4 % (ref 42.7–76)
NRBC BLD AUTO-RTO: 0 /100 WBC (ref 0–0.2)
PLATELET # BLD AUTO: 160 10*3/MM3 (ref 140–450)
PMV BLD AUTO: 10.1 FL (ref 6–12)
POTASSIUM SERPL-SCNC: 4.3 MMOL/L (ref 3.5–4.7)
PROT SERPL-MCNC: 7.4 G/DL (ref 6.3–8)
RBC # BLD AUTO: 4.88 10*6/MM3 (ref 4.14–5.8)
SODIUM SERPL-SCNC: 140 MMOL/L (ref 134–145)
WBC NRBC COR # BLD: 5.97 10*3/MM3 (ref 3.4–10.8)

## 2022-05-19 PROCEDURE — 85652 RBC SED RATE AUTOMATED: CPT | Performed by: INTERNAL MEDICINE

## 2022-05-19 PROCEDURE — 36415 COLL VENOUS BLD VENIPUNCTURE: CPT

## 2022-05-19 PROCEDURE — 85025 COMPLETE CBC W/AUTO DIFF WBC: CPT

## 2022-05-19 PROCEDURE — 80053 COMPREHEN METABOLIC PANEL: CPT

## 2022-05-19 NOTE — TELEPHONE ENCOUNTER
PT CALLED TO GET A LATER TIME FOR HIS LABS TODAY - CALLED OFFICE AND CAROLANN CHANGED TIME. INFORMED PT WHO V/U.

## 2022-05-20 ENCOUNTER — OFFICE VISIT (OUTPATIENT)
Dept: FAMILY MEDICINE CLINIC | Facility: CLINIC | Age: 57
End: 2022-05-20

## 2022-05-20 DIAGNOSIS — M25.511 BILATERAL SHOULDER PAIN, UNSPECIFIED CHRONICITY: Primary | ICD-10-CM

## 2022-05-20 DIAGNOSIS — M25.512 BILATERAL SHOULDER PAIN, UNSPECIFIED CHRONICITY: Primary | ICD-10-CM

## 2022-05-20 DIAGNOSIS — K62.89 RECTAL PAIN: ICD-10-CM

## 2022-05-20 DIAGNOSIS — G58.8 PUDENDAL NEURALGIA: ICD-10-CM

## 2022-05-20 PROCEDURE — 99213 OFFICE O/P EST LOW 20 MIN: CPT | Performed by: FAMILY MEDICINE

## 2022-05-20 NOTE — PROGRESS NOTES
Chief Complaint  No chief complaint on file.  Neurology referral  Subjective          Jose Maria Judge presents to [facility]  History of Present Illness  Patient agreed to be contacted by phone  Finally I was able to contact patient, he wants to cancel the MRI of the shoulder due to tinnitus, he wants wait to  see the  ENT, he has an appointment next week, he agreed to see instead of an orthopedist regarding his bilateral shoulder pain, he also requested to see a different neurologist for his pudendal neuralgia, because he already saw this one previously , he also would like to see a gastroenterologist due to pain after bowel movements, no constipation no diarrhea, no blood in stools  Objective   Vital Signs:  There were no vitals taken for this visit.          Result Review :                 Assessment and Plan    Diagnoses and all orders for this visit:    1. Bilateral shoulder pain, unspecified chronicity (Primary)  -     Ambulatory Referral to Orthopedic Surgery    2. Rectal pain  -     Ambulatory Referral to Gastroenterology    3. Pudendal neuralgia  -     Ambulatory Referral to Neurology             Follow Up   No follow-ups on file.  Patient was given instructions and counseling regarding his condition or for health maintenance advice. Please see specific information pulled into the AVS if appropriate.   Time spent 15 minutes

## 2022-05-24 ENCOUNTER — TELEPHONE (OUTPATIENT)
Dept: FAMILY MEDICINE CLINIC | Facility: CLINIC | Age: 57
End: 2022-05-24

## 2022-05-24 DIAGNOSIS — F43.9 STRESS: Primary | ICD-10-CM

## 2022-05-24 RX ORDER — DIAZEPAM 2 MG/1
2 TABLET ORAL DAILY PRN
Qty: 30 TABLET | Refills: 0 | Status: SHIPPED | OUTPATIENT
Start: 2022-05-24 | End: 2022-07-20 | Stop reason: SDUPTHER

## 2022-05-24 NOTE — TELEPHONE ENCOUNTER
Caller: Jose Maria Judge    Relationship: Self    Best call back number:[phone]       What is the best time to reach you: ANYTIME     Who are you requesting to speak with (clinical staff, provider,  specific staff member): ANYTIME     What was the call regarding: PATIENT WOULD LIKE A CALL BACK FROM DR DALEY TO DISCUSS HIS  BODY'S REACTION TO traMADol (ULTRAM) 50 MG tablet    PATIENT WOULD NOT DISCLOSE WHAT REACTIONS HE WAS HAVING     Do you require a callback: YES

## 2022-05-25 ENCOUNTER — TELEPHONE (OUTPATIENT)
Dept: ONCOLOGY | Facility: CLINIC | Age: 57
End: 2022-05-25

## 2022-05-25 NOTE — TELEPHONE ENCOUNTER
Caller: Jose Maria Judge    Relationship: Self    Best call back number: [phone]      What was the call regarding: PATIENT WANTED TO KNOW IF HE COULD JUST DO A PHONE VISIT FOR TOMORROW     Do you require a callback: YES

## 2022-05-26 ENCOUNTER — APPOINTMENT (OUTPATIENT)
Dept: LAB | Facility: HOSPITAL | Age: 57
End: 2022-05-26

## 2022-05-26 ENCOUNTER — OFFICE VISIT (OUTPATIENT)
Dept: ONCOLOGY | Facility: CLINIC | Age: 57
End: 2022-05-26

## 2022-05-26 DIAGNOSIS — G58.8 PUDENDAL NEURALGIA: Primary | ICD-10-CM

## 2022-05-26 DIAGNOSIS — H93.12 TINNITUS OF LEFT EAR: ICD-10-CM

## 2022-05-26 PROCEDURE — 99442 PR PHYS/QHP TELEPHONE EVALUATION 11-20 MIN: CPT | Performed by: INTERNAL MEDICINE

## 2022-05-26 NOTE — PROGRESS NOTES
Subjective Ongoing pudendal neuralgia, now with tinnitus    REASON FOR FOLLOWUP: Previous left axillary adenopathy with potential evidence of lymph abdominal Hodgkin's disease found negative.  Patient contacted by telephone, agrees to visit      History of Present Illness      The patient is a 56-year-old male who was recently been assessed by surgery having worsening epigastric discomfort since November.  He been seen by Dr. Nguyễn with gallbladder ultrasound and CT scan of abdomen pelvis done in late November demonstrating a possible calcified tiny gallstones within the lumen of the gallbladder.  The same report documents enlarged right external iliac adenopathy that was unchanged from January 2016. he had associated intermittent nausea and ultimately required admission to [facility].  He was admitted December 29-December 31.  His subsequent testing revealed a GI panel that was positive for norovirus and his symptoms improved with gut rest and hydration.  HIDA scan redemonstrated 50% ejection fraction and cholecystectomy was discussed with follow-up planned.   Additional testing continued as outpatient in January including EGD that demonstrated no gross abnormalities of the first and second portions of the duodenum nor the gastric antrum, body and retroflexed view of the stomach which were thought to be normal.      Importantly the patient and also been reviewed for enlarged left axillary lymph node in November leading to an excision of a deep left axillary lymph node November 9, 2018.  This had initially been evaluated by needle biopsy August 2018 revealing a polymorphous lymphoid population with histiocytes.  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  We have discussed the patient's history extensively and that we were to  see him potentially initially in November but as a result of his additional issues medically he is only been seen now approximately 6 months later.  He has no fevers, chills, does have weight loss result of change in his diet.  He describes in particular worsening of a anal fissure that has caused him pain for quite some time as well as symptoms that could potentially be referable to pudendal neuralgia.  The patient was assessed for potential lymphoproliferative disorder with a number of studies including CRP, sed rate, paraprotein analysis,  negative.  Subsequently repeat CT scan of chest and pelvis July 3, 2019 also failed to show any additional lymphadenopathy with a stable appearance to pelvic adenopathy and no intra-abdominal or intrapelvic process seen, no pathologic lymphadenopathy seen in the chest.  He is seen back July 11, 2019 indicating that his major issue has been hyperosmia and he wishes an ENT assessment.  The patient did follow-up with ENT and is now seen back December 30, 2020 still recovering from upper respiratory infection that he believes he developed after seeing ENT staff members.  He feels about the same, still with hyperosmia but has no fever, chills, appetite reduction or weight loss.  He remains concerned about his back and numbness in his extremities and is scheduled for NCV testing in mid February and follow-up with neurosurgery.  Patient underwent follow-up CT scans May 20 demonstrating right pelvic lymphadenopathy that was stable, no new abnormalities in the abdomen pelvis and no lymphadenopathy or abnormalities within the chest.      Unfortunately his continue to have severe pain in his lower back leading to neurosurgical assessment and MRI imaging the lumbar spine showing multilevel disc desiccation similar to May 2019.  Patient was contacted by telephone May 27, 2020 and his scan results reviewed with him.  He still has episodes of chilling that he notices but otherwise is doing  about the same but still has back pain that is bothering him excessively.  He does not have night sweats, weight loss, rash or additional systemic symptoms.  The patient is next contacted 5/27/2021 indicating that he has been having ongoing, and refractory, pain from pudendal neuralgia undergoing a series of treatments to pain management.  He is also quite concerned about his brother who is currently hospitalized.    The patient is next reviewed 5/26/2022 by telephone.  Unfortunately his brother recently passed away and he is continuing to grieve.  The patient, himself, also has ongoing pudendal neuralgia as well as having recent developed tinnitus.  Neurologic consult is pending concerning this.  Recent additional assessment included a right axillary ultrasound that was normal and CT scan of the chest 1/14/2022 also without new abnormality though there is a stable 0.4 cm nodule right middle lobe its been stable over 2 years.    Past Medical History:   Diagnosis Date   • Abdominal lymphadenopathy    • Acid reflux     HX   • Anxiety     R/T PAIN   • Carpal tunnel syndrome, left    • Chronic pain    • DDD (degenerative disc disease), lumbar    • Fatigue    • H/O Hypersensitivity to odor     Burning sensation in nasal passage   • Hemorrhoids     internal fistula   • History of medical problems Hyperosmia, Pudendal neuralgia   • Irritable bowel syndrome    • Low back pain    • Lymphadenopathy, axillary     LEFT   • Male pattern alopecia    • Pudendal neuralgia    • Right hip pain    • Unexplained night sweats         Past Surgical History:   Procedure Laterality Date   • ABSCESS DRAINAGE  08/2012   • ANAL FISTULA REPAIR N/A 09/2012, 10/2012   • AXILLARY LYMPH NODE BIOPSY/EXCISION Left 11/9/2018    Procedure: AXILLARY LYMPH NODE BIOPSY/EXCISION;  Surgeon: Amando Nguyễn MD;  Location: [facility] [place];  Service: General   • COLONOSCOPY N/A 2013    done at [facility]   • COLONOSCOPY N/A 7/10/2019    Procedure:  COLONOSCOPY to CECUM;  Surgeon: Amando Nguyễn MD;  Location: [facility] ENDOSCOPY;  Service: General   • ENDOSCOPY N/A 1/9/2019    Procedure: ESOPHAGOGASTRODUODENOSCOPY;  Surgeon: Amando Nguyễn MD;  Location: [facility] ENDOSCOPY;  Service: General   • FINE NEEDLE ASPIRATION Left 08/09/2018    Left axillary lymph node FNA   • FLEXIBLE SIGMOIDOSCOPY N/A 06/25/2003    Normal-Dr. Cezar Aviles   • HEMORRHOIDECTOMY N/A 1996   • HIP ARTHROSCOPY W/ LABRAL REPAIR Right 04/03/2017    Dr. Lonnie Lemons   • MEDIAL BRANCH BLOCK Bilateral 3/12/2021    Procedure: BILATERAL MEDIAL BRANCH BLOCK W/MAC;  Surgeon: Francisco Velez MD;  [place] [facility] [place];  Service: Pain Management;  Laterality: Bilateral;   • SIGMOIDOSCOPY N/A 9/23/2020    Procedure: FLEXIBLE SIGMOIDOSCOPY WITH BIOPSY;  Surgeon: Shena Ring MD;  Location: [facility] ENDOSCOPY;  Service: Gastroenterology;  Laterality: N/A;  pre- anal/rectal pain  post- hemorrhoids   • TONSILLECTOMY Bilateral         Current Outpatient Medications on File Prior to Visit   Medication Sig Dispense Refill   • acetaminophen (TYLENOL) 500 MG tablet Take 500 mg by mouth Every 6 (Six) Hours As Needed for Mild Pain .     • albuterol sulfate  (90 Base) MCG/ACT inhaler Inhale 2 puffs Every 6 (Six) Hours As Needed.     • Azelastine-Fluticasone 137-50 MCG/ACT suspension 1 spray into the nostril(s) as directed by provider 2 (Two) Times a Day. 23 g 0   • Cholecalciferol (Vitamin D) 50 MCG (2000 UT) tablet Take 2,000 Units by mouth Daily.     • diazePAM (Valium) 2 MG tablet Take 1 tablet by mouth Daily As Needed for Anxiety. 30 tablet 0   • Elastic Bandages & Supports (ELASTIC BACK SUPPORT) misc 1 each Take As Directed. 1 each 0   • guaiFENesin (MUCINEX) 600 MG 12 hr tablet Take 300 mg by mouth As Needed for Cough.     • ibuprofen (ADVIL,MOTRIN) 200 MG tablet Take 200 mg by mouth Every 6 (Six) Hours As Needed for Mild Pain .     • Multiple Vitamins-Minerals (ZINC PO) Take 1 tablet  "by mouth Daily.       No current facility-administered medications on file prior to visit.        ALLERGIES:    Allergies   Allergen Reactions   • Bactrim [Sulfamethoxazole-Trimethoprim] Shortness Of Breath   • Hydrocodone Shortness Of Breath   • Medrol [Methylprednisolone] Unknown - High Severity     Rectal bleeding   • Mobic [Meloxicam] Nausea Only and Other (See Comments)     Severe heartburn   • Naproxen Shortness Of Breath   • Nsaids Unknown - High Severity     Can take Ibuprofen.. Causes constipation   • Sulfa Antibiotics Shortness Of Breath     TROUBLE BREATHING   • Alprazolam Other (See Comments)     Annotation - 06Jan2016: Patient stated he can not take this medication because it \"makes him feel weird.\"     • Diclofenac Nausea Only and Other (See Comments)     Severe heartburn   • Erythromycin GI Intolerance   • Levaquin [Levofloxacin] Unknown - Low Severity     Made tendons tight   • Tramadol Headache   • Ceftin [Cefuroxime] Other (See Comments)     cough        Social History     Socioeconomic History   • Marital status: Single   • Number of children: 0   • Years of education: COLLEGE   • Highest education level: Not asked   Tobacco Use   • Smoking status: Never Smoker   • Smokeless tobacco: Never Used   • Tobacco comment: caff use   Vaping Use   • Vaping Use: Never used   Substance and Sexual Activity   • Alcohol use: No   • Drug use: No   • Sexual activity: Not Currently        Family History   Problem Relation Age of Onset   • Atrial fibrillation Father    • Aneurysm Father    • Hypertension Father    • Lymphoma Brother    • Malig Hyperthermia Neg Hx         Review of Systems   Constitutional: Positive for activity change. Negative for appetite change and unexpected weight change.   HENT: Negative for congestion and rhinorrhea.    Respiratory: Negative.  Negative for chest tightness, shortness of breath and wheezing.    Cardiovascular: Negative.    Gastrointestinal: Negative for abdominal pain, " constipation, diarrhea, nausea and vomiting.   Genitourinary: Negative.    Musculoskeletal: Positive for back pain.   Skin: Negative.    Neurological: Positive for numbness.   Hematological: Negative.    Psychiatric/Behavioral: Negative.              Objective     There were no vitals filed for this visit.  Current Status 2/3/2020   ECOG score 1           RECENT LABS:  Hematology WBC   Date Value Ref Range Status   05/19/2022 5.97 3.40 - 10.80 10*3/mm3 Final   04/21/2022 5.0 3.4 - 10.8 x10E3/uL Final     RBC   Date Value Ref Range Status   05/19/2022 4.88 4.14 - 5.80 10*6/mm3 Final   04/21/2022 5.08 4.14 - 5.80 x10E6/uL Final     Hemoglobin   Date Value Ref Range Status   05/19/2022 14.5 13.0 - 17.7 g/dL Final     Hematocrit   Date Value Ref Range Status   05/19/2022 43.4 37.5 - 51.0 % Final     Platelets   Date Value Ref Range Status   05/19/2022 160 140 - 450 10*3/mm3 Final          Assessment & Plan      The patient is a 56-year-old male who had previously been assessed for enlarged left axillary adenopathy in November leading to a biopsy that was nondiagnostic but suspicious and eventually an excisional biopsy that was eventually felt to be consistent with follicular hyperplasia and progressive transformation of germinal centers with 2 foci suspicious for early involvement by nodules lymphocyte predominant Hodgkin's lymphoma.  This was likely treated by excision and further therapy is not necessary in this patient though he has a number of symptoms that are at least suspicious for potential recurrence.  As he was admitted for his GI symptoms, described above, he underwent additional testing that demonstrated right external iliac adenopathy that have been unchanged from 2016 and further endoscopy was negative as well.  Again we had a long discussion today as to how he might of already been treated under the circumstances but that additional surveillance would be necessary in approximately 6 months from his last  exam and he is now seen May 16 thus allowing us to reexamine him via scan in the next several months.  He and his mother are agreeable with this plan.  We had the patient proceed with a number of studies that were fortunately negative for evidence of lymphoproliferative disorder.  As he is seen July 11, 2019 his physical exam again does not reveal new lymphadenopathy or hepatosplenomegaly and CBC is normal.    The patient then seen February 3, 2020 doing about the same but having issues with lower extremity numbness which is been reviewed by neurosurgery.  After discussion we had him undergo repeat assessments including CT of chest and pelvis 4 months later.  These are found to be without change and the patient was contacted in that we would like to see him at least on a yearly basis.     The patient is next contacted by telephone 5/27/2021 with further pain described as pudendal neuralgia for which he sees pain management routinely with only modest success.  Fortunately there is been no suggestion of development of a malignancy such as lymphoma.     This is again the case for the patient seen 5/26/2022 though he has a series of ongoing somatic complaints.  This includes his pudendal neuralgia and recent development of tinnitus on the left ear.    Plan:  *Neurologic assessment planned    *Patient without development adenopathy, see as needed.      You have chosen to receive care through a telephone visit today. Do you consent to use a telephone visit for your medical care today? Yes     This visit has been rescheduled as a phone visit to comply with patient safety concerns in accordance with CDC recommendations. Total time of discussion was 14 minutes.    29819 is 5-10 minutes  86059 is 11-20 minutes  95721 is 21 or more minutes

## 2022-06-13 ENCOUNTER — OFFICE VISIT (OUTPATIENT)
Dept: ORTHOPEDIC SURGERY | Facility: CLINIC | Age: 57
End: 2022-06-13

## 2022-06-13 VITALS — HEIGHT: 70 IN | TEMPERATURE: 96.2 F | WEIGHT: 212.8 LBS | BODY MASS INDEX: 30.46 KG/M2

## 2022-06-13 DIAGNOSIS — M75.100 TEAR OF ROTATOR CUFF, UNSPECIFIED LATERALITY, UNSPECIFIED TEAR EXTENT, UNSPECIFIED WHETHER TRAUMATIC: Primary | ICD-10-CM

## 2022-06-13 PROCEDURE — 99203 OFFICE O/P NEW LOW 30 MIN: CPT | Performed by: ORTHOPAEDIC SURGERY

## 2022-06-13 PROCEDURE — 73030 X-RAY EXAM OF SHOULDER: CPT | Performed by: ORTHOPAEDIC SURGERY

## 2022-06-13 NOTE — PROGRESS NOTES
"   New bilateral  Shoulder      Patient: Jose Maria Judge        YOB: 1965    Medical Record Number: [id number]        Chief Complaints: bilateral shoulder pain      History of Present Illness: This is a 56-year-old male who presents complaining of bilateral shoulder pain he states the right 1 started bothering him in August he is right-hand dominant he did have that injected at Dr. Valentin's office but states it did not help at all he is right-hand dominant states he fell onto the left shoulder about 6 weeks ago and that shoulder is actually worse.  He has limitation of his function symptoms are moderate to severe intermittent aching worse with activity somewhat better with rest past medical history is well listed below and reviewed by me      Allergies:   Allergies   Allergen Reactions   • Bactrim [Sulfamethoxazole-Trimethoprim] Shortness Of Breath   • Hydrocodone Shortness Of Breath   • Medrol [Methylprednisolone] Unknown - High Severity     Rectal bleeding   • Mobic [Meloxicam] Nausea Only and Other (See Comments)     Severe heartburn   • Naproxen Shortness Of Breath   • Nsaids Unknown - High Severity     Can take Ibuprofen.. Causes constipation   • Sulfa Antibiotics Shortness Of Breath     TROUBLE BREATHING   • Alprazolam Other (See Comments)     Annotation - 06Jan2016: Patient stated he can not take this medication because it \"makes him feel weird.\"     • Diclofenac Nausea Only and Other (See Comments)     Severe heartburn   • Erythromycin GI Intolerance   • Levaquin [Levofloxacin] Unknown - Low Severity     Made tendons tight   • Tramadol Headache   • Ceftin [Cefuroxime] Other (See Comments)     cough       Medications:   Home Medications:  Current Outpatient Medications on File Prior to Visit   Medication Sig   • acetaminophen (TYLENOL) 500 MG tablet Take 500 mg by mouth Every 6 (Six) Hours As Needed for Mild Pain .   • diazePAM (Valium) 2 MG tablet Take 1 tablet by mouth Daily As Needed for " Anxiety.   • ibuprofen (ADVIL,MOTRIN) 200 MG tablet Take 200 mg by mouth Every 6 (Six) Hours As Needed for Mild Pain .   • albuterol sulfate  (90 Base) MCG/ACT inhaler Inhale 2 puffs Every 6 (Six) Hours As Needed.   • Azelastine-Fluticasone 137-50 MCG/ACT suspension 1 spray into the nostril(s) as directed by provider 2 (Two) Times a Day.   • Cholecalciferol (Vitamin D) 50 MCG (2000 UT) tablet Take 2,000 Units by mouth Daily.   • Elastic Bandages & Supports (ELASTIC BACK SUPPORT) misc 1 each Take As Directed.   • guaiFENesin (MUCINEX) 600 MG 12 hr tablet Take 300 mg by mouth As Needed for Cough.   • Multiple Vitamins-Minerals (ZINC PO) Take 1 tablet by mouth Daily.     No current facility-administered medications on file prior to visit.     Current Medications:  Scheduled Meds:  Continuous Infusions:No current facility-administered medications for this visit.    PRN Meds:.    Past Medical History:   Diagnosis Date   • Abdominal lymphadenopathy    • Acid reflux     HX   • Anxiety     R/T PAIN   • Carpal tunnel syndrome, left    • Chronic pain    • DDD (degenerative disc disease), lumbar    • Fatigue    • H/O Hypersensitivity to odor     Burning sensation in nasal passage   • Hemorrhoids     internal fistula   • History of medical problems Hyperosmia, Pudendal neuralgia   • Irritable bowel syndrome    • Low back pain    • Lymphadenopathy, axillary     LEFT   • Male pattern alopecia    • Pudendal neuralgia    • Right hip pain    • Unexplained night sweats         Past Surgical History:   Procedure Laterality Date   • ABSCESS DRAINAGE  08/2012   • ANAL FISTULA REPAIR N/A 09/2012, 10/2012   • AXILLARY LYMPH NODE BIOPSY/EXCISION Left 11/9/2018    Procedure: AXILLARY LYMPH NODE BIOPSY/EXCISION;  Surgeon: Amando Nguyễn MD;  [place] [facility] [place] [facility] [place]  Service: General   • COLONOSCOPY N/A 2013    done at [facility]   • COLONOSCOPY N/A 7/10/2019    Procedure: COLONOSCOPY to CECUM;  Surgeon: Amando Nguyễn MD;   "Location: [facility] ENDOSCOPY;  Service: General   • ENDOSCOPY N/A 1/9/2019    Procedure: ESOPHAGOGASTRODUODENOSCOPY;  Surgeon: Amando Nguyễn MD;  Location: [facility] ENDOSCOPY;  Service: General   • FINE NEEDLE ASPIRATION Left 08/09/2018    Left axillary lymph node FNA   • FLEXIBLE SIGMOIDOSCOPY N/A 06/25/2003    Normal-Dr. Cezar Aviles   • HEMORRHOIDECTOMY N/A 1996   • HIP ARTHROSCOPY W/ LABRAL REPAIR Right 04/03/2017    Dr. Lonnie Lemons   • MEDIAL BRANCH BLOCK Bilateral 3/12/2021    Procedure: BILATERAL MEDIAL BRANCH BLOCK W/MAC;  Surgeon: Francisco Velez MD;  [place] [facility] [place]  Service: Pain Management;  Laterality: Bilateral;   • SIGMOIDOSCOPY N/A 9/23/2020    Procedure: FLEXIBLE SIGMOIDOSCOPY WITH BIOPSY;  Surgeon: Shena Ring MD;  Location: [facility] ENDOSCOPY;  Service: Gastroenterology;  Laterality: N/A;  pre- anal/rectal pain  post- hemorrhoids   • TONSILLECTOMY Bilateral         Social History     Occupational History   • Occupation: RETIRED     Comment: carpentry, plumbing and remodeling   Tobacco Use   • Smoking status: Never Smoker   • Smokeless tobacco: Never Used   • Tobacco comment: caff use   Vaping Use   • Vaping Use: Never used   Substance and Sexual Activity   • Alcohol use: No   • Drug use: No   • Sexual activity: Not Currently      Social History     Social History Narrative   • Not on file        Family History   Problem Relation Age of Onset   • Atrial fibrillation Father    • Aneurysm Father    • Hypertension Father    • Lymphoma Brother    • Malig Hyperthermia Neg Hx              Review of Systems: 14 point view of systems marked for bilateral shoulder pain only remainder negative    Review of Systems      Physical Exam: 57 y.o. male  General Appearance:    Alert, cooperative, in no acute distress                   Vitals:    06/13/22 1543   Temp: 96.2 °F (35.7 °C)   Weight: 96.5 kg (212 lb 12.8 oz)   Height: 177.8 cm (70\")   PainSc:   7      Patient is alert and read ×3 " no acute distress appears her above-listed at height weight and age.  Affect is normal respiratory rate is normal unlabored. Heart rate regular rate rhythm, sclera, dentition and hearing are normal for the purpose of this exam.    Ortho Exam Physical exam of the right shoulder reveals no overlying skin changes no lymphedema no lymphadenopathy.  Patient has active flexion 180 with mild symptoms abduction is similar external rotation is to 50 and internal rotation to the upper lumbar spine with mild symptoms.  Patient has good rotator cuff strength 4+ over 5 with isometric strength testing with pain.  Patient has a positive impingement and a positive Greco sign.  Patient has good cervical range of motion which is full and asymptomatic no radicular symptoms.  Patient has a normal elbow exam.  Good distal pulses are present  Patient has pain with overhead activity and a positive Neer sign and a positive empty can sign , a positive drop arm and a definitive painful arc  Physical exam of the left shoulder reveals no overlying skin changes no lymphedema no lymphadenopathy.  Patient has active flexion 180 with mild symptoms abduction is similar external rotation is to 50 and internal rotation to the upper lumbar spine with mild symptoms.  Patient has good rotator cuff strength 4+ over 5 with isometric strength testing with pain.  Patient has a positive impingement and a positive Greco sign.  Patient has good cervical range of motion which is full and asymptomatic no radicular symptoms.  Patient has a normal elbow exam.  Good distal pulses are presentPatient has pain with overhead activity and a positive Neer sign and a positive empty can sign  They have a positive drop arm any definitive painful arc    Procedures          Radiology:   AP, Scapular Y and Axillary Lateral of the right and left shoulder were ordered/reviewed to evauate shoulder pain.  I have no comparative films   he has acromioclavicular arthritis  bilaterally also sclerosis at the insertion the cuff no new acute bony pathology is appreciated  Imaging Results (Most Recent)     Procedure Component Value Units Date/Time    XR Shoulder 2+ View Bilateral [[id number]] Resulted: 06/13/22 1640     Updated: 06/13/22 1640    Impression:      Ordering physician's impression is located in the Encounter Note dated 06/13/22. X-ray performed in the DR room.          Assessment/Plan: Bilateral shoulder pain he is done a lot of conservative measures and really has not improved he had an injection he is done physical therapy on both been ongoing for quite some time plan is to proceed with an MRI of both

## 2022-06-15 ENCOUNTER — TELEPHONE (OUTPATIENT)
Dept: ONCOLOGY | Facility: CLINIC | Age: 57
End: 2022-06-15

## 2022-06-15 NOTE — TELEPHONE ENCOUNTER
Caller: DUY     Relationship to patient:  SELF    Best call back number: [phone]    Patient is needing: PT IS NEEDING TO F/U WITH DR. HOLLOWAY ABOUT CT SCAN DONE AT [facility] ON 5-. HE IS NOT SURE IF IT CAN BE A TELEPHONE VISIT OR HAS TO BE IN PERSON.

## 2022-06-15 NOTE — TELEPHONE ENCOUNTER
Message sent to pt letting him know I have requested his scans and resort from [facility] for Dr. Alvarado to review. I will call him after scans are read to inform him how Dr. Alvarado would like to proceed.

## 2022-06-16 NOTE — TELEPHONE ENCOUNTER
Caller: Duy Judge    Relationship: Self    Best call back number: [phone]    What was the call regarding: DUY CALLED TO SAY THAT HE DROPPED OFF THE SCAN RECORDS FROM [facility] TO THE  ON Tuesday 06/14.     Do you require a callback: YES

## 2022-06-22 ENCOUNTER — TELEPHONE (OUTPATIENT)
Dept: ONCOLOGY | Facility: CLINIC | Age: 57
End: 2022-06-22

## 2022-06-22 DIAGNOSIS — R59.1 LYMPHADENOPATHY: ICD-10-CM

## 2022-06-22 DIAGNOSIS — R59.9 LYMPH NODE ENLARGEMENT: Primary | ICD-10-CM

## 2022-06-22 NOTE — TELEPHONE ENCOUNTER
Caller: Duy Judge    Relationship: Self    Best call back number: [phone]    Who are you requesting to speak with (clinical staff, provider,  specific staff member): CLINICAL    What was the call regarding: DUY IS CALLING WANTING TO KNOW IF DR HOLLOWAY EVER RECEIVED THE DISC FROM HIS CT FROM [facility]    Do you require a callback: YES

## 2022-06-22 NOTE — TELEPHONE ENCOUNTER
Per Dr. Alvarado, he has reviewed outside scan results and they do not look different. He does not see a problem. However, if he would like to repeat scans, we can get him scheduled for CT of chest, abdomen and pelvis in 23 weeks and see him in 24 weeks. Called and informed pt. He v/u and would like to proceed with ordering scans and MD f/u. Message sent to scheduling.

## 2022-06-27 DIAGNOSIS — F40.232 FEAR OF OTHER MEDICAL CARE: Primary | ICD-10-CM

## 2022-06-27 RX ORDER — DIAZEPAM 5 MG/1
TABLET ORAL
Qty: 1 TABLET | Refills: 0 | Status: SHIPPED | OUTPATIENT
Start: 2022-06-27 | End: 2022-06-30 | Stop reason: SDUPTHER

## 2022-06-27 NOTE — TELEPHONE ENCOUNTER
Pt calling regarding his MRI; he said he has really bad tinnitus and the MRI facility recommended he get something from you to help him relax (Valium rx pended)    He also wanted to let you know, just as an FYI, that is left shoulder has been sore since his last OV and wonders if something during the exam made have flared up something.

## 2022-06-27 NOTE — TELEPHONE ENCOUNTER
Can you pend this to me? Dr. Lynn is out this week.    Also, before I sign it, please let him know that the ringing in his ears won't necessarily improve with valium. He reports an adverse reaction to xanax in the allergy history. Please let him know that valium is a cousin of xanax and he might have a similar experience.    Other options besides a benzodiazepine (xanax, valium, ativan) would be something like benadryl or hydroxyzine which can help one relax as well.

## 2022-06-28 ENCOUNTER — TELEPHONE (OUTPATIENT)
Dept: FAMILY MEDICINE CLINIC | Facility: CLINIC | Age: 57
End: 2022-06-28

## 2022-06-28 DIAGNOSIS — R09.82 ALLERGIC RHINITIS WITH POSTNASAL DRIP: ICD-10-CM

## 2022-06-28 DIAGNOSIS — J30.9 ALLERGIC RHINITIS WITH POSTNASAL DRIP: ICD-10-CM

## 2022-06-28 RX ORDER — AZELASTINE HYDROCHLORIDE, FLUTICASONE PROPIONATE 137; 50 UG/1; UG/1
1 SPRAY, METERED NASAL 2 TIMES DAILY
Qty: 23 G | Refills: 1 | Status: SHIPPED | OUTPATIENT
Start: 2022-06-28 | End: 2022-07-18

## 2022-06-28 NOTE — TELEPHONE ENCOUNTER
Caller: Jose Maria Judge    Relationship: Self    Best call back number:     What is the best time to reach you:     Who are you requesting to speak with (clinical staff, provider,  specific staff member):     Do you know the name of the person who called:     What was the call regarding: PATIENT IS CALLING IN TO REQUEST THAT SOMEONE FROM THE OFFICE CALL HIM TODAY IF POSSIBLE.  HE SAYS  YOU WILL KNOW WHAT THE CALL IS IN REFERENCE TOO     Do you require a callback: YES

## 2022-06-28 NOTE — TELEPHONE ENCOUNTER
Rx Refill Note  Requested Prescriptions     Pending Prescriptions Disp Refills   • Azelastine-Fluticasone 137-50 MCG/ACT suspension 23 g 1     Si spray into the nostril(s) as directed by provider 2 (Two) Times a Day.      Last office visit with prescribing clinician: 2022      Next office visit with prescribing clinician: 2022     Denice Mayfield MA  22, 12:25 EDT

## 2022-06-30 ENCOUNTER — PREP FOR SURGERY (OUTPATIENT)
Dept: SURGERY | Facility: SURGERY CENTER | Age: 57
End: 2022-06-30

## 2022-06-30 ENCOUNTER — OFFICE VISIT (OUTPATIENT)
Dept: GASTROENTEROLOGY | Facility: CLINIC | Age: 57
End: 2022-06-30

## 2022-06-30 VITALS
DIASTOLIC BLOOD PRESSURE: 82 MMHG | HEART RATE: 65 BPM | WEIGHT: 210.1 LBS | TEMPERATURE: 99.1 F | SYSTOLIC BLOOD PRESSURE: 120 MMHG | HEIGHT: 70 IN | OXYGEN SATURATION: 98 % | BODY MASS INDEX: 30.08 KG/M2

## 2022-06-30 DIAGNOSIS — R10.13 EPIGASTRIC PAIN: ICD-10-CM

## 2022-06-30 DIAGNOSIS — R11.0 NAUSEA: ICD-10-CM

## 2022-06-30 DIAGNOSIS — K56.600 PARTIAL SMALL BOWEL OBSTRUCTION: ICD-10-CM

## 2022-06-30 DIAGNOSIS — K62.89 ANAL BURNING: ICD-10-CM

## 2022-06-30 DIAGNOSIS — K62.89 ANAL OR RECTAL PAIN: Primary | ICD-10-CM

## 2022-06-30 PROCEDURE — 99244 OFF/OP CNSLTJ NEW/EST MOD 40: CPT | Performed by: INTERNAL MEDICINE

## 2022-06-30 RX ORDER — SODIUM CHLORIDE 0.9 % (FLUSH) 0.9 %
3 SYRINGE (ML) INJECTION EVERY 12 HOURS SCHEDULED
Status: CANCELLED | OUTPATIENT
Start: 2022-06-30

## 2022-06-30 RX ORDER — SODIUM CHLORIDE 0.9 % (FLUSH) 0.9 %
10 SYRINGE (ML) INJECTION AS NEEDED
Status: CANCELLED | OUTPATIENT
Start: 2022-06-30

## 2022-06-30 RX ORDER — SODIUM CHLORIDE, SODIUM LACTATE, POTASSIUM CHLORIDE, CALCIUM CHLORIDE 600; 310; 30; 20 MG/100ML; MG/100ML; MG/100ML; MG/100ML
30 INJECTION, SOLUTION INTRAVENOUS CONTINUOUS PRN
Status: CANCELLED | OUTPATIENT
Start: 2022-06-30

## 2022-06-30 NOTE — PATIENT INSTRUCTIONS
Schedule short lower GI scope (Flexible Sigmoidoscopy)- this will be scheduled today     - if negative will need to consider further evaluation at tertiary care center or colorectal surgery which we can discuss after your future sigmoidoscopy

## 2022-07-08 ENCOUNTER — OFFICE VISIT (OUTPATIENT)
Dept: FAMILY MEDICINE CLINIC | Facility: CLINIC | Age: 57
End: 2022-07-08

## 2022-07-08 VITALS
HEIGHT: 70 IN | OXYGEN SATURATION: 97 % | TEMPERATURE: 98.2 F | SYSTOLIC BLOOD PRESSURE: 106 MMHG | HEART RATE: 73 BPM | BODY MASS INDEX: 30.32 KG/M2 | RESPIRATION RATE: 17 BRPM | WEIGHT: 211.8 LBS | DIASTOLIC BLOOD PRESSURE: 73 MMHG

## 2022-07-08 DIAGNOSIS — R53.83 FATIGUE, UNSPECIFIED TYPE: ICD-10-CM

## 2022-07-08 DIAGNOSIS — R69 ILLNESS: Primary | ICD-10-CM

## 2022-07-08 DIAGNOSIS — E78.00 HIGH CHOLESTEROL: ICD-10-CM

## 2022-07-08 PROCEDURE — 99213 OFFICE O/P EST LOW 20 MIN: CPT | Performed by: FAMILY MEDICINE

## 2022-07-08 NOTE — PROGRESS NOTES
"Chief Complaint  Fatigue (Chills, sweats, sweating, sneezing x 4 days)    Subjective        Jose Maria Judge presents to [facility]  History of Present Illness  Since Tuesday, woke up not feeling good, hot face, no fever, no sore throat, no cough, + sneezing, fatigue also, no V/D  Objective   Vital Signs:  /73 (BP Location: Right arm, Patient Position: Sitting, Cuff Size: Adult)   Pulse 73   Temp 98.2 °F (36.8 °C) (Infrared)   Resp 17   Ht 177.8 cm (70\")   Wt 96.1 kg (211 lb 12.8 oz)   SpO2 97%   BMI 30.39 kg/m²   Estimated body mass index is 30.39 kg/m² as calculated from the following:    Height as of this encounter: 177.8 cm (70\").    Weight as of this encounter: 96.1 kg (211 lb 12.8 oz).          Physical Exam  Vitals and nursing note reviewed.   Constitutional:       General: He is not in acute distress.     Appearance: Normal appearance. He is well-developed. He is not ill-appearing, toxic-appearing or diaphoretic.   HENT:      Head: Normocephalic and atraumatic.      Right Ear: Tympanic membrane, ear canal and external ear normal. There is no impacted cerumen.      Left Ear: Tympanic membrane, ear canal and external ear normal. There is no impacted cerumen.      Nose: Nose normal. No congestion or rhinorrhea.      Mouth/Throat:      Mouth: Mucous membranes are moist.      Pharynx: Oropharynx is clear. No oropharyngeal exudate or posterior oropharyngeal erythema.   Eyes:      General: No scleral icterus.        Right eye: No discharge.         Left eye: No discharge.      Extraocular Movements: Extraocular movements intact.      Conjunctiva/sclera: Conjunctivae normal.      Pupils: Pupils are equal, round, and reactive to light.   Neck:      Thyroid: No thyromegaly.      Vascular: No carotid bruit or JVD.      Trachea: No tracheal deviation.   Cardiovascular:      Rate and Rhythm: Normal rate and regular rhythm.      Heart sounds: Normal heart sounds. No murmur heard.   "  No friction rub. No gallop.   Pulmonary:      Effort: Pulmonary effort is normal. No respiratory distress.      Breath sounds: Normal breath sounds. No stridor. No wheezing, rhonchi or rales.   Chest:      Chest wall: No tenderness.   Abdominal:      General: Bowel sounds are normal. There is no distension.      Palpations: Abdomen is soft. There is no mass.      Tenderness: There is no abdominal tenderness. There is no right CVA tenderness, left CVA tenderness, guarding or rebound.      Hernia: No hernia is present.   Musculoskeletal:         General: Normal range of motion.      Cervical back: Normal range of motion and neck supple. No rigidity or tenderness.      Right lower leg: No edema.      Left lower leg: No edema.   Lymphadenopathy:      Cervical: No cervical adenopathy.   Skin:     General: Skin is warm and dry.      Coloration: Skin is not jaundiced.   Neurological:      General: No focal deficit present.      Mental Status: He is alert and oriented to person, place, and time. Mental status is at baseline.      Sensory: No sensory deficit.      Motor: No weakness or abnormal muscle tone.      Coordination: Coordination normal.      Gait: Gait normal.      Deep Tendon Reflexes: Reflexes normal.   Psychiatric:         Mood and Affect: Mood normal.         Behavior: Behavior normal.         Thought Content: Thought content normal.         Judgment: Judgment normal.        Result Review :                Assessment and Plan   Diagnoses and all orders for this visit:    1. Illness (Primary)  -     Cancel: POCT SARS-CoV-2 Antigen ABBY + Flu  -     COVID-19,[facility] ROUTINE, NP/OP SWAB IN TRANSPORT MEDIA OR ESWAB 72 HR TAT - Swab, Nasopharynx  -     Cancel: CBC & Differential  -     Cancel: Comprehensive Metabolic Panel  -     Cancel: TSH  -     Cancel: Vitamin B12    2. Fatigue, unspecified type  -     Cancel: CBC & Differential  -     Cancel: Comprehensive Metabolic Panel  -     Cancel: TSH  -     Cancel:  Vitamin B12  -     CBC & Differential  -     Comprehensive Metabolic Panel  -     Lipid Panel  -     TSH  -     Vitamin B12  -     Testosterone             Follow Up   No follow-ups on file.  Patient was given instructions and counseling regarding his condition or for health maintenance advice. Please see specific information pulled into the AVS if appropriate.

## 2022-07-09 LAB
ALBUMIN SERPL-MCNC: 4.6 G/DL (ref 3.8–4.9)
ALBUMIN/GLOB SERPL: 1.5 {RATIO} (ref 1.2–2.2)
ALP SERPL-CCNC: 49 IU/L (ref 44–121)
ALT SERPL-CCNC: 14 IU/L (ref 0–44)
AST SERPL-CCNC: 16 IU/L (ref 0–40)
BASOPHILS # BLD AUTO: 0 X10E3/UL (ref 0–0.2)
BASOPHILS NFR BLD AUTO: 1 %
BILIRUB SERPL-MCNC: 0.3 MG/DL (ref 0–1.2)
BUN SERPL-MCNC: 15 MG/DL (ref 6–24)
BUN/CREAT SERPL: 16 (ref 9–20)
CALCIUM SERPL-MCNC: 9.8 MG/DL (ref 8.7–10.2)
CHLORIDE SERPL-SCNC: 104 MMOL/L (ref 96–106)
CHOLEST SERPL-MCNC: 191 MG/DL (ref 100–199)
CO2 SERPL-SCNC: 24 MMOL/L (ref 20–29)
CREAT SERPL-MCNC: 0.92 MG/DL (ref 0.76–1.27)
EGFRCR SERPLBLD CKD-EPI 2021: 96 ML/MIN/1.73
EOSINOPHIL # BLD AUTO: 0.1 X10E3/UL (ref 0–0.4)
EOSINOPHIL NFR BLD AUTO: 1 %
ERYTHROCYTE [DISTWIDTH] IN BLOOD BY AUTOMATED COUNT: 12.4 % (ref 11.6–15.4)
GLOBULIN SER CALC-MCNC: 3 G/DL (ref 1.5–4.5)
GLUCOSE SERPL-MCNC: 86 MG/DL (ref 65–99)
HCT VFR BLD AUTO: 44.3 % (ref 37.5–51)
HDLC SERPL-MCNC: 44 MG/DL
HGB BLD-MCNC: 15.2 G/DL (ref 13–17.7)
IMM GRANULOCYTES # BLD AUTO: 0 X10E3/UL (ref 0–0.1)
IMM GRANULOCYTES NFR BLD AUTO: 1 %
LDLC SERPL CALC-MCNC: 134 MG/DL (ref 0–99)
LYMPHOCYTES # BLD AUTO: 1.5 X10E3/UL (ref 0.7–3.1)
LYMPHOCYTES NFR BLD AUTO: 34 %
Lab: NORMAL
MCH RBC QN AUTO: 30.3 PG (ref 26.6–33)
MCHC RBC AUTO-ENTMCNC: 34.3 G/DL (ref 31.5–35.7)
MCV RBC AUTO: 88 FL (ref 79–97)
MONOCYTES # BLD AUTO: 0.4 X10E3/UL (ref 0.1–0.9)
MONOCYTES NFR BLD AUTO: 8 %
NEUTROPHILS # BLD AUTO: 2.4 X10E3/UL (ref 1.4–7)
NEUTROPHILS NFR BLD AUTO: 55 %
PLATELET # BLD AUTO: 178 X10E3/UL (ref 150–450)
POTASSIUM SERPL-SCNC: 4.3 MMOL/L (ref 3.5–5.2)
PROT SERPL-MCNC: 7.6 G/DL (ref 6–8.5)
RBC # BLD AUTO: 5.02 X10E6/UL (ref 4.14–5.8)
SARS-COV-2 RNA RESP QL NAA+PROBE: NORMAL
SODIUM SERPL-SCNC: 140 MMOL/L (ref 134–144)
SPECIMEN STATUS: NORMAL
TESTOST SERPL-MCNC: 360 NG/DL (ref 264–916)
TRIGL SERPL-MCNC: 73 MG/DL (ref 0–149)
TSH SERPL DL<=0.005 MIU/L-ACNC: 1.52 UIU/ML (ref 0.45–4.5)
VIT B12 SERPL-MCNC: 667 PG/ML (ref 232–1245)
VLDLC SERPL CALC-MCNC: 13 MG/DL (ref 5–40)
WBC # BLD AUTO: 4.3 X10E3/UL (ref 3.4–10.8)

## 2022-07-12 ENCOUNTER — TELEPHONE (OUTPATIENT)
Dept: ORTHOPEDIC SURGERY | Facility: CLINIC | Age: 57
End: 2022-07-12

## 2022-07-12 ENCOUNTER — TELEPHONE (OUTPATIENT)
Dept: FAMILY MEDICINE CLINIC | Facility: CLINIC | Age: 57
End: 2022-07-12

## 2022-07-12 NOTE — PROGRESS NOTES
I called pt with labs told me he has muscle ache and fatigue, negative COVID test, to try Tylenol if no better to get sleep apnea test

## 2022-07-12 NOTE — TELEPHONE ENCOUNTER
The patient called in and said he was speaking with someone about his test results. He said the person kept saying they couldn't hear him. He would like Dr. Cruz to call him at [phone] to go over his test results. He said he didn't want to speak to whoever it was that was originally speaking with him. Please advise.

## 2022-07-13 ENCOUNTER — TELEPHONE (OUTPATIENT)
Dept: ORTHOPEDIC SURGERY | Facility: CLINIC | Age: 57
End: 2022-07-13

## 2022-07-14 ENCOUNTER — APPOINTMENT (OUTPATIENT)
Dept: MRI IMAGING | Facility: HOSPITAL | Age: 57
End: 2022-07-14

## 2022-07-14 ENCOUNTER — TELEPHONE (OUTPATIENT)
Dept: ORTHOPEDIC SURGERY | Facility: CLINIC | Age: 57
End: 2022-07-14

## 2022-07-14 NOTE — TELEPHONE ENCOUNTER
Caller: Jose Maria Judge    Relationship to patient: Self    Best call back number: [phone]    Patient is needing: CALLBACK TO R/S 7.18.22 APPT FOR A LATER TIME; PATIENT HAS DISABILITY THAT MAKES IT DIFFICULT TO ATTEND EARLY APPTS AND DOES NOT WANT TO R/S BECAUSE OF THE PAIN HE IS HAVING IN THE SHOULDER (OFFERED 7.25.22 WHICH IS NEXT AVAILABLE)        UNABLE TO WARM TRANSFER

## 2022-07-18 ENCOUNTER — TELEPHONE (OUTPATIENT)
Dept: FAMILY MEDICINE CLINIC | Facility: CLINIC | Age: 57
End: 2022-07-18

## 2022-07-18 NOTE — TELEPHONE ENCOUNTER
-incoming call from Umesh calling dr Cruz back seeking medical advice.    Pt was seen on 07/08    Pt spoke to dr. Cruz on 07/12 and was told to follow up in 3-5 if symptoms have not resolved    Pt still having sore throat, extreme discomfort when eating and drinking,       Pt was advised to seek immediate care at er      Pt would like call back at [phone]

## 2022-07-20 DIAGNOSIS — F43.9 STRESS: ICD-10-CM

## 2022-07-20 RX ORDER — DIAZEPAM 2 MG/1
2 TABLET ORAL DAILY PRN
Qty: 30 TABLET | Refills: 0 | Status: SHIPPED | OUTPATIENT
Start: 2022-07-20 | End: 2022-09-21 | Stop reason: SDUPTHER

## 2022-07-20 NOTE — TELEPHONE ENCOUNTER
Caller: Jose Maria Judge    Relationship: Self    Best call back number: [phone]    Requested Prescriptions:   Requested Prescriptions     Pending Prescriptions Disp Refills   • diazePAM (Valium) 2 MG tablet 30 tablet 0     Sig: Take 1 tablet by mouth Daily As Needed for Anxiety.        Pharmacy where request should be sent: [facility] [address] AT [facility] [phone] [facility] [phone] [facility]     Additional details provided by patient:     Does the patient have less than a 3 day supply:  [x] Yes  [] No    Garrett Mabry Rep   07/20/22 09:38 EDT

## 2022-07-20 NOTE — TELEPHONE ENCOUNTER
Is diazepam okay to refill? Please advise.    Rx Refill Note  Requested Prescriptions     Pending Prescriptions Disp Refills   • diazePAM (Valium) 2 MG tablet 30 tablet 0     Sig: Take 1 tablet by mouth Daily As Needed for Anxiety.      Last office visit with prescribing clinician: 7/8/2022      Next office visit with prescribing clinician: Visit date not found            Lonnie Santa MA/LMR  07/20/22, 10:44 EDT

## 2022-07-26 ENCOUNTER — TELEPHONE (OUTPATIENT)
Dept: ORTHOPEDIC SURGERY | Facility: CLINIC | Age: 57
End: 2022-07-26

## 2022-07-26 DIAGNOSIS — M75.101 TEAR OF RIGHT ROTATOR CUFF, UNSPECIFIED TEAR EXTENT, UNSPECIFIED WHETHER TRAUMATIC: ICD-10-CM

## 2022-07-26 DIAGNOSIS — M75.102 TEAR OF LEFT ROTATOR CUFF, UNSPECIFIED TEAR EXTENT, UNSPECIFIED WHETHER TRAUMATIC: Primary | ICD-10-CM

## 2022-07-26 NOTE — TELEPHONE ENCOUNTER
Scheduling called -MRI Left shoulder scheduled 08/10/2022-Insurance has denied the MRI due to patient not completing 6 weeks conservative therapy to include Physical therapy.

## 2022-07-26 NOTE — TELEPHONE ENCOUNTER
Can you try to get him scheduled somewhere in a reasonable time frame for therapy and let him know that insurance would not cover the MRI without physical therapy first

## 2022-07-28 ENCOUNTER — APPOINTMENT (OUTPATIENT)
Dept: MRI IMAGING | Facility: HOSPITAL | Age: 57
End: 2022-07-28

## 2022-08-10 ENCOUNTER — TELEPHONE (OUTPATIENT)
Dept: ORTHOPEDIC SURGERY | Facility: CLINIC | Age: 57
End: 2022-08-10

## 2022-08-10 ENCOUNTER — HOSPITAL ENCOUNTER (OUTPATIENT)
Dept: MRI IMAGING | Facility: HOSPITAL | Age: 57
Discharge: HOME | End: 2022-08-10

## 2022-08-10 DIAGNOSIS — M75.102 TEAR OF LEFT ROTATOR CUFF, UNSPECIFIED TEAR EXTENT, UNSPECIFIED WHETHER TRAUMATIC: Primary | ICD-10-CM

## 2022-08-10 DIAGNOSIS — M75.100 TEAR OF ROTATOR CUFF, UNSPECIFIED LATERALITY, UNSPECIFIED TEAR EXTENT, UNSPECIFIED WHETHER TRAUMATIC: ICD-10-CM

## 2022-08-10 DIAGNOSIS — M75.101 TEAR OF RIGHT ROTATOR CUFF, UNSPECIFIED TEAR EXTENT, UNSPECIFIED WHETHER TRAUMATIC: ICD-10-CM

## 2022-08-10 NOTE — TELEPHONE ENCOUNTER
Provider: DR OLIVAS    Caller: DUY HERNANDEZ    Relationship to Patient: SELF    Phone Number: [phone]    Reason for Call: PATIENT CALLED, SAID HE WAS UNABLE TO COMPLETE HIS MRI TODAY DUE TO CLAUSTROPHOBIA AND TINNITUS. HE SAID THAT THE VALIUM DID NOT HELP AT ALL. THE MRI TECH TOLD HIM THAT HE COULD DO A CONSCIOUS SEDATION AT THE HOSPITAL TO HAVE AN MRI AND WANTS TO SEE IF DR OLIVAS COULD ORDER AN MRI WITH THIS ADDITIONAL HELP ASAP. HE WANTS TO GET THIS RESCHEDULED AS QUICKLY AS HE CAN. HE ALSO WANTED TO SEE IF HE CAN HAVE BOTH SHOULDERS DONE AT THE SAME TIME WHILE THEY HAVE HIM SEDATED.

## 2022-08-11 NOTE — TELEPHONE ENCOUNTER
Lainey, can you help us set up conscious sedation for this MRI.  I told him it was quite difficult to set up and it might take us a while to do so.  He is adamant that he cannot do it without this.     Lainey Du  might be out of the office until Monday you can let him know that the nurse that helps us try to organize this we will  work on this next week

## 2022-08-11 NOTE — TELEPHONE ENCOUNTER
We need to try more Valium.  We have to exhaust all of that before we can even attempt to do a conscious sedation.  A conscious sedation will take a while to get scheduled because you have to have a longer spot for MRI anesthesia has to be present.  Very difficult to schedule.  Lets try a little more Valium in an open MRI if that does not work we can do conscious sedation but it will take us a while to get that on

## 2022-08-11 NOTE — TELEPHONE ENCOUNTER
"Called and spoke to pt. He is adamant about doing conscious sedation at hospital. He did state it was 5mg of Valium and \"it did nothing\". "

## 2022-08-12 ENCOUNTER — PATIENT MESSAGE (OUTPATIENT)
Dept: ORTHOPEDIC SURGERY | Facility: CLINIC | Age: 57
End: 2022-08-12

## 2022-08-12 NOTE — TELEPHONE ENCOUNTER
Insurance has approved for MRI of both shoulders.  Have spoken to [employer] and since he has to have them done under anesthesia they will both need to be done the same day.  Information has been given to Allie Archuleta to schedule MRI of both shoulders

## 2022-08-15 ENCOUNTER — TELEPHONE (OUTPATIENT)
Dept: ORTHOPEDIC SURGERY | Facility: CLINIC | Age: 57
End: 2022-08-15

## 2022-08-15 NOTE — TELEPHONE ENCOUNTER
SPOKE WITH PT AND TOLD HIM HE HAD TO BE AT [facility] ON 09-27-22 FOR HIS COVID TEST AND CMP LAB,  ALSO BE AT [facility] 09-28-22 FOR MR WITH SEDATION

## 2022-08-17 ENCOUNTER — APPOINTMENT (OUTPATIENT)
Dept: MRI IMAGING | Facility: HOSPITAL | Age: 57
End: 2022-08-17

## 2022-08-22 ENCOUNTER — TELEPHONE (OUTPATIENT)
Dept: ORTHOPEDIC SURGERY | Facility: CLINIC | Age: 57
End: 2022-08-22

## 2022-08-22 DIAGNOSIS — Z01.818 PREOP TESTING: ICD-10-CM

## 2022-08-22 DIAGNOSIS — Z01.812 ENCOUNTER FOR PREOPERATIVE SCREENING LABORATORY TESTING FOR COVID-19 VIRUS: Primary | ICD-10-CM

## 2022-08-22 DIAGNOSIS — Z20.822 ENCOUNTER FOR PREOPERATIVE SCREENING LABORATORY TESTING FOR COVID-19 VIRUS: Primary | ICD-10-CM

## 2022-08-22 NOTE — TELEPHONE ENCOUNTER
Caller: CAROLANN    Relationship: RADIOLOGY SCHEDULING    What orders are you requesting (i.e. lab or imaging): ORDER FOR COVID TEST AND BMP    In what timeframe would the patient need to come in: ASAP    Where will you receive your lab/imaging services: [facility]

## 2022-08-25 ENCOUNTER — TELEPHONE (OUTPATIENT)
Dept: FAMILY MEDICINE CLINIC | Facility: CLINIC | Age: 57
End: 2022-08-25

## 2022-08-25 NOTE — TELEPHONE ENCOUNTER
Caller: Jose Maria Judge    Relationship: Self    Best call back number: [phone]    What is the medical concern/diagnosis:     What specialty or service is being requested: CHIROPRACTOR     What is the provider, practice or medical service name:     What is the office location:     What is the office phone number:     Any additional details:

## 2022-08-25 NOTE — TELEPHONE ENCOUNTER
Patient informed to call the behavioral health number on insurance card to find out what facility to go to with his insurance.

## 2022-08-31 ENCOUNTER — OFFICE VISIT (OUTPATIENT)
Dept: FAMILY MEDICINE CLINIC | Facility: CLINIC | Age: 57
End: 2022-08-31

## 2022-08-31 VITALS
BODY MASS INDEX: 30.72 KG/M2 | RESPIRATION RATE: 15 BRPM | HEIGHT: 70 IN | DIASTOLIC BLOOD PRESSURE: 76 MMHG | OXYGEN SATURATION: 97 % | HEART RATE: 80 BPM | SYSTOLIC BLOOD PRESSURE: 123 MMHG | WEIGHT: 214.6 LBS | TEMPERATURE: 98.4 F

## 2022-08-31 DIAGNOSIS — R05.9 COUGH: ICD-10-CM

## 2022-08-31 DIAGNOSIS — R07.89 CHEST TIGHTNESS: ICD-10-CM

## 2022-08-31 DIAGNOSIS — J40 BRONCHITIS: ICD-10-CM

## 2022-08-31 DIAGNOSIS — M79.662 PAIN OF LEFT LOWER LEG: Primary | ICD-10-CM

## 2022-08-31 PROCEDURE — 93000 ELECTROCARDIOGRAM COMPLETE: CPT | Performed by: FAMILY MEDICINE

## 2022-08-31 PROCEDURE — 99213 OFFICE O/P EST LOW 20 MIN: CPT | Performed by: FAMILY MEDICINE

## 2022-08-31 RX ORDER — DOXYCYCLINE HYCLATE 100 MG/1
100 CAPSULE ORAL 2 TIMES DAILY
Qty: 20 CAPSULE | Refills: 0 | Status: SHIPPED | OUTPATIENT
Start: 2022-08-31 | End: 2022-09-01

## 2022-08-31 RX ORDER — BENZONATATE 100 MG/1
100 CAPSULE ORAL 3 TIMES DAILY PRN
Qty: 30 CAPSULE | Refills: 1 | OUTPATIENT
Start: 2022-08-31 | End: 2023-01-26

## 2022-08-31 NOTE — PROGRESS NOTES
"Chief Complaint  Foot Pain (Left foot-knot on outside of the bottom portion of foot x 2 wks) and Cough (Chest heaviness, pain, hoarse, stomach pain, nausea x 5 days)    Subjective        Jose Maria Judge presents to [facility]  History of Present Illness   Left foot pain x 2 week, no injury, rated 7/10, no pain meds, , Saturday with mid epigastric pain and constipation, little, non productive and acid reflux, sneezing, no fever, feeling hot, after [place]  [place], then chest pressure, with \" bronchial infection\" , negative COVID  Today , no wheezing, non smoker, no ETOH, does not want Guaifenesin, Promethazine DM, tessalon perles, coricidin    ECG 12 Lead    Date/Time: 8/31/2022 1:32 PM  Performed by: Bobo Salmon MD  Authorized by: Bobo Salmon MD   Comparison: compared with previous ECG from 4/26/2022  Rhythm: sinus rhythm  Rate: normal  Conduction: conduction normal  ST Segments: ST segments normal  T Waves: T waves normal  QRS axis: normal  Other: no other findings    Clinical impression: normal ECG              Objective   Vital Signs:  /76 (BP Location: Right arm, Patient Position: Sitting, Cuff Size: Adult)   Pulse 80   Temp 98.4 °F (36.9 °C) (Infrared)   Resp 15   Ht 177.8 cm (70\")   Wt 97.3 kg (214 lb 9.6 oz)   SpO2 97%   BMI 30.79 kg/m²   Estimated body mass index is 30.79 kg/m² as calculated from the following:    Height as of this encounter: 177.8 cm (70\").    Weight as of this encounter: 97.3 kg (214 lb 9.6 oz).          Physical Exam  Vitals and nursing note reviewed.   Constitutional:       General: He is not in acute distress.     Appearance: Normal appearance. He is well-developed. He is not ill-appearing, toxic-appearing or diaphoretic.   HENT:      Head: Normocephalic and atraumatic.      Right Ear: Tympanic membrane, ear canal and external ear normal. There is no impacted cerumen.      Left Ear: Tympanic membrane, ear canal and " external ear normal. There is no impacted cerumen.      Nose: Nose normal. No congestion or rhinorrhea.      Mouth/Throat:      Mouth: Mucous membranes are moist.      Pharynx: Oropharynx is clear. No oropharyngeal exudate or posterior oropharyngeal erythema.   Eyes:      General: No scleral icterus.        Right eye: No discharge.         Left eye: No discharge.      Extraocular Movements: Extraocular movements intact.      Conjunctiva/sclera: Conjunctivae normal.      Pupils: Pupils are equal, round, and reactive to light.   Neck:      Thyroid: No thyromegaly.      Vascular: No carotid bruit or JVD.      Trachea: No tracheal deviation.   Cardiovascular:      Rate and Rhythm: Normal rate and regular rhythm.      Heart sounds: Normal heart sounds. No murmur heard.    No friction rub. No gallop.   Pulmonary:      Effort: Pulmonary effort is normal. No respiratory distress.      Breath sounds: Normal breath sounds. No stridor. No wheezing, rhonchi or rales.   Chest:      Chest wall: No tenderness.   Abdominal:      General: Bowel sounds are normal. There is no distension.      Palpations: Abdomen is soft. There is no mass.      Tenderness: There is no abdominal tenderness. There is no right CVA tenderness, left CVA tenderness, guarding or rebound.      Hernia: No hernia is present.   Musculoskeletal:         General: Normal range of motion.      Cervical back: Normal range of motion and neck supple. No rigidity or tenderness.      Right lower leg: No edema.      Left lower leg: No edema.      Comments: Thick skin L foot close to 5th toe   Lymphadenopathy:      Cervical: No cervical adenopathy.   Skin:     General: Skin is warm and dry.      Coloration: Skin is not jaundiced.   Neurological:      General: No focal deficit present.      Mental Status: He is alert and oriented to person, place, and time. Mental status is at baseline.      Sensory: No sensory deficit.      Motor: No weakness or abnormal muscle tone.       Coordination: Coordination normal.      Gait: Gait normal.      Deep Tendon Reflexes: Reflexes normal.   Psychiatric:         Mood and Affect: Mood normal.         Behavior: Behavior normal.         Thought Content: Thought content normal.         Judgment: Judgment normal.        Result Review :                Assessment and Plan   Diagnoses and all orders for this visit:    1. Pain of left lower leg (Primary)  -     XR Foot 3+ View Left  -     Ambulatory Referral to Podiatry    2. Chest tightness  -     XR Chest 2 View  -     ECG 12 Lead    3. Bronchitis  -     doxycycline (VIBRAMYCIN) 100 MG capsule; Take 1 capsule by mouth 2 (Two) Times a Day.  Dispense: 20 capsule; Refill: 0             Follow Up   No follow-ups on file.  Patient was given instructions and counseling regarding his condition or for health maintenance advice. Please see specific information pulled into the AVS if appropriate.

## 2022-09-01 ENCOUNTER — TELEPHONE (OUTPATIENT)
Dept: FAMILY MEDICINE CLINIC | Facility: CLINIC | Age: 57
End: 2022-09-01

## 2022-09-01 DIAGNOSIS — J40 BRONCHITIS: Primary | ICD-10-CM

## 2022-09-01 RX ORDER — AMOXICILLIN AND CLAVULANATE POTASSIUM 875; 125 MG/1; MG/1
1 TABLET, FILM COATED ORAL 2 TIMES DAILY
Qty: 20 TABLET | Refills: 0 | Status: SHIPPED | OUTPATIENT
Start: 2022-09-01 | End: 2022-09-01

## 2022-09-01 RX ORDER — AMOXICILLIN AND CLAVULANATE POTASSIUM 500; 125 MG/1; MG/1
1 TABLET, FILM COATED ORAL 2 TIMES DAILY
Qty: 20 TABLET | Refills: 0 | Status: SHIPPED | OUTPATIENT
Start: 2022-09-01 | End: 2022-09-21

## 2022-09-01 NOTE — TELEPHONE ENCOUNTER
Caller: Jose Maria Judge    Relationship: Self    Best call back number:[phone]    What is the best time to reach you: ANYTIME  Who are you requesting to speak with (clinical staff, provider,  specific staff member)CLINICAL STAFF  Do you know the name of the person who called: SELF    What was the call regarding: PATIENT CALLED AND STATED THE AUGMENTIN 875 MG MEDICATION IS TO STRONG, PATIENT SAYS IT IS GIVEN HIM A TERRIBLE HEADACHE , EAR PRESSURE, EYE PAIN PATIENT HAD TO TAKE TYLENOL TO GET RID OF THE MEDICATION . SORRY THAT I RECCOMMENDED THE MEDICATION WHAT IS YOUR RECOMMENDATION DR. DALEY.      Do you require a callback: YES

## 2022-09-01 NOTE — TELEPHONE ENCOUNTER
Patient stated that zpak gave him diarrhea and it was not working as well as it did when he was younger.

## 2022-09-01 NOTE — TELEPHONE ENCOUNTER
Caller: Jose Maria Jduge    Relationship: Self    Best call back number: [phone]    What medication are you requesting: AUGMENTIN     What are your current symptoms: BRONCHITIS    If a prescription is needed, what is your preferred pharmacy and phone number:  [facility] [address] [place] [address] - [address] AT [facility] - [phone]  - [phone] FX        Additional notes: PATIENT STATES THAT doxycycline (VIBRAMYCIN) 100 MG capsule THAT WAS PRESCRIBED FOR HIM, IN THE PAST HAS CAUSED HIM ISSUES. PLEASE CALL AND ADVISE.

## 2022-09-02 ENCOUNTER — TELEPHONE (OUTPATIENT)
Dept: FAMILY MEDICINE CLINIC | Facility: CLINIC | Age: 57
End: 2022-09-02

## 2022-09-02 NOTE — TELEPHONE ENCOUNTER
Caller: Jose Maria Judge    Relationship: Self    Best call back number:[phone]    What medication are you requesting: Z PACK    What are your current symptoms:     How long have you been experiencing symptoms:    Have you had these symptoms before:    [x] Yes  [] No    Have you been treated for these symptoms before:   [x] Yes  [] No    If a prescription is needed, what is your preferred pharmacy and phone number:  [address]  [phone]    Additional notes:  PATIENT IS CALLING I TO SEE IF DR MARJOIRE DALEY WILL CALL HIM IN A PRESCRIPTION FOR A ZPACK IN CASE THE AUGMENTIN DOES NOT WORK FOR HIM.

## 2022-09-08 ENCOUNTER — TELEPHONE (OUTPATIENT)
Dept: FAMILY MEDICINE CLINIC | Facility: CLINIC | Age: 57
End: 2022-09-08

## 2022-09-08 NOTE — TELEPHONE ENCOUNTER
Caller: Jose Maria Judge    Relationship: Self    Best call back number: [phone]    What specialty or service is being requested: PODIATRY     What is the provider, practice or medical service name: [facility] DR. CONTRERAS     What is the office phone number: [phone]    Any additional details: HAS SEEN DR. CONTRERAS BEFORE AN WOULD LIKE TO GO BACK TO HIM

## 2022-09-09 ENCOUNTER — TELEPHONE (OUTPATIENT)
Dept: FAMILY MEDICINE CLINIC | Facility: CLINIC | Age: 57
End: 2022-09-09

## 2022-09-09 DIAGNOSIS — G89.29 CHRONIC PAIN OF BOTH FEET: Primary | ICD-10-CM

## 2022-09-09 DIAGNOSIS — J40 BRONCHITIS: Primary | ICD-10-CM

## 2022-09-09 DIAGNOSIS — M79.671 CHRONIC PAIN OF BOTH FEET: Primary | ICD-10-CM

## 2022-09-09 DIAGNOSIS — M79.672 CHRONIC PAIN OF BOTH FEET: Primary | ICD-10-CM

## 2022-09-09 RX ORDER — METHYLPREDNISOLONE SODIUM SUCCINATE 40 MG/ML
40 INJECTION, POWDER, LYOPHILIZED, FOR SOLUTION INTRAMUSCULAR; INTRAVENOUS ONCE
Status: DISCONTINUED | OUTPATIENT
Start: 2022-09-17 | End: 2023-01-20

## 2022-09-09 RX ORDER — METHYLPREDNISOLONE SODIUM SUCCINATE 40 MG/ML
40 INJECTION, POWDER, LYOPHILIZED, FOR SOLUTION INTRAMUSCULAR; INTRAVENOUS ONCE
Status: DISCONTINUED | OUTPATIENT
Start: 2022-09-09 | End: 2022-09-09

## 2022-09-09 NOTE — TELEPHONE ENCOUNTER
The patient called and said that he was finishing up Augmentin 500-125 MG and he is experiencing what he calls pressure in his bronchial tube. He said he didn't believe it had anything to do with his heart. The hub recommended that he go to the ER but he didn't want to go. He wanted to leave a message for Dr. Cruz. He was wondering if there was a breathing treatment Dr. Cruz could prescribe. Please advise [phone].

## 2022-09-09 NOTE — TELEPHONE ENCOUNTER
Patient said he can tolerate Solu-Medrol injectable but no pills, pills causes  him to have rectal bleeding, no with the injections, per patient

## 2022-09-09 NOTE — TELEPHONE ENCOUNTER
Patient states he's having bronchial congestion, feels like he needs to cough up/out. Does not want an inhaler.

## 2022-09-09 NOTE — TELEPHONE ENCOUNTER
I did call patient because he feels that he has bronchial tube congestion, with cough, I did offer Promethazine DM he declined, Tessalon help is not helping, guaifenesin  not helping, antibiotics is not helping, he is no wheezing, but he first requested breathing treatment, but we agreed to at least try steroids, he can take steroid injection but no orally, patient will stop by for steroid injection

## 2022-09-09 NOTE — TELEPHONE ENCOUNTER
"Pt declined medrol because can worsen his tinnitus, declined Pulmonologist and ENT, will \" let it run its course\", if  no better to go to ER recommended  "

## 2022-09-13 ENCOUNTER — TELEPHONE (OUTPATIENT)
Dept: FAMILY MEDICINE CLINIC | Facility: CLINIC | Age: 57
End: 2022-09-13

## 2022-09-13 NOTE — TELEPHONE ENCOUNTER
PATIENT CALLED AND STATES PODIATRY REFERRAL WAS NOT RECEIVED AT [place] AND [facility].    PLEASE REFAX REFERRAL -[phone]  [phone]  OFFICE SAID SEND IT TO BOTH FAX NUMBERS    HE IS HAVING FOOT PAIN   CALL BACK NUMBER [phone]

## 2022-09-19 ENCOUNTER — ANESTHESIA EVENT (OUTPATIENT)
Dept: MRI IMAGING | Facility: HOSPITAL | Age: 57
End: 2022-09-19

## 2022-09-20 ENCOUNTER — HOSPITAL ENCOUNTER (OUTPATIENT)
Dept: MRI IMAGING | Facility: HOSPITAL | Age: 57
Discharge: HOME | End: 2022-09-20

## 2022-09-20 ENCOUNTER — APPOINTMENT (OUTPATIENT)
Dept: INTERVENTIONAL RADIOLOGY/VASCULAR | Facility: HOSPITAL | Age: 57
End: 2022-09-20

## 2022-09-20 ENCOUNTER — ANESTHESIA (OUTPATIENT)
Dept: MRI IMAGING | Facility: HOSPITAL | Age: 57
End: 2022-09-20

## 2022-09-20 VITALS — DIASTOLIC BLOOD PRESSURE: 80 MMHG | SYSTOLIC BLOOD PRESSURE: 112 MMHG

## 2022-09-20 DIAGNOSIS — M75.102 TEAR OF LEFT ROTATOR CUFF, UNSPECIFIED TEAR EXTENT, UNSPECIFIED WHETHER TRAUMATIC: ICD-10-CM

## 2022-09-20 DIAGNOSIS — M75.101 TEAR OF RIGHT ROTATOR CUFF, UNSPECIFIED TEAR EXTENT, UNSPECIFIED WHETHER TRAUMATIC: ICD-10-CM

## 2022-09-20 PROCEDURE — 73221 MRI JOINT UPR EXTREM W/O DYE: CPT

## 2022-09-20 PROCEDURE — 25010000002 ONDANSETRON PER 1 MG: Performed by: ANESTHESIOLOGY

## 2022-09-20 PROCEDURE — 25010000002 PROPOFOL 10 MG/ML EMULSION: Performed by: STUDENT IN AN ORGANIZED HEALTH CARE EDUCATION/TRAINING PROGRAM

## 2022-09-20 RX ORDER — PROMETHAZINE HYDROCHLORIDE 25 MG/1
25 TABLET ORAL ONCE AS NEEDED
Status: CANCELLED | OUTPATIENT
Start: 2022-09-20

## 2022-09-20 RX ORDER — ONDANSETRON 2 MG/ML
INJECTION INTRAMUSCULAR; INTRAVENOUS AS NEEDED
Status: DISCONTINUED | OUTPATIENT
Start: 2022-09-20 | End: 2022-09-20 | Stop reason: SURG

## 2022-09-20 RX ORDER — PROMETHAZINE HYDROCHLORIDE 25 MG/1
25 SUPPOSITORY RECTAL ONCE AS NEEDED
Status: CANCELLED | OUTPATIENT
Start: 2022-09-20

## 2022-09-20 RX ORDER — LIDOCAINE HYDROCHLORIDE 20 MG/ML
INJECTION, SOLUTION INFILTRATION; PERINEURAL AS NEEDED
Status: DISCONTINUED | OUTPATIENT
Start: 2022-09-20 | End: 2022-09-20 | Stop reason: SURG

## 2022-09-20 RX ORDER — PROPOFOL 10 MG/ML
VIAL (ML) INTRAVENOUS AS NEEDED
Status: DISCONTINUED | OUTPATIENT
Start: 2022-09-20 | End: 2022-09-20 | Stop reason: SURG

## 2022-09-20 RX ORDER — SODIUM CHLORIDE, SODIUM LACTATE, POTASSIUM CHLORIDE, CALCIUM CHLORIDE 600; 310; 30; 20 MG/100ML; MG/100ML; MG/100ML; MG/100ML
INJECTION, SOLUTION INTRAVENOUS CONTINUOUS PRN
Status: DISCONTINUED | OUTPATIENT
Start: 2022-09-20 | End: 2022-09-20 | Stop reason: SURG

## 2022-09-20 RX ADMIN — PROPOFOL 200 MG: 10 INJECTION, EMULSION INTRAVENOUS at 13:09

## 2022-09-20 RX ADMIN — ONDANSETRON HYDROCHLORIDE 4 MG: 2 SOLUTION INTRAMUSCULAR; INTRAVENOUS at 14:03

## 2022-09-20 RX ADMIN — SODIUM CHLORIDE, POTASSIUM CHLORIDE, SODIUM LACTATE AND CALCIUM CHLORIDE: 600; 310; 30; 20 INJECTION, SOLUTION INTRAVENOUS at 13:05

## 2022-09-20 RX ADMIN — PROPOFOL 100 MG: 10 INJECTION, EMULSION INTRAVENOUS at 13:13

## 2022-09-20 RX ADMIN — LIDOCAINE HYDROCHLORIDE 100 MG: 20 INJECTION, SOLUTION INFILTRATION; PERINEURAL at 13:09

## 2022-09-20 NOTE — ANESTHESIA POSTPROCEDURE EVALUATION
Patient: Jose Maria Judge    Procedure Summary     Date: 09/20/22 Room / Location: [facility] MRI    Anesthesia Start: 1305 Anesthesia Stop: 1419    Procedure: MRI SHOULDER RIGHT WO CONTRAST Diagnosis: Tear of right rotator cuff, unspecified tear extent, unspecified whether traumatic    Scheduled Providers:  Provider: Anne Marie Triplett MD    Anesthesia Type: general ASA Status: 3          Anesthesia Type: general    Vitals  Vitals Value Taken Time   /90 09/20/22 1500   Temp     Pulse 64 09/20/22 1500   Resp 18 09/20/22 1500   SpO2 98 % 09/20/22 1500           Post Anesthesia Care and Evaluation    Patient location during evaluation: bedside  Patient participation: complete - patient participated  Level of consciousness: awake and alert  Pain management: adequate    Airway patency: patent  Anesthetic complications: No anesthetic complications  PONV Status: controlled  Cardiovascular status: acceptable  Respiratory status: acceptable  Hydration status: acceptable

## 2022-09-20 NOTE — ANESTHESIA PROCEDURE NOTES
Airway  Urgency: elective    Date/Time: 9/20/2022 1:14 PM  Airway not difficult    General Information and Staff    Patient location during procedure: OR  Anesthesiologist: Vipin Raya MD    Indications and Patient Condition  Indications for airway management: airway protection    Preoxygenated: yes  MILS maintained throughout  Mask difficulty assessment: 0 - not attempted    Final Airway Details  Final airway type: supraglottic airway      Successful airway: LMA  Size 4    Number of attempts at approach: 2  Assessment: lips, teeth, and gum same as pre-op and atraumatic intubation    Additional Comments  Attempted LMA 5 initially; unable to get good seal

## 2022-09-20 NOTE — ANESTHESIA PREPROCEDURE EVALUATION
Anesthesia Evaluation     Patient summary reviewed and Nursing notes reviewed   no history of anesthetic complications:  NPO Solid Status: > 8 hours             Airway   Mallampati: II  TM distance: >3 FB  Neck ROM: full  Dental - normal exam     Pulmonary - negative pulmonary ROS   Cardiovascular     (+) hypertension,       Neuro/Psych  (+) psychiatric history Anxiety,    GI/Hepatic/Renal/Endo    (+) obesity,  GERD well controlled,      Musculoskeletal     Abdominal    Substance History      OB/GYN          Other   arthritis,                      Anesthesia Plan    ASA 3     general     (I have reviewed the patient's history with the patient and the chart, including all pertinent laboratory results and imaging. I have explained the risks of anesthesia including but not limited to dental damage, corneal abrasion, nerve injury, MI, stroke, and death. Questions asked and answered. Anesthetic plan discussed with patient and team as indicated. Patient expressed understanding of the above.  )  intravenous induction     Anesthetic plan, risks, benefits, and alternatives have been provided, discussed and informed consent has been obtained with: patient.        CODE STATUS:

## 2022-09-21 ENCOUNTER — TELEMEDICINE (OUTPATIENT)
Dept: FAMILY MEDICINE CLINIC | Facility: CLINIC | Age: 57
End: 2022-09-21

## 2022-09-21 ENCOUNTER — TELEPHONE (OUTPATIENT)
Dept: FAMILY MEDICINE CLINIC | Facility: CLINIC | Age: 57
End: 2022-09-21

## 2022-09-21 ENCOUNTER — TELEPHONE (OUTPATIENT)
Dept: ORTHOPEDIC SURGERY | Facility: CLINIC | Age: 57
End: 2022-09-21

## 2022-09-21 DIAGNOSIS — F43.9 STRESS: ICD-10-CM

## 2022-09-21 DIAGNOSIS — B00.1 COLD SORE: ICD-10-CM

## 2022-09-21 DIAGNOSIS — B00.1 COLD SORE: Primary | ICD-10-CM

## 2022-09-21 PROCEDURE — 99213 OFFICE O/P EST LOW 20 MIN: CPT | Performed by: FAMILY MEDICINE

## 2022-09-21 RX ORDER — VALACYCLOVIR HYDROCHLORIDE 1 G/1
2000 TABLET, FILM COATED ORAL 2 TIMES DAILY
Qty: 4 TABLET | Refills: 1 | Status: SHIPPED | OUTPATIENT
Start: 2022-09-21 | End: 2022-09-21

## 2022-09-21 RX ORDER — VALACYCLOVIR HYDROCHLORIDE 1 G/1
2000 TABLET, FILM COATED ORAL 2 TIMES DAILY
Qty: 4 TABLET | Refills: 1 | Status: SHIPPED | OUTPATIENT
Start: 2022-09-21 | End: 2023-01-20

## 2022-09-21 RX ORDER — VALACYCLOVIR HYDROCHLORIDE 1 G/1
1000 TABLET, FILM COATED ORAL 2 TIMES DAILY
Qty: 4 TABLET | Refills: 1 | Status: SHIPPED | OUTPATIENT
Start: 2022-09-21 | End: 2022-09-21 | Stop reason: SDUPTHER

## 2022-09-21 RX ORDER — DIAZEPAM 2 MG/1
2 TABLET ORAL DAILY PRN
Qty: 30 TABLET | Refills: 0 | Status: SHIPPED | OUTPATIENT
Start: 2022-09-21 | End: 2022-11-28 | Stop reason: SDUPTHER

## 2022-09-21 RX ORDER — VALACYCLOVIR HYDROCHLORIDE 1 G/1
2000 TABLET, FILM COATED ORAL 2 TIMES DAILY
Qty: 4 TABLET | Refills: 1 | Status: SHIPPED | OUTPATIENT
Start: 2022-09-21 | End: 2022-09-21 | Stop reason: SDUPTHER

## 2022-09-21 NOTE — TELEPHONE ENCOUNTER
DELETE AFTER REVIEWING: Telephone encounter to be sent to the clinical pool     Caller: Jose Maria Judge    Relationship: Self    Best call back number: [phone]    What medication are you requesting: SOMETHING FOR SORES IN MOUTH     What are your current symptoms: HAS BLISTERS ON LIPS AND INNER LIP  AND SORE THROAT    How long have you been experiencing symptoms: LAST NIGHT     Have you had these symptoms before:    [] Yes  [x] No    Have you been treated for these symptoms before:   [] Yes  [x] No    If a prescription is needed, what is your preferred pharmacy and phone number:    [facility] [address]  AT [facility] - [phone]  - [phone] FX        Additional notes: PATIENT WENT UNDER ANESTHESIA YESTERDAY FOR A PROCEDURE AND NOW HAS THESE SYMPTOMS, CALLED THE NURSE LINE AND THEY TOLD HIM TO FOLLOW UP WITH HIS PCP. PATIENT REQUESTS A CALL BACK AS WELL.

## 2022-09-21 NOTE — PROGRESS NOTES
"Chief Complaint  No chief complaint on file.  Mouth sores  Subjective        Jose Maria Judge presents to [facility]  History of Present Illness  Mouth sores, patient agreed to be contacted by [employer],  One on upper lip 3 on bottom x a day, was put under x MRI of the shoulder, and diazepam refill  Objective   Vital Signs:  There were no vitals taken for this visit.  Estimated body mass index is 29.15 kg/m² as calculated from the following:    Height as of 9/20/22: 180.3 cm (71\").    Weight as of 9/20/22: 94.8 kg (209 lb).            Result Review :                Assessment and Plan   Diagnoses and all orders for this visit:    1. Cold sore (Primary)  -     Discontinue: valACYclovir (Valtrex) 1000 MG tablet; Take 1 tablet by mouth 2 (Two) Times a Day.  Dispense: 4 tablet; Refill: 1  -     valACYclovir (Valtrex) 1000 MG tablet; Take 2 tablets by mouth 2 (Two) Times a Day.  Dispense: 4 tablet; Refill: 1    2. Stress  -     diazePAM (Valium) 2 MG tablet; Take 1 tablet by mouth Daily As Needed for Anxiety.  Dispense: 30 tablet; Refill: 0             Follow Up   No follow-ups on file.  Patient was given instructions and counseling regarding his condition or for health maintenance advice. Please see specific information pulled into the AVS if appropriate.   Time spent    "

## 2022-09-21 NOTE — TELEPHONE ENCOUNTER
The following patient called and wants to know if he needs to start the following medication,   valACYclovir (Valtrex) 1000 MG tablet        Sig: Take 1 tablet by mouth 2 (Two) Times a Day.        Tonight.

## 2022-09-21 NOTE — TELEPHONE ENCOUNTER
----- Message from Ronit Lynn MD sent at 9/21/2022  8:53 AM EDT -----  Please tell him his left shoulder MRI shows some tendinitis the rotator cuff no tear he also has mild arthritis.  In view of this we should be able to improve his symptoms with injections physical therapy and activity modification I am happy to discuss further in follow-up

## 2022-09-23 ENCOUNTER — TELEPHONE (OUTPATIENT)
Dept: FAMILY MEDICINE CLINIC | Facility: CLINIC | Age: 57
End: 2022-09-23

## 2022-09-23 NOTE — TELEPHONE ENCOUNTER
Caller: Jose Maria Judge    Relationship to patient: Self    Best call back number: [phone]    Patient is needing: PATIENT STATES THAT HE WAS PUT UNDER ANESTHESIA SOME DAYS AGO AND DEVELOPED COLD SORES. HE STATED THAT HE WAS PRESCRIBED SOME MEDICATION FOR THIS, BUT WHEN HE WOKE UP AND THEY'VE GOTTEN WORSE AND MORE SWOLLEN. PLEASE ADVISE [phone] AS SOON AS POSSIBLE

## 2022-09-26 ENCOUNTER — TELEPHONE (OUTPATIENT)
Dept: FAMILY MEDICINE CLINIC | Facility: CLINIC | Age: 57
End: 2022-09-26

## 2022-09-26 NOTE — TELEPHONE ENCOUNTER
Caller: Jose Maria Judge    Relationship to patient: Self    Best call back number: [phone]    Patient is needing: PT STATED THAT HE HAD APPT 9/21/22 WITH PCP FOR BLISTERS IN HIS MOUTH. WAS PRESCIBED MEDICATION AND TOLD TO CALL OFFICE TODAY IF THERE WERE NO IMPROVEMENT. PT STATED THAT THE BLISTERS HAS NOT GOTTEN ANY BETTER. PLEASE ADVISE.

## 2022-09-27 ENCOUNTER — APPOINTMENT (OUTPATIENT)
Dept: LAB | Facility: HOSPITAL | Age: 57
End: 2022-09-27

## 2022-09-28 ENCOUNTER — APPOINTMENT (OUTPATIENT)
Dept: INTERVENTIONAL RADIOLOGY/VASCULAR | Facility: HOSPITAL | Age: 57
End: 2022-09-28

## 2022-10-03 ENCOUNTER — OFFICE VISIT (OUTPATIENT)
Dept: ORTHOPEDIC SURGERY | Facility: CLINIC | Age: 57
End: 2022-10-03

## 2022-10-03 VITALS — HEIGHT: 71 IN | BODY MASS INDEX: 30.17 KG/M2 | TEMPERATURE: 97.1 F | WEIGHT: 215.5 LBS

## 2022-10-03 DIAGNOSIS — M75.40 IMPINGEMENT SYNDROME OF SHOULDER REGION, UNSPECIFIED LATERALITY: Primary | ICD-10-CM

## 2022-10-03 PROCEDURE — 99213 OFFICE O/P EST LOW 20 MIN: CPT | Performed by: ORTHOPAEDIC SURGERY

## 2022-10-03 NOTE — PROGRESS NOTES
Shoulder MRI Follow Up      Patient: Jose Maria Judge        YOB: 1965            Chief Complaints: Shoulder pain right and left      History of Present Illness: The patient is here follow-up of an MRI of the shoulder both the right and left shoulder he states the right was actually doing a little better the left 1 is hurting him on the left he has some tendinopathy without evidence of a tear and he has some mild glenohumeral arthritis on the right he has some tendinopathy with a small insertional tear no full-thickness tear he has some acromioclavicular arthritis he has some glenohumeral arthritis as well as a tear of what appears to be the posterior superior labrum most of his symptoms seem rotator cuff      Physical Exam: 57 y.o. male  General Appearance:    Alert, cooperative, in no acute distress                 There were no vitals filed for this visit.     Patient is alert and read ×3 no acute distress appears her above-listed at height weight and age.  Affect is normal respiratory rate is normal unlabored. Heart rate regular rate rhythm, sclera, dentition and hearing are normal for the purpose of this exam.      Ortho Exam    I did attempt to examine him again he did states the last time we did that really made his shoulders  worse he asked if I could not do that today    MRI Results: MRIs as above on both I have reviewed reviewed and agree  Procedures      Assessment/Plan: Bilateral shoulder pain on the left he has more tendinopathy on the right there is a small interstitial tear may be a tear of the labrum but I do not know that that is a huge part of his symptomatology.  The right was actually doing better left 1 hurts him worse we talked about options including injections he states he had the right injected previously that did not work is not a fan of needles does not want to do that he wants to do the most conservative first and that would be physical therapy.  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

## 2022-10-05 ENCOUNTER — HOSPITAL ENCOUNTER (OUTPATIENT)
Dept: PHYSICAL THERAPY | Facility: HOSPITAL | Age: 57
Setting detail: THERAPIES SERIES
Discharge: HOME | End: 2022-10-05

## 2022-10-05 DIAGNOSIS — R29.898 WEAKNESS OF LEFT UPPER EXTREMITY: ICD-10-CM

## 2022-10-05 DIAGNOSIS — M25.512 LEFT SHOULDER PAIN, UNSPECIFIED CHRONICITY: Primary | ICD-10-CM

## 2022-10-05 DIAGNOSIS — M25.612 DECREASED RANGE OF MOTION OF LEFT SHOULDER: ICD-10-CM

## 2022-10-05 PROCEDURE — 97161 PT EVAL LOW COMPLEX 20 MIN: CPT

## 2022-10-05 PROCEDURE — 97110 THERAPEUTIC EXERCISES: CPT

## 2022-10-06 NOTE — THERAPY EVALUATION
Outpatient Physical Therapy Ortho Initial Evaluation  [facility]     Patient Name: Jose Maria Judge  : 1965  MRN: [id number]  Today's Date: 10/5/2022      Visit Date: 10/05/2022    Patient Active Problem List   Diagnosis   • Bilateral hip pain   • Right hip pain   • Left hip pain   • Trochanteric bursitis   • Tear of acetabular labrum   • Loss of hair   • Anal burning   • Anxiety   • Elevated blood pressure   • Hamstring strain   • Hypertension   • Radiculitis   • Hamstring muscle strain   • Rectal pain   • Herpes zoster   • Varicose veins of other specified sites   • Lymph node enlargement   • Partial small bowel obstruction (HCC)   • Epigastric pain   • Lymphadenopathy, axillary   • Hypersensitivity to odor   • Chronic bilateral low back pain without sciatica   • Nausea   • Anal or rectal pain   • Lumbar facet arthropathy   • Pudendal neuralgia   • Tinnitus of left ear   • High risk medication use   • Varicose veins of left lower extremity with pain   • Rib pain on right side   • Lymphadenopathy   • Bradycardia   • Bilateral shoulder pain   • Chronic pain of both feet   • Illness   • Fatigue        Past Medical History:   Diagnosis Date   • Abdominal lymphadenopathy    • Acid reflux     HX   • Anxiety     R/T PAIN   • Carpal tunnel syndrome, left    • Chronic pain    • DDD (degenerative disc disease), lumbar    • Fatigue    • H/O Hypersensitivity to odor     Burning sensation in nasal passage   • Hemorrhoids     internal fistula   • History of medical problems Hyperosmia, Pudendal neuralgia   • Irritable bowel syndrome    • Low back pain    • Lymphadenopathy, axillary     LEFT   • Male pattern alopecia    • Pudendal neuralgia    • Right hip pain    • Unexplained night sweats         Past Surgical History:   Procedure Laterality Date   • ABSCESS DRAINAGE  2012   • ANAL FISTULA REPAIR N/A 2012, 10/2012   • AXILLARY LYMPH NODE BIOPSY/EXCISION Left 2018    Procedure: AXILLARY LYMPH NODE  BIOPSY/EXCISION;  Surgeon: Amando Nguyễn MD;  Location:  [facility];  Service: General   • COLONOSCOPY N/A 2013    done at [facility]   • COLONOSCOPY N/A 7/10/2019    Procedure: COLONOSCOPY to CECUM;  Surgeon: Amando Nguyễn MD;  Location: [facility] ENDOSCOPY;  Service: General   • ENDOSCOPY N/A 1/9/2019    Procedure: ESOPHAGOGASTRODUODENOSCOPY;  Surgeon: Amando Nguyễn MD;  Location: [facility] ENDOSCOPY;  Service: General   • FINE NEEDLE ASPIRATION Left 08/09/2018    Left axillary lymph node FNA   • FLEXIBLE SIGMOIDOSCOPY N/A 06/25/2003    Normal-Dr. Cezar Aviles   • HEMORRHOIDECTOMY N/A 1996   • HIP ARTHROSCOPY W/ LABRAL REPAIR Right 04/03/2017    Dr. Lonnie Lemons   • MEDIAL BRANCH BLOCK Bilateral 3/12/2021    Procedure: BILATERAL MEDIAL BRANCH BLOCK W/MAC;  Surgeon: Francisco Velez MD;  Location: [facility] OR;  Service: Pain Management;  Laterality: Bilateral;   • SIGMOIDOSCOPY N/A 9/23/2020    Procedure: FLEXIBLE SIGMOIDOSCOPY WITH BIOPSY;  Surgeon: Shena Ring MD;  Location: [facility] ENDOSCOPY;  Service: Gastroenterology;  Laterality: N/A;  pre- anal/rectal pain  post- hemorrhoids   • TONSILLECTOMY Bilateral        Visit Dx:     ICD-10-CM ICD-9-CM   1. Left shoulder pain, unspecified chronicity  M25.512 719.41   2. Weakness of left upper extremity  R29.898 729.89   3. Decreased range of motion of left shoulder  M25.612 719.51          Patient History     Row Name 10/05/22 1400             History    Chief Complaint Pain  -JA      Type of Pain Shoulder pain  L  -JA      Brief Description of Current Complaint Fell onto L shoulder about 4 months and has been painful since. Last year R shoulder hurt and he was going to have PT but his brother was ill and passed away with CA. He reports MMT flared his R shld last year so he declined that at MD appt. No pain at rest in chair, can lie on L with extra pillows but limited amount of time.  -JA      Occupation/sports/leisure activities household  brianaores, does not lift heavy things due to pudendal nerve issue, wants to return to strengthening with 2.5# and resistance.  -JA      Results of Clinical Tests MRI: Supraspinatus tendinopathy without evidence for rotator cuff tear.  Type II acromial arch with moderate AC joint arthritis.   Mild glenohumeral joint arthritis  -JA              Pain     Pain Location Shoulder  L  -JA      Pain at Present 0  -JA      Pain at Worst 5;6  -JA      What Performance Factors Make the Current Problem(s) WORSE? reaching behind back raising arm with any weight  -JA              Fall Risk Assessment    Any falls in the past year: Yes  -JA      Number of falls reported in the last 12 months 11  -JA      Factors that contributed to the fall: --  did not trip, the surface he was standing on moved quickly  -JA            User Key  (r) = Recorded By, (t) = Taken By, (c) = Cosigned By    Initials Name Provider Type    Kirstin Chao, PT Physical Therapist                 PT Ortho     Row Name 10/05/22 1400       Special Tests/Palpation    Special Tests/Palpation Shoulder  -JA       Shoulder Impingement/Rotator Cuff Special Tests    Gre[place]-Chad Test (RC Lesion vs. Bursitis) Left:;Positive  -    Neer Impingement Test (RC Lesion vs. Bursitis) --  pain attempting test  -JA    Empty Can Test (RC Lesion) Left:;Positive  -       General ROM    LT Upper Ext Lt Shoulder ABduction;Lt Shoulder Extension;Lt Shoulder Flexion;Lt Shoulder External Rotation;Lt Shoulder Internal Rotation;Lt Elbow Extension/Flexion  -       Left Upper Ext    Lt Shoulder Abduction AROM 60  -JA    Lt Shoulder Abduction PROM 60  -JA    Lt Shoulder Flexion AROM 85  -JA    Lt Shoulder Flexion PROM 80  -JA    Lt Shoulder External Rotation AROM MARIEL pain w/attempt  -JA    Lt Shoulder External Rotation PROM 20, very guarded  -JA    Lt Shoulder Internal Rotation AROM FIR pain w/attempt  -JA    Lt Shoulder Internal Rotation PROM 40  -JA    Lt Upper Extremity  Comments  pt very guarded anticipating increased pain  -LISET       MMT (Manual Muscle Testing)    Lt Upper Ext Lt Shoulder Flexion;Lt Shoulder ABduction;Lt Shoulder Internal Rotation;Lt Shoulder External Rotation  -LISET       MMT Left Upper Ext    Lt Shoulder Flexion MMT, Gross Movement (3+/5) fair plus  -JA    Lt Shoulder ABduction MMT, Gross Movement (3+/5) fair plus  -JA    Lt Shoulder Internal Rotation MMT, Gross Movement (3+/5) fair plus  -JA    Lt Shoulder External Rotation MMT, Gross Movement (3+/5) fair plus  -JA    Lt Upper Extremity Comments  all are pain limited  -LISET          User Key  (r) = Recorded By, (t) = Taken By, (c) = Cosigned By    Initials Name Provider Type    Kirstin Chao, PT Physical Therapist                            Therapy Education  Education Details: See OP Ex  Given: HEP, Symptoms/condition management, Pain management, Posture/body mechanics  Program: New  How Provided: Verbal, Demonstration, Written  Provided to: Patient  Level of Understanding: Verbalized, Demonstrated      PT OP Goals     Row Name 10/05/22 1600          PT Short Term Goals    STG Date to Achieve 11/04/22  -LISET     STG 1 Pt will be independent with initial HEP.  -LISET     STG 1 Progress New  -     STG 2 Pt will demonstrate increased AAROM/PROM to 90% of full or better.  -LISET     STG 2 Progress New  -     STG 3 Pt will be able to use L shoulder for dressing and grooming ADLs with no more than 2/10 pain.  -LISET     STG 3 Progress New  -LISET     STG 4 Pt will demonstrate optimal shoulder-scapula postural alignment to reduce shoulder strain/pain.  -LISET     STG 4 Progress New  -LISET            Long Term Goals    LTG Date to Achieve 12/04/22  -LISET     LTG 1 Pt will be independent with advanced HEP for shoulder strengthening and scapular stabilization.  -LISET     LTG 1 Progress New  -LISET     LTG 2 Pt will be able to move through 75% of full AROM or better with minimal to no increased pain to facilitate return to PLOF.  -LISET      LTG 2 Progress New  -JA     LTG 3 Pt will score 44 on QuickDASH indicating decrease in perceived functional disability.  -LISET     LTG 3 Progress New  -JA     LTG 4 Pt will demonstrate increased strength of L shoulder to 4/5-4+/5.  -LISET     LTG 4 Progress New  -JA            Time Calculation    PT Goal Re-Cert Due Date 01/03/23  -LISET           User Key  (r) = Recorded By, (t) = Taken By, (c) = Cosigned By    Initials Name Provider Type    Kirstin Chao, PT Physical Therapist                 PT Assessment/Plan     Row Name 10/05/22 1400          PT Assessment    Functional Limitations Limitation in home management;Limitations in community activities;Limitations in functional capacity and performance;Performance in leisure activities;Performance in self-care ADL  -LISET     Impairments Pain;Range of motion;Muscle strength;Posture;Poor body mechanics  -LISET     Assessment Comments Jose Maria Judge is a 57 y.o. male referred to outpatient physical therapy for evaluation and treatment of left shoulder impingement pain.  Patient presents with decreased shoulder ROM, weakness, and has impaired use of L shoulder for ADLs and household activity. L shoulder (+) impingment sign; Quick DASH score is 57 where 0=no perceived functional disability. Signs and symptoms are consistent with referring diagnosis.  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  This condition is stable. Recommend skilled PT to address functional deficits. Thank you for this referral.  -LISET     Please refer to paper survey for additional self-reported information Yes  -LISET     Rehab Potential Good  -     Patient/caregiver participated in establishment of treatment plan and goals Yes  -     Patient would benefit from skilled therapy intervention Yes  -LISET            PT Plan    PT Frequency 2x/week  -LISET     Predicted  Duration of Therapy Intervention (PT) 8 visits  -JA     Planned CPT's? PT EVAL LOW COMPLEXITY: 01609;PT RE-EVAL: 16270;PT THER PROC EA 15 MIN: 44766;PT THER ACT EA 15 MIN: 61097;PT MANUAL THERAPY EA 15 MIN: 27531;PT NEUROMUSC RE-EDUCATION EA 15 MIN: 16087;PT HOT OR COLD PACK TREAT MCARE  -JA     PT Plan Comments review HEP, add pendulum, add AAROM supine w/cane, on wall if ready, PROM/manual techniqeus as tolerated (very guarded, anticipation of increased pain)  -JA           User Key  (r) = Recorded By, (t) = Taken By, (c) = Cosigned By    Initials Name Provider Type    Kirstin Chao, PT Physical Therapist                   OP Exercises     Row Name 10/05/22 1400             Subjective Comments    Subjective Comments initial eval  -JA              Total Minutes    93068 - PT Therapeutic Exercise Minutes 23  -JA              Exercise 1    Exercise Name 1 Discussed: importance of restoring full ROM then strengthening, impingement, anatomy of shoulder and importance of scapular muscle strength as well as RC.  Worked on strategies to reduce muscle guarding and anticipation of pain/fear avoidance. Instructed in HEP and recommended cold/heat for pain management.  -JA      Time 1 8min  -JA              Exercise 2    Exercise Name 2 seated AAROM shld flex table slide  -JA      Cueing 2 Verbal;Demo  -JA      Reps 2 6  -JA              Exercise 3    Exercise Name 3 seated AAROM shld scaption table slide  -JA      Cueing 3 Verbal;Demo  -JA      Reps 3 6  -JA              Exercise 4    Exercise Name 4 seated AAROM ER w/cane  -JA      Cueing 4 Verbal;Demo  -JA      Reps 4 10  -JA              Exercise 5    Exercise Name 5 scap retraction  -JA      Cueing 5 Verbal;Tactile;Demo  -JA      Reps 5 10  -JA      Time 5 3sec  -JA            User Key  (r) = Recorded By, (t) = Taken By, (c) = Cosigned By    Initials Name Provider Type    Kirstin Chao, PT Physical Therapist                              Outcome Measure  Options: Quick DASH  Quick DASH  Number of Questions Answered: 11  Quick DASH Score: 56.82         Time Calculation:     Start Time: 1415  Stop Time: 1500  Time Calculation (min): 45 min  Timed Charges  59314 - PT Therapeutic Exercise Minutes: 23  Untimed Charges  PT Eval/Re-eval Minutes: 20  Total Minutes  Timed Charges Total Minutes: 23  Untimed Charges Total Minutes: 20   Total Minutes: 43     Therapy Charges for Today     Code Description Service Date Service Provider Modifiers Qty    04665017394 HC PT THER PROC EA 15 MIN 10/5/2022 Kirstin Dale, PT GP 2    68685726111 HC PT EVAL LOW COMPLEXITY 1 10/5/2022 Kirstin Dale, PT GP 1          PT G-Codes  Outcome Measure Options: Quick DASH  Quick DASH Score: 56.82         Kirstin Dale PT  10/5/2022

## 2022-10-12 ENCOUNTER — TELEPHONE (OUTPATIENT)
Dept: ORTHOPEDIC SURGERY | Facility: CLINIC | Age: 57
End: 2022-10-12

## 2022-10-12 NOTE — TELEPHONE ENCOUNTER
PATIENT CALLED AND SAID HIS FOOT PAIN IS WORSENING AND WAS WONDERING IF HE COULD BE SEEN TODAY. PLEASE ADVISE.

## 2022-10-13 ENCOUNTER — OFFICE VISIT (OUTPATIENT)
Dept: ORTHOPEDIC SURGERY | Facility: CLINIC | Age: 57
End: 2022-10-13

## 2022-10-13 VITALS — BODY MASS INDEX: 30.1 KG/M2 | TEMPERATURE: 96.8 F | HEIGHT: 71 IN | WEIGHT: 215 LBS

## 2022-10-13 DIAGNOSIS — G57.90 NEURITIS OF FOOT, UNSPECIFIED LATERALITY: ICD-10-CM

## 2022-10-13 DIAGNOSIS — R20.2 NUMBNESS AND TINGLING OF BOTH FEET: Primary | ICD-10-CM

## 2022-10-13 DIAGNOSIS — R20.0 NUMBNESS AND TINGLING OF BOTH FEET: Primary | ICD-10-CM

## 2022-10-13 DIAGNOSIS — M77.42 METATARSALGIA OF BOTH FEET: ICD-10-CM

## 2022-10-13 DIAGNOSIS — M79.671 FOOT PAIN, BILATERAL: ICD-10-CM

## 2022-10-13 DIAGNOSIS — M77.41 METATARSALGIA OF BOTH FEET: ICD-10-CM

## 2022-10-13 DIAGNOSIS — M79.672 FOOT PAIN, BILATERAL: ICD-10-CM

## 2022-10-13 PROCEDURE — 73630 X-RAY EXAM OF FOOT: CPT | Performed by: ORTHOPAEDIC SURGERY

## 2022-10-13 PROCEDURE — 99214 OFFICE O/P EST MOD 30 MIN: CPT | Performed by: ORTHOPAEDIC SURGERY

## 2022-10-13 RX ORDER — DIAZEPAM 10 MG/1
10 TABLET ORAL
Qty: 1 TABLET | Refills: 0 | Status: SHIPPED | OUTPATIENT
Start: 2022-10-13 | End: 2022-10-13

## 2022-10-13 NOTE — PROGRESS NOTES
"   New Patient Complaint      Patient: Jose Maria Judge  YOB: 1965 57 y.o. male  Medical Record Number: [id number]    Chief Complaints: Feet hurt    History of Present Illness:   Patient reports a 3-week history of pain in both feet around the fifth MTP joint.  He does not recall any specific injury and reported that about a week and a half ago he started using Voltaren gel on the right and then had subsequent pain into the plantar lateral aspect of his right foot back into his heel with a feeling of \"walking on bubbles\".    He has fairly severe unrelenting pain.    He has somewhat of a complex history and that he does have a history of a pudendal nerve problem with pelvic floor pain and has also reported that he has had pins-and-needles feelings in his feet chronically.  He said that he had a pop in his left calf in 1995 and right in 2005.  He states that he was unable to wear shoe after about 2000 on the left because it gave him the pins-and-needles feeling in his foot and then subsequently after the pop in the calf on the right he has not been able to wear shoes since 2005.    He said that the pins-and-needles feeling was when he was wearing shoes and resolved when he came out of the shoes but has now had progressive symptoms even without shoe wear.       HPI    Allergies:   Allergies   Allergen Reactions   • Bactrim [Sulfamethoxazole-Trimethoprim] Shortness Of Breath   • Hydrocodone Shortness Of Breath   • Hydrocodone-Acetaminophen Shortness Of Breath and Unknown (See Comments)     Bloating, sleeplessness, difficulty breathing   • Levaquin [Levofloxacin] Unknown - Low Severity     Made tendons tight   • Medrol [Methylprednisolone] Unknown - High Severity     Rectal bleeding   • Mobic [Meloxicam] Nausea Only and Other (See Comments)     Severe heartburn   • Naproxen Shortness Of Breath   • Nsaids Unknown - High Severity     Can take Ibuprofen.. Causes constipation   • Sulfa Antibiotics Shortness Of " "Breath     TROUBLE BREATHING   • Alprazolam Other (See Comments)     Annotation - 06Jan2016: Patient stated he can not take this medication because it \"makes him feel weird.\"     • Diclofenac Nausea Only and Other (See Comments)     Severe heartburn   • Erythromycin GI Intolerance   • Tramadol Headache   • Ceftin [Cefuroxime] Other (See Comments)     cough       Medications:   Current Outpatient Medications on File Prior to Visit   Medication Sig   • acetaminophen (TYLENOL) 500 MG tablet Take 500 mg by mouth Every 6 (Six) Hours As Needed for Mild Pain .   • diazePAM (Valium) 2 MG tablet Take 1 tablet by mouth Daily As Needed for Anxiety.   • benzonatate (Tessalon Perles) 100 MG capsule Take 1 capsule by mouth 3 (Three) Times a Day As Needed for Cough.   • fluticasone (FLONASE) 50 MCG/ACT nasal spray 2 sprays into the nostril(s) as directed by provider Daily.   • guaiFENesin (MUCINEX) 600 MG 12 hr tablet Take 300 mg by mouth As Needed for Cough.   • valACYclovir (Valtrex) 1000 MG tablet Take 2 tablets by mouth 2 (Two) Times a Day.     Current Facility-Administered Medications on File Prior to Visit   Medication   • methylPREDNISolone sodium succinate (SOLU-Medrol) injection 40 mg       Past Medical History:   Diagnosis Date   • Abdominal lymphadenopathy    • Acid reflux     HX   • Anxiety     R/T PAIN   • Carpal tunnel syndrome, left    • Chronic pain    • DDD (degenerative disc disease), lumbar    • Fatigue    • H/O Hypersensitivity to odor     Burning sensation in nasal passage   • Hemorrhoids     internal fistula   • History of medical problems Hyperosmia, Pudendal neuralgia   • Irritable bowel syndrome    • Low back pain    • Lymphadenopathy, axillary     LEFT   • Male pattern alopecia    • Pudendal neuralgia    • Right hip pain    • Unexplained night sweats      Past Surgical History:   Procedure Laterality Date   • ABSCESS DRAINAGE  08/2012   • ANAL FISTULA REPAIR N/A 09/2012, 10/2012   • AXILLARY LYMPH NODE " BIOPSY/EXCISION Left 11/9/2018    Procedure: AXILLARY LYMPH NODE BIOPSY/EXCISION;  Surgeon: Amando Nguyễn MD;  Location:  [facility];  Service: General   • COLONOSCOPY N/A 2013    done at [facility]   • COLONOSCOPY N/A 7/10/2019    Procedure: COLONOSCOPY to CECUM;  Surgeon: Amando Nguyễn MD;  Location:  [facility] ENDOSCOPY;  Service: General   • ENDOSCOPY N/A 1/9/2019    Procedure: ESOPHAGOGASTRODUODENOSCOPY;  Surgeon: Amando Nguyễn MD;  [facility];  Service: General   • FINE NEEDLE ASPIRATION Left 08/09/2018    Left axillary lymph node FNA   • FLEXIBLE SIGMOIDOSCOPY N/A 06/25/2003    Normal-Dr. Cezar Aviles   • HEMORRHOIDECTOMY N/A 1996   • HIP ARTHROSCOPY W/ LABRAL REPAIR Right 04/03/2017    Dr. Lonnie Lemons   • MEDIAL BRANCH BLOCK Bilateral 3/12/2021    Procedure: BILATERAL MEDIAL BRANCH BLOCK W/MAC;  Surgeon: Francisco Velez MD;  Location: [facility] OR;  Service: Pain Management;  Laterality: Bilateral;   • SIGMOIDOSCOPY N/A 9/23/2020    Procedure: FLEXIBLE SIGMOIDOSCOPY WITH BIOPSY;  Surgeon: Shena Ring MD;  Location[facility];  Service: Gastroenterology;  Laterality: N/A;  pre- anal/rectal pain  post- hemorrhoids   • TONSILLECTOMY Bilateral      Social History     Occupational History   • Occupation: RETIRED     Comment: carpentry, plumbing and remodeling   Tobacco Use   • Smoking status: Never   • Smokeless tobacco: Never   • Tobacco comments:     caff use   Vaping Use   • Vaping Use: Never used   Substance and Sexual Activity   • Alcohol use: No   • Drug use: No   • Sexual activity: Defer      Social History     Social History Narrative   • Not on file     Family History   Problem Relation Age of Onset   • Atrial fibrillation Father    • Aneurysm Father    • Hypertension Father    • Lymphoma Brother    • Malig Hyperthermia Neg Hx    • Colon cancer Neg Hx    • Colon polyps Neg Hx    • Crohn's disease Neg Hx    • Irritable bowel syndrome Neg Hx    •  "Ulcerative colitis Neg Hx        Review of Systems: 14 point review of systems performed, positive pertinent findings identified in HPI. All remaining systems negative     Review of Systems      Physical Exam:   Vitals:    10/13/22 0822   Temp: 96.8 °F (36 °C)   TempSrc: Temporal   Weight: 97.5 kg (215 lb)   Height: 180.3 cm (70.98\")     Physical Exam   Constitutional: pleasant, well developed Is seated in a wheelchair.  Eyes: sclera non icteric  Hearing : adequate for exam  Cardiovascular: palpable pulses in bilaeral feet, bilateral calves/ thighs NT without sign of DVT he has multiple varicosities  Respiratoy: breathing unlabored   Neurological: grossly sensate to LT throughout bilateral LEs but with some diminished sensation in the feet  Psychiatric: oriented with normal mood and affect.   Lymphatic: No palpable popliteal lymphadenopathy bilateral LEs  Skin: intact throughout bilateral legs/feet  Musculoskeletal: He has moderate tenderness to palpation over the fifth MTP and distal metatarsal bilaterally.  There is no warmth erythema or sign of infection there was no skin crepitus.  He was diffusely tender in the feet but unable to really localize pain.  Physical Exam  Ortho Exam    Radiology: 3 views of both feet ordered evaluate pain reviewed and no prior x-rays available for comparison there is some mild arthritis of the first MTP joints.  I do not see any obvious fracture but there is some questionable periosteal thickening along with second more so than third metatarsals bilaterally.  Well as questionable area of the right fifth metatarsal neck there may be an evolving stress fracture and somewhat similar but not as prominent findings on the left.  Did review x-ray report of the left foot from 8/31/2020 from [facility] which described subtalar joint narrowing and narrowing of the first TMT and first MTP joints.    Assessment/Plan: Bilateral fifth metatarsal pain 2.  Bilateral foot pain with chronic " neuritic symptoms.    Reviewed him this is quite a complex case and I am not really sure what all to do with this at this point.    The most pressing thing seems to be has metatarsal pain bilaterally and I think the best thing to do is go ahead and get an MRI both of his feet to make sure is not any stress fracture tendon ligament injury etc.    I did prescribe him Valium to take before the MRIs and he understands not to drive to or from the procedure.    We will see him back in about a month after he has the MRIs.    Also think it be good to have him get further neurologic evaluation.  He has an appoint with Dr. Elijah Foote already scheduled for 10/31/2022 and this was to [place] for his pudendal nerve problems and he is going to let Dr. Foote know that he has had the neuritic symptoms in his feet.  I will place a formal neurologic evaluation consult for his feet as well.    And go ahead and order an EMG nerve conduction study as well.    He inquired as to whether or not his varicose veins could have any contributing factor to this and reviewed him it certainly possible.  He has seen Dr. Sonny Lynn in the past and had ablation done and was recommended to have another opinion and is seeing a [place] vascular specialist later this month or early next month..    We will see him back in about a month to look at the MRIs and see if there is anything I can do to help him and reviewed with him I may need to send him to see 1 mother foot and ankle colleagues as this is somewhat of a complex case without clear definitive answer.

## 2022-10-17 ENCOUNTER — TELEPHONE (OUTPATIENT)
Dept: PHYSICAL THERAPY | Facility: HOSPITAL | Age: 57
End: 2022-10-17

## 2022-10-17 NOTE — TELEPHONE ENCOUNTER
Returned Jose Maria's call. He had question regarding increased irritation but not specifically pain in his shoulder since starting his HEP one day last week.  We discussed that his fall caused weakness and loss of synchronicity of shoulder/shoulder blade muscles. Encouraged him to focus on gentle AAROM in pain-free range and the shoulder blade squeezes until his next appt Thurs. He stated he understood.

## 2022-10-25 ENCOUNTER — TELEPHONE (OUTPATIENT)
Dept: ORTHOPEDIC SURGERY | Facility: CLINIC | Age: 57
End: 2022-10-25

## 2022-10-25 NOTE — TELEPHONE ENCOUNTER
Caller: DUY   Relationship to Patient: SELF     Phone Number: [phone]   Reason for Call: PATIENT CALLING STATING MRI IS STILL PENDING, PLEASE ADVISE

## 2022-10-26 ENCOUNTER — TELEPHONE (OUTPATIENT)
Dept: ORTHOPEDIC SURGERY | Facility: CLINIC | Age: 57
End: 2022-10-26

## 2022-10-26 DIAGNOSIS — M79.672 FOOT PAIN, BILATERAL: ICD-10-CM

## 2022-10-26 DIAGNOSIS — R20.0 NUMBNESS AND TINGLING OF BOTH FEET: ICD-10-CM

## 2022-10-26 DIAGNOSIS — M77.41 METATARSALGIA OF BOTH FEET: Primary | ICD-10-CM

## 2022-10-26 DIAGNOSIS — R20.2 NUMBNESS AND TINGLING OF BOTH FEET: ICD-10-CM

## 2022-10-26 DIAGNOSIS — M79.671 FOOT PAIN, BILATERAL: ICD-10-CM

## 2022-10-26 DIAGNOSIS — M77.42 METATARSALGIA OF BOTH FEET: Primary | ICD-10-CM

## 2022-10-26 NOTE — TELEPHONE ENCOUNTER
Provider: DR ZAPIEN     Caller: DUY HERNANDEZ     Relationship to Patient: SELF     Phone Number: [phone]    Reason for Call: PATIENT CALLED AND NEEDS A CALL BACK PLEASE ADVISE

## 2022-10-26 NOTE — TELEPHONE ENCOUNTER
"Spoke with patient. He states his feet have gotten worse and he is hardly able to walk. Patient also informed me he has some sort or ear issues and is not able to do the normal MRI he stated he needed a \"conscious sedated\" MRI which he's had in the past. I have re-placed the order for the MRI on [facility] and made the note in the comment section that patient is to have that specific MRI. I have sent to ALTAGRACIA whom stated she can get in touch with someone at the hospital to hopefully expedite this process since he's already been waiting a week and is experiencing so much pain.   "

## 2022-10-26 NOTE — TELEPHONE ENCOUNTER
I returned patient's call and he said his feet are still quite painful and getting worse with difficulty walking.  Allie still working on getting his MRIs scheduled and he said that even though we had the Valium for the claustrophobia that the tech told him it may trigger his tinnitus making it worse and you want to do this with conscious sedation we will ask Allie to look into how to doing that he would have to be at that to the hospital and said he had 1 several weeks ago with Dr. Lynn that way.  We will ask Allie let him know once the MRIs are scheduled.

## 2022-10-31 ENCOUNTER — OFFICE VISIT (OUTPATIENT)
Dept: NEUROLOGY | Facility: CLINIC | Age: 57
End: 2022-10-31

## 2022-10-31 VITALS
SYSTOLIC BLOOD PRESSURE: 115 MMHG | BODY MASS INDEX: 30.55 KG/M2 | WEIGHT: 218.2 LBS | HEIGHT: 71 IN | OXYGEN SATURATION: 97 % | HEART RATE: 74 BPM | DIASTOLIC BLOOD PRESSURE: 72 MMHG

## 2022-10-31 DIAGNOSIS — G60.9 IDIOPATHIC PERIPHERAL NEUROPATHY: Primary | ICD-10-CM

## 2022-10-31 PROBLEM — R68.89 INTOLERANT OF COLD: Status: ACTIVE | Noted: 2020-04-02

## 2022-10-31 PROBLEM — M54.6 THORACIC BACK PAIN: Status: ACTIVE | Noted: 2018-07-11

## 2022-10-31 PROBLEM — Z00.00 WELL ADULT HEALTH CHECK: Status: ACTIVE | Noted: 2021-03-19

## 2022-10-31 PROBLEM — M62.830 MUSCLE SPASM OF BACK: Status: ACTIVE | Noted: 2021-10-15

## 2022-10-31 PROBLEM — R39.12 POOR URINARY STREAM: Status: ACTIVE | Noted: 2020-10-06

## 2022-10-31 PROBLEM — R06.83 SNORING: Status: ACTIVE | Noted: 2017-01-09

## 2022-10-31 PROBLEM — J34.89 POSTERIOR RHINORRHEA: Status: ACTIVE | Noted: 2020-10-02

## 2022-10-31 PROBLEM — J30.1 ALLERGIC RHINITIS DUE TO POLLEN: Status: ACTIVE | Noted: 2021-07-30

## 2022-10-31 PROBLEM — R42 VERTIGO: Status: ACTIVE | Noted: 2021-11-04

## 2022-10-31 PROBLEM — I86.1 VARICOCELE: Status: ACTIVE | Noted: 2018-10-15

## 2022-10-31 PROBLEM — R22.30 MASS OF AXILLA: Status: ACTIVE | Noted: 2020-08-18

## 2022-10-31 PROBLEM — G62.9 NEUROPATHY: Status: ACTIVE | Noted: 2022-06-10

## 2022-10-31 PROBLEM — E78.00 ELEVATED LOW DENSITY LIPOPROTEIN (LDL) CHOLESTEROL LEVEL: Status: ACTIVE | Noted: 2020-01-30

## 2022-10-31 PROBLEM — H93.19 TINNITUS: Status: ACTIVE | Noted: 2021-11-09

## 2022-10-31 PROBLEM — B35.4 TINEA CORPORIS: Status: ACTIVE | Noted: 2019-02-05

## 2022-10-31 PROBLEM — H57.89: Status: ACTIVE | Noted: 2021-03-30

## 2022-10-31 PROBLEM — R43.9 OLFACTION DISORDER: Status: ACTIVE | Noted: 2019-05-15

## 2022-10-31 PROBLEM — R59.0 AXILLARY LYMPHADENOPATHY: Status: ACTIVE | Noted: 2018-07-24

## 2022-10-31 PROBLEM — H10.10 ALLERGIC CONJUNCTIVITIS: Status: ACTIVE | Noted: 2021-03-30

## 2022-10-31 PROBLEM — J30.2 SEASONAL ALLERGIES: Status: ACTIVE | Noted: 2019-05-13

## 2022-10-31 PROBLEM — L64.9 MALE PATTERN ALOPECIA: Status: ACTIVE | Noted: 2018-01-04

## 2022-10-31 PROBLEM — S99.921A INJURY OF RIGHT FOOT: Status: ACTIVE | Noted: 2018-09-16

## 2022-10-31 PROBLEM — U07.1 ACUTE COVID-19: Status: ACTIVE | Noted: 2021-11-20

## 2022-10-31 PROBLEM — N40.0 BENIGN PROSTATIC HYPERPLASIA: Status: ACTIVE | Noted: 2018-10-15

## 2022-10-31 PROBLEM — K60.0 ACUTE ANAL FISSURE: Status: ACTIVE | Noted: 2020-02-19

## 2022-10-31 PROBLEM — R21 RASH: Status: ACTIVE | Noted: 2019-03-13

## 2022-10-31 PROBLEM — G57.60 MORTON'S METATARSALGIA: Status: ACTIVE | Noted: 2019-02-27

## 2022-10-31 PROBLEM — G47.00 INSOMNIA: Status: ACTIVE | Noted: 2021-10-15

## 2022-10-31 PROBLEM — K82.9 DISORDER OF GALLBLADDER: Status: ACTIVE | Noted: 2019-02-05

## 2022-10-31 PROCEDURE — 99215 OFFICE O/P EST HI 40 MIN: CPT | Performed by: PSYCHIATRY & NEUROLOGY

## 2022-10-31 NOTE — PROGRESS NOTES
CC: Pain in the feet; left-sided pudendal neuralgia    HPI:  Jose Maria Judge is a  57 y.o. right-handed white male who was sent for neurologic consultation by Dr. De La Cruz as well as Dr. Fink.  He is being seen as a follow-up although I have not seen the patient clinically other than to perform an EMG.  The patient has history of a left-sided pudendal neuralgia which started around 2014.  The patient states that at onset he had developed a perirectal abscess which required 2 operations.  There was a delay however in onset of his symptoms but he specifically indicated that it involved the left pudendal nerve.  He had seen Rhiannon Devlin, physical therapist for pelvic floor exercises.  He has seen urology who he states did not have a treatment plan for this.  He has had MRIs and CTs of the abdomen and pelvis in the past and he is followed for right-sided lymphadenopathy.  The patient states he was tried on multiple medications including at least gabapentin, Lyrica, Cymbalta.  He states that gabapentin and Lyrica both caused side effects.  Cymbalta did not do anything he states.  He says in the last year or so a lot of the symptoms have improved but if he sits in one place he will get flareup of his symptoms.    The patient has developed increasing pain in the soles of his feet with some burning stinging and hypersensitivity over the past couple of weeks.  He describes pins and needle sensations.  He indicates weight loading makes it much worse.  He has history of significant varicose veins and Dr. Sonny Lynn did ablations on those of the left leg which did not help his symptoms on a but he has additional varicose veins in that leg as well as in the right leg.  He was wearing support hose for this and he describes onset of numbness on the sole of the right foot laterally which she believes is related to the constriction of the support hose.  Because of this he does not wear support hose anymore.  He has also had Chad  tears of the gastrocnemius on both sides in the more distant past.  In addition to this he had a labral tear of the right hip which has been operated on.  The patient indicates that he has had foot pain dating back at least until 2011 when he saw podiatrist Tawanda.  A shot in the sole of the foot was very painful but that in conjunction with a months worth of Celebrex did actually help with swelling and pain in his feet.    He had been sent to see Dr. Connell in our group in 2019 for hypersensitivity to smells.  She noticed some evidence of peripheral neuropathy/lumbar radiculopathy and sent him for an EMG which I performed 4/7/2020 showing normal nerve conductions in the left leg with 1 comparison of the superficial peroneal sensory on the right.  He did not tolerate needle exam but I was able to get a root screen of 3 muscles which showed no denervation potentials.  He complained that it shot pain to his left groin.    The patient has numbness and tingling in the left hand and states he was thought to have carpal tunnel syndrome, no surgery.  The patient has been scheduled for a follow-up CT scan of the abdomen and pelvis in December and MRI scans of the feet 11/29/2022.    Review of Dr. Fink's note indicates that x-rays of the feet had been taken and demonstrate some arthritic changes and some periosteal thickening apparently    A search for laboratories demonstrated normal sed rate of 9 (5/2022); normal B12 of 667 (7/2022 and folate levels 8.96 (12/2021).  TSH normal 1.52 (7/2022).  An old SPE/IEP from 5/2019 was negative for an MGUS.  Blood sugars some have been above 100 but most less than 100.    Past Medical History:   Diagnosis Date   • Abdominal lymphadenopathy    • Acid reflux     HX   • Anxiety     R/T PAIN   • Carpal tunnel syndrome, left    • Chronic pain    • DDD (degenerative disc disease), lumbar    • Fatigue    • H/O Hypersensitivity to odor     Burning sensation in nasal passage   •  Hemorrhoids     internal fistula   • History of medical problems Hyperosmia, Pudendal neuralgia   • Irritable bowel syndrome    • Low back pain    • Lymphadenopathy, axillary     LEFT   • Male pattern alopecia    • Pudendal neuralgia    • Right hip pain    • Unexplained night sweats          Past Surgical History:   Procedure Laterality Date   • ABSCESS DRAINAGE  08/2012   • ANAL FISTULA REPAIR N/A 09/2012, 10/2012   • AXILLARY LYMPH NODE BIOPSY/EXCISION Left 11/9/2018    Procedure: AXILLARY LYMPH NODE BIOPSY/EXCISION;  Surgeon: Amando Nguyễn MD;  Location: [facility] OR OSC;  Service: General   • COLONOSCOPY N/A 2013    done at [facility]   • COLONOSCOPY N/A 7/10/2019    Procedure: COLONOSCOPY to CECUM;  Surgeon: Amando Nguyễn MD;  [facility];  Service: General   • ENDOSCOPY N/A 1/9/2019    Procedure: ESOPHAGOGASTRODUODENOSCOPY;  Surgeon: Amando Nguyễn MD;  Location: [facility] ENDOSCOPY;  Service: General   • FINE NEEDLE ASPIRATION Left 08/09/2018    Left axillary lymph node FNA   • FLEXIBLE SIGMOIDOSCOPY N/A 06/25/2003    Normal-Dr. Cezar Aviles   • HEMORRHOIDECTOMY N/A 1996   • HIP ARTHROSCOPY W/ LABRAL REPAIR Right 04/03/2017    Dr. Lonnie Lemons   • MEDIAL BRANCH BLOCK Bilateral 3/12/2021    Procedure: BILATERAL MEDIAL BRANCH BLOCK W/MAC;  Surgeon: Francisco Velez MD;  Location: [facility];  Service: Pain Management;  Laterality: Bilateral;   • SIGMOIDOSCOPY N/A 9/23/2020    Procedure: FLEXIBLE SIGMOIDOSCOPY WITH BIOPSY;  Surgeon: Shena Ring MD;  Location: [facility] ENDOSCOPY;  Service: Gastroenterology;  Laterality: N/A;  pre- anal/rectal pain  post- hemorrhoids   • TONSILLECTOMY Bilateral            Current Outpatient Medications:   •  acetaminophen (TYLENOL) 500 MG tablet, Take 500 mg by mouth Every 6 (Six) Hours As Needed for Mild Pain ., Disp: , Rfl:   •  benzonatate (Tessalon Perles) 100 MG capsule, Take 1 capsule by mouth 3 (Three) Times a Day As Needed for Cough.,  Disp: 30 capsule, Rfl: 1  •  diazePAM (Valium) 2 MG tablet, Take 1 tablet by mouth Daily As Needed for Anxiety., Disp: 30 tablet, Rfl: 0  •  fluticasone (FLONASE) 50 MCG/ACT nasal spray, 2 sprays into the nostril(s) as directed by provider Daily., Disp: 16 g, Rfl: 0  •  guaiFENesin (MUCINEX) 600 MG 12 hr tablet, Take 300 mg by mouth As Needed for Cough., Disp: , Rfl:   •  valACYclovir (Valtrex) 1000 MG tablet, Take 2 tablets by mouth 2 (Two) Times a Day., Disp: 4 tablet, Rfl: 1    Current Facility-Administered Medications:   •  methylPREDNISolone sodium succinate (SOLU-Medrol) injection 40 mg, 40 mg, Intramuscular, Once, Bobo Salmon MD      Family History   Problem Relation Age of Onset   • Atrial fibrillation Father    • Aneurysm Father    • Hypertension Father    • Lymphoma Brother    • Malig Hyperthermia Neg Hx    • Colon cancer Neg Hx    • Colon polyps Neg Hx    • Crohn's disease Neg Hx    • Irritable bowel syndrome Neg Hx    • Ulcerative colitis Neg Hx          Social History     Socioeconomic History   • Marital status: Single   • Number of children: 0   • Years of education: COLLEGE   • Highest education level: Not asked   Tobacco Use   • Smoking status: Never   • Smokeless tobacco: Never   • Tobacco comments:     caff use   Vaping Use   • Vaping Use: Never used   Substance and Sexual Activity   • Alcohol use: No   • Drug use: No   • Sexual activity: Defer         Allergies   Allergen Reactions   • Bactrim [Sulfamethoxazole-Trimethoprim] Shortness Of Breath   • Hydrocodone Shortness Of Breath   • Hydrocodone-Acetaminophen Shortness Of Breath and Unknown (See Comments)     Bloating, sleeplessness, difficulty breathing   • Levaquin [Levofloxacin] Unknown - Low Severity     Made tendons tight   • Medrol [Methylprednisolone] Unknown - High Severity     Rectal bleeding   • Mobic [Meloxicam] Nausea Only and Other (See Comments)     Severe heartburn   • Naproxen Shortness Of Breath   • Nsaids Unknown -  "High Severity     Can take Ibuprofen.. Causes constipation   • Sulfa Antibiotics Shortness Of Breath     TROUBLE BREATHING   • Alprazolam Other (See Comments)     Annotation - 06Jan2016: Patient stated he can not take this medication because it \"makes him feel weird.\"     • Diclofenac Nausea Only and Other (See Comments)     Severe heartburn   • Erythromycin GI Intolerance   • Tramadol Headache   • Ceftin [Cefuroxime] Other (See Comments)     cough         Pain Scale: At least 5/10 soles of feet        ROS:  Review of Systems   Constitutional: Negative for appetite change, fatigue and unexpected weight change.   HENT: Positive for tinnitus (left ear). Negative for ear pain and nosebleeds.    Eyes: Negative for photophobia, pain and visual disturbance.   Respiratory: Negative for choking, chest tightness, shortness of breath, wheezing and stridor.    Cardiovascular: Negative for chest pain, palpitations and leg swelling.   Gastrointestinal: Negative for nausea and vomiting.   Endocrine: Positive for cold intolerance (triggers nerve pain) and heat intolerance (causes fatigue).   Genitourinary: Negative for difficulty urinating.   Musculoskeletal: Positive for gait problem (walking due to feet/ stinging and buring). Negative for joint swelling, neck pain and neck stiffness.   Skin: Negative for rash and wound.   Allergic/Immunologic: Negative for food allergies.   Neurological: Positive for numbness (rt foot bottom toes and down the side to the heal). Negative for dizziness, tremors, speech difficulty, weakness, light-headedness and headaches.   Hematological: Does not bruise/bleed easily.   Psychiatric/Behavioral: Negative for agitation and sleep disturbance. The patient is not nervous/anxious.          I have reviewed and agree with the above ROS completed by the medical assistant.      Physical Exam:  Vitals:    10/31/22 1604   BP: 115/72   BP Location: Right arm   Patient Position: Sitting   Pulse: 74   SpO2: 97% " "  Weight: 99 kg (218 lb 3.2 oz)   Height: 180.3 cm (70.98\")     Orthostatic BP:    Body mass index is 30.45 kg/m².    Physical Exam  General: Overweight white male no acute distress  HEENT: Normocephalic no evidence of trauma.  Discs poorly seen.  Throat negative  Neck: Supple.  No thyromegaly.  No cervical bruits  Heart: Regular rate and rhythm no murmurs.  Large varicosities in both legs  Extremities: Radial pulses were strong and simultaneous.  Dorsalis pedis and posterior tibial pulses are strong bilaterally.  Tapping along the course of the posterior tibial nerve at the ankle produced some electrical shocklike pains on the left shooting in the sole of the foot but only local tenderness on the right.      Neurological Exam:   Mental Status: Awake, alert, oriented to person, place and time.  Conversant without evidence of an affective disorder, thought disorder, delusions or hallucinations.  Attention span and concentration are normal.  HCF: No aphasia, apraxia or dysarthria.  Recent and remote memory intact.  Knowledge of recent events intact.  CN: I:   II: Visual fields full without left inattention   III, IV, VI: Eye movements intact without nystagmus or ptosis.  Pupils equal round and reactive to light.   V,VII: Light touch and pinprick intact all 3 divisions of V.  Facial muscles symmetrical.   VIII: Hearing intact to finger rub   IX,X: Soft palate elevates symmetrically   XI: Sternomastoid and trapezius are strong.   XII: Tongue midline without atrophy or fasciculations  Motor: Normal tone and bulk in the upper and lower extremities with 1 possible exception of the abductor digiti quinti of the right foot   Power testing: There is mild weakness of the left abductor pollicis brevis with good strength in all other muscles tested in the upper extremities except that the left shoulder girdle muscles were not studied because he had a fall with injury to the left shoulder and he was just getting started with " therapy.  Normal lower extremities the patient had back pain and right hip pain with testing some of the proximal muscles but I was able to get good strength in the gluteus medius and adductor's of the hip.  The quadriceps and hamstrings were strong.  Tibialis anterior and toe extensors and flexors were strong.  Reflexes: Upper extremities: +1 diffusely        Lower extremities: +1 knee jerks and ankle jerk        Toe signs: Downgoing bilaterally  Sensory: Light touch: Reduced in the first 2 digits of the left hand otherwise intact over the upper extremities.  Reduced in the lateral aspect of the right sole.  Remainder of light touch was normal or perhaps hypersensitive in the feet.        Pinprick: Reduced in the first 2 digits of the left hand otherwise intact in the upper extremities.  Sharp all over in the lower extremities (soles of feet not tested)        Vibration: Intact at the ankles and at the first CMT joints        Position: Intact in the great toes bilaterally    Monofilament testing on the sole of the foot was normal on the left but reduced on the lateral aspect of the right sole.  Cerebellar: Finger-to-nose: Normal           Rapid movement: Normal           Heel-to-shin: Normal  Gait and Station: Using crutches because of foot pain    Results:      Lab Results   Component Value Date    GLUCOSE 86 07/08/2022    BUN 15 07/08/2022    CREATININE 0.92 07/08/2022    EGFRIFNONA 83 05/20/2021    BCR 16 07/08/2022    CO2 24 07/08/2022    CALCIUM 9.8 07/08/2022    PROTENTOTREF 7.6 07/08/2022    ALBUMIN 4.6 07/08/2022    LABIL2 1.5 07/08/2022    AST 16 07/08/2022    ALT 14 07/08/2022       Lab Results   Component Value Date    WBC 4.3 07/08/2022    HGB 15.2 07/08/2022    HCT 44.3 07/08/2022    MCV 88 07/08/2022     07/08/2022         .No results found for: RPR      Lab Results   Component Value Date    TSH 1.520 07/08/2022    D7AQKGS 7.46 12/30/2021         Lab Results   Component Value Date    EPYMUWXK28  667 07/08/2022         Lab Results   Component Value Date    FOLATE 8.96 12/30/2021         No results found for: HGBA1C      Lab Results   Component Value Date    GLUCOSE 86 07/08/2022    BUN 15 07/08/2022    CREATININE 0.92 07/08/2022    EGFRIFNONA 83 05/20/2021    BCR 16 07/08/2022    K 4.3 07/08/2022    CO2 24 07/08/2022    CALCIUM 9.8 07/08/2022    PROTENTOTREF 7.6 07/08/2022    ALBUMIN 4.6 07/08/2022    LABIL2 1.5 07/08/2022    AST 16 07/08/2022    ALT 14 07/08/2022         Lab Results   Component Value Date    WBC 4.3 07/08/2022    HGB 15.2 07/08/2022    HCT 44.3 07/08/2022    MCV 88 07/08/2022     07/08/2022             Assessment:   1.  Bilateral foot pain mostly soles of the feet much worse with weightbearing-I with some exacerbation while seated suspect the origin of this is musculoskeletal however there are some subtle features that imply a neuropathic contribution may be present such as reduced sensation on the lateral aspect of the right foot and the lateral plantar distribution as well as a Tinel's over the posterior tibial nerve on the left.  2.  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  History of left pudendal neuralgia-symptoms largely better although exacerbation of some of the symptoms being seated for prolonged period.  Has had MRIs and CT scans of the abdomen and pelvis without left-sided pathology although he does have enlarged lymph nodes on the right.  He has been tried on the usual medications which I would have tried for neuropathic pain and he had side effects or they were unsuccessful.          Plan:  1.  Await MRI scans of the feet looking for plantar pathology  2.  After much discussion the patient agreed to try and do the nerve conduction part of an EMG in efforts to identify if he has objective evidence of tarsal tunnel syndrome.  He did not tolerate the needle exam at all on a previous study.  3.  Await results of CT scan of the abdomen and pelvis for completion  4.   Check a few additional labs including a hemoglobin A1c, SPE/IEP, copper level and heavy metal screen  5.  2 cm at the time of his EMG              Time: 60 minutes          Dictated utilizing Dragon dictation.

## 2022-11-01 ENCOUNTER — TELEPHONE (OUTPATIENT)
Dept: NEUROLOGY | Facility: CLINIC | Age: 57
End: 2022-11-01

## 2022-11-01 NOTE — TELEPHONE ENCOUNTER
Caller: DUY Vergara call back number: [phone]    What was the call regarding: PT SAID SAW DR GAMINO YESTERDAY AND WANTED A EMG TEST DONE ON PT . HE  CAN'T GET IN UNTIL MARCH SAID DR GAMINO WANTED SOONER?     Do you require a callback: YES WITH SOONER  APPT .     PLEASE CALL AND ADVISE.

## 2022-11-02 NOTE — TELEPHONE ENCOUNTER
Caller: DUY  Relationship to Patient: SELF  Phone Number: [phone]    Reason for Call: PT WAS CALLING TO CHECK ON THE STATUS

## 2022-11-03 ENCOUNTER — TELEPHONE (OUTPATIENT)
Dept: ORTHOPEDIC SURGERY | Facility: CLINIC | Age: 57
End: 2022-11-03

## 2022-11-03 NOTE — TELEPHONE ENCOUNTER
I CALLED PT 2 DAYS IN A ROW, TO LET HIM KNOW HE IS NOT GETTING HIS MRI WITH SEDATION ON 11-07-22, [facility] ONLY DOES THIS PROCEDURE ONCE A MONTHA ND HE IS SCHEDULED FOR 11-29-22,LLR

## 2022-11-07 ENCOUNTER — APPOINTMENT (OUTPATIENT)
Dept: MRI IMAGING | Facility: HOSPITAL | Age: 57
End: 2022-11-07

## 2022-11-07 ENCOUNTER — PROCEDURE VISIT (OUTPATIENT)
Dept: NEUROLOGY | Facility: CLINIC | Age: 57
End: 2022-11-07

## 2022-11-07 ENCOUNTER — HOSPITAL ENCOUNTER (OUTPATIENT)
Dept: MRI IMAGING | Facility: HOSPITAL | Age: 57
End: 2022-11-07

## 2022-11-07 DIAGNOSIS — G57.90 NEURITIS OF FOOT, UNSPECIFIED LATERALITY: ICD-10-CM

## 2022-11-07 DIAGNOSIS — R20.0 NUMBNESS AND TINGLING OF BOTH FEET: ICD-10-CM

## 2022-11-07 DIAGNOSIS — R20.2 NUMBNESS AND TINGLING OF BOTH FEET: ICD-10-CM

## 2022-11-07 PROCEDURE — 95912 NRV CNDJ TEST 11-12 STUDIES: CPT | Performed by: PSYCHIATRY & NEUROLOGY

## 2022-11-07 NOTE — PROGRESS NOTES
EMG and Nerve Conduction Studies    I.      Instrument used: Neuromax 1002  II.     Please see data sheets for tabular summary of NCS and details on methods, temperatures and lab standards.   III.    EMG muscles tested for upper extremity studies include the deltoid, biceps, triceps, pronator teres, extensor digitorum communis, first dorsal interosseous and abductor pollicis brevis.    IV.   EMG muscles tested for lower extremity studies include the vastus lateralis, tibialis anterior, peroneus longus, medial gastrocnemius and extensor digitorum brevis.    V.    Additional muscles tested as needed.  Paraspinal muscles tested as needed.   VI.   Please see data sheets for tabular summary of EMG findings.   VII. The complete report includes the data sheets.      Indication: Numbness pain burning soles of feet; left carpal tunnel syndrome  History:57-year-old male with numbness pain and burning on the soles of both feet.  There is some more noticeable numbness on the lateral aspect of the right sole after wearing particularly tight support hose.  The patient had a previous EMG but could not tolerate the needle exam and so we will proceed with just nerve conductions looking for peripheral neuropathy or tarsal tunnel syndrome.        Ht: 180.3 cm  Wt: 99 kg.;  BMI 30.45.  HbA1C: No results found for: HGBA1C  TSH:   Lab Results   Component Value Date    TSH 1.520 07/08/2022       Technical summary nerve conduction studies were obtained in each lower extremity as well as studies in both arms looking for carpal tunnel syndrome.  Skin temperatures were at least 32 °C measured on the palms.  In the feet skin temperatures were a bit lower than this measured at the ankle or at the instep but temperature correction was not needed.  No needle exam was performed.    Results:  1.  Normal right sural sensory distal latency and amplitude.  2.  Normal right superficial peroneal sensory distal latency and amplitude.  3.  Normal medial  orthodromic mixed plantar distal latencies bilaterally.  4.  Normal tibial motor conduction velocities with normal distal latencies along the medial and lateral plantar motor nerves.  Normal amplitudes.  5.  Prolonged median antidromic sensory distal latencies bilaterally at 4.0 ms each with normal amplitudes.  6.  Normal left ulnar antidromic sensory distal latency and amplitude.  7.  Prolonged median motor distal latencies bilaterally at 4.5 ms each.  On the left proximal conduction velocity was normal.  Amplitudes were normal on both sides.    Impression:  Abnormal study consistent with moderate bilateral median neuropathies at the wrists. No evidence of a more diffuse peripheral neuropathy peripheral neuropathy or a tibial neuropathy at either ankle.  No comments regarding lumbar radiculopathies since no needle exam was performed.  Study results were discussed with the patient.    Elijah Foote M.D.          Addendum:  The patient has scheduled MRI scans of both feet specifically assessing for Planter fasciitis among other pathologies.  He has not obtained his labs which I have asked and he indicates he will go do that today.  I asked him to call me for results on these tests.  GNS    Dictated utilizing Dragon dictation.

## 2022-11-14 ENCOUNTER — TELEPHONE (OUTPATIENT)
Dept: ORTHOPEDIC SURGERY | Facility: CLINIC | Age: 57
End: 2022-11-14

## 2022-11-14 NOTE — TELEPHONE ENCOUNTER
Caller: PATIENT    Relationship to patient: SELF     Best call back number: [phone]    Patient is needing: PATIENT WAS CALLING TO LET DR. OLIVAS KNOW THAT HE CANCELLED TODAY'S APPT DUE TO HIS BILATERAL FOOT PAIN GIVING HIM A LOT OF ISSUES AT THIS TIME. PATIENT STATED HE HASN'T BEEN ABLE TO DO PHYSICAL THERAPY FOR HIS SHOULDER BECAUSE OF HIS PAIN AND IS WAITING TO GET THE MRI DONE OF HIS FEET. PATIENT WANTED TO UPDATE DR. OLIVAS ON WHAT HAS BEEN GOING ON AND WHY HE CANCELLED HIS APPT. PATIENT STATED THE MRI FOR BOTH FEET IS SCHEDULED FOR 11.29.22. THANK YOU!

## 2022-11-15 ENCOUNTER — TELEPHONE (OUTPATIENT)
Dept: ORTHOPEDIC SURGERY | Facility: CLINIC | Age: 57
End: 2022-11-15

## 2022-11-15 ENCOUNTER — TELEPHONE (OUTPATIENT)
Dept: INTERVENTIONAL RADIOLOGY/VASCULAR | Facility: HOSPITAL | Age: 57
End: 2022-11-15

## 2022-11-15 ENCOUNTER — ANESTHESIA EVENT (OUTPATIENT)
Dept: INTERVENTIONAL RADIOLOGY/VASCULAR | Facility: HOSPITAL | Age: 57
End: 2022-11-15

## 2022-11-15 ENCOUNTER — LAB (OUTPATIENT)
Dept: LAB | Facility: HOSPITAL | Age: 57
End: 2022-11-15

## 2022-11-15 DIAGNOSIS — G60.9 IDIOPATHIC PERIPHERAL NEUROPATHY: ICD-10-CM

## 2022-11-15 DIAGNOSIS — Z20.822 ENCOUNTER FOR PREOPERATIVE SCREENING LABORATORY TESTING FOR COVID-19 VIRUS: ICD-10-CM

## 2022-11-15 DIAGNOSIS — Z01.812 ENCOUNTER FOR PREOPERATIVE SCREENING LABORATORY TESTING FOR COVID-19 VIRUS: ICD-10-CM

## 2022-11-15 DIAGNOSIS — Z01.818 PREOP TESTING: ICD-10-CM

## 2022-11-15 LAB
ANION GAP SERPL CALCULATED.3IONS-SCNC: 7.7 MMOL/L (ref 5–15)
BUN SERPL-MCNC: 12 MG/DL (ref 6–20)
BUN/CREAT SERPL: 12.9 (ref 7–25)
CALCIUM SPEC-SCNC: 9.6 MG/DL (ref 8.6–10.5)
CHLORIDE SERPL-SCNC: 104 MMOL/L (ref 98–107)
CO2 SERPL-SCNC: 27.3 MMOL/L (ref 22–29)
CREAT SERPL-MCNC: 0.93 MG/DL (ref 0.76–1.27)
EGFRCR SERPLBLD CKD-EPI 2021: 95.2 ML/MIN/1.73
GLUCOSE SERPL-MCNC: 85 MG/DL (ref 65–99)
HBA1C MFR BLD: 5.3 % (ref 4.8–5.6)
POTASSIUM SERPL-SCNC: 4.1 MMOL/L (ref 3.5–5.2)
RPR SER QL: NORMAL
SODIUM SERPL-SCNC: 139 MMOL/L (ref 136–145)

## 2022-11-15 PROCEDURE — 82784 ASSAY IGA/IGD/IGG/IGM EACH: CPT

## 2022-11-15 PROCEDURE — 80048 BASIC METABOLIC PNL TOTAL CA: CPT

## 2022-11-15 PROCEDURE — 83655 ASSAY OF LEAD: CPT | Performed by: PSYCHIATRY & NEUROLOGY

## 2022-11-15 PROCEDURE — 82525 ASSAY OF COPPER: CPT

## 2022-11-15 PROCEDURE — 86592 SYPHILIS TEST NON-TREP QUAL: CPT | Performed by: PSYCHIATRY & NEUROLOGY

## 2022-11-15 PROCEDURE — 82175 ASSAY OF ARSENIC: CPT | Performed by: PSYCHIATRY & NEUROLOGY

## 2022-11-15 PROCEDURE — 86334 IMMUNOFIX E-PHORESIS SERUM: CPT

## 2022-11-15 PROCEDURE — 83825 ASSAY OF MERCURY: CPT | Performed by: PSYCHIATRY & NEUROLOGY

## 2022-11-15 PROCEDURE — 82595 ASSAY OF CRYOGLOBULIN: CPT

## 2022-11-15 PROCEDURE — 84155 ASSAY OF PROTEIN SERUM: CPT | Performed by: PSYCHIATRY & NEUROLOGY

## 2022-11-15 PROCEDURE — 84165 PROTEIN E-PHORESIS SERUM: CPT | Performed by: PSYCHIATRY & NEUROLOGY

## 2022-11-15 PROCEDURE — 83036 HEMOGLOBIN GLYCOSYLATED A1C: CPT | Performed by: PSYCHIATRY & NEUROLOGY

## 2022-11-15 PROCEDURE — 36415 COLL VENOUS BLD VENIPUNCTURE: CPT

## 2022-11-15 NOTE — TELEPHONE ENCOUNTER
This patient reporting that there was a cancellation and he was able to get scheduled for MRI sooner than expected (tomorrow).  He also says that a meta Panel was ordered to be done prior to MRI.  He called and spoke to Nancy (?) to see if he could skip the meta panel because his feet hurt too bad and he didn't have a ride.    She told patient that it was up to you.  I explained that you are in surgery and I may not hear back from you before he needs to head out. But, I would place urgent message and we could see.    I urged him to go if he could.

## 2022-11-16 ENCOUNTER — HOSPITAL ENCOUNTER (OUTPATIENT)
Dept: INTERVENTIONAL RADIOLOGY/VASCULAR | Facility: HOSPITAL | Age: 57
Discharge: HOME | End: 2022-11-16

## 2022-11-16 ENCOUNTER — ANESTHESIA (OUTPATIENT)
Dept: INTERVENTIONAL RADIOLOGY/VASCULAR | Facility: HOSPITAL | Age: 57
End: 2022-11-16

## 2022-11-16 ENCOUNTER — HOSPITAL ENCOUNTER (OUTPATIENT)
Dept: MRI IMAGING | Facility: HOSPITAL | Age: 57
Discharge: HOME | End: 2022-11-16

## 2022-11-16 VITALS
HEIGHT: 71 IN | TEMPERATURE: 98.4 F | SYSTOLIC BLOOD PRESSURE: 112 MMHG | OXYGEN SATURATION: 92 % | BODY MASS INDEX: 29.26 KG/M2 | RESPIRATION RATE: 16 BRPM | DIASTOLIC BLOOD PRESSURE: 68 MMHG | HEART RATE: 48 BPM | WEIGHT: 209 LBS

## 2022-11-16 VITALS
SYSTOLIC BLOOD PRESSURE: 91 MMHG | RESPIRATION RATE: 14 BRPM | DIASTOLIC BLOOD PRESSURE: 52 MMHG | HEART RATE: 62 BPM | OXYGEN SATURATION: 97 %

## 2022-11-16 LAB
ALBUMIN SERPL ELPH-MCNC: 4 G/DL (ref 2.9–4.4)
ALBUMIN/GLOB SERPL: 1.1 {RATIO} (ref 0.7–1.7)
ALPHA1 GLOB SERPL ELPH-MCNC: 0.3 G/DL (ref 0–0.4)
ALPHA2 GLOB SERPL ELPH-MCNC: 0.8 G/DL (ref 0.4–1)
B-GLOBULIN SERPL ELPH-MCNC: 1.2 G/DL (ref 0.7–1.3)
GAMMA GLOB SERPL ELPH-MCNC: 1.5 G/DL (ref 0.4–1.8)
GLOBULIN SER CALC-MCNC: 3.8 G/DL (ref 2.2–3.9)
IGA SERPL-MCNC: 216 MG/DL (ref 90–386)
IGG SERPL-MCNC: 1573 MG/DL (ref 603–1613)
IGM SERPL-MCNC: 62 MG/DL (ref 20–172)
LABORATORY COMMENT REPORT: NORMAL
M PROTEIN SERPL ELPH-MCNC: NORMAL G/DL
PROT PATTERN SERPL ELPH-IMP: NORMAL
PROT PATTERN SERPL IFE-IMP: NORMAL
PROT SERPL-MCNC: 7.8 G/DL (ref 6–8.5)

## 2022-11-16 PROCEDURE — 73718 MRI LOWER EXTREMITY W/O DYE: CPT

## 2022-11-16 PROCEDURE — 25010000002 PROPOFOL 10 MG/ML EMULSION: Performed by: ANESTHESIOLOGY

## 2022-11-16 RX ORDER — NALOXONE HCL 0.4 MG/ML
0.4 VIAL (ML) INJECTION AS NEEDED
Status: DISCONTINUED | OUTPATIENT
Start: 2022-11-16 | End: 2022-11-17 | Stop reason: HOSPADM

## 2022-11-16 RX ORDER — ONDANSETRON 2 MG/ML
4 INJECTION INTRAMUSCULAR; INTRAVENOUS ONCE AS NEEDED
Status: CANCELLED | OUTPATIENT
Start: 2022-11-16

## 2022-11-16 RX ORDER — SODIUM CHLORIDE, SODIUM LACTATE, POTASSIUM CHLORIDE, CALCIUM CHLORIDE 600; 310; 30; 20 MG/100ML; MG/100ML; MG/100ML; MG/100ML
INJECTION, SOLUTION INTRAVENOUS CONTINUOUS PRN
Status: DISCONTINUED | OUTPATIENT
Start: 2022-11-16 | End: 2022-11-16 | Stop reason: SURG

## 2022-11-16 RX ORDER — DIPHENHYDRAMINE HYDROCHLORIDE 50 MG/ML
6.25 INJECTION INTRAMUSCULAR; INTRAVENOUS
Status: CANCELLED | OUTPATIENT
Start: 2022-11-16

## 2022-11-16 RX ORDER — FENTANYL CITRATE 50 UG/ML
25 INJECTION, SOLUTION INTRAMUSCULAR; INTRAVENOUS
Status: DISCONTINUED | OUTPATIENT
Start: 2022-11-16 | End: 2022-11-17 | Stop reason: HOSPADM

## 2022-11-16 RX ORDER — HYDRALAZINE HYDROCHLORIDE 20 MG/ML
10 INJECTION INTRAMUSCULAR; INTRAVENOUS
Status: CANCELLED | OUTPATIENT
Start: 2022-11-16

## 2022-11-16 RX ORDER — EPHEDRINE SULFATE 50 MG/ML
5 INJECTION, SOLUTION INTRAVENOUS ONCE AS NEEDED
Status: DISCONTINUED | OUTPATIENT
Start: 2022-11-16 | End: 2022-11-17 | Stop reason: HOSPADM

## 2022-11-16 RX ORDER — LIDOCAINE HYDROCHLORIDE 20 MG/ML
INJECTION, SOLUTION INFILTRATION; PERINEURAL AS NEEDED
Status: DISCONTINUED | OUTPATIENT
Start: 2022-11-16 | End: 2022-11-16 | Stop reason: SURG

## 2022-11-16 RX ORDER — PROPOFOL 10 MG/ML
VIAL (ML) INTRAVENOUS AS NEEDED
Status: DISCONTINUED | OUTPATIENT
Start: 2022-11-16 | End: 2022-11-16 | Stop reason: SURG

## 2022-11-16 RX ORDER — FENTANYL CITRATE 50 UG/ML
25 INJECTION, SOLUTION INTRAMUSCULAR; INTRAVENOUS
Status: CANCELLED | OUTPATIENT
Start: 2022-11-16

## 2022-11-16 RX ORDER — LABETALOL HYDROCHLORIDE 5 MG/ML
5 INJECTION, SOLUTION INTRAVENOUS
Status: CANCELLED | OUTPATIENT
Start: 2022-11-16

## 2022-11-16 RX ORDER — NALOXONE HCL 0.4 MG/ML
0.4 VIAL (ML) INJECTION AS NEEDED
Status: CANCELLED | OUTPATIENT
Start: 2022-11-16

## 2022-11-16 RX ORDER — DIPHENHYDRAMINE HYDROCHLORIDE 50 MG/ML
6.25 INJECTION INTRAMUSCULAR; INTRAVENOUS
Status: DISCONTINUED | OUTPATIENT
Start: 2022-11-16 | End: 2022-11-17 | Stop reason: HOSPADM

## 2022-11-16 RX ORDER — GLYCOPYRROLATE 0.2 MG/ML
INJECTION INTRAMUSCULAR; INTRAVENOUS AS NEEDED
Status: DISCONTINUED | OUTPATIENT
Start: 2022-11-16 | End: 2022-11-16 | Stop reason: SURG

## 2022-11-16 RX ORDER — ONDANSETRON 2 MG/ML
4 INJECTION INTRAMUSCULAR; INTRAVENOUS ONCE AS NEEDED
Status: DISCONTINUED | OUTPATIENT
Start: 2022-11-16 | End: 2022-11-17 | Stop reason: HOSPADM

## 2022-11-16 RX ORDER — HYDRALAZINE HYDROCHLORIDE 20 MG/ML
10 INJECTION INTRAMUSCULAR; INTRAVENOUS
Status: DISCONTINUED | OUTPATIENT
Start: 2022-11-16 | End: 2022-11-17 | Stop reason: HOSPADM

## 2022-11-16 RX ORDER — EPHEDRINE SULFATE 50 MG/ML
5 INJECTION, SOLUTION INTRAVENOUS ONCE AS NEEDED
Status: CANCELLED | OUTPATIENT
Start: 2022-11-16

## 2022-11-16 RX ORDER — LABETALOL HYDROCHLORIDE 5 MG/ML
5 INJECTION, SOLUTION INTRAVENOUS
Status: DISCONTINUED | OUTPATIENT
Start: 2022-11-16 | End: 2022-11-17 | Stop reason: HOSPADM

## 2022-11-16 RX ADMIN — GLYCOPYRROLATE 0.1 MG: 0.2 INJECTION INTRAMUSCULAR; INTRAVENOUS at 14:33

## 2022-11-16 RX ADMIN — PROPOFOL 100 MG: 10 INJECTION, EMULSION INTRAVENOUS at 14:04

## 2022-11-16 RX ADMIN — PROPOFOL 105.48 MCG/KG/MIN: 10 INJECTION, EMULSION INTRAVENOUS at 14:02

## 2022-11-16 RX ADMIN — SODIUM CHLORIDE, POTASSIUM CHLORIDE, SODIUM LACTATE AND CALCIUM CHLORIDE: 600; 310; 30; 20 INJECTION, SOLUTION INTRAVENOUS at 13:58

## 2022-11-16 RX ADMIN — LIDOCAINE HYDROCHLORIDE 60 MG: 20 INJECTION, SOLUTION INFILTRATION; PERINEURAL at 14:02

## 2022-11-16 NOTE — ANESTHESIA POSTPROCEDURE EVALUATION
Patient: Jose Maria Judge    Procedure Summary     Date: 11/16/22 Room / Location: [facility] MRI; [facility] INTERVENTIONAL    Anesthesia Start: 1358 Anesthesia Stop: 1518    Procedures:       MRI FOOT LEFT WO CONTRAST      MRI FOOT RIGHT WO CONTRAST Diagnosis:       Metatarsalgia of both feet      Numbness and tingling of both feet      Foot pain, bilateral    Scheduled Providers:  Provider: Salvador Montesinos MD    Anesthesia Type: MAC ASA Status: 2          Anesthesia Type: MAC    Vitals  Vitals Value Taken Time   BP 91/52 11/16/22 1512   Temp     Pulse 62 11/16/22 1512   Resp 14 11/16/22 1512   SpO2 97 % 11/16/22 1512           Post Anesthesia Care and Evaluation    Patient location during evaluation: bedside  Patient participation: complete - patient participated  Level of consciousness: sleepy but conscious  Pain management: adequate    Airway patency: patent  Anesthetic complications: No anesthetic complications    Cardiovascular status: acceptable  Respiratory status: acceptable  Hydration status: acceptable    Comments: *There were no vitals taken for this visit.

## 2022-11-16 NOTE — ANESTHESIA PREPROCEDURE EVALUATION
Anesthesia Evaluation     NPO Solid Status: > 8 hours  NPO Liquid Status: > 2 hours           Airway   Mallampati: I  Neck ROM: full  no difficulty expected  Dental - normal exam     Pulmonary - normal exam     PE comment: nonlabored  Cardiovascular - normal exam  Exercise tolerance: good (4-7 METS)    Rhythm: regular  Rate: normal    (+) hypertension, dysrhythmias Bradycardia,       Neuro/Psych  (+) dizziness/light headedness (vertigo), psychiatric history Anxiety,      ROS Comment: Neuropathy  GI/Hepatic/Renal/Endo    (+)  GERD,      ROS Comment: Partial SBO hx--resolved    Musculoskeletal     (+) arthralgias, back pain, chronic pain,   Abdominal    Substance History      OB/GYN          Other   arthritis,        Other Comment: lymphadenopathy  ROS/Med Hx Other: Severe tinnitus                  Anesthesia Plan    ASA 2     MAC     (Pt prefers MAC. Will attempt to find MRI compatible infusion pump. If unavailable, would need GA.)  intravenous induction     Anesthetic plan, risks, benefits, and alternatives have been provided, discussed and informed consent has been obtained with: patient.        CODE STATUS:

## 2022-11-17 ENCOUNTER — TELEPHONE (OUTPATIENT)
Dept: NEUROLOGY | Facility: CLINIC | Age: 57
End: 2022-11-17

## 2022-11-17 NOTE — TELEPHONE ENCOUNTER
The labs that are back do not show any risk factor for neuropathy.  There are 3 labs still outstanding.    The MRIs of both feet show only some degenerative arthritic changes on the right.  The plantar surfaces do not show any evidence of plantar fasciitis and there are not any muscle changes that would go along with a neuropathic process either.    I am not sure what to make of his foot pain at this point    Please let him know    GNS

## 2022-11-17 NOTE — TELEPHONE ENCOUNTER
Caller: Nu Jose Maria SHARON    Relationship: Self    Best call back number: [phone]    Who are you requesting to speak with (clinical staff, provider,  specific staff member):   DR GAMINO    What was the call regarding:   PT HAS HAD THE MRI DONE THAT WAS ORDERED BY DR ZAPIEN AND WANTED TO LET DR GAMINO KNOW.    PT HAS ALSO HAD THE BLOOD WORK THAT WAS REQUESTED BY DR GAMINO. IT WAS ON Tuesday, 11-15-22.    MRIs and LABS ARE VISIBLE IN THE PT'S CHART.    PT DOESN'T HAVE A F/U VISIT SCHEDULED. PLEASE LET PT KNOW IF HE NEEDS TO BE SCHEDULED AND ALSO WHAT'S THE PT'S NEXT PLAN CARE.

## 2022-11-17 NOTE — TELEPHONE ENCOUNTER
[place] panel is metabolic panel (labs). It looks like patient did get those done as well as the mri.

## 2022-11-18 ENCOUNTER — TELEMEDICINE (OUTPATIENT)
Dept: FAMILY MEDICINE CLINIC | Facility: CLINIC | Age: 57
End: 2022-11-18

## 2022-11-18 DIAGNOSIS — J02.9 SORE THROAT: Primary | ICD-10-CM

## 2022-11-18 LAB — COPPER SERPL-MCNC: 96 UG/DL (ref 69–132)

## 2022-11-18 PROCEDURE — 99213 OFFICE O/P EST LOW 20 MIN: CPT | Performed by: FAMILY MEDICINE

## 2022-11-18 NOTE — PROGRESS NOTES
"Chief Complaint  No chief complaint on file.  Sore throat  Subjective        Jose Maria Judge presents to [facility]  History of Present Illness  Patient agreed to be contacted by Zoom   Sore throat, after MRI for feet, was put to sleep x MRI, due to tinnitus, not intubated, 2 days ago, had anesthesia and oxygen per mask, dried up mouth and soreness on throat, no fever, no cough, patient does see that his throat is red with white spots,  Objective   Vital Signs:  There were no vitals taken for this visit.  Estimated body mass index is 29.15 kg/m² as calculated from the following:    Height as of 11/16/22: 180.3 cm (71\").    Weight as of 11/16/22: 94.8 kg (209 lb).             Result Review :                Assessment and Plan   Diagnoses and all orders for this visit:    1. Sore throat (Primary)             Follow Up   Return if symptoms worsen or fail to improve.  Patient was given instructions and counseling regarding his condition or for health maintenance advice. Please see specific information pulled into the AVS if appropriate.   Time spent 10 minutes    "

## 2022-11-21 LAB — CRYOGLOB SER QL 1D COLD INC: NORMAL

## 2022-11-21 NOTE — TELEPHONE ENCOUNTER
Caller: Jose Maria Judge    Relationship: Self    Best call back number: [phone]    Who are you requesting to speak with (clinical staff, provider,  specific staff member): FARRUKH CHAN TRANSFERRED CALL TO FARRUKH.

## 2022-11-21 NOTE — TELEPHONE ENCOUNTER
Patient called back got his answer regarding the testing and will call the Podiatrist for further Question ,Carol Fenton

## 2022-11-22 LAB
ARSENIC BLD-MCNC: 4 UG/L (ref 0–9)
LEAD BLDV-MCNC: 1 UG/DL (ref 0–3.4)
MERCURY BLD-MCNC: 2.3 UG/L (ref 0–14.9)

## 2022-11-23 ENCOUNTER — TELEMEDICINE (OUTPATIENT)
Dept: FAMILY MEDICINE CLINIC | Facility: CLINIC | Age: 57
End: 2022-11-23

## 2022-11-23 DIAGNOSIS — R52 BODY ACHES: ICD-10-CM

## 2022-11-23 DIAGNOSIS — J02.9 PHARYNGITIS, UNSPECIFIED ETIOLOGY: Primary | ICD-10-CM

## 2022-11-23 PROCEDURE — 99214 OFFICE O/P EST MOD 30 MIN: CPT | Performed by: FAMILY MEDICINE

## 2022-11-23 RX ORDER — AMOXICILLIN 400 MG/5ML
800 POWDER, FOR SUSPENSION ORAL 2 TIMES DAILY
Qty: 150 ML | Refills: 0 | Status: SHIPPED | OUTPATIENT
Start: 2022-11-23 | End: 2022-11-28

## 2022-11-23 NOTE — PROGRESS NOTES
C/o ST.      Subjective   Jose Maria Judge is a 57 y.o. male.     History of Present Illness   Video visit due to unable to come to the office.    Consent obtained.  Pt at home.  Provider in office.  8 minute visit.  C/o ST for 1 week.  Had covid test and flu and strep negative 5 days ago.  Still with with pink and red around uvula.  Had MAC anesthesia 7 days ago for MRI.  Some body aches as well.  No fever.  8 minute visit.    The following portions of the patient's history were reviewed and updated as appropriate: allergies, current medications, past family history, past medical history, past social history, past surgical history and problem list.    Review of Systems   Constitutional: Negative for appetite change and fatigue.   HENT: Positive for sore throat. Negative for nosebleeds.    Eyes: Negative for blurred vision and visual disturbance.   Respiratory: Negative for shortness of breath and wheezing.    Cardiovascular: Negative for chest pain and leg swelling.   Gastrointestinal: Negative for abdominal distention and abdominal pain.   Endocrine: Negative for cold intolerance and polyuria.   Genitourinary: Negative for dysuria and hematuria.   Musculoskeletal: Negative for arthralgias and myalgias.   Skin: Negative for color change and rash.   Neurological: Negative for weakness and confusion.   Psychiatric/Behavioral: Negative for agitation and depressed mood.       Patient Active Problem List   Diagnosis   • Bilateral hip pain   • Right hip pain   • Left hip pain   • Trochanteric bursitis   • Tear of acetabular labrum   • Loss of hair   • Anal burning   • Anxiety   • Elevated blood pressure   • Hamstring strain   • Hypertension   • Neuritis of foot   • Hamstring muscle strain   • Rectal pain   • Herpes zoster   • Varicose veins of bilateral lower extremities with pain   • Lymph node enlargement   • Partial obstruction of small intestine (HCC)   • Epigastric pain   • Axillary lymphadenopathy   • Olfaction  disorder   • Acute bilateral low back pain without sciatica   • Nausea   • Anal or rectal pain   • Arthropathy of lumbar facet joint   • Pudendal neuralgia   • Tinnitus   • High risk medication use   • Varicose veins of left lower extremity with pain   • Rib pain on right side   • Lymphadenopathy   • Bradycardia   • Bilateral shoulder pain   • Chronic pain of both feet   • Illness   • Fatigue   • Metatarsalgia of both feet   • Numbness and tingling of both feet   • Well adult health check   • Vertigo   • Varicocele   • Tinea corporis   • Thoracic back pain   • Swelling of structure of right eye   • Snoring   • Seasonal allergies   • Rash   • Posterior rhinorrhea   • Poor urinary stream   • Other specified diseases of anus and rectum   • Night sweats   • Neuropathy   • Muscle spasm of back   • Miller's metatarsalgia   • Mass of axilla   • Male pattern alopecia   • Low back strain   • Irritable bowel syndrome   • Intolerant of cold   • Insomnia   • Injury of right foot   • Gastroesophageal reflux disease   • Ganglion   • Elevated low density lipoprotein (LDL) cholesterol level   • Disorder of gallbladder   • Degeneration of lumbar intervertebral disc   • Carpal tunnel syndrome   • Benign prostatic hyperplasia   • Alopecia   • Allergic rhinitis due to pollen   • Allergic rhinitis   • Allergic conjunctivitis   • Acute sinusitis   • Acute COVID-19   • Acute anal fissure   • Pharyngitis   • Body aches       Allergies   Allergen Reactions   • Bactrim [Sulfamethoxazole-Trimethoprim] Shortness Of Breath   • Hydrocodone Shortness Of Breath   • Hydrocodone-Acetaminophen Shortness Of Breath and Unknown (See Comments)     Bloating, sleeplessness, difficulty breathing   • Levaquin [Levofloxacin] Unknown - Low Severity     Made tendons tight   • Medrol [Methylprednisolone] Unknown - High Severity     Rectal bleeding   • Mobic [Meloxicam] Nausea Only and Other (See Comments)     Severe heartburn   • Naproxen Shortness Of Breath   •  "Nsaids Unknown - High Severity     Can take Ibuprofen.. Causes constipation   • Sulfa Antibiotics Shortness Of Breath     TROUBLE BREATHING   • Alprazolam Other (See Comments)     Annotation - 06Jan2016: Patient stated he can not take this medication because it \"makes him feel weird.\"     • Diclofenac Nausea Only and Other (See Comments)     Severe heartburn   • Erythromycin GI Intolerance   • Tramadol Headache   • Ceftin [Cefuroxime] Other (See Comments)     cough         Current Outpatient Medications:   •  acetaminophen (TYLENOL) 500 MG tablet, Take 500 mg by mouth Every 6 (Six) Hours As Needed for Mild Pain ., Disp: , Rfl:   •  amoxicillin (AMOXIL) 400 MG/5ML suspension, Take 10 mL by mouth 2 (Two) Times a Day., Disp: 150 mL, Rfl: 0  •  benzonatate (Tessalon Perles) 100 MG capsule, Take 1 capsule by mouth 3 (Three) Times a Day As Needed for Cough., Disp: 30 capsule, Rfl: 1  •  diazePAM (Valium) 2 MG tablet, Take 1 tablet by mouth Daily As Needed for Anxiety., Disp: 30 tablet, Rfl: 0  •  fluticasone (FLONASE) 50 MCG/ACT nasal spray, 2 sprays into the nostril(s) as directed by provider Daily., Disp: 16 g, Rfl: 0  •  valACYclovir (Valtrex) 1000 MG tablet, Take 2 tablets by mouth 2 (Two) Times a Day., Disp: 4 tablet, Rfl: 1    Current Facility-Administered Medications:   •  methylPREDNISolone sodium succinate (SOLU-Medrol) injection 40 mg, 40 mg, Intramuscular, Once, Bobo Salmon MD    Past Medical History:   Diagnosis Date   • Abdominal lymphadenopathy    • Acid reflux     HX   • Anxiety     R/T PAIN   • Carpal tunnel syndrome, left    • Chronic pain    • DDD (degenerative disc disease), lumbar    • Fatigue    • H/O Hypersensitivity to odor     Burning sensation in nasal passage   • Hemorrhoids     internal fistula   • History of medical problems Hyperosmia, Pudendal neuralgia   • Irritable bowel syndrome    • Low back pain    • Lymphadenopathy, axillary     LEFT   • Male pattern alopecia    • Pudendal " neuralgia    • Right hip pain    • Unexplained night sweats        Past Surgical History:   Procedure Laterality Date   • ABSCESS DRAINAGE  08/2012   • ANAL FISTULA REPAIR N/A 09/2012, 10/2012   • AXILLARY LYMPH NODE BIOPSY/EXCISION Left 11/9/2018    Procedure: AXILLARY LYMPH NODE BIOPSY/EXCISION;  Surgeon: Amando Nguyễn MD;  Location:  [facility];  Service: General   • COLONOSCOPY N/A 2013    done at [facility]   • COLONOSCOPY N/A 7/10/2019    Procedure: COLONOSCOPY to CECUM;  Surgeon: Amando Nguyễn MD;  Location[facility];  Service: General   • ENDOSCOPY N/A 1/9/2019    Procedure: ESOPHAGOGASTRODUODENOSCOPY;  Surgeon: Amando Nguyễn MD;  Location[facility];  Service: General   • FINE NEEDLE ASPIRATION Left 08/09/2018    Left axillary lymph node FNA   • FLEXIBLE SIGMOIDOSCOPY N/A 06/25/2003    Normal-Dr. Cezar Aviles   • HEMORRHOIDECTOMY N/A 1996   • HIP ARTHROSCOPY W/ LABRAL REPAIR Right 04/03/2017    Dr. Lonnie Lemons   • MEDIAL BRANCH BLOCK Bilateral 3/12/2021    Procedure: BILATERAL MEDIAL BRANCH BLOCK W/MAC;  Surgeon: Francisco Velez MD;  Location: [facility] OR;  Service: Pain Management;  Laterality: Bilateral;   • SIGMOIDOSCOPY N/A 9/23/2020    Procedure: FLEXIBLE SIGMOIDOSCOPY WITH BIOPSY;  Surgeon: Shena Ring MD;  Location[facility];  Service: Gastroenterology;  Laterality: N/A;  pre- anal/rectal pain  post- hemorrhoids   • TONSILLECTOMY Bilateral        Family History   Problem Relation Age of Onset   • Atrial fibrillation Father    • Aneurysm Father    • Hypertension Father    • Lymphoma Brother    • Malig Hyperthermia Neg Hx    • Colon cancer Neg Hx    • Colon polyps Neg Hx    • Crohn's disease Neg Hx    • Irritable bowel syndrome Neg Hx    • Ulcerative colitis Neg Hx        Social History     Tobacco Use   • Smoking status: Never   • Smokeless tobacco: Never   • Tobacco comments:     caff use   Substance Use Topics   • Alcohol use: No             Objective     There were no vitals filed for this visit.  There is no height or weight on file to calculate BMI.    Physical Exam  Constitutional:       Appearance: He is well-developed.   HENT:      Head: Normocephalic and atraumatic.   Pulmonary:      Breath sounds: Normal breath sounds.   Neurological:      Mental Status: He is alert and oriented to person, place, and time.   Psychiatric:         Behavior: Behavior normal.         Thought Content: Thought content normal.         Judgment: Judgment normal.         Lab Results   Component Value Date    GLUCOSE 85 11/15/2022    BUN 12 11/15/2022    CREATININE 0.93 11/15/2022    EGFRIFNONA 83 05/20/2021    BCR 12.9 11/15/2022    K 4.1 11/15/2022    CO2 27.3 11/15/2022    CALCIUM 9.6 11/15/2022    PROTENTOTREF 7.8 11/15/2022    ALBUMIN 4.0 11/15/2022    LABIL2 1.1 11/15/2022    AST 16 07/08/2022    ALT 14 07/08/2022       WBC   Date Value Ref Range Status   07/08/2022 4.3 3.4 - 10.8 x10E3/uL Final     RBC   Date Value Ref Range Status   07/08/2022 5.02 4.14 - 5.80 x10E6/uL Final     Hemoglobin   Date Value Ref Range Status   07/08/2022 15.2 13.0 - 17.7 g/dL Final   05/19/2022 14.5 13.0 - 17.7 g/dL Final     Hematocrit   Date Value Ref Range Status   07/08/2022 44.3 37.5 - 51.0 % Final   05/19/2022 43.4 37.5 - 51.0 % Final     MCV   Date Value Ref Range Status   07/08/2022 88 79 - 97 fL Final   05/19/2022 88.9 79.0 - 97.0 fL Final     MCH   Date Value Ref Range Status   07/08/2022 30.3 26.6 - 33.0 pg Final   05/19/2022 29.7 26.6 - 33.0 pg Final     MCHC   Date Value Ref Range Status   07/08/2022 34.3 31.5 - 35.7 g/dL Final   05/19/2022 33.4 31.5 - 35.7 g/dL Final     RDW   Date Value Ref Range Status   07/08/2022 12.4 11.6 - 15.4 % Final   05/19/2022 12.7 12.3 - 15.4 % Final     RDW-SD   Date Value Ref Range Status   05/19/2022 41.4 37.0 - 54.0 fl Final     MPV   Date Value Ref Range Status   05/19/2022 10.1 6.0 - 12.0 fL Final     Platelets   Date Value Ref  Range Status   07/08/2022 178 150 - 450 x10E3/uL Final   05/19/2022 160 140 - 450 10*3/mm3 Final     Neutrophil Rel %   Date Value Ref Range Status   07/08/2022 55 Not Estab. % Final     Neutrophil %   Date Value Ref Range Status   05/19/2022 51.4 42.7 - 76.0 % Final     Lymphocyte Rel %   Date Value Ref Range Status   07/08/2022 34 Not Estab. % Final     Lymphocyte %   Date Value Ref Range Status   05/19/2022 36.5 19.6 - 45.3 % Final     Monocyte Rel %   Date Value Ref Range Status   07/08/2022 8 Not Estab. % Final     Monocyte %   Date Value Ref Range Status   05/19/2022 9.2 5.0 - 12.0 % Final     Eosinophil Rel %   Date Value Ref Range Status   07/08/2022 1 Not Estab. % Final     Eosinophil %   Date Value Ref Range Status   05/19/2022 1.7 0.3 - 6.2 % Final     Basophil Rel %   Date Value Ref Range Status   07/08/2022 1 Not Estab. % Final     Basophil %   Date Value Ref Range Status   05/19/2022 0.7 0.0 - 1.5 % Final     Immature Grans %   Date Value Ref Range Status   05/19/2022 0.5 0.0 - 0.5 % Final     Neutrophils Absolute   Date Value Ref Range Status   07/08/2022 2.4 1.4 - 7.0 x10E3/uL Final     Neutrophils, Absolute   Date Value Ref Range Status   05/19/2022 3.07 1.70 - 7.00 10*3/mm3 Final     Lymphocytes Absolute   Date Value Ref Range Status   07/08/2022 1.5 0.7 - 3.1 x10E3/uL Final     Lymphocytes, Absolute   Date Value Ref Range Status   05/19/2022 2.18 0.70 - 3.10 10*3/mm3 Final     Monocytes Absolute   Date Value Ref Range Status   07/08/2022 0.4 0.1 - 0.9 x10E3/uL Final     Monocytes, Absolute   Date Value Ref Range Status   05/19/2022 0.55 0.10 - 0.90 10*3/mm3 Final     Eosinophils Absolute   Date Value Ref Range Status   07/08/2022 0.1 0.0 - 0.4 x10E3/uL Final     Eosinophils, Absolute   Date Value Ref Range Status   05/19/2022 0.10 0.00 - 0.40 10*3/mm3 Final     Basophils Absolute   Date Value Ref Range Status   07/08/2022 0.0 0.0 - 0.2 x10E3/uL Final     Basophils, Absolute   Date Value Ref Range  Status   05/19/2022 0.04 0.00 - 0.20 10*3/mm3 Final     Immature Grans, Absolute   Date Value Ref Range Status   05/19/2022 0.03 0.00 - 0.05 10*3/mm3 Final     nRBC   Date Value Ref Range Status   05/19/2022 0.0 0.0 - 0.2 /100 WBC Final       Lab Results   Component Value Date    HGBA1C 5.30 11/15/2022       Lab Results   Component Value Date    XCYYRRUQ68 667 07/08/2022       TSH   Date Value Ref Range Status   07/08/2022 1.520 0.450 - 4.500 uIU/mL Final   12/30/2021 1.550 0.270 - 4.200 uIU/mL Final       No results found for: CHOL  Lab Results   Component Value Date    TRIG 73 07/08/2022     Lab Results   Component Value Date    HDL 44 07/08/2022     Lab Results   Component Value Date     (H) 07/08/2022     Lab Results   Component Value Date    VLDL 13 07/08/2022     No results found for: LDLHDL      Procedures    Assessment & Plan   Problems Addressed this Visit     Body aches    Relevant Medications    amoxicillin (AMOXIL) 400 MG/5ML suspension    Pharyngitis - Primary    Relevant Medications    amoxicillin (AMOXIL) 400 MG/5ML suspension   Diagnoses       Codes Comments    Pharyngitis, unspecified etiology    -  Primary ICD-10-CM: J02.9  ICD-9-CM: 462     Body aches     ICD-10-CM: R52  ICD-9-CM: 780.96         Pharyngitis with systemic symptoms.  Possible strep vs other bacterial vs viral.  Cover with amoxicillin.  (suspension per pt request).    No orders of the defined types were placed in this encounter.      Current Outpatient Medications   Medication Sig Dispense Refill   • acetaminophen (TYLENOL) 500 MG tablet Take 500 mg by mouth Every 6 (Six) Hours As Needed for Mild Pain .     • amoxicillin (AMOXIL) 400 MG/5ML suspension Take 10 mL by mouth 2 (Two) Times a Day. 150 mL 0   • benzonatate (Tessalon Perles) 100 MG capsule Take 1 capsule by mouth 3 (Three) Times a Day As Needed for Cough. 30 capsule 1   • diazePAM (Valium) 2 MG tablet Take 1 tablet by mouth Daily As Needed for Anxiety. 30 tablet 0   •  fluticasone (FLONASE) 50 MCG/ACT nasal spray 2 sprays into the nostril(s) as directed by provider Daily. 16 g 0   • valACYclovir (Valtrex) 1000 MG tablet Take 2 tablets by mouth 2 (Two) Times a Day. 4 tablet 1     Current Facility-Administered Medications   Medication Dose Route Frequency Provider Last Rate Last Admin   • methylPREDNISolone sodium succinate (SOLU-Medrol) injection 40 mg  40 mg Intramuscular Once Bobo Salmon MD Daniel E. Essa had no medications administered during this visit.    No follow-ups on file.    There are no Patient Instructions on file for this visit.

## 2022-11-25 ENCOUNTER — APPOINTMENT (OUTPATIENT)
Dept: INFUSION THERAPY | Facility: HOSPITAL | Age: 57
End: 2022-11-25

## 2022-11-28 ENCOUNTER — OFFICE VISIT (OUTPATIENT)
Dept: FAMILY MEDICINE CLINIC | Facility: CLINIC | Age: 57
End: 2022-11-28

## 2022-11-28 ENCOUNTER — TELEPHONE (OUTPATIENT)
Dept: FAMILY MEDICINE CLINIC | Facility: CLINIC | Age: 57
End: 2022-11-28

## 2022-11-28 ENCOUNTER — TELEPHONE (OUTPATIENT)
Dept: ORTHOPEDIC SURGERY | Facility: CLINIC | Age: 57
End: 2022-11-28

## 2022-11-28 DIAGNOSIS — R20.2 NUMBNESS AND TINGLING OF BOTH FEET: ICD-10-CM

## 2022-11-28 DIAGNOSIS — R20.0 NUMBNESS AND TINGLING OF BOTH FEET: ICD-10-CM

## 2022-11-28 DIAGNOSIS — M77.41 METATARSALGIA OF BOTH FEET: Primary | ICD-10-CM

## 2022-11-28 DIAGNOSIS — G57.90 NEURITIS OF FOOT, UNSPECIFIED LATERALITY: ICD-10-CM

## 2022-11-28 DIAGNOSIS — J02.9 SORE THROAT: ICD-10-CM

## 2022-11-28 DIAGNOSIS — F43.9 STRESS: Primary | ICD-10-CM

## 2022-11-28 DIAGNOSIS — M77.42 METATARSALGIA OF BOTH FEET: Primary | ICD-10-CM

## 2022-11-28 PROCEDURE — 99213 OFFICE O/P EST LOW 20 MIN: CPT | Performed by: FAMILY MEDICINE

## 2022-11-28 RX ORDER — DIAZEPAM 2 MG/1
2 TABLET ORAL DAILY PRN
Qty: 30 TABLET | Refills: 0 | Status: SHIPPED | OUTPATIENT
Start: 2022-11-28

## 2022-11-28 RX ORDER — CEFPROZIL 250 MG/5ML
500 POWDER, FOR SUSPENSION ORAL 2 TIMES DAILY
Qty: 150 ML | Refills: 0 | Status: SHIPPED | OUTPATIENT
Start: 2022-11-28 | End: 2023-01-20

## 2022-11-28 NOTE — PROGRESS NOTES
"Chief Complaint  No chief complaint on file.  Anxiety  Subjective        Jose Maria Judge presents to [facility]  History of Present Illness  Patient agreed to be contacted by phone, patient is at home and I am at the office, on valium, on Amoxicillin x sore throat  Objective   Vital Signs:  There were no vitals taken for this visit.  Estimated body mass index is 29.15 kg/m² as calculated from the following:    Height as of 11/16/22: 180.3 cm (71\").    Weight as of 11/16/22: 94.8 kg (209 lb).            Result Review :                Assessment and Plan   Diagnoses and all orders for this visit:    1. Stress (Primary)  -     diazePAM (Valium) 2 MG tablet; Take 1 tablet by mouth Daily As Needed for Anxiety.  Dispense: 30 tablet; Refill: 0    2. Sore throat  Comments:  on Amoxicillin             Follow Up   Return in about 3 months (around 2/28/2023).  Patient was given instructions and counseling regarding his condition or for health maintenance advice. Please see specific information pulled into the AVS if appropriate.     Time spent 15 minutes  "

## 2022-11-28 NOTE — TELEPHONE ENCOUNTER
Caller: Jose Maria Judge    Relationship to patient: Self    Best call back number:     Chief complaint:     Type of visit: MYCHART    Requested date: 11/28/22    If rescheduling, when is the original appointment: 11/28/22 OFFICE VISIT    Additional notes: PATIENT IS CALLING IN TO SEE IF HIS OFFICE VISIT THAT IS SCHEDULED FOR TODAY AT 1:15 CAN BE CHANGED TO A MYCHART VISIT.  I FOUND SOME MYCHARTS TODAY AT 4:30 AND 4:45 BUT HE DID NOT WANT THOSE.

## 2022-11-28 NOTE — TELEPHONE ENCOUNTER
Caller: Jose Maria Judge    Relationship: Self    Best call back number: [phone]    What is the best time to reach you: ANYTIME    What was the call regarding: PATIENT WOULD LIKE TO KNOW IF HE CAN DO A VIDEO VISIT WITH DR. ZAPIEN BECAUSE HE IS SICK AND STILL WANTS TO FOLLOW UP. PATIENT SAID IT DOES NOT HAVE TO BE AT SCHEDULED APPT TIME ON 12-1 BUT ANYTIME DR IS AVAILABLE. PLEASE CALL PATIENT AND ADVISE.    Do you require a callback: YES

## 2022-11-29 ENCOUNTER — APPOINTMENT (OUTPATIENT)
Dept: MRI IMAGING | Facility: HOSPITAL | Age: 57
End: 2022-11-29

## 2022-11-29 ENCOUNTER — APPOINTMENT (OUTPATIENT)
Dept: INTERVENTIONAL RADIOLOGY/VASCULAR | Facility: HOSPITAL | Age: 57
End: 2022-11-29

## 2022-11-29 NOTE — TELEPHONE ENCOUNTER
"I called and spoke with patient when he said he could not come in for his visit this week because he had become sick.  He said that he is having persistent symptoms mainly in his metatarsals with burning pain in his fifth metatarsal and in his forefoot.  He also reports a feeling of walking on bubbles I believe in his right foot he said after he started using some Voltaren gel about 6 or 8 weeks ago it did cause him to feel he was walking on cong.    He had his nerve study and did not have the needle part of the exam as he had not been able to tolerate that previously.  Review of the report of that did not show any obvious evidence of tibial neuropathy or peripheral neuropathy and MRIs of his feet did not show any abnormality around the fifth MTP joint some mild arthritis at the right first MTP joint really no findings on the left.    Reviewed with him I really do not see anything I can do from a surgical standpoint for him and will see about getting him started in some therapy hopefully with [facility] at [facility].  He said he has had some relief in the past with arch supports that had to be \"just right\".    Will hold off on any nerve medications for for now.  We did talk about possible pain management as a source of her evaluation but he wanted to hold off on that as we do not have  clear etiology for this but certainly seems like symptoms seem to be mostly neuritic in nature but difficult to say for sure.  But he did not want to \"just numb the problem\".    We will reschedule him to see me in a couple of weeks as he had to cancel his appointment this week and my schedule is very full.  We did talk about possible evaluation by another foot and ankle specialist to get another opinion if symptoms are not improved with the measures of which I am aware of trying.  He appreciated the call and I let him know that I have a message out to Dr. Foote to try to discuss his nerve findings personally with him.  "

## 2022-11-30 ENCOUNTER — APPOINTMENT (OUTPATIENT)
Dept: PET IMAGING | Facility: HOSPITAL | Age: 57
End: 2022-11-30

## 2022-11-30 ENCOUNTER — TELEPHONE (OUTPATIENT)
Dept: ORTHOPEDIC SURGERY | Facility: CLINIC | Age: 57
End: 2022-11-30

## 2022-11-30 ENCOUNTER — TELEPHONE (OUTPATIENT)
Dept: OCCUPATIONAL THERAPY | Facility: HOSPITAL | Age: 57
End: 2022-11-30

## 2022-11-30 NOTE — TELEPHONE ENCOUNTER
Caller: DUY   Relationship to Patient: SELF     Phone Number: [phone]   Reason for Call: PATIENT CALLING STATING HE WOULD LIKE TO LET DR OLIVAS THAT HE COULD NOT FINISH HIS PT FOR HIS SHOULDERS, BECAUSE HE HAS HAD SOME FOOT PAIN, HE STATES THAT HE IS NOT DIABETIC. HE WOULD ALSO LIKE TO LET HER KNOW THAT HE IS WORKING WITH DR ZAPIEN TO RESOLVE THE BILATERAL FOOT PAIN. HE STATES THAT HE CANT WEAR SHOES AND IS HAVING A HARD TIME. HE DOES WANT TO LET HER KNOW HE WILL RESUME HIS SHOULDER PT WHEN HE IS ABLE TO WALK.

## 2022-12-01 ENCOUNTER — TELEPHONE (OUTPATIENT)
Dept: PHYSICAL THERAPY | Facility: HOSPITAL | Age: 57
End: 2022-12-01

## 2022-12-01 NOTE — TELEPHONE ENCOUNTER
Returned Jose Maria's call. He apologized for missing so many PT appts for his shoulder due to worsening of his radhames foot pain. He states he has had issues with foot pain since 1995. Feels he ruined his feet playing basketball with all the running and jumping and felt fascia pop in L calf and later R calf. Lately he has had to order groceries because he can't stand/walk without pins and needles and feels like he's walking on cong. He is asking whether we treat foot pain in our clinic and I let him know we do. He would like us to contact Dr Fink regarding getting an order for PT (he states he was going to send him to [facility] PT but they no longer accept his insurance). He plans to look for shoes/slides that he can be comfortable enough in to wear into PT. I will request an order from Dr Fink and wait for Jose Maria to call and schedule.

## 2022-12-01 NOTE — TELEPHONE ENCOUNTER
Spoke with pt who asked whether we could see him for his foot pain. He is well-known to me and has been seen for other issues. We are happy to see him and would need an order for physical therapy for radhames foot pain. Please see previous telephone encounter for more information.

## 2022-12-02 DIAGNOSIS — M77.41 METATARSALGIA OF BOTH FEET: Primary | ICD-10-CM

## 2022-12-02 DIAGNOSIS — R20.0 NUMBNESS AND TINGLING OF BOTH FEET: ICD-10-CM

## 2022-12-02 DIAGNOSIS — M77.42 METATARSALGIA OF BOTH FEET: Primary | ICD-10-CM

## 2022-12-02 DIAGNOSIS — R20.2 NUMBNESS AND TINGLING OF BOTH FEET: ICD-10-CM

## 2022-12-07 ENCOUNTER — TELEPHONE (OUTPATIENT)
Dept: NEUROLOGY | Facility: CLINIC | Age: 57
End: 2022-12-07

## 2022-12-07 NOTE — TELEPHONE ENCOUNTER
I spoke with Dr. Fink about the results of the nerve conduction study.  There were no abnormalities to suggest peripheral neuropathy or entrapment of the tibial nerve at the ankle.  The patient had previous bad experience with a needle exam and so he declined needle exam and so I was not able to evaluate for radiculopathy.    OSKAR

## 2022-12-08 ENCOUNTER — TELEPHONE (OUTPATIENT)
Dept: FAMILY MEDICINE CLINIC | Facility: CLINIC | Age: 57
End: 2022-12-08

## 2022-12-08 ENCOUNTER — TELEPHONE (OUTPATIENT)
Dept: ORTHOPEDIC SURGERY | Facility: CLINIC | Age: 57
End: 2022-12-08

## 2022-12-08 NOTE — TELEPHONE ENCOUNTER
Provider: DR. ZAPIEN  Caller: DUY HERNANDEZ                            Relationship to Patient: SELF  Phone Number: [phone]  Reason for Call: PATIENT CALLED WANTING TO KNOW IF HE SHOULD GO TO 12/12/22 APPOINTMENT OR SHOULD HE WAIT UNTIL SOME PHYSICAL THERAPY HAS BEEN DONE. PLEASE ADVISE.

## 2022-12-08 NOTE — TELEPHONE ENCOUNTER
I returned patient's call after he had called inquiring whether he still needed to come in for his appointment on 12/12/2022 as he has not yet started therapy.  His insurance was not accepted at [facility] and I had communicated last week with a therapist who had worked with him previously Kirstin Dale and placed an order for him to see her.  He says that she told him she thought she could help him.    A said he still has pain along his fifth metatarsals and into his forefeet and gets a pins-and-needles feeling on his feet when he tries to stand and is having difficulty finding any accommodative shoe wear.  He has not yet scheduled therapy and is waiting to do so till he can get in there more readily and find something to wear.    Reviewed with him that I reviewed his MRIs and not see any muscular atrophy no bursitis stress fractures etc. really nothing I can address from a surgical standpoint he does have some arthritis in the big toe on the right but this certainly is not causing his symptoms.    Also reviewed with him that I spoke at length with Dr. Foote the neurologist did not know if that is and he was able to do the nerve conduction studies but patient would not allow for annual exam he could not do the EMG but felt that he had enough information to say that the patient did not have any type of nerve entrapment about the ankle or foot nor any evidence of polyneuropathy or peripheral neuropathy.  Reviewed with patient that it could be potentially something coming from his back but he is not really having any radicular symptoms.  He said he had some back issues in the past worked up and did not really have anything major going on and offered to work his back in further again with MRI which he did not want to do at this time.    He questions whether it could be any type of circulation issue and to [place] for sure and recommended that he check with his PCP for further evaluation of that and further work-up  and possible referral to vascular specialist if needed.        Reviewed with patient that I do not know of anything else I know to offer him and certainly nothing from a surgical standpoint that I know to offer him and really no other conservative measures other than to try to find some accommodative shoes and he is going work on checking in several local shoe places and also given the [facility] of [facility] to see if they have any accommodative shoes (he was familiar with them as his parents have gone there).  I do feel that therapy could be of benefit to him to work on gait mechanics desensitization etc.    Rather than making a follow-up appointment at this time he was instructed to call to schedule a follow-up appointment about a month after he starts physical therapy whenever he does that and voiced clear understanding and wished to proceed in that fashion.

## 2022-12-08 NOTE — TELEPHONE ENCOUNTER
Caller: Jose Maria Judge    Relationship: Self    Best call back number: [phone]    What was the call regarding: PATIENT WANTED TO DISCUSS SYMPTOMS OF A HEIGHTENED SENSE OF SMELL. HE HAS AN ARTICLE HE WOULD LIKE TO SEND TO DR AMADOR. PATIENT WAS ADVISED TO SEND THROUGH [place], BUT IS THERE AN E-MAIL ADDRESS HE CAN SEND IT TO?    [email or web address]    Do you require a callback: YES

## 2022-12-28 ENCOUNTER — HOSPITAL ENCOUNTER (OUTPATIENT)
Dept: CT IMAGING | Facility: HOSPITAL | Age: 57
Discharge: HOME | End: 2022-12-28
Admitting: INTERNAL MEDICINE

## 2022-12-28 DIAGNOSIS — R59.1 LYMPHADENOPATHY: ICD-10-CM

## 2022-12-28 DIAGNOSIS — R59.9 LYMPH NODE ENLARGEMENT: ICD-10-CM

## 2022-12-28 PROCEDURE — 25010000002 IOPAMIDOL 61 % SOLUTION: Performed by: INTERNAL MEDICINE

## 2022-12-28 PROCEDURE — 0 DIATRIZOATE MEGLUMINE & SODIUM PER 1 ML: Performed by: INTERNAL MEDICINE

## 2022-12-28 PROCEDURE — 74177 CT ABD & PELVIS W/CONTRAST: CPT

## 2022-12-28 PROCEDURE — 71260 CT THORAX DX C+: CPT

## 2022-12-28 RX ADMIN — DIATRIZOATE MEGLUMINE AND DIATRIZOATE SODIUM 30 ML: 660; 100 LIQUID ORAL; RECTAL at 09:47

## 2022-12-28 RX ADMIN — IOPAMIDOL 85 ML: 612 INJECTION, SOLUTION INTRAVENOUS at 09:47

## 2022-12-29 ENCOUNTER — TELEPHONE (OUTPATIENT)
Dept: ONCOLOGY | Facility: CLINIC | Age: 57
End: 2022-12-29

## 2022-12-29 DIAGNOSIS — R59.9 LYMPH NODE ENLARGEMENT: Primary | ICD-10-CM

## 2022-12-29 NOTE — TELEPHONE ENCOUNTER
Caller: Jose Maria Judge    Relationship to patient: Self    Best call back number: [phone]    Type of visit: LAB AND FOLLOW UP    Requested date: ANY    If rescheduling, when is the original appointment: 12/07    Additional notes: PLEASE CALL ONCE R/S. HUB UNABLE TO R/S WITHIN TIMEFRAME.

## 2022-12-29 NOTE — TELEPHONE ENCOUNTER
Called pt to let him know Dr. Alvarado has reviewed scans and would like to see him in follow up in the next few weeks. Pt v/u. Message sent to scheduling.

## 2022-12-30 ENCOUNTER — TELEPHONE (OUTPATIENT)
Dept: ONCOLOGY | Facility: CLINIC | Age: 57
End: 2022-12-30

## 2022-12-30 NOTE — TELEPHONE ENCOUNTER
----- Message from Shonda Blas sent at 12/30/2022  8:19 AM EST -----    ----- Message -----  From: Megha Handley RN  Sent: 12/30/2022   8:17 AM EST  To: [facility]    Can we please try to schedule him for labs and a follow up visit with Dr. Alvarado in the next 2-3 weeks. I talked to Dr. Alvarado and it can be 3 weeks if needed because he is rounding next week and the following week he is all booked up. Please let pt know this. He does not want to go to EP for visit. Thank you

## 2023-01-14 ENCOUNTER — HOSPITAL ENCOUNTER (EMERGENCY)
Facility: HOSPITAL | Age: 58
Discharge: HOME | End: 2023-01-14
Attending: EMERGENCY MEDICINE | Admitting: EMERGENCY MEDICINE
Payer: COMMERCIAL

## 2023-01-14 VITALS
WEIGHT: 210 LBS | SYSTOLIC BLOOD PRESSURE: 100 MMHG | HEART RATE: 85 BPM | DIASTOLIC BLOOD PRESSURE: 86 MMHG | OXYGEN SATURATION: 95 % | RESPIRATION RATE: 18 BRPM | HEIGHT: 71 IN | TEMPERATURE: 99.8 F | BODY MASS INDEX: 29.4 KG/M2

## 2023-01-14 DIAGNOSIS — U07.1 COVID-19: Primary | ICD-10-CM

## 2023-01-14 DIAGNOSIS — B00.1 COLD SORE: ICD-10-CM

## 2023-01-14 DIAGNOSIS — G47.09 TROUBLE GETTING TO SLEEP: ICD-10-CM

## 2023-01-14 DIAGNOSIS — J40 BRONCHITIS: ICD-10-CM

## 2023-01-14 LAB
FLUAV RNA RESP QL NAA+PROBE: NOT DETECTED
FLUBV RNA RESP QL NAA+PROBE: NOT DETECTED
RSV RNA NPH QL NAA+NON-PROBE: NOT DETECTED
S PYO AG THROAT QL: NEGATIVE
SARS-COV-2 RNA RESP QL NAA+PROBE: DETECTED

## 2023-01-14 PROCEDURE — 87081 CULTURE SCREEN ONLY: CPT | Performed by: PHYSICIAN ASSISTANT

## 2023-01-14 PROCEDURE — 87880 STREP A ASSAY W/OPTIC: CPT | Performed by: PHYSICIAN ASSISTANT

## 2023-01-14 PROCEDURE — 87637 SARSCOV2&INF A&B&RSV AMP PRB: CPT | Performed by: PHYSICIAN ASSISTANT

## 2023-01-14 PROCEDURE — C9803 HOPD COVID-19 SPEC COLLECT: HCPCS

## 2023-01-14 PROCEDURE — 99283 EMERGENCY DEPT VISIT LOW MDM: CPT

## 2023-01-14 RX ORDER — FLUTICASONE PROPIONATE 50 MCG
2 SPRAY, SUSPENSION (ML) NASAL DAILY
Qty: 18.2 ML | Refills: 0 | Status: SHIPPED | OUTPATIENT
Start: 2023-01-14 | End: 2023-01-14 | Stop reason: SDUPTHER

## 2023-01-14 RX ORDER — ACETAMINOPHEN 500 MG
TABLET ORAL
Qty: 30 TABLET | Refills: 0 | Status: SHIPPED | OUTPATIENT
Start: 2023-01-14

## 2023-01-14 RX ORDER — ACETAMINOPHEN 500 MG
TABLET ORAL
Qty: 30 TABLET | Refills: 0 | Status: SHIPPED | OUTPATIENT
Start: 2023-01-14 | End: 2023-01-14 | Stop reason: SDUPTHER

## 2023-01-14 RX ORDER — FLUTICASONE PROPIONATE 50 MCG
2 SPRAY, SUSPENSION (ML) NASAL DAILY
Qty: 18.2 ML | Refills: 0 | Status: ON HOLD | OUTPATIENT
Start: 2023-01-14 | End: 2023-03-30

## 2023-01-14 RX ORDER — ACETAMINOPHEN 500 MG
1000 TABLET ORAL ONCE
Status: COMPLETED | OUTPATIENT
Start: 2023-01-14 | End: 2023-01-14

## 2023-01-14 RX ORDER — DEXTROMETHORPHAN HYDROBROMIDE AND PROMETHAZINE HYDROCHLORIDE 15; 6.25 MG/5ML; MG/5ML
5 SYRUP ORAL 4 TIMES DAILY PRN
Qty: 240 ML | Refills: 0 | OUTPATIENT
Start: 2023-01-14 | End: 2023-01-26

## 2023-01-14 RX ORDER — DEXTROMETHORPHAN HYDROBROMIDE AND PROMETHAZINE HYDROCHLORIDE 15; 6.25 MG/5ML; MG/5ML
5 SYRUP ORAL 4 TIMES DAILY PRN
Qty: 240 ML | Refills: 0 | Status: SHIPPED | OUTPATIENT
Start: 2023-01-14 | End: 2023-01-14 | Stop reason: SDUPTHER

## 2023-01-14 RX ADMIN — ACETAMINOPHEN 1000 MG: 500 TABLET, FILM COATED ORAL at 04:04

## 2023-01-14 NOTE — ED NOTES
"Pt arrives ambulatory to triage desk via PV.    Pt states \"It started out with my throat. And my throat feels scratchy and inflamed. Every time I put my head down my eyes and ears are hot. My parents have been sick and I have been taking care of them. And then all of a sudden at 8pm I spiked a fever, 100.7. I took 1000mg of tylenol, and 2mg of valium at 930pm.\"      Patient was placed in face mask during first look triage.  Patient was wearing a face mask throughout encounter.  I wore personal protective equipment throughout the encounter.  Hand hygiene was performed before and after patient encounter.      "

## 2023-01-14 NOTE — DISCHARGE INSTRUCTIONS
Ensure you are drinking plenty of water remaining hydrated.  Since her symptoms started yesterday recommend quarantine for at least 8 days and being fever free for 24 hours prior to presenting in public.  Commend calling your family physician to see if he has further guidance.  Return to the emergency department with any further concerns.

## 2023-01-15 ENCOUNTER — DOCUMENTATION (OUTPATIENT)
Dept: FAMILY MEDICINE CLINIC | Facility: CLINIC | Age: 58
End: 2023-01-15
Payer: COMMERCIAL

## 2023-01-16 ENCOUNTER — TELEPHONE (OUTPATIENT)
Dept: ONCOLOGY | Facility: CLINIC | Age: 58
End: 2023-01-16
Payer: COMMERCIAL

## 2023-01-16 LAB — BACTERIA SPEC AEROBE CULT: NORMAL

## 2023-01-16 NOTE — TELEPHONE ENCOUNTER
Caller: Jose Maria Judge    Relationship to patient: Self    Best call back number: [phone]    Chief complaint: PATIENT TO CANCEL 1/20/23 APPT DUE TO POSITIVE COVID TEST 1/13/23    Type of visit: LAB AND FU    Requested date: NEXT AVAILABLE

## 2023-01-16 NOTE — ED PROVIDER NOTES
EMERGENCY DEPARTMENT ENCOUNTER    Room Number:  05/05  Date seen:  1/15/2023  PCP: Rhett Kenyon MD      HPI:  Chief Complaint: Patient  A complete HPI/ROS/PMH/PSH/SH/FH are unobtainable due to: Sore throat and myalgias      Context: Jose Maria Judge is a 57 y.o. male who presents to the ED c/o sore throat myalgias.  Patient states he has been feeling ill for the past 3 to 4 days.  Suddenly this evening he spiked a fever 100.7 around 8 PM.  Patient states he took 100 mg of Tylenol and 2 mg of Valium at approximately 30 p.m. and attempt to alleviate his symptoms.  The fever subsided with the Tylenol but the myalgias persisted.  Given that the patient's symptoms have dramatically worsened over the course of the past 12 to 24 hours he decided to present to the emergency department for further evaluation.    He does endorse cough but denies neck pain, headache, photophobia, dysuria, frequency, abdominal pain.    He is unaware of any specific recent sick contacts.          PAST MEDICAL HISTORY  Active Ambulatory Problems     Diagnosis Date Noted   • Bilateral hip pain 08/10/2016   • Right hip pain 08/10/2016   • Left hip pain 08/10/2016   • Trochanteric bursitis 08/25/2016   • Tear of acetabular labrum 09/22/2016   • Loss of hair 04/10/2012   • Anal burning 06/16/2017   • Anxiety 10/19/2012   • Elevated blood pressure 04/10/2012   • Hamstring strain 01/13/2015   • Hypertension 02/15/2012   • Neuritis of foot 04/24/2014   • Hamstring muscle strain 06/16/2017   • Rectal pain 04/24/2014   • Herpes zoster 09/16/2013   • Varicose veins of bilateral lower extremities with pain 04/10/2012   • Lymph node enlargement 10/04/2018   • Partial obstruction of small intestine (HCC) 12/29/2018   • Epigastric pain 01/04/2019   • Axillary lymphadenopathy 07/24/2018   • 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   • Acute bilateral low back pain without sciatica 01/18/2016   • Nausea 05/26/2020   • Anal or rectal pain 08/24/2020   • Arthropathy of  lumbar facet joint 04/24/2014   • 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   • Tinnitus 11/09/2021   • High risk medication use 04/14/2022   • Varicose veins of left lower extremity with pain 04/14/2022   • Rib pain on right side 04/27/2022   • Lymphadenopathy 04/27/2022   • Bradycardia 04/27/2022   • Bilateral shoulder pain 05/13/2022   • Chronic pain of both feet 05/13/2022   • Illness 07/08/2022   • Fatigue 12/29/2016   • Metatarsalgia of both feet 10/13/2022   • Numbness and tingling of both feet 10/13/2022   • Well adult health check 03/19/2021   • Vertigo 11/04/2021   • Varicocele 10/15/2018   • Tinea corporis 02/05/2019   • Thoracic back pain 07/11/2018   • Swelling of structure of right eye 03/30/2021   • Snoring 01/09/2017   • Seasonal allergies 05/13/2019   • Rash 03/13/2019   • Posterior rhinorrhea 10/02/2020   • Poor urinary stream 10/06/2020   • Other specified diseases of anus and rectum 04/24/2014   • Night sweats 12/08/2015   • Neuropathy 06/10/2022   • Muscle spasm of back 10/15/2021   • Miller's metatarsalgia 02/27/2019   • Mass of axilla 08/18/2020   • Male pattern alopecia 01/04/2018   • Low back strain 10/20/2014   • Irritable bowel syndrome 12/19/2012   • Intolerant of cold 04/02/2020   • Insomnia 10/15/2021   • Injury of right foot 09/16/2018   • Gastroesophageal reflux disease 12/21/2016   • Ganglion 06/17/2015   • Elevated low density lipoprotein (LDL) cholesterol level 01/30/2020   • Disorder of gallbladder 02/05/2019   • Degeneration of lumbar intervertebral disc 10/22/2014   • Carpal tunnel syndrome 05/11/2015   • Benign prostatic hyperplasia 10/15/2018   • Alopecia 04/10/2012   • Allergic rhinitis due to pollen 07/30/2021   • Allergic rhinitis 11/20/2015   • Allergic conjunctivitis 03/30/2021   • Acute sinusitis 11/20/2015   • Acute COVID-19 11/20/2021   • Acute anal fissure 02/19/2020   • Pharyngitis 11/18/2022   • Body aches 11/23/2022   • Stress 11/28/2022   • Sore throat 11/28/2022     Resolved  Ambulatory Problems     Diagnosis Date Noted   • No Resolved Ambulatory Problems     Past Medical History:   Diagnosis Date   • Abdominal lymphadenopathy    • Acid reflux    • Carpal tunnel syndrome, left    • Chronic pain    • DDD (degenerative disc disease), lumbar    • H/O Hypersensitivity to odor    • Hemorrhoids    • History of medical problems Hyperosmia, Pudendal neuralgia   • Low back pain    • Lymphadenopathy, axillary    • Unexplained night sweats          PAST SURGICAL HISTORY  Past Surgical History:   Procedure Laterality Date   • ABSCESS DRAINAGE  08/2012   • ANAL FISTULA REPAIR N/A 09/2012, 10/2012   • AXILLARY LYMPH NODE BIOPSY/EXCISION Left 11/9/2018    Procedure: AXILLARY LYMPH NODE BIOPSY/EXCISION;  Surgeon: Amando Nguyễn MD;  Location: [facility] OR OSC;  Service: General   • COLONOSCOPY N/A 2013    done at [facility]   • COLONOSCOPY N/A 7/10/2019    Procedure: COLONOSCOPY to CECUM;  Surgeon: Amando Nguyễn MD;  Location: [facility] ENDOSCOPY;  Service: General   • ENDOSCOPY N/A 1/9/2019    Procedure: ESOPHAGOGASTRODUODENOSCOPY;  Surgeon: Amando Nguyễn MD;  [facility];  Service: General   • FINE NEEDLE ASPIRATION Left 08/09/2018    Left axillary lymph node FNA   • FLEXIBLE SIGMOIDOSCOPY N/A 06/25/2003    Normal-Dr. Cezar Aviles   • HEMORRHOIDECTOMY N/A 1996   • HIP ARTHROSCOPY W/ LABRAL REPAIR Right 04/03/2017    Dr. Lonnie Lemons   • MEDIAL BRANCH BLOCK Bilateral 3/12/2021    Procedure: BILATERAL MEDIAL BRANCH BLOCK W/MAC;  Surgeon: Francisco Velez MD;  Location: [facility] OR;  Service: Pain Management;  Laterality: Bilateral;   • SIGMOIDOSCOPY N/A 9/23/2020    Procedure: FLEXIBLE SIGMOIDOSCOPY WITH BIOPSY;  Surgeon: Shena Ring MD;  [facility];  Service: Gastroenterology;  Laterality: N/A;  pre- anal/rectal pain  post- hemorrhoids   • TONSILLECTOMY Bilateral          FAMILY HISTORY  Family History   Problem Relation Age of Onset   • Atrial  fibrillation Father    • Aneurysm Father    • Hypertension Father    • Lymphoma Brother    • Malig Hyperthermia Neg Hx    • Colon cancer Neg Hx    • Colon polyps Neg Hx    • Crohn's disease Neg Hx    • Irritable bowel syndrome Neg Hx    • Ulcerative colitis Neg Hx          SOCIAL HISTORY  Social History     Socioeconomic History   • Marital status: Single   • Number of children: 0   • Years of education: COLLEGE   • Highest education level: Not asked   Tobacco Use   • Smoking status: Never   • Smokeless tobacco: Never   • Tobacco comments:     caff use   Vaping Use   • Vaping Use: Never used   Substance and Sexual Activity   • Alcohol use: No   • Drug use: No   • Sexual activity: Defer         ALLERGIES  Bactrim [sulfamethoxazole-trimethoprim], Hydrocodone, Hydrocodone-acetaminophen, Levaquin [levofloxacin], Medrol [methylprednisolone], Mobic [meloxicam], Naproxen, Nsaids, Sulfa antibiotics, Alprazolam, Diclofenac, Erythromycin, Tramadol, and Ceftin [cefuroxime]        REVIEW OF SYSTEMS  Review of Systems     All systems reviewed and negative except for those discussed in HPI.       PHYSICAL EXAM  ED Triage Vitals   Temp Heart Rate Resp BP SpO2   01/14/23 0206 01/14/23 0206 01/14/23 0206 01/14/23 0311 01/14/23 0206   99.2 °F (37.3 °C) 85 16 143/91 97 %      Temp src Heart Rate Source Patient Position BP Location FiO2 (%)   01/14/23 0206 01/14/23 0206 01/14/23 0311 01/14/23 0311 --   Tympanic Monitor Sitting Right arm        Physical Exam      GENERAL: Anxious, nontoxic, no acute distress  HENT: nares patent, mucous brains moist, no gross cervical lymphadenopathy.  Neck: Moves head and neck freely no nuchal rigidity noted  EYES: no scleral icterus  CV: regular rhythm, normal rate, no rubs murmurs gallops  RESPIRATORY: normal effort, lungs CTA B  ABDOMEN: soft, nontender  MUSCULOSKELETAL: no deformity  NEURO: alert, moves all extremities, follows commands  PSYCH: Anxious, cooperative  SKIN: warm, dry, no signs of  dehydration    Vital signs and nursing notes reviewed.          LAB RESULTS  No results found for this or any previous visit (from the past 24 hour(s)).    Ordered the above labs and reviewed the results.        RADIOLOGY  No Radiology Exams Resulted Within Past 24 Hours    Ordered the above noted radiological studies. Reviewed by me in PACS.          PROCEDURES  Procedures          MEDICATIONS GIVEN IN ER  Medications   acetaminophen (TYLENOL) tablet 1,000 mg (1,000 mg Oral Given 1/14/23 0404)           MEDICAL DECISION MAKING, PROGRESS, and CONSULTS    All labs have been independently reviewed by me.  All radiology studies have been reviewed by me and discussed with radiologist dictating the report.   EKG's independently viewed and interpreted by me.  Discussion below represents my analysis of pertinent findings related to patient's condition, differential diagnosis, treatment plan and final disposition.      Orders placed during this visit:  Orders Placed This Encounter   Procedures   • COVID-19, FLU A/B, RSV PCR - Swab, Nasopharynx   • Rapid Strep A Screen - Swab, Throat   • Beta Strep Culture, Throat - Swab, Throat         Additional sources:  - Discussed/obtained information from independent historians: None available  Additional information was obtained to confirm the patient's history.    - External (non-ED) record review: Reviewed a neurology note from Dr. Elijah Foote from 11/14/2022.  Patient is being seen and followed as well as treated for tarsal tunnel syndrome.    - Chronic or social conditions impacting care: Patient has anxiety        Differential diagnosis:    DDx: Strep pharyngitis, COVID-19, influenza, other viral illness, PNA.  Will obtain rapid strep, COVID/influenza A and B/RSV testing      Additional orders considered but not ordered:  Chest x-ray considered.  Patient's lungs appear CTA B sats are 98 to 99% on RA at bedside.  Doubt pneumonia and will forego imaging.        Independent  interpretation of labs, radiology studies, and discussions with consultants:  ED Course as of 01/15/23 2344   Sat Jan 14, 2023   0548 Strep A Ag: Negative [RC]   0637 Prior to discharge, he also mentions he has been having trouble falling asleep.  We will trial a short course of OTC doxylamine.  We will have him to follow-up with his PCP for further management. [RC]      ED Course User Index  [RC] Zion Navarro III, PA                  PPE: The patient wore a mask throughout the entire encounter. I wore a well-fitting mask.    DIAGNOSIS  Final diagnoses:   COVID-19   Trouble getting to sleep         DISPOSITION  DISCHARGE    Patient discharged in stable condition.    Reviewed implications of results, diagnosis, meds, responsibility to follow up, warning signs and symptoms of possible worsening, potential complications and reasons to return to ER.  Patient/Family voiced understanding of above instructions.    Discussed plan for discharge, as there is no emergent indication for admission. Patient referred to primary care provider for BP management due to today's BP. Pt/family is agreeable and understands need for follow up and repeat testing.  Pt is aware that discharge does not mean that nothing is wrong but it indicates no emergency is present that requires admission and they must continue care with follow-up as given below or physician of their choice.     FOLLOW-UP  No follow-up provider specified.       Medication List      New Prescriptions    doxylamine 25 MG tablet  Commonly known as: UNISOM  Take 2 tablets 30 minutes prior to sleep.     promethazine-dextromethorphan 6.25-15 MG/5ML syrup  Commonly known as: PROMETHAZINE-DM  Take 5 mL by mouth 4 (Four) Times a Day As Needed for Cough.        Changed    * acetaminophen 500 MG tablet  Commonly known as: TYLENOL  What changed: Another medication with the same name was added. Make sure you understand how and when to take each.     * acetaminophen 500 MG  tablet  Commonly known as: TYLENOL  Take 1 to 2 tablets every 6 hours as needed for fever, aches, pains  What changed: You were already taking a medication with the same name, and this prescription was added. Make sure you understand how and when to take each.     * fluticasone 50 MCG/ACT nasal spray  Commonly known as: FLONASE  2 sprays into the nostril(s) as directed by provider Daily.  What changed: Another medication with the same name was added. Make sure you understand how and when to take each.     * fluticasone 50 MCG/ACT nasal spray  Commonly known as: FLONASE  2 sprays into the nostril(s) as directed by provider Daily.  What changed: You were already taking a medication with the same name, and this prescription was added. Make sure you understand how and when to take each.         * This list has 4 medication(s) that are the same as other medications prescribed for you. Read the directions carefully, and ask your doctor or other care provider to review them with you.               Where to Get Your Medications      These medications were sent to [facility] [id number] - [facility] [address] AT [facility] [phone] [facility] [phone]   [address], [facility] [address]    Phone: [phone]   · acetaminophen 500 MG tablet  · doxylamine 25 MG tablet  · fluticasone 50 MCG/ACT nasal spray  · promethazine-dextromethorphan 6.25-15 MG/5ML syrup           Latest Documented Vital Signs:  As of 23:44 EST  BP- 100/86 HR- 85 Temp- 99.8 °F (37.7 °C) O2 sat- 95%      --    Please note that portions of this were completed with a voice recognition program.       Note Disclaimer: At [facility], we believe that sharing information builds trust and better relationships. You are receiving this note because you are receiving care at [facility] or recently visited. It is possible you will see health information before a provider has talked with you about it. This  kind of information can be easy to misunderstand. To help you fully understand what it means for your health, we urge you to discuss this note with your provider.       Zion Navarro III, PA  01/15/23 [id number]

## 2023-01-17 ENCOUNTER — OFFICE VISIT (OUTPATIENT)
Dept: FAMILY MEDICINE CLINIC | Facility: CLINIC | Age: 58
End: 2023-01-17
Payer: COMMERCIAL

## 2023-01-17 VITALS
HEART RATE: 71 BPM | BODY MASS INDEX: 30.38 KG/M2 | OXYGEN SATURATION: 100 % | WEIGHT: 217 LBS | SYSTOLIC BLOOD PRESSURE: 110 MMHG | DIASTOLIC BLOOD PRESSURE: 80 MMHG | HEIGHT: 71 IN | TEMPERATURE: 98.2 F

## 2023-01-17 DIAGNOSIS — U07.1 COVID-19 VIRUS DETECTED: ICD-10-CM

## 2023-01-17 DIAGNOSIS — J31.2 SORE THROAT, CHRONIC: Primary | ICD-10-CM

## 2023-01-17 PROCEDURE — 99213 OFFICE O/P EST LOW 20 MIN: CPT | Performed by: STUDENT IN AN ORGANIZED HEALTH CARE EDUCATION/TRAINING PROGRAM

## 2023-01-17 PROCEDURE — 96372 THER/PROPH/DIAG INJ SC/IM: CPT | Performed by: STUDENT IN AN ORGANIZED HEALTH CARE EDUCATION/TRAINING PROGRAM

## 2023-01-17 RX ORDER — METHYLPREDNISOLONE SODIUM SUCCINATE 40 MG/ML
40 INJECTION, POWDER, LYOPHILIZED, FOR SOLUTION INTRAMUSCULAR; INTRAVENOUS ONCE
Status: COMPLETED | OUTPATIENT
Start: 2023-01-17 | End: 2023-01-17

## 2023-01-17 RX ADMIN — METHYLPREDNISOLONE SODIUM SUCCINATE 40 MG: 40 INJECTION, POWDER, LYOPHILIZED, FOR SOLUTION INTRAMUSCULAR; INTRAVENOUS at 12:57

## 2023-01-17 NOTE — PROGRESS NOTES
"Chief Complaint  Sore Throat (1/14/23  ER -POSITIVE for covid - throat looks awful per pt), Fever, and Cough    Subjective        Jose Maria Judge presents to [facility]  History of Present Illness     Answers for HPI/ROS submitted by the patient on 1/17/2023  What is the primary reason for your visit?: Other  Please describe your symptoms.: Extreme scratchy painful sore throat, coughing up phlegm, persistent high fever, and periodic coughing fits with congestion  Have you had these symptoms before?: No  How long have you been having these symptoms?: 1-4 days  Please list any medications you are currently taking for this condition.: 800 mg Tylenol, 600 mg Ibuprofen taken every two hours, to keep fever from skyrocketing  Please describe any probable cause for these symptoms. : Been having throat irritations for past couple months. , This past friday nightTested positive for Covid, plus medical technician at [facility] most likely injured my throat while aggressively swabbing it for a test. Extremely painful throat has been waking me up every two hours to take medication, and cough up phlegm. Followed by persistent high fever, unless i took fever medication every two hours around the clock to keep from extreme shivers, so i could get some sleep.    Throat discomfort not improving. Continues to have fevers. Recent covid diagnosis. Tried Augmentin for chronic sore throat before. Has not tried steroids. Does not have an allergy to steroids as was previously documented. Patient says he had some bloating with the pills once but does request steroid injection today to help with covid symptoms and sore throat.     Objective   Vital Signs:  /80   Pulse 71   Temp 98.2 °F (36.8 °C) (Infrared)   Ht 180.3 cm (71\")   Wt 98.4 kg (217 lb)   SpO2 100%   BMI 30.27 kg/m²   Estimated body mass index is 30.27 kg/m² as calculated from the following:    Height as of this encounter: 180.3 cm (71\").    " Weight as of this encounter: 98.4 kg (217 lb).             Physical Exam  HENT:      Head: Normocephalic and atraumatic.      Nose: Nose normal. No rhinorrhea.      Mouth/Throat:      Mouth: Mucous membranes are moist.      Pharynx: No oropharyngeal exudate.      Comments: No abrasions, no tonsillar exudate, some drainage   Eyes:      Extraocular Movements: Extraocular movements intact.      Conjunctiva/sclera: Conjunctivae normal.   Cardiovascular:      Rate and Rhythm: Normal rate.   Pulmonary:      Effort: Pulmonary effort is normal.   Musculoskeletal:         General: Normal range of motion.   Neurological:      Mental Status: He is alert and oriented to person, place, and time.   Psychiatric:         Thought Content: Thought content normal.        Result Review :                   Assessment and Plan   Diagnoses and all orders for this visit:    1. Sore throat, chronic (Primary)  -     Ambulatory Referral to ENT (Otolaryngology)  -     Cancel: Throat / Upper Respiratory Culture - Swab, Throat; Future  -     Throat / Upper Respiratory Culture - Swab, Throat    2. COVID-19 virus detected  -     methylPREDNISolone sodium succinate (SOLU-Medrol) injection 40 mg    persistent sore throat. Throat culture today. Steroid to help with covid symptoms. Refer to ENT.          Follow Up   Return if symptoms worsen or fail to improve.  Patient was given instructions and counseling regarding his condition or for health maintenance advice. Please see specific information pulled into the AVS if appropriate.

## 2023-01-20 ENCOUNTER — TELEMEDICINE (OUTPATIENT)
Dept: FAMILY MEDICINE CLINIC | Facility: TELEHEALTH | Age: 58
End: 2023-01-20
Payer: COMMERCIAL

## 2023-01-20 DIAGNOSIS — J22 LOWER RESPIRATORY INFECTION (E.G., BRONCHITIS, PNEUMONIA, PNEUMONITIS, PULMONITIS): Primary | ICD-10-CM

## 2023-01-20 PROBLEM — R39.12 POOR URINARY STREAM: Status: RESOLVED | Noted: 2020-10-06 | Resolved: 2023-01-20

## 2023-01-20 PROBLEM — M25.519 SHOULDER PAIN: Status: ACTIVE | Noted: 2022-05-13

## 2023-01-20 PROBLEM — K56.600 PARTIAL OBSTRUCTION OF SMALL INTESTINE: Status: RESOLVED | Noted: 2018-12-29 | Resolved: 2023-01-20

## 2023-01-20 LAB
BACTERIA SPEC RESP CULT: NORMAL
BACTERIA SPEC RESP CULT: NORMAL

## 2023-01-20 PROCEDURE — 99213 OFFICE O/P EST LOW 20 MIN: CPT | Performed by: NURSE PRACTITIONER

## 2023-01-20 RX ORDER — AZITHROMYCIN 250 MG/1
TABLET, FILM COATED ORAL
Qty: 6 TABLET | Refills: 0 | OUTPATIENT
Start: 2023-01-20 | End: 2023-01-26

## 2023-01-20 NOTE — PROGRESS NOTES
Chief Complaint   Patient presents with   • Nasal Congestion   • Cough     Productive       Subjective   Jose Maria Judge is a 57 y.o. male.     History of Present Illness  Covid diagnosed on 1/13/2023 with cough and congestion. He went into clinic and received a steroid shot on 1/16/2023 for severe sore throat and strep test was negative. He states that his throat feels better but he has developed more productive cough with increased drainage and is concerned that he is worsening. He has been taking mucinex. He says that he cannot take promethazine-dm that he was prescribed because it causes tinnitus to be worse. He denies wheezing, shortness of breath, chest pain or fever.   Cough  Episode onset: Over a week. The cough is productive of sputum. Associated symptoms include postnasal drip. Pertinent negatives include no ear pain, fever, headaches, nasal congestion, shortness of breath or wheezing.       The following portions of the patient's history were reviewed and updated as appropriate: allergies, current medications, past medical history, and problem list.      Past Medical History:   Diagnosis Date   • Abdominal lymphadenopathy    • Acid reflux     HX   • Anxiety     R/T PAIN   • Carpal tunnel syndrome, left    • Chronic pain    • DDD (degenerative disc disease), lumbar    • Fatigue    • H/O Hypersensitivity to odor     Burning sensation in nasal passage   • Hemorrhoids     internal fistula   • History of medical problems Hyperosmia, Pudendal neuralgia   • Irritable bowel syndrome    • Low back pain    • Lymphadenopathy, axillary     LEFT   • Male pattern alopecia    • Pudendal neuralgia    • Right hip pain    • Unexplained night sweats      Social History     Socioeconomic History   • Marital status: Single   • Number of children: 0   • Years of education: COLLEGE   • Highest education level: Not asked   Tobacco Use   • Smoking status: Never   • Smokeless tobacco: Never   • Tobacco comments:     caff use    Vaping Use   • Vaping Use: Never used   Substance and Sexual Activity   • Alcohol use: Never   • Drug use: Never   • Sexual activity: Not Currently     Comment: not currently seeing anyone     medication documentation: reviewed and updated with patient and   Current Outpatient Medications:   •  acetaminophen (TYLENOL) 500 MG tablet, Take 1 to 2 tablets every 6 hours as needed for fever, aches, pains, Disp: 30 tablet, Rfl: 0  •  benzonatate (Tessalon Perles) 100 MG capsule, Take 1 capsule by mouth 3 (Three) Times a Day As Needed for Cough., Disp: 30 capsule, Rfl: 1  •  azithromycin (Zithromax Z-Min) 250 MG tablet, Take 2 tabs on Day 1, then 1 tab daily for 4 additional days, Disp: 6 tablet, Rfl: 0  •  diazePAM (Valium) 2 MG tablet, Take 1 tablet by mouth Daily As Needed for Anxiety., Disp: 30 tablet, Rfl: 0  •  fluticasone (FLONASE) 50 MCG/ACT nasal spray, 2 sprays into the nostril(s) as directed by provider Daily., Disp: 18.2 mL, Rfl: 0  •  promethazine-dextromethorphan (PROMETHAZINE-DM) 6.25-15 MG/5ML syrup, Take 5 mL by mouth 4 (Four) Times a Day As Needed for Cough., Disp: 240 mL, Rfl: 0  No current facility-administered medications for this visit.  Review of Systems   Constitutional: Negative for fever.   HENT: Positive for postnasal drip. Negative for ear pain.    Respiratory: Positive for cough. Negative for shortness of breath and wheezing.    Neurological: Negative for headaches.       Objective   Physical Exam  Constitutional:       General: He is not in acute distress.     Appearance: He is not ill-appearing.   HENT:      Nose: Congestion present.      Mouth/Throat:      Pharynx: Posterior oropharyngeal erythema present.   Eyes:      Conjunctiva/sclera: Conjunctivae normal.   Pulmonary:      Effort: Pulmonary effort is normal.      Comments: Raspy voice with cough when he talks  Psychiatric:         Mood and Affect: Mood normal.         Assessment & Plan   Diagnoses and all orders for this visit:    1.  Lower respiratory infection (e.g., bronchitis, pneumonia, pneumonitis, pulmonitis) (Primary)  -     azithromycin (Zithromax Z-Min) 250 MG tablet; Take 2 tabs on Day 1, then 1 tab daily for 4 additional days  Dispense: 6 tablet; Refill: 0     Flonase daily. If no improvement in 2-3 days, start azithromycin. He has an extensive history of symptoms due to medications and I have discussed that Covid is viral in nature, thus antibiotic may not be beneficial, except for secondary infection. In addition to Covid, He could have bacterial bronchitis, vs sinusitis causing increased drainage. This was discussed. He has been advised to follow up with PCP.               Follow Up:  If your symptoms are not resolving by the completion of your treatment or are worsening, see your primary care provider for follow up. If you don't have a primary care provider, you may go to any Urgent Care for re-evaluation. If you develop any life threatening symptoms, go to the nearest Emergency Department immediately or call EMS.               The use of  Video Visit was utilized during this visit, using both MyChart and Zoom. The use of a video visit has been reviewed with the patient and verbal informed consent has been obtained. No technical difficulties occurred during the visit.    is located at [address] [place] [id number]  Provider is located at [place], [place]

## 2023-01-26 ENCOUNTER — TELEPHONE (OUTPATIENT)
Dept: FAMILY MEDICINE CLINIC | Facility: CLINIC | Age: 58
End: 2023-01-26
Payer: COMMERCIAL

## 2023-01-26 NOTE — TELEPHONE ENCOUNTER
Patient was a warm transfer & was very argumentative about making an appointment with Dr Kenyon vs Dr Martinez. Patient was very insistent that Dr Kenyon was his new PCP & he would not see anyone but Dr Kenyon. I reminded patient that he had spoken to the practice manager previously & there was also a message in his my chart from her with a list of providers he could reach out to that he requested previously.  Patient was informed that Dr Kenyon was at full capacity & he had already established with Dr Martinez, he stated he didn't want to keep her as his PCP.    Patient was very persistent & continued to speak in an aggressive, argumentative tone. I asked patient several times to not speak to me in that manner or I would end the call. Patient continued & I ended the call.

## 2023-01-31 ENCOUNTER — TELEPHONE (OUTPATIENT)
Dept: GASTROENTEROLOGY | Facility: CLINIC | Age: 58
End: 2023-01-31

## 2023-01-31 ENCOUNTER — TELEPHONE (OUTPATIENT)
Dept: URGENT CARE | Facility: CLINIC | Age: 58
End: 2023-01-31

## 2023-01-31 NOTE — TELEPHONE ENCOUNTER
Hub staff attempted to follow warm transfer process and was unsuccessful     Caller: Jose Maria Judge    Relationship to patient: Self    Best call back number: [phone]    Patient is needing: PATIENT IS CONTACTING AND WOULD LIKE TO BE SEEN DUE TO EXPERIENCING ACID REFLUX.  HE PREVIOUSLY SEEN DR. HEBERT AT [facility] ON  06/30/2022.  WAS LAST SEEN WITH DR CUNNINGHAM 10-.

## 2023-01-31 NOTE — TELEPHONE ENCOUNTER
01/31/2023  AC PATIENT CALLED STILL NOT BETTER SAYS HE CANT FINISH THE AUGMENTIN BC ITS GIVING HIM DIARRHEA HE WAS ADVISED TO GO TO THE ER BY DR YI FOR FURTHER EVAL AND TREAT

## 2023-02-06 ENCOUNTER — TELEPHONE (OUTPATIENT)
Dept: GASTROENTEROLOGY | Facility: CLINIC | Age: 58
End: 2023-02-06

## 2023-02-06 NOTE — TELEPHONE ENCOUNTER
Provider: MARY CUNNINGHAM  Caller: DUY HERNANDEZ  Relationship to Patient: SELF  Phone Number: [phone]  Reason for Call: PT IS HAVING ACID REFLUX AND IT HAS AN ICK MAKING THROAT RED, AND HE IS NOT SICK AND IT IS STAYING RED. IT HAS BEEN LIKE THIS SINCE October 2022.    PT HAS AN APPOINTMENT ON 2/24 BUT IF HE COULD SEE OCTAVIO HE WOULD PREFER TO SEE HER. THE URGENT CARE DOCTOR SUGGESTED PT SEE GASTRO PHYSICIAN FOR THIS ISSUE.

## 2023-02-07 NOTE — TELEPHONE ENCOUNTER
Returned call to pt and passed on Dr Ring's recommendations.  Offered sooner apt and he is scheduled.

## 2023-02-13 ENCOUNTER — TELEPHONE (OUTPATIENT)
Dept: INTERNAL MEDICINE | Facility: CLINIC | Age: 58
End: 2023-02-13
Payer: COMMERCIAL

## 2023-02-13 NOTE — TELEPHONE ENCOUNTER
----- Message from Singh Middleton MD sent at 2/10/2023  5:05 PM EST -----  Regarding: RE: NEW PATIENT APPOINTMENT REQUEST FOR DR. MIDDLETON SINCE MR. HERNANDEZ'S DAD IS A PATIENT.  Contact: [phone]  Refer to Dr. Morgan Young  ----- Message -----  From: Lainey Fischer  Sent: 2/10/2023   3:14 PM EST  To: Singh Middleton MD  Subject: FW: NEW PATIENT APPOINTMENT REQUEST FOR  #    Message below.     Would you want to accept as new patient or do you want to refer to Dr. Mora or Dr. Young  ----- Message -----  From: Pete Taylor RegSched Rep  Sent: 2/10/2023   3:07 PM EST  To: [facility]  Subject: NEW PATIENT APPOINTMENT REQUEST FOR DR. HENRIQUEZ#    DR. MIDDLETON,  MR. HERNANDEZ, WOULD LIKE TO SEE IF YOU WOULD ACCEPT HIM AS A PATIENT, SINCE HIS FATHER IS A LONG TIME PATIENT OF YOUR OFFICE?  IF SO, PLEASE CONTACT HIM -[phone] TO SCHEDULE.    THANK YOU   PETE TAYLOR  [facility]

## 2023-02-13 NOTE — TELEPHONE ENCOUNTER
[facility] to read:    Left voice mail stating that Dr. Middleton is not accepting new patient at this time. He recommends patient schedule with one of is partners. Dr. Nico Mora or Dr. Ahsan Young

## 2023-02-15 ENCOUNTER — TELEPHONE (OUTPATIENT)
Dept: ONCOLOGY | Facility: CLINIC | Age: 58
End: 2023-02-15
Payer: COMMERCIAL

## 2023-02-15 NOTE — TELEPHONE ENCOUNTER
Caller: Jose Maria Judge    Relationship to patient: Self    Best call back number: [phone]    Chief complaint: R/S    Type of visit: LAB AND FOLLOW UP    Requested date: NO MORNINGS, AFTER 12    If rescheduling, when is the original appointment: 2-22    Additional notes: PLEASE ADVISE

## 2023-02-22 ENCOUNTER — OFFICE VISIT (OUTPATIENT)
Dept: ONCOLOGY | Facility: CLINIC | Age: 58
End: 2023-02-22
Payer: COMMERCIAL

## 2023-02-22 ENCOUNTER — LAB (OUTPATIENT)
Dept: LAB | Facility: HOSPITAL | Age: 58
End: 2023-02-22
Payer: COMMERCIAL

## 2023-02-22 VITALS
HEART RATE: 74 BPM | OXYGEN SATURATION: 96 % | BODY MASS INDEX: 29.88 KG/M2 | DIASTOLIC BLOOD PRESSURE: 92 MMHG | HEIGHT: 71 IN | TEMPERATURE: 97.3 F | WEIGHT: 213.4 LBS | SYSTOLIC BLOOD PRESSURE: 137 MMHG | RESPIRATION RATE: 18 BRPM

## 2023-02-22 DIAGNOSIS — R59.1 LYMPHADENOPATHY: ICD-10-CM

## 2023-02-22 DIAGNOSIS — R59.9 LYMPH NODE ENLARGEMENT: ICD-10-CM

## 2023-02-22 DIAGNOSIS — G58.8 PUDENDAL NEURALGIA: Primary | ICD-10-CM

## 2023-02-22 LAB
ALBUMIN SERPL-MCNC: 4.5 G/DL (ref 3.5–5.2)
ALBUMIN/GLOB SERPL: 1.5 G/DL (ref 1.1–2.4)
ALP SERPL-CCNC: 46 U/L (ref 38–116)
ALT SERPL W P-5'-P-CCNC: 17 U/L (ref 0–41)
ANION GAP SERPL CALCULATED.3IONS-SCNC: 14.1 MMOL/L (ref 5–15)
AST SERPL-CCNC: 19 U/L (ref 0–40)
BASOPHILS # BLD AUTO: 0.03 10*3/MM3 (ref 0–0.2)
BASOPHILS NFR BLD AUTO: 0.6 % (ref 0–1.5)
BILIRUB SERPL-MCNC: 0.3 MG/DL (ref 0.2–1.2)
BUN SERPL-MCNC: 18 MG/DL (ref 6–20)
BUN/CREAT SERPL: 18.9 (ref 7.3–30)
CALCIUM SPEC-SCNC: 9.7 MG/DL (ref 8.5–10.2)
CHLORIDE SERPL-SCNC: 105 MMOL/L (ref 98–107)
CO2 SERPL-SCNC: 22.9 MMOL/L (ref 22–29)
CREAT SERPL-MCNC: 0.95 MG/DL (ref 0.7–1.3)
DEPRECATED RDW RBC AUTO: 41 FL (ref 37–54)
EGFRCR SERPLBLD CKD-EPI 2021: 92.8 ML/MIN/1.73
EOSINOPHIL # BLD AUTO: 0.09 10*3/MM3 (ref 0–0.4)
EOSINOPHIL NFR BLD AUTO: 1.7 % (ref 0.3–6.2)
ERYTHROCYTE [DISTWIDTH] IN BLOOD BY AUTOMATED COUNT: 12.5 % (ref 12.3–15.4)
GLOBULIN UR ELPH-MCNC: 3.1 GM/DL (ref 1.8–3.5)
GLUCOSE SERPL-MCNC: 88 MG/DL (ref 74–124)
HCT VFR BLD AUTO: 42.3 % (ref 37.5–51)
HGB BLD-MCNC: 14.3 G/DL (ref 13–17.7)
IMM GRANULOCYTES # BLD AUTO: 0.01 10*3/MM3 (ref 0–0.05)
IMM GRANULOCYTES NFR BLD AUTO: 0.2 % (ref 0–0.5)
LYMPHOCYTES # BLD AUTO: 1.84 10*3/MM3 (ref 0.7–3.1)
LYMPHOCYTES NFR BLD AUTO: 34.8 % (ref 19.6–45.3)
MCH RBC QN AUTO: 30.1 PG (ref 26.6–33)
MCHC RBC AUTO-ENTMCNC: 33.8 G/DL (ref 31.5–35.7)
MCV RBC AUTO: 89.1 FL (ref 79–97)
MONOCYTES # BLD AUTO: 0.54 10*3/MM3 (ref 0.1–0.9)
MONOCYTES NFR BLD AUTO: 10.2 % (ref 5–12)
NEUTROPHILS NFR BLD AUTO: 2.77 10*3/MM3 (ref 1.7–7)
NEUTROPHILS NFR BLD AUTO: 52.5 % (ref 42.7–76)
NRBC BLD AUTO-RTO: 0 /100 WBC (ref 0–0.2)
PLATELET # BLD AUTO: 154 10*3/MM3 (ref 140–450)
PMV BLD AUTO: 9.9 FL (ref 6–12)
POTASSIUM SERPL-SCNC: 4.1 MMOL/L (ref 3.5–4.7)
PROT SERPL-MCNC: 7.6 G/DL (ref 6.3–8)
RBC # BLD AUTO: 4.75 10*6/MM3 (ref 4.14–5.8)
SODIUM SERPL-SCNC: 142 MMOL/L (ref 134–145)
WBC NRBC COR # BLD: 5.28 10*3/MM3 (ref 3.4–10.8)

## 2023-02-22 PROCEDURE — 36415 COLL VENOUS BLD VENIPUNCTURE: CPT

## 2023-02-22 PROCEDURE — 85025 COMPLETE CBC W/AUTO DIFF WBC: CPT

## 2023-02-22 PROCEDURE — 80053 COMPREHEN METABOLIC PANEL: CPT

## 2023-02-22 PROCEDURE — 99214 OFFICE O/P EST MOD 30 MIN: CPT | Performed by: INTERNAL MEDICINE

## 2023-02-22 RX ORDER — GABAPENTIN 100 MG/1
100 CAPSULE ORAL NIGHTLY
Qty: 30 CAPSULE | Refills: 1 | Status: SHIPPED | OUTPATIENT
Start: 2023-02-22 | End: 2023-04-05

## 2023-02-22 NOTE — PROGRESS NOTES
Subjective Ongoing pudendal neuralgia, right groin pain.      REASON FOR FOLLOWUP: Previous left axillary adenopathy with potential evidence of lymph abdominal Hodgkin's disease found negative.    History of Present Illness      The patient is a 57-year-old male who was recently been assessed by surgery having worsening epigastric discomfort since November.  He been seen by Dr. Nguyễn with gallbladder ultrasound and CT scan of abdomen pelvis done in late November demonstrating a possible calcified tiny gallstones within the lumen of the gallbladder.  The same report documents enlarged right external iliac adenopathy that was unchanged from January 2016. he had associated intermittent nausea and ultimately required admission to [facility].  He was admitted December 29-December 31.  His subsequent testing revealed a GI panel that was positive for norovirus and his symptoms improved with gut rest and hydration.  HIDA scan redemonstrated 50% ejection fraction and cholecystectomy was discussed with follow-up planned.   Additional testing continued as outpatient in January including EGD that demonstrated no gross abnormalities of the first and second portions of the duodenum nor the gastric antrum, body and retroflexed view of the stomach which were thought to be normal.      Importantly the patient and also been reviewed for enlarged left axillary lymph node in November leading to an excision of a deep left axillary lymph node November 9, 2018.  This had initially been evaluated by needle biopsy August 2018 revealing a polymorphous lymphoid population with histiocytes.  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  We have discussed the patient's history extensively and that we were to see him potentially initially in November but as a  result of his additional issues medically he is only been seen now approximately 6 months later.  He has no fevers, chills, does have weight loss result of change in his diet.  He describes in particular worsening of a anal fissure that has caused him pain for quite some time as well as symptoms that could potentially be referable to pudendal neuralgia.  The patient was assessed for potential lymphoproliferative disorder with a number of studies including CRP, sed rate, paraprotein analysis,  negative.  Subsequently repeat CT scan of chest and pelvis July 3, 2019 also failed to show any additional lymphadenopathy with a stable appearance to pelvic adenopathy and no intra-abdominal or intrapelvic process seen, no pathologic lymphadenopathy seen in the chest.  He is seen back July 11, 2019 indicating that his major issue has been hyperosmia and he wishes an ENT assessment.  The patient did follow-up with ENT and is now seen back December 30, 2020 still recovering from upper respiratory infection that he believes he developed after seeing ENT staff members.  He feels about the same, still with hyperosmia but has no fever, chills, appetite reduction or weight loss.  He remains concerned about his back and numbness in his extremities and is scheduled for NCV testing in mid February and follow-up with neurosurgery.  Patient underwent follow-up CT scans May 20 demonstrating right pelvic lymphadenopathy that was stable, no new abnormalities in the abdomen pelvis and no lymphadenopathy or abnormalities within the chest.      Unfortunately his continue to have severe pain in his lower back leading to neurosurgical assessment and MRI imaging the lumbar spine showing multilevel disc desiccation similar to May 2019.  Patient was contacted by telephone May 27, 2020 and his scan results reviewed with him.  He still has episodes of chilling that he notices but otherwise is doing about the same but still has back pain that is  bothering him excessively.  He does not have night sweats, weight loss, rash or additional systemic symptoms.  The patient is next contacted 5/27/2021 indicating that he has been having ongoing, and refractory, pain from pudendal neuralgia undergoing a series of treatments to pain management.  He is also quite concerned about his brother who is currently hospitalized.    The patient is next reviewed 5/26/2022 by telephone.  Unfortunately his brother recently passed away and he is continuing to grieve.  The patient, himself, also has ongoing pudendal neuralgia as well as having recent developed tinnitus.  Neurologic consult is pending concerning this.  Recent additional assessment included a right axillary ultrasound that was normal and CT scan of the chest 1/14/2022 also without new abnormality though there is a stable 0.4 cm nodule right middle lobe its been stable over 2 years.    The patient states he had surgery involving his right hip and has had been having groin pain since.    The patient underwent additional scans 12/28/2022 revealing enlarging right external iliac node 2.7 x 3.7 x 3.8 previously 2.2 x 3.4 x 3.4.  He was asked to return for follow-up indicating that he has been noting some tingling at the area.  There were no additional findings on his other scans including CT of the chest.    Past Medical History:   Diagnosis Date   • Abdominal lymphadenopathy    • Acid reflux     HX   • Anxiety     R/T PAIN   • Carpal tunnel syndrome, left    • Chronic pain    • DDD (degenerative disc disease), lumbar    • Fatigue    • H/O Hypersensitivity to odor     Burning sensation in nasal passage   • Hemorrhoids     internal fistula   • History of medical problems Hyperosmia, Pudendal neuralgia   • Irritable bowel syndrome    • Low back pain    • Lymphadenopathy, axillary     LEFT   • Male pattern alopecia    • Pudendal neuralgia    • Right hip pain    • Unexplained night sweats         Past Surgical History:    Procedure Laterality Date   • ABSCESS DRAINAGE  08/2012   • ANAL FISTULA REPAIR N/A 09/2012, 10/2012   • AXILLARY LYMPH NODE BIOPSY/EXCISION Left 11/9/2018    Procedure: AXILLARY LYMPH NODE BIOPSY/EXCISION;  Surgeon: Amando Nguyễn MD;  Location: [facility] OR [facility];  Service: General   • COLONOSCOPY N/A 2013    done at [facility]   • COLONOSCOPY N/A 7/10/2019    Procedure: COLONOSCOPY to CECUM;  Surgeon: Amando Nguyễn MD;  Location: [facility] ENDOSCOPY;  Service: General   • ENDOSCOPY N/A 1/9/2019    Procedure: ESOPHAGOGASTRODUODENOSCOPY;  Surgeon: Amando Nguyễn MD;  Location: [facility] ENDOSCOPY;  Service: General   • FINE NEEDLE ASPIRATION Left 08/09/2018    Left axillary lymph node FNA   • FLEXIBLE SIGMOIDOSCOPY N/A 06/25/2003    Normal-Dr. Cezar Aviles   • HEMORRHOIDECTOMY N/A 1996   • HIP ARTHROSCOPY W/ LABRAL REPAIR Right 04/03/2017    Dr. Lonnie Lemons   • MEDIAL BRANCH BLOCK Bilateral 3/12/2021    Procedure: BILATERAL MEDIAL BRANCH BLOCK W/MAC;  Surgeon: Francisco Velez MD;  Location: [facility] OR;  Service: Pain Management;  Laterality: Bilateral;   • SIGMOIDOSCOPY N/A 9/23/2020    Procedure: FLEXIBLE SIGMOIDOSCOPY WITH BIOPSY;  Surgeon: Shena Ring MD;  Location: [facility] ENDOSCOPY;  Service: Gastroenterology;  Laterality: N/A;  pre- anal/rectal pain  post- hemorrhoids   • TONSILLECTOMY Bilateral         Current Outpatient Medications on File Prior to Visit   Medication Sig Dispense Refill   • acetaminophen (TYLENOL) 500 MG tablet Take 1 to 2 tablets every 6 hours as needed for fever, aches, pains 30 tablet 0   • diazePAM (Valium) 2 MG tablet Take 1 tablet by mouth Daily As Needed for Anxiety. 30 tablet 0   • fluticasone (FLONASE) 50 MCG/ACT nasal spray 2 sprays into the nostril(s) as directed by provider Daily. 18.2 mL 0     No current facility-administered medications on file prior to visit.        ALLERGIES:    Allergies   Allergen Reactions   • Bactrim [Sulfamethoxazole-Trimethoprim]  "Shortness Of Breath   • Hydrocodone Shortness Of Breath   • Hydrocodone-Acetaminophen Shortness Of Breath and Unknown (See Comments)     Bloating, sleeplessness, difficulty breathing   • Levaquin [Levofloxacin] Unknown - Low Severity     Made tendons tight   • Mobic [Meloxicam] Nausea Only and Other (See Comments)     Severe heartburn   • Naproxen Shortness Of Breath   • Promethazine Tinnitus   • Sulfa Antibiotics Shortness Of Breath     TROUBLE BREATHING   • Alprazolam Other (See Comments)     Annotation - 06Jan2016: Patient stated he can not take this medication because it \"makes him feel weird.\"     • Diclofenac Nausea Only and Other (See Comments)     Severe heartburn   • Erythromycin GI Intolerance   • Tramadol Headache   • Ceftin [Cefuroxime] Other (See Comments)     cough        Social History     Socioeconomic History   • Marital status: Single   • Number of children: 0   • Years of education: COLLEGE   • Highest education level: Not asked   Tobacco Use   • Smoking status: Never   • Smokeless tobacco: Never   • Tobacco comments:     caff use   Vaping Use   • Vaping Use: Never used   Substance and Sexual Activity   • Alcohol use: Never   • Drug use: Never   • Sexual activity: Not Currently     Comment: not currently seeing anyone        Family History   Problem Relation Age of Onset   • Atrial fibrillation Father    • Aneurysm Father    • Hypertension Father    • Lymphoma Brother    • Cancer Brother    • Malig Hyperthermia Neg Hx    • Colon cancer Neg Hx    • Colon polyps Neg Hx    • Crohn's disease Neg Hx    • Irritable bowel syndrome Neg Hx    • Ulcerative colitis Neg Hx         Review of Systems   Constitutional: Positive for activity change. Negative for appetite change and unexpected weight change.   HENT: Negative for congestion and rhinorrhea.    Respiratory: Negative.  Negative for chest tightness, shortness of breath and wheezing.    Cardiovascular: Negative.    Gastrointestinal: Negative for " "abdominal pain, constipation, diarrhea, nausea and vomiting.   Genitourinary: Negative.    Musculoskeletal: Positive for back pain and gait problem (Right hip surgery?).   Skin: Negative.    Neurological: Positive for numbness.   Hematological: Negative.    Psychiatric/Behavioral: Negative.              Objective     Vitals:    02/22/23 1529   BP: 137/92   Pulse: 74   Resp: 18   Temp: 97.3 °F (36.3 °C)   TempSrc: Temporal   SpO2: 96%   Weight: 96.8 kg (213 lb 6.4 oz)   Height: 180.3 cm (70.98\")   PainSc:   6   PainLoc: Hip  Comment: right hip gets tight/ groin     Current Status 2/22/2023   ECOG score 0           RECENT LABS:  Hematology WBC   Date Value Ref Range Status   02/22/2023 5.28 3.40 - 10.80 10*3/mm3 Final   07/08/2022 4.3 3.4 - 10.8 x10E3/uL Final     RBC   Date Value Ref Range Status   02/22/2023 4.75 4.14 - 5.80 10*6/mm3 Final   07/08/2022 5.02 4.14 - 5.80 x10E6/uL Final     Hemoglobin   Date Value Ref Range Status   02/22/2023 14.3 13.0 - 17.7 g/dL Final     Hematocrit   Date Value Ref Range Status   02/22/2023 42.3 37.5 - 51.0 % Final     Platelets   Date Value Ref Range Status   02/22/2023 154 140 - 450 10*3/mm3 Final          Assessment & Plan      The patient is a 57-year-old male who had previously been assessed for enlarged left axillary adenopathy in November leading to a biopsy that was nondiagnostic but suspicious and eventually an excisional biopsy that was eventually felt to be consistent with follicular hyperplasia and progressive transformation of germinal centers with 2 foci suspicious for early involvement by nodules lymphocyte predominant Hodgkin's lymphoma.  This was likely treated by excision and further therapy is not necessary in this patient though he has a number of symptoms that are at least suspicious for potential recurrence.  As he was admitted for his GI symptoms, described above, he underwent additional testing that demonstrated right external iliac adenopathy that have been " unchanged from 2016 and further endoscopy was negative as well.  Again we had a long discussion today as to how he might of already been treated under the circumstances but that additional surveillance would be necessary in approximately 6 months from his last exam and he is now seen May 16 thus allowing us to reexamine him via scan in the next several months.  He and his mother are agreeable with this plan.  We had the patient proceed with a number of studies that were fortunately negative for evidence of lymphoproliferative disorder.  As he is seen July 11, 2019 his physical exam again does not reveal new lymphadenopathy or hepatosplenomegaly and CBC is normal.    The patient then seen February 3, 2020 doing about the same but having issues with lower extremity numbness which is been reviewed by neurosurgery.  After discussion we had him undergo repeat assessments including CT of chest and pelvis 4 months later.  These are found to be without change and the patient was contacted in that we would like to see him at least on a yearly basis.     The patient is next contacted by telephone 5/27/2021 with further pain described as pudendal neuralgia for which he sees pain management routinely with only modest success.  Fortunately there is been no suggestion of development of a malignancy such as lymphoma.     This is again the case for the patient seen 5/26/2022 though he has a series of ongoing somatic complaints.  This includes his pudendal neuralgia and recent development of tinnitus on the left ear.    The patient states that he has been having right hip pain as well as foot pain recently undergoing surgery involving his right hip.      The patient underwent additional scans 12/28/2022 revealing enlarging right external iliac node 2.7 x 3.7 x 3.8 previously 2.2 x 3.4 x 3.4.  He was asked to return for follow-up indicating that he has been noting some tingling at the area.  There were no additional findings on his  other scans including CT of the chest.        Plan:     *Concerning the findings we have discussed a follow-up scan with him in 6 months.  If the right inguinal mass is larger than a follow-up biopsy will be assessed though the area in question has not changed dramatically over 2 years.  No additional adenopathy has developed concurrently.  After lengthy discussion he is agreeable to a follow-up exam.    *Reassessment 23 weeks, CT chest on pelvis, CBC, LDH, CMP    *24 weeks MD though the patient is urged to call if he notes additional adenopathy that is developing in the interval.

## 2023-03-01 ENCOUNTER — OFFICE VISIT (OUTPATIENT)
Dept: GASTROENTEROLOGY | Facility: CLINIC | Age: 58
End: 2023-03-01
Payer: COMMERCIAL

## 2023-03-01 VITALS
WEIGHT: 217.2 LBS | OXYGEN SATURATION: 95 % | BODY MASS INDEX: 30.31 KG/M2 | TEMPERATURE: 97.8 F | DIASTOLIC BLOOD PRESSURE: 72 MMHG | HEART RATE: 60 BPM | SYSTOLIC BLOOD PRESSURE: 111 MMHG

## 2023-03-01 DIAGNOSIS — K62.89 ANAL OR RECTAL PAIN: ICD-10-CM

## 2023-03-01 DIAGNOSIS — K64.8 INTERNAL HEMORRHOIDS: ICD-10-CM

## 2023-03-01 DIAGNOSIS — J39.2 THROAT IRRITATION: Primary | ICD-10-CM

## 2023-03-01 PROCEDURE — 99214 OFFICE O/P EST MOD 30 MIN: CPT | Performed by: INTERNAL MEDICINE

## 2023-03-01 RX ORDER — PANTOPRAZOLE SODIUM 20 MG/1
20 TABLET, DELAYED RELEASE ORAL DAILY
Qty: 30 TABLET | Refills: 0 | Status: SHIPPED | OUTPATIENT
Start: 2023-03-01 | End: 2023-03-21

## 2023-03-01 NOTE — PROGRESS NOTES
Subjective   Chief Complaint   Patient presents with   • Throat irritation        Jose Maria Judge is a  57 y.o. male here for a follow up visit for GERD.  He was last seen by me in 2020.    He denies heartburn.    He reports dryness in his mouth and redness in his throat.  He tasted acid in his mouth last night.  He has had 2 negative strep tests.  He had covid in jan but these issues preceded covid.  Appetite good, not related to food.      No sinus drainage or stuffiness.  He has seasonal allergies but this has not been an issue.     Started new vitamins a few weeks ago.      He tried pepcid for 2 weeks.    He has a history of pudendal neuralgia and continues to have daily pain in his rectum.  He sees multiple specialist for this in the past.  He has tried fiber, stool softener, MiraLAX.  He has bowel movements postprandially.  He reports he tried a stool softener before and it made him go to the bathroom too much.  MiraLAX booked him up too much and fiber caused him to go too much.  When he uses the restroom too frequently it makes the pain worse.  He usually lays down on his left side after he eats and takes half of Valium and this helps with the pain.  He also uses Tylenol.      HPI  Past Medical History:   Diagnosis Date   • Abdominal lymphadenopathy    • Acid reflux     HX   • Anxiety     R/T PAIN   • Carpal tunnel syndrome, left    • Chronic pain    • DDD (degenerative disc disease), lumbar    • Fatigue    • H/O Hypersensitivity to odor     Burning sensation in nasal passage   • Hemorrhoids     internal fistula   • History of medical problems Hyperosmia, Pudendal neuralgia   • Irritable bowel syndrome    • Low back pain    • Lymphadenopathy, axillary     LEFT   • Male pattern alopecia    • Pudendal neuralgia    • Right hip pain    • Unexplained night sweats      Past Surgical History:   Procedure Laterality Date   • ABSCESS DRAINAGE  08/2012   • ANAL FISTULA REPAIR N/A 09/2012, 10/2012   • AXILLARY LYMPH NODE  BIOPSY/EXCISION Left 11/9/2018    Procedure: AXILLARY LYMPH NODE BIOPSY/EXCISION;  Surgeon: Amando Nguyễn MD;  [facility];  Service: General   • COLONOSCOPY N/A 2013    done at [facility]   • COLONOSCOPY N/A 7/10/2019    Procedure: COLONOSCOPY to CECUM;  Surgeon: Amando Nguyễn MD;  [facility];  Service: General   • ENDOSCOPY N/A 1/9/2019    Procedure: ESOPHAGOGASTRODUODENOSCOPY;  Surgeon: Amando Nguyễn MD;  Location: [facility] ENDOSCOPY;  Service: General   • FINE NEEDLE ASPIRATION Left 08/09/2018    Left axillary lymph node FNA   • FLEXIBLE SIGMOIDOSCOPY N/A 06/25/2003    Normal-Dr. Cezar Aviles   • HEMORRHOIDECTOMY N/A 1996   • HIP ARTHROSCOPY W/ LABRAL REPAIR Right 04/03/2017    Dr. Lonnie Lemons   • MEDIAL BRANCH BLOCK Bilateral 3/12/2021    Procedure: BILATERAL MEDIAL BRANCH BLOCK W/MAC;  Surgeon: Francisco Velez MD;  Location: [facility] OR;  Service: Pain Management;  Laterality: Bilateral;   • SIGMOIDOSCOPY N/A 9/23/2020    Procedure: FLEXIBLE SIGMOIDOSCOPY WITH BIOPSY;  Surgeon: Shena Ring MD;  Location: [facility] ENDOSCOPY;  Service: Gastroenterology;  Laterality: N/A;  pre- anal/rectal pain  post- hemorrhoids   • TONSILLECTOMY Bilateral        Current Outpatient Medications:   •  acetaminophen (TYLENOL) 500 MG tablet, Take 1 to 2 tablets every 6 hours as needed for fever, aches, pains, Disp: 30 tablet, Rfl: 0  •  diazePAM (Valium) 2 MG tablet, Take 1 tablet by mouth Daily As Needed for Anxiety., Disp: 30 tablet, Rfl: 0  •  gabapentin (NEURONTIN) 100 MG capsule, Take 1 capsule by mouth Every Night., Disp: 30 capsule, Rfl: 1  •  fluticasone (FLONASE) 50 MCG/ACT nasal spray, 2 sprays into the nostril(s) as directed by provider Daily., Disp: 18.2 mL, Rfl: 0  PRN Meds:.  Allergies   Allergen Reactions   • Bactrim [Sulfamethoxazole-Trimethoprim] Shortness Of Breath   • Hydrocodone Shortness Of Breath   • Hydrocodone-Acetaminophen Shortness Of Breath and Unknown  "(See Comments)     Bloating, sleeplessness, difficulty breathing   • Levaquin [Levofloxacin] Unknown - Low Severity     Made tendons tight   • Mobic [Meloxicam] Nausea Only and Other (See Comments)     Severe heartburn   • Naproxen Shortness Of Breath   • Promethazine Tinnitus   • Sulfa Antibiotics Shortness Of Breath     TROUBLE BREATHING   • Alprazolam Other (See Comments)     Annotation - 06Jan2016: Patient stated he can not take this medication because it \"makes him feel weird.\"     • Diclofenac Nausea Only and Other (See Comments)     Severe heartburn   • Erythromycin GI Intolerance   • Tramadol Headache   • Ceftin [Cefuroxime] Other (See Comments)     cough     Social History     Socioeconomic History   • Marital status: Single   • Number of children: 0   • Years of education: COLLEGE   • Highest education level: Not asked   Tobacco Use   • Smoking status: Never   • Smokeless tobacco: Never   • Tobacco comments:     caff use   Vaping Use   • Vaping Use: Never used   Substance and Sexual Activity   • Alcohol use: Never   • Drug use: Never   • Sexual activity: Not Currently     Comment: not currently seeing anyone     Family History   Problem Relation Age of Onset   • Atrial fibrillation Father    • Aneurysm Father    • Hypertension Father    • Lymphoma Brother    • Cancer Brother    • Malig Hyperthermia Neg Hx    • Colon cancer Neg Hx    • Colon polyps Neg Hx    • Crohn's disease Neg Hx    • Irritable bowel syndrome Neg Hx    • Ulcerative colitis Neg Hx      Review of Systems   Constitutional: Negative for appetite change and unexpected weight change.   HENT: Negative for trouble swallowing.    Gastrointestinal: Negative for abdominal pain, nausea and vomiting.     Vitals:    03/01/23 1433   BP: 111/72   Pulse: 60   Temp: 97.8 °F (36.6 °C)   SpO2: 95%         03/01/23  1433   Weight: 98.5 kg (217 lb 3.2 oz)       Objective   Physical Exam  Constitutional:       Appearance: Normal appearance. He is " well-developed.   HENT:      Head: Normocephalic and atraumatic.   Eyes:      General: No scleral icterus.     Conjunctiva/sclera: Conjunctivae normal.   Pulmonary:      Effort: Pulmonary effort is normal.   Abdominal:      General: There is no distension.      Palpations: Abdomen is soft.   Musculoskeletal:      Cervical back: Normal range of motion and neck supple.   Skin:     General: Skin is warm and dry.   Neurological:      Mental Status: He is alert.   Psychiatric:         Mood and Affect: Mood normal.         Behavior: Behavior normal.       No radiology results for the last 7 days    Assessment & Plan   Diagnoses and all orders for this visit:    Throat irritation    Anal or rectal pain    Internal hemorrhoids      Plan:  · His symptoms are atypical for acid reflux.  He had no response with 2 weeks of Pepcid.  We will try PPI for a month and if he does not have improvement, recommend that he see an ENT for his persistent throat irritation.  · He has tried multiple medications to improve his bowel movements and relieve his rectal pain.  Recommend that he see colorectal surgery and seek another opinion given his ongoing symptoms.  He describes an itching sensation in his rectum and I recommended a trial of over-the-counter Preparation H suppositories.  He has previously tried MiraLAX, fiber supplementation, stool softeners and glycerin suppositories without any improvement in his symptoms.  He had a flexible sigmoidoscopy in 2020 that showed nonbleeding internal hemorrhoids but no other findings.

## 2023-03-06 ENCOUNTER — TELEPHONE (OUTPATIENT)
Dept: GASTROENTEROLOGY | Facility: CLINIC | Age: 58
End: 2023-03-06
Payer: COMMERCIAL

## 2023-03-06 DIAGNOSIS — K92.1 BLACK STOOL: Primary | ICD-10-CM

## 2023-03-06 NOTE — TELEPHONE ENCOUNTER
"Returned call to pt who stated since seeing you last week he starting waking up at 2-3 am with nausea.  He is concerned \"because this is a sign of an ulcer\"  Also \"I didn't have this symptom when I saw Dr Ring.\"  And \"I don't have GERD\"    Reviewed Dr Cooper visit note with the pt.  Reviewed her recommendations.  Pt has NOT started the Pantoprazole, which I advised he needed to get today, as it will treat his symptoms.  He said he has been \"too sick to get the medication\"    Is there anything else you would like him to try, in addition to the pantoprazole?   "

## 2023-03-06 NOTE — TELEPHONE ENCOUNTER
Recommend starting the pantoprazole 30 minutes prior to evening meal and we will see how his symptoms respond

## 2023-03-06 NOTE — TELEPHONE ENCOUNTER
Caller: Jose Maria Judge    Relationship to patient: Self    Best call back number: [phone] (Mobile)    Patient is needing: PT IS CALLING REGARDING DIRECTIONS ON HOW TO TAKE HIS MEDICINE. I TRIED TO TRANSFER TO THE OFFICE, BUT NO ONE WAS AVAIL. PLEASE CALL PT ASAP SO HE CAN TAKE HIS PANTOPRAZOLE THIS EVENING.

## 2023-03-06 NOTE — TELEPHONE ENCOUNTER
Caller: Jose Maria Judge    Relationship: Self    Best call back number: [phone]    What is the best time to reach you: ANYTIME    Who are you requesting to speak with (clinical staff, provider,  specific staff member): CLINICAL    What was the call regarding: PATIENT COMPLAINING OF HIS ACID REFLUX GETTING WORSE AND IT WAKING HIM UP WITH A LOT OF NAUSEA AND PAIN IN HIS STOMACH. HE WOULD LIKE TO SPEAK WITH A NURSE ON WHAT HE CAN DO.     Do you require a callback: YES

## 2023-03-06 NOTE — TELEPHONE ENCOUNTER
Hub staff attempted to follow warm transfer process and was unsuccessful     Caller: Jose Maria Judge    Relationship to patient: Self    Best call back number: [phone]    Patient is needing: PATIENT CALLING WITH QUESTIONS ON HOW TO TAKE  PANTOPRAZOLE (PROTONIX) SOD  20 MG SAYS ON BOTTLE TO TAKE ONCE DAILY AND DR. CUNNINGHAM ADVISED TO TAKE 30 MINS BEFORE DINNER AND THE LABEL DOES NOT REFLECT THIS AND PATIENT WOULD LIKE CLARIFICATION ON HOW TO TAKE MEDICATION.     PLEASE REVIEW AND CONTACT PATIENT TO ADVISE.

## 2023-03-07 ENCOUNTER — LAB (OUTPATIENT)
Dept: LAB | Facility: HOSPITAL | Age: 58
End: 2023-03-07
Payer: COMMERCIAL

## 2023-03-07 ENCOUNTER — TELEPHONE (OUTPATIENT)
Dept: GASTROENTEROLOGY | Facility: CLINIC | Age: 58
End: 2023-03-07

## 2023-03-07 DIAGNOSIS — K92.1 BLACK STOOL: ICD-10-CM

## 2023-03-07 LAB
HCT VFR BLD AUTO: 43 % (ref 37.5–51)
HGB BLD-MCNC: 14.6 G/DL (ref 13–17.7)

## 2023-03-07 PROCEDURE — 85014 HEMATOCRIT: CPT

## 2023-03-07 PROCEDURE — 36415 COLL VENOUS BLD VENIPUNCTURE: CPT

## 2023-03-07 PROCEDURE — 85018 HEMOGLOBIN: CPT

## 2023-03-07 NOTE — TELEPHONE ENCOUNTER
Caller: Jose Maria Judge    Relationship to patient: Self    Best call back number: [phone]    Patient is needing: PT HAS BLACK STOOL SINCE 3/4/2023, WITH NIGHT TIME NAUSEA.    PLEASE CALL PT TO DISCUSS.

## 2023-03-07 NOTE — TELEPHONE ENCOUNTER
pls have him come to the hospital lab for h/h given black stool but if this persists he should go to the er for immediate eval

## 2023-03-09 ENCOUNTER — APPOINTMENT (OUTPATIENT)
Dept: INFUSION THERAPY | Facility: HOSPITAL | Age: 58
End: 2023-03-09

## 2023-03-10 ENCOUNTER — TELEPHONE (OUTPATIENT)
Dept: GASTROENTEROLOGY | Facility: CLINIC | Age: 58
End: 2023-03-10
Payer: COMMERCIAL

## 2023-03-10 NOTE — TELEPHONE ENCOUNTER
Caller: Jose Maria Judge    Relationship to patient: Self    Best call back number: [phone]    Patient is needing: PATIENT CALLING TO STATE BLACK STOOL HAS STOPPED AND IS BACK TO NORMAL. BUT THE NAUSEA STILL COMING AND GOING. PATIENT IS VERY CONCERNED AND AFRAID ABOUT THIS. PATIENT IS STILL TAKING THE PANTOPRAZOLE BUT IT IS NOT HELPING WITH THE NAUSEA. HE WOULD RATHER NOT GO TO THE EMERGENCY ROOM AND WAIT FOR HOURS, AND WOULD LIKE TO KNOW IF DR. CUNNINGHAM CAN ORDER SOME TEST AND CAN SHE SEND RX TO HELP WITH THE  NAUSEA.    PLEASE REVIEW AND CONTACT PATIENT TO ADVISE.

## 2023-03-10 NOTE — TELEPHONE ENCOUNTER
Called pt and pt reports that the black stools stopped yesterday.  He stools are normal consistency. Pt reports daily bm's.   Pt reports he is still having nausea.  He reports that the nausea is worse when waking up.  Pt did report some abd discomfort at the upper portion of his stomach. Pt is concerned.  Pt is taking the pantoprazole but is not sure if this is helping.  Pt denies a fever and chills. He is asking what can he do or take. Pt does not want to go to ER.  Advised will send message to Dr Ring and in the meantime if symptoms worsen to go to ER.

## 2023-03-13 ENCOUNTER — TELEPHONE (OUTPATIENT)
Dept: ONCOLOGY | Facility: CLINIC | Age: 58
End: 2023-03-13
Payer: COMMERCIAL

## 2023-03-13 NOTE — TELEPHONE ENCOUNTER
Hub staff attempted to follow warm transfer process and was unsuccessful     Caller: Jose Maria Judge    Relationship to patient: Self    Best call back number: [phone]    Patient is needing: PT RETURNED CALL WANTED TO SPEAK WITH THE NURSE BEFORE SCHEDULING APPT.  REQUESTING A CALL BACK AS SOON AS POSSIBLE.

## 2023-03-13 NOTE — TELEPHONE ENCOUNTER
vm left for pt to call back and or check my chart    Ok for HUB to schedule follow up with Dr Ring or PA

## 2023-03-13 NOTE — TELEPHONE ENCOUNTER
Hub staff attempted to follow warm transfer process and was unsuccessful     Caller: Jose Maria Judge    Relationship to patient: Self    Best call back number:[phone]    Patient is needing: 3X CALLING IN TODAY ,  PATIENT RETURNING CALL IN REGARDS TO A SOONER APPT. PATIENT STATES THAT HE HAS FOLLOWED A VERY LIGHT DIET AND EARLY DINNER TIME TO TRY AND SUBSIDE THE NAUSEA AND PAIN HE ALSO STATES THAT HE HAS TAKEN THE pantoprazole 30 MIN PRIOR TO DINNER EVERYDAY WITH NO RELIEF PATIENT REQUEST CALL BACK ASAP FOR SOONER APPT WOULD LIKE TO BE SEEN THIS WEEK PLEASE CALL PATIENT AND ADVISE VERY ADAMANT ABOUT BEING SEEN IN OFFICE THIS WEEK

## 2023-03-13 NOTE — TELEPHONE ENCOUNTER
Provider: MARY CUNNINGHAM  Caller: DUY HERNANDEZ  Relationship to Patient: SELF  Pharmacy: [facility]   Phone Number: [phone]  Reason for Call: PT DOES NOT WANT TO GO TO ER. PT HAS PELVIC FLOOR INJURY THAT MAKE IT DIFFICULT FOR HIM TO WAIT IN ER AND WANT TO AVOID OTHER ILLNESS GOING THERE.     HE IS TAKING PANTOPRAZOLE BUT STILL HAS THE NAUSEA THAT WAKES HIM, STOMACH PAIN, AND A BITTER TASTE IN HIS MOUTH.    IN THE ATTEMPT TO SCHEDULE FOR APPT EARLIEST WAS IN April 28TH, PT DECLINE WOULD LIKE TO BE SEEN SOONER. OR AT TAKE A TEST TO DETERMINE WHAT IS GOING WITH STOMACH.     PLEASE CALL TO DISCUSS OR SCHEDULE.

## 2023-03-13 NOTE — TELEPHONE ENCOUNTER
Returned call to pt, vm left concerning he needs to give the pantoprazole more time.      I also mentioned to him in the message, that I will again discuss his concerns, below with Dr Ring.

## 2023-03-13 NOTE — TELEPHONE ENCOUNTER
Caller: Jose Maria Judge    Relationship: Self    Best call back number: [phone]    What was the call regarding: PATIENT CALLED WANTED TO SPEAK TO DR HOLLOWAY REGARDING SOME ISSUES HE HAS BEEN HAVING. SINCE MARCH HE HAS HAD NAUSEA AND PAIN IN UPPER STOMACH.   PATIENT HAS HAD SOME DARK STOOL AS WELL    Do you require a callback: PLEASE CALL PATIENT TO ADVISE

## 2023-03-13 NOTE — TELEPHONE ENCOUNTER
Pt called stating that since the first of March, he has had issues with acid reflux, nausea and dark stools. He states he placed a few calls to Dr. iRng's office regarding these issues. Pt is very adamant he does not want to go to the ER. He is awaiting response from Dr. Ring regarding call placed on Friday. Advised pt to call their office back to see if there is an update. Advised him Dr. Ring's office would need to be the one to advise on his issues. Pt states he wants to let Dr. Alvarado know. Advised pt he is on vacation but I will mention this to him when I speak to him next. He v/u.

## 2023-03-14 NOTE — TELEPHONE ENCOUNTER
Recommend egd for further evaluation of his symptoms if they persist - would still like for him to give the PPI another week.  If he would like medication for nausea to use prn in the interim, pls let me know

## 2023-03-15 NOTE — TELEPHONE ENCOUNTER
Hub staff attempted to follow warm transfer process and was unsuccessful     Caller: Jose Maria Judge    Relationship to patient: Self    Best call back number: [phone]    Patient is needing: PATIENT RETURNING VM LEFT ON PHONE. PATIENT IS IN ER FOR EXTREME LIGHT HEADEDNESS AND DIZZINESS, BAD NAUSEA AND PAIN. STILL HAVING THE DIZZINESS IN ER. HE IS IN THE [facility] EMERGENCY DEPARTMENT OF THE [facility].    PLEASE REVIEW AND CONTACT PATIENT.

## 2023-03-16 ENCOUNTER — PREP FOR SURGERY (OUTPATIENT)
Dept: OTHER | Facility: HOSPITAL | Age: 58
End: 2023-03-16
Payer: COMMERCIAL

## 2023-03-16 DIAGNOSIS — R10.13 EPIGASTRIC PAIN: Primary | ICD-10-CM

## 2023-03-16 DIAGNOSIS — R11.0 NAUSEA: ICD-10-CM

## 2023-03-16 RX ORDER — ONDANSETRON 4 MG/1
4 TABLET, FILM COATED ORAL EVERY 8 HOURS PRN
Qty: 30 TABLET | Refills: 0 | Status: SHIPPED | OUTPATIENT
Start: 2023-03-16 | End: 2023-03-21

## 2023-03-16 NOTE — TELEPHONE ENCOUNTER
Caller: Jose Maria Judge    Relationship to patient: Self    Best call back number: [phone]  CAN CALL ANY TIME.     Patient is needing: PATIENT CALLING TO CHECK WITH DR. CUNNINGHAM REGARDING THE ISSUES THAT HE HAS BEEN HAVING ALONG WITH HIS ER VISIT YESTERDAY. PLEASE CONTACT PATIENT.     ATTENDING  AT [facility] OFF [facility] TOLD PATIENT TO LET HIS GI  KNOW ABOUT WHAT IS GOING ON.

## 2023-03-16 NOTE — TELEPHONE ENCOUNTER
"Call to pt . Advise per DR Ring's note.  Verb understanding.     Reports issues with nausea and upper stomach pain \"hits at 2:30 -3:00 am every night\".  Yesterday had severe dizziness - could hardly walk.  Had 3 bm's in morning, and after last bm felt like emptied out and felt better.  Dizziness resolved.  However, was concerned about this episode, so went to ER (see care everywhere).  Was given GI cocktail, and then again experienced dizziness - convinced from GI cocktail.      States would like something for nausea.  Would like to proceed with EGD.  Requesting list of foods that are safe for him to eat.     Update/requests to DR Ring.       "

## 2023-03-16 NOTE — TELEPHONE ENCOUNTER
zofran sent    Avoid - acidic foods such as tomatoes and citrus, etoh, carbonated beverages, coffee, fatty foods, fried foods

## 2023-03-21 ENCOUNTER — OFFICE VISIT (OUTPATIENT)
Dept: SURGERY | Facility: CLINIC | Age: 58
End: 2023-03-21
Payer: COMMERCIAL

## 2023-03-21 VITALS — HEIGHT: 70 IN | BODY MASS INDEX: 30.38 KG/M2 | WEIGHT: 212.2 LBS

## 2023-03-21 DIAGNOSIS — Z79.1 NSAID LONG-TERM USE: ICD-10-CM

## 2023-03-21 DIAGNOSIS — R11.2 NAUSEA AND VOMITING, UNSPECIFIED VOMITING TYPE: ICD-10-CM

## 2023-03-21 DIAGNOSIS — R10.11 RUQ ABDOMINAL PAIN: ICD-10-CM

## 2023-03-21 DIAGNOSIS — K92.1 MELENA: Primary | ICD-10-CM

## 2023-03-21 PROCEDURE — 99213 OFFICE O/P EST LOW 20 MIN: CPT | Performed by: PHYSICIAN ASSISTANT

## 2023-03-21 PROCEDURE — 1159F MED LIST DOCD IN RCRD: CPT | Performed by: PHYSICIAN ASSISTANT

## 2023-03-21 PROCEDURE — 1160F RVW MEDS BY RX/DR IN RCRD: CPT | Performed by: PHYSICIAN ASSISTANT

## 2023-03-21 RX ORDER — FAMOTIDINE 20 MG/1
20 TABLET, FILM COATED ORAL DAILY
Qty: 30 TABLET | Refills: 1 | Status: SHIPPED | OUTPATIENT
Start: 2023-03-21 | End: 2023-03-23 | Stop reason: ALTCHOICE

## 2023-03-22 ENCOUNTER — TELEPHONE (OUTPATIENT)
Dept: SURGERY | Facility: CLINIC | Age: 58
End: 2023-03-22
Payer: COMMERCIAL

## 2023-03-22 PROBLEM — K92.1 MELENA: Status: ACTIVE | Noted: 2023-03-22

## 2023-03-22 PROBLEM — R10.11 RUQ ABDOMINAL PAIN: Status: ACTIVE | Noted: 2023-03-22

## 2023-03-22 NOTE — H&P (VIEW-ONLY)
ASSESSMENT/PLAN:    This is a 57-year-old gentleman presenting with melena with bilateral upper quadrant and epigastric abdominal pain as well as nausea.  This in conjunction with his alternating constipation and diarrhea are prompting me to recommend proceeding with an EGD and colonoscopy.  Risks and rationale were discussed with him with the risk including but not limited to bleeding, infection, bowel perforation and the need for additional procedures.  Any additional follow-up will be discussed with him once results of been reviewed.  I did advise him that I recommended he return to pantoprazole use but he wished to be placed on something else, stating that previously had done well with Pepcid.  Therefore I wrote him a prescription for Pepcid for him to take daily.  All questions were answered and he was willing to proceed with all recommendations.    CC:     Epigastric abdominal pain, nausea    HPI:    This is a 57-year-old gentleman well-known to Dr. Nguyễn who returns to the office after he last saw him in 2021.  He presents today now with approximately 3 weeks of right upper quadrant, epigastric and left upper quadrant abdominal pain as well as nausea and vomiting.  This has been associated with black dark tarry stools and alternating constipation and diarrhea as well.  He was seen by GI but they were unable to schedule him as expediently as he would like and therefore came to us.  He is concerned because of the continuation of his symptoms.  He states that he becomes sick to his stomach with the smell of food and even had an episode of lightheadedness and dizziness which prompted him to go to the emergency room.  Initially he was on pantoprazole but stated that this gave him diarrhea which was painful to his rectum and he says has since stopped.  He is also concerned because states he has taken a lot of NSAIDs for several days in order to reduce his effects from a recent diagnosis of COVID at the beginning  of the month as well.    ENDOSCOPY:   • Colonoscopy: 2019 normal  • EGD: 2019    SOCIAL HISTORY:   • Denies tobacco use  • Denies alcohol use    FAMILY HISTORY:    • Colorectal cancer: None    PREVIOUS ABDOMINAL SURGERY    • None    OTHER SURGERY  Past Surgical History:   Procedure Laterality Date   • ABSCESS DRAINAGE  08/2012   • ANAL FISTULA REPAIR N/A 09/2012, 10/2012   • AXILLARY LYMPH NODE BIOPSY/EXCISION Left 11/9/2018    Procedure: AXILLARY LYMPH NODE BIOPSY/EXCISION;  Surgeon: Amando Nguyễn MD;  [facility];  Service: General   • COLONOSCOPY N/A 2013    done at [facility]   • COLONOSCOPY N/A 7/10/2019    Procedure: COLONOSCOPY to CECUM;  Surgeon: Amando Nguyễn MD;  [facility];  Service: General   • ENDOSCOPY N/A 1/9/2019    Procedure: ESOPHAGOGASTRODUODENOSCOPY;  Surgeon: Amando Nguyễn MD;  Location[facility];  Service: General   • FINE NEEDLE ASPIRATION Left 08/09/2018    Left axillary lymph node FNA   • FLEXIBLE SIGMOIDOSCOPY N/A 06/25/2003    Normal-Dr. Cezar Aviles   • HEMORRHOIDECTOMY N/A 1996   • HIP ARTHROSCOPY W/ LABRAL REPAIR Right 04/03/2017    Dr. Lonnie Lemons   • MEDIAL BRANCH BLOCK Bilateral 3/12/2021    Procedure: BILATERAL MEDIAL BRANCH BLOCK W/MAC;  Surgeon: Francisco Velez MD;  Location: [facility];  Service: Pain Management;  Laterality: Bilateral;   • SIGMOIDOSCOPY N/A 9/23/2020    Procedure: FLEXIBLE SIGMOIDOSCOPY WITH BIOPSY;  Surgeon: Shena Ring MD;  [facility];  Service: Gastroenterology;  Laterality: N/A;  pre- anal/rectal pain  post- hemorrhoids   • TONSILLECTOMY Bilateral        PAST MEDICAL HISTORY:    Past Medical History:   Diagnosis Date   • Abdominal lymphadenopathy    • Acid reflux     HX   • Anxiety     R/T PAIN   • Carpal tunnel syndrome, left    • Chronic pain    • DDD (degenerative disc disease), lumbar    • Fatigue    • H/O Hypersensitivity to odor     Burning sensation in nasal passage  "  • Hemorrhoids     internal fistula   • History of medical problems Hyperosmia, Pudendal neuralgia   • Irritable bowel syndrome    • Low back pain    • Lymphadenopathy, axillary     LEFT   • Male pattern alopecia    • Pudendal neuralgia    • Right hip pain    • Unexplained night sweats        MEDICATIONS:     Current Outpatient Medications:   •  acetaminophen (TYLENOL) 500 MG tablet, Take 1 to 2 tablets every 6 hours as needed for fever, aches, pains, Disp: 30 tablet, Rfl: 0  •  diazePAM (Valium) 2 MG tablet, Take 1 tablet by mouth Daily As Needed for Anxiety., Disp: 30 tablet, Rfl: 0  •  fluticasone (FLONASE) 50 MCG/ACT nasal spray, 2 sprays into the nostril(s) as directed by provider Daily., Disp: 18.2 mL, Rfl: 0  •  famotidine (PEPCID) 20 MG tablet, Take 1 tablet by mouth Daily., Disp: 30 tablet, Rfl: 1  •  gabapentin (NEURONTIN) 100 MG capsule, Take 1 capsule by mouth Every Night. (Patient not taking: Reported on 3/21/2023), Disp: 30 capsule, Rfl: 1    ALLERGIES:   Allergies   Allergen Reactions   • Bactrim [Sulfamethoxazole-Trimethoprim] Shortness Of Breath   • Hydrocodone Shortness Of Breath   • Hydrocodone-Acetaminophen Shortness Of Breath and Unknown (See Comments)     Bloating, sleeplessness, difficulty breathing   • Levaquin [Levofloxacin] Unknown - Low Severity     Made tendons tight   • Mobic [Meloxicam] Nausea Only and Other (See Comments)     Severe heartburn   • Naproxen Shortness Of Breath   • Promethazine Tinnitus   • Sulfa Antibiotics Shortness Of Breath     TROUBLE BREATHING   • Alprazolam Other (See Comments)     Annotation - 06Jan2016: Patient stated he can not take this medication because it \"makes him feel weird.\"     • Diclofenac Nausea Only and Other (See Comments)     Severe heartburn   • Erythromycin GI Intolerance   • Tramadol Headache   • Ceftin [Cefuroxime] Other (See Comments)     cough       PHYSICAL EXAM:   • Constitutional: Well-developed well-nourished, no acute distress  • Vital " "signs:   o Height 70\"  o Weight 212  o BMI 30.5  • Neck: Supple, no palpable mass, trachea midline  • Respiratory: Clear to auscultation, normal inspiratory effort  • Cardiovascular: Regular rate, no murmur, no carotid bruit  • Gastrointestinal: Soft, nontender  • Psychiatric: Alert and oriented ×3, normal affect       Salvador Santa PA-C    "

## 2023-03-22 NOTE — TELEPHONE ENCOUNTER
Patient left a voice mail saying he forgot to discusswith Cornel at his appointment yesterday that his throat is red and dry.  He also reports his throat feels constricted when he swallows and has a bitter taste in his mouth.  He would like a call back to discuss these symptoms.

## 2023-03-22 NOTE — PROGRESS NOTES
ASSESSMENT/PLAN:    This is a 57-year-old gentleman presenting with melena with bilateral upper quadrant and epigastric abdominal pain as well as nausea.  This in conjunction with his alternating constipation and diarrhea are prompting me to recommend proceeding with an EGD and colonoscopy.  Risks and rationale were discussed with him with the risk including but not limited to bleeding, infection, bowel perforation and the need for additional procedures.  Any additional follow-up will be discussed with him once results of been reviewed.  I did advise him that I recommended he return to pantoprazole use but he wished to be placed on something else, stating that previously had done well with Pepcid.  Therefore I wrote him a prescription for Pepcid for him to take daily.  All questions were answered and he was willing to proceed with all recommendations.    CC:     Epigastric abdominal pain, nausea    HPI:    This is a 57-year-old gentleman well-known to Dr. Nguyễn who returns to the office after he last saw him in 2021.  He presents today now with approximately 3 weeks of right upper quadrant, epigastric and left upper quadrant abdominal pain as well as nausea and vomiting.  This has been associated with black dark tarry stools and alternating constipation and diarrhea as well.  He was seen by GI but they were unable to schedule him as expediently as he would like and therefore came to us.  He is concerned because of the continuation of his symptoms.  He states that he becomes sick to his stomach with the smell of food and even had an episode of lightheadedness and dizziness which prompted him to go to the emergency room.  Initially he was on pantoprazole but stated that this gave him diarrhea which was painful to his rectum and he says has since stopped.  He is also concerned because states he has taken a lot of NSAIDs for several days in order to reduce his effects from a recent diagnosis of COVID at the beginning  of the month as well.    ENDOSCOPY:   • Colonoscopy: 2019 normal  • EGD: 2019    SOCIAL HISTORY:   • Denies tobacco use  • Denies alcohol use    FAMILY HISTORY:    • Colorectal cancer: None    PREVIOUS ABDOMINAL SURGERY    • None    OTHER SURGERY  Past Surgical History:   Procedure Laterality Date   • ABSCESS DRAINAGE  08/2012   • ANAL FISTULA REPAIR N/A 09/2012, 10/2012   • AXILLARY LYMPH NODE BIOPSY/EXCISION Left 11/9/2018    Procedure: AXILLARY LYMPH NODE BIOPSY/EXCISION;  Surgeon: Amando Nguyễn MD;  [facility];  Service: General   • COLONOSCOPY N/A 2013    done at [facility]   • COLONOSCOPY N/A 7/10/2019    Procedure: COLONOSCOPY to CECUM;  Surgeon: Amando Nguyễn MD;  [facility];  Service: General   • ENDOSCOPY N/A 1/9/2019    Procedure: ESOPHAGOGASTRODUODENOSCOPY;  Surgeon: Amando Nguyễn MD;  [facility];  Service: General   • FINE NEEDLE ASPIRATION Left 08/09/2018    Left axillary lymph node FNA   • FLEXIBLE SIGMOIDOSCOPY N/A 06/25/2003    Normal-Dr. Cezar Aviles   • HEMORRHOIDECTOMY N/A 1996   • HIP ARTHROSCOPY W/ LABRAL REPAIR Right 04/03/2017    Dr. Lonnie Lemons   • MEDIAL BRANCH BLOCK Bilateral 3/12/2021    Procedure: BILATERAL MEDIAL BRANCH BLOCK W/MAC;  Surgeon: Francisco Velez MD;  Location: [facility];  Service: Pain Management;  Laterality: Bilateral;   • SIGMOIDOSCOPY N/A 9/23/2020    Procedure: FLEXIBLE SIGMOIDOSCOPY WITH BIOPSY;  Surgeon: Shena Ring MD;  [facility];  Service: Gastroenterology;  Laterality: N/A;  pre- anal/rectal pain  post- hemorrhoids   • TONSILLECTOMY Bilateral        PAST MEDICAL HISTORY:    Past Medical History:   Diagnosis Date   • Abdominal lymphadenopathy    • Acid reflux     HX   • Anxiety     R/T PAIN   • Carpal tunnel syndrome, left    • Chronic pain    • DDD (degenerative disc disease), lumbar    • Fatigue    • H/O Hypersensitivity to odor     Burning sensation in nasal passage  "  • Hemorrhoids     internal fistula   • History of medical problems Hyperosmia, Pudendal neuralgia   • Irritable bowel syndrome    • Low back pain    • Lymphadenopathy, axillary     LEFT   • Male pattern alopecia    • Pudendal neuralgia    • Right hip pain    • Unexplained night sweats        MEDICATIONS:     Current Outpatient Medications:   •  acetaminophen (TYLENOL) 500 MG tablet, Take 1 to 2 tablets every 6 hours as needed for fever, aches, pains, Disp: 30 tablet, Rfl: 0  •  diazePAM (Valium) 2 MG tablet, Take 1 tablet by mouth Daily As Needed for Anxiety., Disp: 30 tablet, Rfl: 0  •  fluticasone (FLONASE) 50 MCG/ACT nasal spray, 2 sprays into the nostril(s) as directed by provider Daily., Disp: 18.2 mL, Rfl: 0  •  famotidine (PEPCID) 20 MG tablet, Take 1 tablet by mouth Daily., Disp: 30 tablet, Rfl: 1  •  gabapentin (NEURONTIN) 100 MG capsule, Take 1 capsule by mouth Every Night. (Patient not taking: Reported on 3/21/2023), Disp: 30 capsule, Rfl: 1    ALLERGIES:   Allergies   Allergen Reactions   • Bactrim [Sulfamethoxazole-Trimethoprim] Shortness Of Breath   • Hydrocodone Shortness Of Breath   • Hydrocodone-Acetaminophen Shortness Of Breath and Unknown (See Comments)     Bloating, sleeplessness, difficulty breathing   • Levaquin [Levofloxacin] Unknown - Low Severity     Made tendons tight   • Mobic [Meloxicam] Nausea Only and Other (See Comments)     Severe heartburn   • Naproxen Shortness Of Breath   • Promethazine Tinnitus   • Sulfa Antibiotics Shortness Of Breath     TROUBLE BREATHING   • Alprazolam Other (See Comments)     Annotation - 06Jan2016: Patient stated he can not take this medication because it \"makes him feel weird.\"     • Diclofenac Nausea Only and Other (See Comments)     Severe heartburn   • Erythromycin GI Intolerance   • Tramadol Headache   • Ceftin [Cefuroxime] Other (See Comments)     cough       PHYSICAL EXAM:   • Constitutional: Well-developed well-nourished, no acute distress  • Vital " "signs:   o Height 70\"  o Weight 212  o BMI 30.5  • Neck: Supple, no palpable mass, trachea midline  • Respiratory: Clear to auscultation, normal inspiratory effort  • Cardiovascular: Regular rate, no murmur, no carotid bruit  • Gastrointestinal: Soft, nontender  • Psychiatric: Alert and oriented ×3, normal affect       Salvador Santa PA-C    "

## 2023-03-23 ENCOUNTER — TELEPHONE (OUTPATIENT)
Dept: SURGERY | Facility: CLINIC | Age: 58
End: 2023-03-23
Payer: COMMERCIAL

## 2023-03-23 RX ORDER — OMEPRAZOLE 20 MG/1
20 CAPSULE, DELAYED RELEASE ORAL DAILY
Qty: 30 CAPSULE | Refills: 1 | Status: SHIPPED | OUTPATIENT
Start: 2023-03-23 | End: 2023-04-05

## 2023-03-23 NOTE — TELEPHONE ENCOUNTER
Patient states he has only taken pantoprazole in the past-pt would like to know what other options there are. Spoke with Cornel who advised to call in omeprazole 20 mg daily #30 with one refill.

## 2023-03-23 NOTE — TELEPHONE ENCOUNTER
Famotidine has a very low risk of tinnitus as do the other recommended medications, the PPI's which he previously stated he did not tolerate.  I can write for one of those if he prefers but the risk is minimal and the benefits high.

## 2023-03-23 NOTE — TELEPHONE ENCOUNTER
Patient called stating he was reading the possible side effects of famotidine and one of them was ringing in the ear. Pt states he already has tinnitus and does not want to make it worse. Pt is asking if there is an alternative medication that can be prescribed.

## 2023-03-24 NOTE — TELEPHONE ENCOUNTER
Patient notified. He states he took gabapentin this morning and it made him dizzy and made his stomach nervous. Pt asked to let Cornel know that he will not take the gabapentin again.

## 2023-03-27 ENCOUNTER — TELEPHONE (OUTPATIENT)
Dept: SURGERY | Facility: CLINIC | Age: 58
End: 2023-03-27
Payer: COMMERCIAL

## 2023-03-27 NOTE — TELEPHONE ENCOUNTER
Pt thinks he has the H pylori. Would prefer to do the stool study can you place orders and he picks up the studies?

## 2023-03-27 NOTE — TELEPHONE ENCOUNTER
Pt wants to do the EGD but is nervous about making the ulcer worse by taking a biopsy. Wondering if the stool study would be effective to tell if he has H pylori. I have reached out to Cornel and he had told me his thoughts and I relayed those to the Patient but he says he would also like your opinion he trusts you.

## 2023-03-29 ENCOUNTER — ANESTHESIA EVENT (OUTPATIENT)
Dept: PERIOP | Facility: HOSPITAL | Age: 58
End: 2023-03-29
Payer: COMMERCIAL

## 2023-03-30 ENCOUNTER — ANESTHESIA (OUTPATIENT)
Dept: PERIOP | Facility: HOSPITAL | Age: 58
End: 2023-03-30
Payer: COMMERCIAL

## 2023-03-30 ENCOUNTER — HOSPITAL ENCOUNTER (OUTPATIENT)
Facility: HOSPITAL | Age: 58
Setting detail: HOSPITAL OUTPATIENT SURGERY
Discharge: HOME | End: 2023-03-30
Attending: SURGERY | Admitting: SURGERY
Payer: COMMERCIAL

## 2023-03-30 VITALS
RESPIRATION RATE: 16 BRPM | DIASTOLIC BLOOD PRESSURE: 78 MMHG | BODY MASS INDEX: 29.93 KG/M2 | HEART RATE: 52 BPM | SYSTOLIC BLOOD PRESSURE: 123 MMHG | OXYGEN SATURATION: 98 % | WEIGHT: 208.6 LBS | TEMPERATURE: 98.2 F

## 2023-03-30 DIAGNOSIS — K92.1 MELENA: ICD-10-CM

## 2023-03-30 DIAGNOSIS — Z79.1 NSAID LONG-TERM USE: ICD-10-CM

## 2023-03-30 DIAGNOSIS — R10.11 RUQ ABDOMINAL PAIN: ICD-10-CM

## 2023-03-30 DIAGNOSIS — R11.2 NAUSEA AND VOMITING, UNSPECIFIED VOMITING TYPE: ICD-10-CM

## 2023-03-30 PROCEDURE — 25010000002 PROPOFOL 200 MG/20ML EMULSION: Performed by: NURSE ANESTHETIST, CERTIFIED REGISTERED

## 2023-03-30 PROCEDURE — 88305 TISSUE EXAM BY PATHOLOGIST: CPT | Performed by: SURGERY

## 2023-03-30 PROCEDURE — 43239 EGD BIOPSY SINGLE/MULTIPLE: CPT | Performed by: SURGERY

## 2023-03-30 RX ORDER — LIDOCAINE HYDROCHLORIDE 10 MG/ML
INJECTION, SOLUTION EPIDURAL; INFILTRATION; INTRACAUDAL; PERINEURAL AS NEEDED
Status: DISCONTINUED | OUTPATIENT
Start: 2023-03-30 | End: 2023-03-30 | Stop reason: SURG

## 2023-03-30 RX ORDER — LIDOCAINE HYDROCHLORIDE 10 MG/ML
0.5 INJECTION, SOLUTION EPIDURAL; INFILTRATION; INTRACAUDAL; PERINEURAL ONCE AS NEEDED
Status: DISCONTINUED | OUTPATIENT
Start: 2023-03-30 | End: 2023-03-30 | Stop reason: HOSPADM

## 2023-03-30 RX ORDER — SODIUM CHLORIDE 0.9 % (FLUSH) 0.9 %
10 SYRINGE (ML) INJECTION EVERY 12 HOURS SCHEDULED
Status: DISCONTINUED | OUTPATIENT
Start: 2023-03-30 | End: 2023-03-30 | Stop reason: HOSPADM

## 2023-03-30 RX ORDER — PROPOFOL 10 MG/ML
INJECTION, EMULSION INTRAVENOUS AS NEEDED
Status: DISCONTINUED | OUTPATIENT
Start: 2023-03-30 | End: 2023-03-30 | Stop reason: SURG

## 2023-03-30 RX ORDER — ONDANSETRON 2 MG/ML
4 INJECTION INTRAMUSCULAR; INTRAVENOUS ONCE AS NEEDED
Status: DISCONTINUED | OUTPATIENT
Start: 2023-03-30 | End: 2023-03-30 | Stop reason: HOSPADM

## 2023-03-30 RX ORDER — SODIUM CHLORIDE, SODIUM LACTATE, POTASSIUM CHLORIDE, CALCIUM CHLORIDE 600; 310; 30; 20 MG/100ML; MG/100ML; MG/100ML; MG/100ML
9 INJECTION, SOLUTION INTRAVENOUS CONTINUOUS
Status: DISCONTINUED | OUTPATIENT
Start: 2023-03-30 | End: 2023-03-30 | Stop reason: HOSPADM

## 2023-03-30 RX ORDER — SODIUM CHLORIDE, SODIUM LACTATE, POTASSIUM CHLORIDE, CALCIUM CHLORIDE 600; 310; 30; 20 MG/100ML; MG/100ML; MG/100ML; MG/100ML
100 INJECTION, SOLUTION INTRAVENOUS CONTINUOUS
Status: DISCONTINUED | OUTPATIENT
Start: 2023-03-30 | End: 2023-03-30 | Stop reason: HOSPADM

## 2023-03-30 RX ORDER — SODIUM CHLORIDE 9 MG/ML
40 INJECTION, SOLUTION INTRAVENOUS AS NEEDED
Status: DISCONTINUED | OUTPATIENT
Start: 2023-03-30 | End: 2023-03-30 | Stop reason: HOSPADM

## 2023-03-30 RX ORDER — SODIUM CHLORIDE 0.9 % (FLUSH) 0.9 %
10 SYRINGE (ML) INJECTION AS NEEDED
Status: DISCONTINUED | OUTPATIENT
Start: 2023-03-30 | End: 2023-03-30 | Stop reason: HOSPADM

## 2023-03-30 RX ADMIN — SODIUM CHLORIDE, POTASSIUM CHLORIDE, SODIUM LACTATE AND CALCIUM CHLORIDE 9 ML/HR: 600; 310; 30; 20 INJECTION, SOLUTION INTRAVENOUS at 12:38

## 2023-03-30 RX ADMIN — PROPOFOL INJECTABLE EMULSION 50 MG: 10 INJECTION, EMULSION INTRAVENOUS at 14:10

## 2023-03-30 RX ADMIN — PROPOFOL INJECTABLE EMULSION 50 MG: 10 INJECTION, EMULSION INTRAVENOUS at 14:12

## 2023-03-30 RX ADMIN — LIDOCAINE HYDROCHLORIDE 100 MG: 10 INJECTION, SOLUTION EPIDURAL; INFILTRATION; INTRACAUDAL; PERINEURAL at 14:10

## 2023-03-30 RX ADMIN — PROPOFOL INJECTABLE EMULSION 50 MG: 10 INJECTION, EMULSION INTRAVENOUS at 14:14

## 2023-03-30 NOTE — OP NOTE
PREOPERATIVE DIAGNOSIS:  Right upper quadrant pain and nausea    POSTOPERATIVE DIAGNOSIS AND FINDINGS:  Small sliding hiatal hernia    PROCEDURE:  EGD with biopsy of gastric antrum    SURGEON:  Amando Nguyễn MD    ANESTHESIA:  MAC    SPECIMEN:  Antral biopsy    DESCRIPTION:  In decubitus position endoscope was inserted into the esophagus, stomach and duodenum. Antegrade and retroflex view of stomach performed.  First and second portions of duodenum were grossly normal.  Gastric antrum, body and retroflexed view of the fundus were normal.  Antral biopsies were obtained to rule out H. pylori.  GE junction and esophagus were normal with exception of a very small sliding hiatal hernia.    Amando Nguyễn M.D.

## 2023-03-30 NOTE — ANESTHESIA POSTPROCEDURE EVALUATION
Patient: Jose Maria Judge    Procedure Summary     Date: 03/30/23 Room / Location: [facility] ENDOSCOPY 2 /  [facility]    Anesthesia Start: 1407 Anesthesia Stop: 1416    Procedure: ESOPHAGOGASTRODUODENOSCOPY WITH BIOPSY (Esophagus) Diagnosis:       Melena      RUQ abdominal pain      Nausea and vomiting, unspecified vomiting type      NSAID long-term use      (Melena [K92.1])      (RUQ abdominal pain [R10.11])      (Nausea and vomiting, unspecified vomiting type [R11.2])      (NSAID long-term use [Z79.1])    Surgeons: Amando Nguyễn MD Provider: Tiffanie Zambrano CRNA    Anesthesia Type: MAC ASA Status: 2          Anesthesia Type: MAC    Vitals  Vitals Value Taken Time   /68 03/30/23 1422   Temp     Pulse 51 03/30/23 1422   Resp 16 03/30/23 1422   SpO2 97 % 03/30/23 1422           Post Anesthesia Care and Evaluation    Patient location during evaluation: PHASE II  Patient participation: complete - patient participated  Level of consciousness: awake and alert  Pain score: 0  Pain management: adequate    Airway patency: patent  Anesthetic complications: No anesthetic complications  PONV Status: none  Cardiovascular status: acceptable  Respiratory status: acceptable  Hydration status: acceptable

## 2023-03-30 NOTE — ANESTHESIA PREPROCEDURE EVALUATION
Anesthesia Evaluation     Patient summary reviewed and Nursing notes reviewed   no history of anesthetic complications:  NPO Solid Status: > 8 hours  NPO Liquid Status: > 8 hours           Airway   Mallampati: II  TM distance: <3 FB  Neck ROM: full  No difficulty expected  Dental - normal exam     Pulmonary - normal exam   (-) shortness of breath  Cardiovascular - normal exam  Exercise tolerance: good (4-7 METS)    ECG reviewed    (-) angina      Neuro/Psych  GI/Hepatic/Renal/Endo    (+)  GERD (Pain, Nausea, other GI issues ),      Musculoskeletal     (+) neck pain,   Abdominal  - normal exam   Substance History      OB/GYN          Other   arthritis,                      Anesthesia Plan    ASA 2     MAC   total IV anesthesia  intravenous induction     Anesthetic plan, risks, benefits, and alternatives have been provided, discussed and informed consent has been obtained with: patient.    Use of blood products discussed with patient  Consented to blood products.       CODE STATUS:

## 2023-04-03 ENCOUNTER — TELEPHONE (OUTPATIENT)
Dept: SURGERY | Facility: CLINIC | Age: 58
End: 2023-04-03
Payer: COMMERCIAL

## 2023-04-03 DIAGNOSIS — R11.2 NAUSEA AND VOMITING, UNSPECIFIED VOMITING TYPE: ICD-10-CM

## 2023-04-03 DIAGNOSIS — R10.11 RUQ ABDOMINAL PAIN: Primary | ICD-10-CM

## 2023-04-03 LAB
LAB AP CASE REPORT: NORMAL
PATH REPORT.FINAL DX SPEC: NORMAL
PATH REPORT.GROSS SPEC: NORMAL

## 2023-04-05 ENCOUNTER — OFFICE VISIT (OUTPATIENT)
Dept: INTERNAL MEDICINE | Facility: CLINIC | Age: 58
End: 2023-04-05
Payer: COMMERCIAL

## 2023-04-05 VITALS
OXYGEN SATURATION: 98 % | BODY MASS INDEX: 30.06 KG/M2 | WEIGHT: 210 LBS | SYSTOLIC BLOOD PRESSURE: 108 MMHG | HEIGHT: 70 IN | DIASTOLIC BLOOD PRESSURE: 70 MMHG | HEART RATE: 72 BPM

## 2023-04-05 DIAGNOSIS — E78.00 PURE HYPERCHOLESTEROLEMIA: Primary | ICD-10-CM

## 2023-04-05 DIAGNOSIS — R59.1 LYMPHADENOPATHY: ICD-10-CM

## 2023-04-05 DIAGNOSIS — F41.8 ANXIETY ABOUT HEALTH: ICD-10-CM

## 2023-04-05 DIAGNOSIS — C81.94 HODGKIN LYMPHOMA OF LYMPH NODES OF AXILLA, UNSPECIFIED HODGKIN LYMPHOMA TYPE: ICD-10-CM

## 2023-04-05 NOTE — PROGRESS NOTES
"  Nico Mora D.O.  Internal Medicine  [facility]  [address], [address]  [place] [address]  [phone]      Chief Complaint  Establish Care    SUBJECTIVE    History of Present Illness    Jose Maria Judge is a 57 y.o. male who presents to the office today as a new patient to establish care.   Previous PCP Dr De La Cruz. Transferring care.    Hyperlipidemia: not currently requiring medication. States he does not eat much fast, fatty or greasy foods but doesn't do any exercise at all due to his disability related to his pudendal neuralgia .     Microhematuria: follows with [facility].    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 Uses Tylenol as needed and has diazepam 2 mg as needed for anxiety , though he cannot recall the last time he even needed it.      Hodgkin's lymphoma: 2018 leading to a biopsy of Left axillary lymphon node that was nondiagnostic but suspicious and eventually an excisional biopsy that was eventually felt to be consistent with follicular hyperplasia and progressive transformation of germinal centers with 2 foci suspicious for early involvement by nodules lymphocyte predominant Hodgkin's lymphoma.  This was likely treated by excisionenlarging right external iliac node. Follows with [facility] and is currently having an enlarging right external iliac node monitored.         Allergies   Allergen Reactions   • Bactrim [Sulfamethoxazole-Trimethoprim] Shortness Of Breath   • Hydrocodone Shortness Of Breath   • Naproxen Shortness Of Breath   • Sulfa Antibiotics Shortness Of Breath     TROUBLE BREATHING   • Alprazolam Other (See Comments)     Annotation - 06Jan2016: Patient stated he can not take this medication because it \"makes him feel weird.\"     • Diclofenac Nausea Only and Other (See Comments)     Severe heartburn   • Erythromycin GI Intolerance   • Promethazine Tinnitus   • " Tramadol Headache   • Ceftin [Cefuroxime] Other (See Comments)     cough   • Levaquin [Levofloxacin] Unknown - Low Severity     Made tendons tight   • Mobic [Meloxicam] Nausea Only and Other (See Comments)     Severe heartburn        Outpatient Medications Marked as Taking for the 4/5/23 encounter (Office Visit) with Nico Mora DO   Medication Sig Dispense Refill   • acetaminophen (TYLENOL) 500 MG tablet Take 1 to 2 tablets every 6 hours as needed for fever, aches, pains 30 tablet 0   • diazePAM (Valium) 2 MG tablet Take 1 tablet by mouth Daily As Needed for Anxiety. 30 tablet 0        Past Medical History:   Diagnosis Date   • Acid reflux     HX   • Anal fistula    • Anxiety     R/T PAIN   • Carpal tunnel syndrome, left    • DDD (degenerative disc disease), lumbar    • Hemorrhoids    • Hodgkin's lymphoma     left axillary adenopathy leading to a biopsy that was nondiagnostic but suspicious and eventually an excisional biopsy that was eventually felt to be consistent with follicular hyperplasia and progressive transformation of germinal centers with 2 foci suspicious for early involvement by nodules lymphocyte predominant Hodgkin's lymphoma.  This was likely treated by excision   • Hyperlipidemia    • Hyperosmia    • Labral tear of hip joint     RIGHT   • Male pattern alopecia    • Postnasal drip    • Pudendal neuralgia    • Tinnitus of left ear    • Varicose vein of leg     left     Past Surgical History:   Procedure Laterality Date   • ABSCESS DRAINAGE  08/2012   • ANAL FISTULA REPAIR N/A 09/2012, 10/2012   • AXILLARY LYMPH NODE BIOPSY/EXCISION Left 11/9/2018    Procedure: AXILLARY LYMPH NODE BIOPSY/EXCISION;  Surgeon: Amando Nguyễn MD;  Location: [facility] OR [facility];  Service: General   • COLONOSCOPY N/A 2013    done at [facility]   • COLONOSCOPY N/A 7/10/2019    Procedure: COLONOSCOPY to CECUM;  Surgeon: Amando Nguyễn MD;  Location: [facility] ENDOSCOPY;  Service: General   • ENDOSCOPY N/A 1/9/2019     "Procedure: ESOPHAGOGASTRODUODENOSCOPY;  Surgeon: Amando Nguyễn MD;  Location:  [facility];  Service: General   • ENDOSCOPY N/A 3/30/2023    Procedure: ESOPHAGOGASTRODUODENOSCOPY WITH BIOPSY;  Surgeon: Amando Nguyễn MD;  Location:  [facility] OR;  Service: Gastroenterology;  Laterality: N/A;  Gastric biopsy; Normal   • FINE NEEDLE ASPIRATION Left 08/09/2018    Left axillary lymph node FNA   • FLEXIBLE SIGMOIDOSCOPY N/A 06/25/2003    Normal-Dr. Cezar Aviles   • HEMORRHOIDECTOMY N/A 1996   • HIP ARTHROSCOPY W/ LABRAL REPAIR Right 04/03/2017    Dr. Lonnie Lemons   • MEDIAL BRANCH BLOCK Bilateral 3/12/2021    Procedure: BILATERAL MEDIAL BRANCH BLOCK W/MAC;  Surgeon: Francisco Velez MD;  [place] [facility] [place];  Service: Pain Management;  Laterality: Bilateral;   • SIGMOIDOSCOPY N/A 9/23/2020    Procedure: FLEXIBLE SIGMOIDOSCOPY WITH BIOPSY;  Surgeon: Shena Ring MD;  Location:  [facility];  Service: Gastroenterology;  Laterality: N/A;  pre- anal/rectal pain  post- hemorrhoids   • TONSILLECTOMY Bilateral      Family History   Problem Relation Age of Onset   • Arthritis Mother    • Atrial fibrillation Father    • Aneurysm Father    • Hypertension Father    • Dementia Father    • Lymphoma Brother    • Malig Hyperthermia Neg Hx    • Colon cancer Neg Hx    • Colon polyps Neg Hx    • Crohn's disease Neg Hx    • Irritable bowel syndrome Neg Hx    • Ulcerative colitis Neg Hx     reports that he has never smoked. He has never used smokeless tobacco. He reports that he does not drink alcohol and does not use drugs.    OBJECTIVE    Vital Signs:   /70   Pulse 72   Ht 177.8 cm (70\")   Wt 95.3 kg (210 lb)   SpO2 98%   BMI 30.13 kg/m²     Physical Exam  Vitals reviewed.   Constitutional:       General: He is not in acute distress.     Appearance: Normal appearance. He is obese. He is not ill-appearing.   HENT:      Head: Atraumatic.   Eyes:      General: No scleral icterus.  Cardiovascular:      " Rate and Rhythm: Normal rate and regular rhythm.      Heart sounds: Normal heart sounds. No murmur heard.  Pulmonary:      Effort: Pulmonary effort is normal. No respiratory distress.      Breath sounds: Normal breath sounds. No stridor. No wheezing or rhonchi.   Musculoskeletal:      Right lower leg: No edema.      Left lower leg: No edema.   Skin:     Coloration: Skin is not jaundiced.   Neurological:      Mental Status: He is alert.   Psychiatric:         Mood and Affect: Mood normal.         Behavior: Behavior normal.         Thought Content: Thought content normal.            The following data was reviewed by: Nico Mora DO on 04/05/2023:  Common labs    Common Labs 11/15/22 11/15/22 11/15/22 2/22/23 2/22/23 3/7/23    1201 1201 1201 1522 1522    Glucose  85   88    BUN  12   18    Creatinine  0.93   0.95    Sodium  139   142    Potassium  4.1   4.1    Chloride  104   105    Calcium  9.6   9.7    Total Protein   7.8      Albumin   4.0  4.5    Total Bilirubin     0.3    Alkaline Phosphatase     46    AST (SGOT)     19    ALT (SGPT)     17    WBC    5.28     Hemoglobin    14.3  14.6   Hematocrit    42.3  43.0   Platelets    154     Hemoglobin A1C 5.30                             ASSESSMENT & PLAN     Diagnoses and all orders for this visit:    1. Pure hypercholesterolemia (Primary)  Lab Results   Component Value Date    CHLPL 191 07/08/2022    TRIG 73 07/08/2022    HDL 44 07/08/2022     (H) 07/08/2022     The 10-year ASCVD risk score (Aidan MA, et al., 2019) is: 5.8%    Values used to calculate the score:      Age: 57 years      Sex: Male      Is Non- : No      Diabetic: No      Tobacco smoker: No      Systolic Blood Pressure: 108 mmHg      Is BP treated: No      HDL Cholesterol: 44 mg/dL      Total Cholesterol: 191 mg/dL  -at this time does not warrant statin for primary prevention  -he is limited on physical activity and states he already eats a rather clean diet     2. Anxiety  about health  -uses rare diazepam 2 mg daily   -ROOSEVELT reviewed and is appropriate  -update urine drug screen at next visit      3. Hodgkin lymphoma of lymph nodes of axilla, unspecified Hodgkin lymphoma type  4. Lymphadenopathy  -2018 leading to a biopsy of Left axillary lymphon node that was nondiagnostic but suspicious and eventually an excisional biopsy that was eventually felt to be consistent with follicular hyperplasia and progressive transformation of germinal centers with 2 foci suspicious for early involvement by nodules lymphocyte predominant Hodgkin's lymphoma.  This was likely treated by excisionenlarging right external iliac node. Follows with [facility] and is currently having an enlarging right external iliac node monitored.   -reviewed most recent heme/onc progress note from 2/2023      I spent 41 minutes caring for Jose Maria on this date of service. This time includes time spent by me in the following activities:preparing for the visit, reviewing tests, performing a medically appropriate examination and/or evaluation , counseling and educating the patient/family/caregiver and documenting information in the medical record    The following social determinates of health impact the patient's medical decision making: No social determinates of health were factored in to today's visit.     Follow Up  Return in about 3 months (around 7/5/2023) for Annual physical.    Patient/family had no further questions at this time and verbalized understanding of the plan discussed today.

## 2023-04-06 ENCOUNTER — TELEPHONE (OUTPATIENT)
Dept: INTERNAL MEDICINE | Facility: CLINIC | Age: 58
End: 2023-04-06
Payer: COMMERCIAL

## 2023-04-06 NOTE — TELEPHONE ENCOUNTER
Patient states he was talking to Dr. Mora during his visit yesterday 04/05/2023 and he couldn't remember the medication he uses for his foot fungal issue. The medication he uses is clotrimazol & betamethason dipropionate cream USP, 1% / 0.05% base. Please call patient back at [phone].

## 2023-04-07 ENCOUNTER — TELEPHONE (OUTPATIENT)
Dept: INTERNAL MEDICINE | Facility: CLINIC | Age: 58
End: 2023-04-07
Payer: COMMERCIAL

## 2023-04-12 ENCOUNTER — HOSPITAL ENCOUNTER (OUTPATIENT)
Dept: ULTRASOUND IMAGING | Facility: HOSPITAL | Age: 58
Discharge: HOME | End: 2023-04-12
Admitting: SURGERY
Payer: COMMERCIAL

## 2023-04-12 DIAGNOSIS — R10.11 RUQ ABDOMINAL PAIN: ICD-10-CM

## 2023-04-12 DIAGNOSIS — R11.2 NAUSEA AND VOMITING, UNSPECIFIED VOMITING TYPE: ICD-10-CM

## 2023-04-12 PROCEDURE — 76705 ECHO EXAM OF ABDOMEN: CPT

## 2023-04-14 ENCOUNTER — TELEPHONE (OUTPATIENT)
Dept: SURGERY | Facility: CLINIC | Age: 58
End: 2023-04-14
Payer: COMMERCIAL

## 2023-06-05 ENCOUNTER — TELEPHONE (OUTPATIENT)
Dept: PHYSICAL THERAPY | Facility: HOSPITAL | Age: 58
End: 2023-06-05
Payer: COMMERCIAL

## 2023-06-05 NOTE — TELEPHONE ENCOUNTER
Returned call, pt with questions about his L shoulder pain. Encouraged him to restart therapy that he had to discontinue several months ago and he was in agreement.

## 2023-07-25 ENCOUNTER — HOSPITAL ENCOUNTER (OUTPATIENT)
Dept: PHYSICAL THERAPY | Facility: HOSPITAL | Age: 58
Setting detail: THERAPIES SERIES
Discharge: HOME | End: 2023-07-25
Payer: COMMERCIAL

## 2023-07-25 DIAGNOSIS — M25.512 LEFT SHOULDER PAIN, UNSPECIFIED CHRONICITY: Primary | ICD-10-CM

## 2023-07-25 DIAGNOSIS — M25.612 DECREASED RANGE OF MOTION OF LEFT SHOULDER: ICD-10-CM

## 2023-07-25 DIAGNOSIS — R29.898 WEAKNESS OF LEFT UPPER EXTREMITY: ICD-10-CM

## 2023-07-25 PROCEDURE — 97140 MANUAL THERAPY 1/> REGIONS: CPT

## 2023-07-25 PROCEDURE — 97110 THERAPEUTIC EXERCISES: CPT

## 2023-07-25 NOTE — THERAPY TREATMENT NOTE
Outpatient Physical Therapy Ortho Treatment Note  [facility]     Patient Name: Jose Maria Judge  : 1965  MRN: [id number]  Today's Date: 2023      Visit Date: 2023    Visit Dx:    ICD-10-CM ICD-9-CM   1. Left shoulder pain, unspecified chronicity  M25.512 719.41   2. Decreased range of motion of left shoulder  M25.612 719.51   3. Weakness of left upper extremity  R29.898 729.89       Patient Active Problem List   Diagnosis    Bilateral hip pain    Right hip pain    Left hip pain    Trochanteric bursitis    Tear of acetabular labrum    Loss of hair    Anal burning    Elevated blood pressure    Hamstring strain    Hypertension    Neuritis of foot    Hamstring muscle strain    Rectal pain    Herpes zoster    Varicose veins of bilateral lower extremities with pain    Lymph node enlargement    Epigastric pain    Axillary lymphadenopathy    Olfaction disorder    Acute bilateral low back pain without sciatica    Nausea    Anal or rectal pain    Arthropathy of lumbar facet joint    Pudendal neuralgia    Tinnitus    High risk medication use    Varicose veins of left lower extremity with pain    Rib pain    Lymphadenopathy    Bradycardia    Shoulder pain    Chronic pain of both feet    Illness    Fatigue    Metatarsalgia of both feet    Paresthesia of foot    Well adult health check    Vertigo    Varicocele    Tinea corporis    Thoracic back pain    Swelling of structure of right eye    Snoring    Seasonal allergies    Rash    Posterior rhinorrhea    Other specified diseases of anus and rectum    Night sweats    Neuropathy    Muscle spasm of back    Miller's metatarsalgia    Mass of axilla    Male pattern alopecia    Low back strain    Irritable bowel syndrome    Intolerant of cold    Insomnia    Injury of right foot    Gastroesophageal reflux disease    Ganglion    Elevated low density lipoprotein (LDL) cholesterol level    Disorder of gallbladder    Degeneration of lumbar intervertebral disc    Carpal  tunnel syndrome    Benign prostatic hyperplasia    Alopecia    Allergic rhinitis due to pollen    Allergic rhinitis    Allergic conjunctivitis    Acute sinusitis    Acute COVID-19    Acute anal fissure    Pharyngitis    Body aches    Stress    Sore throat    Melena    RUQ abdominal pain        Past Medical History:   Diagnosis Date    Acid reflux     HX    Anal fistula     Anxiety     R/T PAIN    Arthritis of back     can't remember    Carpal tunnel syndrome, left     DDD (degenerative disc disease), lumbar     Hemorrhoids     Hodgkin's lymphoma     left axillary adenopathy leading to a biopsy that was nondiagnostic but suspicious and eventually an excisional biopsy that was eventually felt to be consistent with follicular hyperplasia and progressive transformation of germinal centers with 2 foci suspicious for early involvement by nodules lymphocyte predominant Hodgkin's lymphoma.  This was likely treated by excision    Hyperlipidemia     Hyperosmia     Labral tear of hip joint     RIGHT    Low back strain     Male pattern alopecia     Periarthritis of shoulder     need to check file    Postnasal drip     Pudendal neuralgia     Tinnitus of left ear     Varicose vein of leg     left        Past Surgical History:   Procedure Laterality Date    ABSCESS DRAINAGE  08/2012    ANAL FISTULA REPAIR N/A 09/2012, 10/2012    AXILLARY LYMPH NODE BIOPSY/EXCISION Left 11/09/2018    Procedure: AXILLARY LYMPH NODE BIOPSY/EXCISION;  Surgeon: Amando Nguyễn MD;  Location: [facility] [place] [facility] [place];  Service: General    COLONOSCOPY N/A 2013    done at [facility]    COLONOSCOPY N/A 07/10/2019    Procedure: COLONOSCOPY to CECUM;  Surgeon: Amando Nguyễn MD;  Location: [facility] ENDOSCOPY;  Service: General    ENDOSCOPY N/A 01/09/2019    Procedure: ESOPHAGOGASTRODUODENOSCOPY;  Surgeon: Amando Nguyễn MD;  Location: [facility] ENDOSCOPY;  Service: General    ENDOSCOPY N/A 03/30/2023    Procedure: ESOPHAGOGASTRODUODENOSCOPY WITH BIOPSY;  Surgeon:  Amando Nguyễn MD;  Location:  [facility] OR;  Service: Gastroenterology;  Laterality: N/A;  Gastric biopsy; Normal    FINE NEEDLE ASPIRATION Left 08/09/2018    Left axillary lymph node FNA    FLEXIBLE SIGMOIDOSCOPY N/A 06/25/2003    Normal-Dr. Cezar Aviles    HEMORRHOIDECTOMY N/A 1996    HIP ARTHROSCOPY W/ LABRAL REPAIR Right 04/03/2017    Dr. Lonnie Lemons    HIP SURGERY  2017    right labral repair    MEDIAL BRANCH BLOCK Bilateral 03/12/2021    Procedure: BILATERAL MEDIAL BRANCH BLOCK W/MAC;  Surgeon: Francisco Velez MD;  Location: [facility];  Service: Pain Management;  Laterality: Bilateral;    SIGMOIDOSCOPY N/A 09/23/2020    Procedure: FLEXIBLE SIGMOIDOSCOPY WITH BIOPSY;  Surgeon: Shena Ring MD;  Location:  [facility];  Service: Gastroenterology;  Laterality: N/A;  pre- anal/rectal pain  post- hemorrhoids    TONSILLECTOMY Bilateral                         PT Assessment/Plan       Row Name 07/25/23 1600          PT Assessment    Assessment Comments Jose Maria states he missed last appt due to not feeling well. He demonstrates increasing supine AROM with some discomfort in last 1/3 of range that sometimes stopped >120 deg. He demonstrates decreasing muscle guarding however it does impair ROM at times. Good response to rhythmic stabilization, LEVON, and slow reversal techniques. Will reassess AROM/PROM, strength next visit  -LISET        PT Plan    PT Plan Comments reassess ROM, MMT, quickDASH  -LISET               User Key  (r) = Recorded By, (t) = Taken By, (c) = Cosigned By      Initials Name Provider Type    Kirstin Chao, PT Physical Therapist                       OP Exercises       Row Name 07/25/23 1300             Subjective Comments    Subjective Comments It's okay until I move it the wrong way.  -LISET         Subjective Pain    Able to rate subjective pain? yes  -LISET      Pre-Treatment Pain Level 0  -LISET      Subjective Pain Comment jumps to 5/10 if he moves it the wrong way  -LISET          "Total Minutes    37956 - PT Therapeutic Exercise Minutes 8  -JA      36677 - PT Manual Therapy Minutes 30  -JA         Exercise 1    Exercise Name 1 UBE  -JA      Reps 1 sat on 2\" foam pad on the seat (due to hx pudendal neuralgia).  -JA      Time 1 3min  -JA         Exercise 2    Exercise Name 2 shoulder shrug w/reverse rolls  -JA      Cueing 2 Verbal;Tactile;Demo  -JA         Exercise 3    Exercise Name 3 scapular retraction  -JA      Cueing 3 Verbal;Tactile;Demo  -JA      Reps 3 --  -JA      Time 3 --  -JA         Exercise 4    Exercise Name 4 shoulder pulley for flexion and scaption  -JA      Cueing 4 Verbal;Tactile;Demo  -JA      Reps 4 10 flex, then elbow extended for 10 in plane of scapula  -JA      Time 4 --  -JA         Exercise 5    Exercise Name 5 foam roller on wall  (forearms on foam roller w/pressure while rolling up wall)  -JA      Reps 5 8  -JA         Exercise 6    Exercise Name 6 UT stretch in standing  -JA      Cueing 6 Verbal;Demo  -JA      Reps 6 --  -JA      Time 6 --  -JA         Exercise 8    Exercise Name 8 shoulder FIR stretch (begin with radhames shld ext)  -JA      Cueing 8 Verbal;Demo  -JA      Reps 8 3  -JA      Time 8 20sec  -JA         Exercise 10    Exercise Name 10 supine chest press + flex AAROM  -JA      Reps 10 --  -JA      Time 10 --  -JA         Exercise 11    Exercise Name 11 SL ER  -JA      Reps 11 --  -JA      Time 11 --  -JA         Exercise 12    Exercise Name 12 supine HzAbd  -JA      Reps 12 --  -JA      Time 12 --  -JA         Exercise 13    Exercise Name 13 Biceps curl L  -JA      Reps 13 15  -JA      Time 13 1#  -JA         Exercise 14    Exercise Name 14 SAP  -JA      Sets 14 2  -JA      Reps 14 10  -JA      Additional Comments R then L x 2 ea  -JA                User Key  (r) = Recorded By, (t) = Taken By, (c) = Cosigned By      Initials Name Provider Type    Kirstin Chao, PT Physical Therapist                             Manual Rx (last 36 hours)       Manual " Treatments       Row Name 07/25/23 1300             Total Minutes    62371 - PT Manual Therapy Minutes 30  -JA         Manual Rx 1    Manual Rx 1 Location L shoulder supine  -      Manual Rx 1 Type GH jt mobs, PROM  -         Manual Rx 2    Manual Rx 2 Location SL scap mobilizaiton, STM periscap muscles  -      Manual Rx 2 Type scap pro-retr resisted retaction; rhythmic stab at 90 deg abd  -         Manual Rx 3    Manual Rx 3 Location isometrics for ER/IR in neutral at 30 deg abd; RS at 90 deg flex; slow-reversal 0-30 ER and IR to tolerance  -      Manual Rx 3 Type rhythmic stab at 90 deg flex  -      Manual Rx 3 Grade slow reversals for D2 and ER/IR  -                User Key  (r) = Recorded By, (t) = Taken By, (c) = Cosigned By      Initials Name Provider Type    Kirstin Chao, PT Physical Therapist                                       Time Calculation:   Start Time: 1342  Stop Time: 1420  Time Calculation (min): 38 min  Timed Charges  40744 - PT Therapeutic Exercise Minutes: 8  37400 - PT Manual Therapy Minutes: 30  Total Minutes  Timed Charges Total Minutes: 38   Total Minutes: 38  Therapy Charges for Today       Code Description Service Date Service Provider Modifiers Qty    39136725531 HC PT THER PROC EA 15 MIN 7/25/2023 Kirstin Dale, PT GP 1    23016868389 HC PT MANUAL THERAPY EA 15 MIN 7/25/2023 Kirstin Dale, PT GP 2                      Kirstin Dale PT  7/25/2023

## 2023-07-27 ENCOUNTER — HOSPITAL ENCOUNTER (OUTPATIENT)
Dept: PHYSICAL THERAPY | Facility: HOSPITAL | Age: 58
Setting detail: THERAPIES SERIES
Discharge: HOME | End: 2023-07-27
Payer: COMMERCIAL

## 2023-07-27 DIAGNOSIS — M25.512 LEFT SHOULDER PAIN, UNSPECIFIED CHRONICITY: Primary | ICD-10-CM

## 2023-07-27 DIAGNOSIS — R29.898 WEAKNESS OF LEFT UPPER EXTREMITY: ICD-10-CM

## 2023-07-27 DIAGNOSIS — M25.612 DECREASED RANGE OF MOTION OF LEFT SHOULDER: ICD-10-CM

## 2023-07-27 PROCEDURE — 97140 MANUAL THERAPY 1/> REGIONS: CPT

## 2023-07-27 PROCEDURE — 97110 THERAPEUTIC EXERCISES: CPT

## 2023-07-27 NOTE — THERAPY PROGRESS REPORT/RE-CERT
Outpatient Physical Therapy Ortho Progress Note  [facility]     Patient Name: Jose Maria Judge  : 1965  MRN: [id number]  Today's Date: 2023      Visit Date: 2023    Patient Active Problem List   Diagnosis    Bilateral hip pain    Right hip pain    Left hip pain    Trochanteric bursitis    Tear of acetabular labrum    Loss of hair    Anal burning    Elevated blood pressure    Hamstring strain    Hypertension    Neuritis of foot    Hamstring muscle strain    Rectal pain    Herpes zoster    Varicose veins of bilateral lower extremities with pain    Lymph node enlargement    Epigastric pain    Axillary lymphadenopathy    Olfaction disorder    Acute bilateral low back pain without sciatica    Nausea    Anal or rectal pain    Arthropathy of lumbar facet joint    Pudendal neuralgia    Tinnitus    High risk medication use    Varicose veins of left lower extremity with pain    Rib pain    Lymphadenopathy    Bradycardia    Shoulder pain    Chronic pain of both feet    Illness    Fatigue    Metatarsalgia of both feet    Paresthesia of foot    Well adult health check    Vertigo    Varicocele    Tinea corporis    Thoracic back pain    Swelling of structure of right eye    Snoring    Seasonal allergies    Rash    Posterior rhinorrhea    Other specified diseases of anus and rectum    Night sweats    Neuropathy    Muscle spasm of back    Miller's metatarsalgia    Mass of axilla    Male pattern alopecia    Low back strain    Irritable bowel syndrome    Intolerant of cold    Insomnia    Injury of right foot    Gastroesophageal reflux disease    Ganglion    Elevated low density lipoprotein (LDL) cholesterol level    Disorder of gallbladder    Degeneration of lumbar intervertebral disc    Carpal tunnel syndrome    Benign prostatic hyperplasia    Alopecia    Allergic rhinitis due to pollen    Allergic rhinitis    Allergic conjunctivitis    Acute sinusitis    Acute COVID-19    Acute anal fissure    Pharyngitis    Body  aches    Stress    Sore throat    Melena    RUQ abdominal pain        Past Medical History:   Diagnosis Date    Acid reflux     HX    Anal fistula     Anxiety     R/T PAIN    Arthritis of back     can't remember    Carpal tunnel syndrome, left     DDD (degenerative disc disease), lumbar     Hemorrhoids     Hodgkin's lymphoma     left axillary adenopathy leading to a biopsy that was nondiagnostic but suspicious and eventually an excisional biopsy that was eventually felt to be consistent with follicular hyperplasia and progressive transformation of germinal centers with 2 foci suspicious for early involvement by nodules lymphocyte predominant Hodgkin's lymphoma.  This was likely treated by excision    Hyperlipidemia     Hyperosmia     Labral tear of hip joint     RIGHT    Low back strain     Male pattern alopecia     Periarthritis of shoulder     need to check file    Postnasal drip     Pudendal neuralgia     Tinnitus of left ear     Varicose vein of leg     left        Past Surgical History:   Procedure Laterality Date    ABSCESS DRAINAGE  08/2012    ANAL FISTULA REPAIR N/A 09/2012, 10/2012    AXILLARY LYMPH NODE BIOPSY/EXCISION Left 11/09/2018    Procedure: AXILLARY LYMPH NODE BIOPSY/EXCISION;  Surgeon: Amando Nguyễn MD;  [facility] [place] [facility];  Service: General    COLONOSCOPY N/A 2013    done at [facility]    COLONOSCOPY N/A 07/10/2019    Procedure: COLONOSCOPY to CECUM;  Surgeon: Amando Nguyễn MD;  Location: [facility] ENDOSCOPY;  Service: General    ENDOSCOPY N/A 01/09/2019    Procedure: ESOPHAGOGASTRODUODENOSCOPY;  Surgeon: Amando Nguyễn MD;  Location: [facility] ENDOSCOPY;  Service: General    ENDOSCOPY N/A 03/30/2023    Procedure: ESOPHAGOGASTRODUODENOSCOPY WITH BIOPSY;  Surgeon: Amando Nguyễn MD;  Location: [facility];  Service: Gastroenterology;  Laterality: N/A;  Gastric biopsy; Normal    FINE NEEDLE ASPIRATION Left 08/09/2018    Left axillary lymph node FNA    FLEXIBLE SIGMOIDOSCOPY N/A  06/25/2003    Normal-Dr. Cezar Aviles    HEMORRHOIDECTOMY N/A 1996    HIP ARTHROSCOPY W/ LABRAL REPAIR Right 04/03/2017    Dr. Lonnie Lemons    HIP SURGERY  2017    right labral repair    MEDIAL BRANCH BLOCK Bilateral 03/12/2021    Procedure: BILATERAL MEDIAL BRANCH BLOCK W/MAC;  Surgeon: Francisco Velez MD;  [place] [facility] [place] [place];  Service: Pain Management;  Laterality: Bilateral;    SIGMOIDOSCOPY N/A 09/23/2020    Procedure: FLEXIBLE SIGMOIDOSCOPY WITH BIOPSY;  Surgeon: Shena Ring MD;  Location: [facility] ENDOSCOPY;  Service: Gastroenterology;  Laterality: N/A;  pre- anal/rectal pain  post- hemorrhoids    TONSILLECTOMY Bilateral        Visit Dx:     ICD-10-CM ICD-9-CM   1. Left shoulder pain, unspecified chronicity  M25.512 719.41   2. Weakness of left upper extremity  R29.898 729.89   3. Decreased range of motion of left shoulder  M25.612 719.51              PT Ortho       Row Name 07/27/23 1600       Left Upper Ext    Lt Shoulder Flexion AROM 140  -JA    Lt Shoulder Flexion PROM 145 supine  -JA    Lt Shoulder External Rotation PROM 55  -JA    Lt Shoulder Internal Rotation AROM FIR just above belt  -JA       MMT Left Upper Ext    Lt Shoulder Flexion MMT, Gross Movement (4-/5) good minus  -JA    Lt Shoulder ABduction MMT, Gross Movement (4-/5) good minus  -JA    Lt Shoulder Internal Rotation MMT, Gross Movement (4/5) good  -JA    Lt Shoulder External Rotation MMT, Gross Movement (4-/5) good minus  -JA    Lt Upper Extremity Comments  within available range  -LISET              User Key  (r) = Recorded By, (t) = Taken By, (c) = Cosigned By      Initials Name Provider Type    Kirstin Chao, PT Physical Therapist                                Therapy Education  Education Details: Intermittent posture reminders throughout. Used isomtrics and resistance through range to reduce muscle guarding. Instructed in STM for pec  to reduce facilitation. Added L horizontal abd in supine holding RTB in R hand  and diagonal if tolerated  Given: Posture/body mechanics  Program: Reinforced, Progressed  How Provided: Verbal, Demonstration  Provided to: Patient  Level of Understanding: Verbalized, Demonstrated      PT OP Goals       Row Name 07/27/23 1500          PT Short Term Goals    STG Date to Achieve 07/27/23  -LISET     STG 1 Pt will be independent with initial HEP.  -JA     STG 1 Progress Met  -LISET     STG 2 Pt will demonstrate good posture in sitting and standing.  -JA     STG 2 Progress Progressing;Partially Met  -LISET     STG 3 Pt will demonstrate increased AAROM/PROM to 80% of full or better.  -LISET     STG 3 Progress Ongoing  -        Long Term Goals    LTG Date to Achieve 08/26/23  -LISET     LTG 1 Pt will demonstrate increased L shoulder AROM to WFL to complete dressing/grooming ADLs and household chores with ease.  -JA     LTG 1 Progress Ongoing  -JA     LTG 2 Pt will be independent with advanced HEP for RC strengthening and scapular stabilization.  -JA     LTG 2 Progress Ongoing  -JA     LTG 3 Pt will score 44 on QuickDASH indicating decrease in perceived functional disability.  -JA     LTG 3 Progress Ongoing  -JA     LTG 4 Pt will demonstrate increased L shoulder strength to strength 4+/5.  -JA     LTG 4 Progress Ongoing  -JA     LTG 5 Pt will report 75% decrease in shoulder pain during ADLs.  -LISET     LTG 5 Progress Ongoing  -LISET               User Key  (r) = Recorded By, (t) = Taken By, (c) = Cosigned By      Initials Name Provider Type    Kirstin Chao, PT Physical Therapist                     PT Assessment/Plan       Row Name 07/27/23 1600          PT Assessment    Assessment Comments Jose Maria Judge has been seen for 8 visits for L shoulder pain and loss of ROM and strength that impairs his functional use of L arm. Continues to have forward head posture, rounded shoulders and muscle guarding. ROM and MMT strength is slowly improving. He is responding to therapy somewhat slowly due to muscle guarding, 2/3  "STGs met, 0/5 LTGs met. He will benefit from continuing skilled therapy services.  -JA        PT Plan    PT Plan Comments assess quickDASH  -JA               User Key  (r) = Recorded By, (t) = Taken By, (c) = Cosigned By      Initials Name Provider Type    Kirstin Chao, PT Physical Therapist                       OP Exercises       Row Name 07/27/23 1500             Subjective Comments    Subjective Comments I feel like I can reach higher but i can't hold it there.  -LISET         Subjective Pain    Able to rate subjective pain? yes  -LISET      Pre-Treatment Pain Level 0  -JA         Total Minutes    68877 - PT Therapeutic Exercise Minutes 18  -JA      18529 - PT Manual Therapy Minutes 25  -JA         Exercise 1    Exercise Name 1 UBE  -JA      Reps 1 sat on 2\" foam pad on the seat (due to hx pudendal neuralgia).  -JA      Time 1 2 min, stopped due to sound sensitivity to the clicking noise the machine was making  -JA         Exercise 2    Exercise Name 2 shoulder shrug w/reverse rolls  -JA      Cueing 2 Verbal;Tactile;Demo  -JA         Exercise 3    Exercise Name 3 scapular retraction  -JA      Cueing 3 Verbal;Tactile;Demo  -JA      Sets 3 2 (1 set elbows ext, 1 set elbws flexed)  -JA      Reps 3 10 ea  -JA      Additional Comments monitored for UT substitution and he perfomred well, UT were not engaged  -JA         Exercise 4    Exercise Name 4 shoulder pulley for flexion and scaption  -JA      Cueing 4 Verbal;Tactile;Demo  -JA      Reps 4 10 flex, then elbow extended for 10 in plane of scapula  -JA         Exercise 5    Exercise Name 5 foam roller on wall  (forearms on foam roller w/pressure while rolling up wall)  -JA      Sets 5 2  -JA      Reps 5 5  -JA         Exercise 6    Exercise Name 6 UT stretch in standing  -JA      Cueing 6 Verbal;Demo  -JA      Reps 6 reveiwed only  -JA         Exercise 8    Exercise Name 8 shoulder FIR stretch (begin with radhames shld ext)  -JA      Cueing 8 Verbal;Demo  -JA      " Reps 8 3  -JA      Time 8 20sec  -JA         Exercise 10    Exercise Name 10 supine chest press + flex AAROM  -JA      Reps 10 10  -JA         Exercise 11    Exercise Name 11 SL ER  -JA         Exercise 12    Exercise Name 12 supine HzAbd  -JA         Exercise 13    Exercise Name 13 Biceps curl L  -JA      Reps 13 15  -JA      Time 13 2#  -JA         Exercise 14    Exercise Name 14 SAP  -JA      Sets 14 2  -JA      Reps 14 10  -JA                User Key  (r) = Recorded By, (t) = Taken By, (c) = Cosigned By      Initials Name Provider Type    Kirstin Chao, PT Physical Therapist                  Manual Rx (last 36 hours)       Manual Treatments       Row Name 07/27/23 1500             Total Minutes    84392 - PT Manual Therapy Minutes 25  -         Manual Rx 1    Manual Rx 1 Location L shoulder supine  -LISET      Manual Rx 1 Type GH jt mobs, PROM  -LISET         Manual Rx 2    Manual Rx 2 Location SL scap mobilizaiton, STM periscap muscles  -      Manual Rx 2 Type scap pro-retr resisted retaction; rhythmic stab at 90 deg abd  -LISET      Manual Rx 2 Grade resume next visit  -LISET         Manual Rx 3    Manual Rx 3 Location isometrics for ER/IR in neutral at 30 deg abd; RS at 90 deg flex; slow-reversal 0-30 ER and IR to tolerance  -LISET      Manual Rx 3 Type rhythmic stab at 90 deg flex  -      Manual Rx 3 Grade slow reversals for D2 and ER/IR  -LISET                User Key  (r) = Recorded By, (t) = Taken By, (c) = Cosigned By      Initials Name Provider Type    Kirstin Chao, PT Physical Therapist                                          Time Calculation:     Start Time: 1500  Stop Time: 1545  Time Calculation (min): 45 min  Timed Charges  33406 - PT Therapeutic Exercise Minutes: 18  10568 - PT Manual Therapy Minutes: 25  Total Minutes  Timed Charges Total Minutes: 43   Total Minutes: 43     Therapy Charges for Today       Code Description Service Date Service Provider Modifiers Qty    [id number]  PT  THER PROC EA 15 MIN 7/27/2023 Kirstin Dale, PT GP 1    [id number]  PT MANUAL THERAPY EA 15 MIN 7/27/2023 Kirstin Dale, PT GP 2                      Kirstin Dale, PT  7/27/2023

## 2023-08-01 ENCOUNTER — HOSPITAL ENCOUNTER (OUTPATIENT)
Dept: PHYSICAL THERAPY | Facility: HOSPITAL | Age: 58
Setting detail: THERAPIES SERIES
Discharge: HOME | End: 2023-08-01
Payer: COMMERCIAL

## 2023-08-01 DIAGNOSIS — M25.612 DECREASED RANGE OF MOTION OF LEFT SHOULDER: ICD-10-CM

## 2023-08-01 DIAGNOSIS — M25.512 LEFT SHOULDER PAIN, UNSPECIFIED CHRONICITY: Primary | ICD-10-CM

## 2023-08-01 DIAGNOSIS — R29.898 WEAKNESS OF LEFT UPPER EXTREMITY: ICD-10-CM

## 2023-08-01 PROCEDURE — 97140 MANUAL THERAPY 1/> REGIONS: CPT

## 2023-08-01 PROCEDURE — 97110 THERAPEUTIC EXERCISES: CPT

## 2023-08-03 ENCOUNTER — TELEPHONE (OUTPATIENT)
Dept: ONCOLOGY | Facility: CLINIC | Age: 58
End: 2023-08-03
Payer: COMMERCIAL

## 2023-08-03 ENCOUNTER — HOSPITAL ENCOUNTER (OUTPATIENT)
Dept: PHYSICAL THERAPY | Facility: HOSPITAL | Age: 58
Setting detail: THERAPIES SERIES
Discharge: HOME | End: 2023-08-03
Payer: COMMERCIAL

## 2023-08-03 DIAGNOSIS — R29.898 WEAKNESS OF LEFT UPPER EXTREMITY: ICD-10-CM

## 2023-08-03 DIAGNOSIS — R59.1 LYMPHADENOPATHY: Primary | ICD-10-CM

## 2023-08-03 DIAGNOSIS — M25.512 LEFT SHOULDER PAIN, UNSPECIFIED CHRONICITY: Primary | ICD-10-CM

## 2023-08-03 DIAGNOSIS — M25.612 DECREASED RANGE OF MOTION OF LEFT SHOULDER: ICD-10-CM

## 2023-08-03 PROCEDURE — 97140 MANUAL THERAPY 1/> REGIONS: CPT

## 2023-08-03 PROCEDURE — 97110 THERAPEUTIC EXERCISES: CPT

## 2023-08-07 ENCOUNTER — OFFICE VISIT (OUTPATIENT)
Dept: ORTHOPEDIC SURGERY | Facility: CLINIC | Age: 58
End: 2023-08-07
Payer: COMMERCIAL

## 2023-08-07 VITALS — TEMPERATURE: 97.7 F | WEIGHT: 213.4 LBS | HEIGHT: 71 IN | BODY MASS INDEX: 29.88 KG/M2

## 2023-08-07 DIAGNOSIS — M75.02 ADHESIVE CAPSULITIS OF LEFT SHOULDER: Primary | ICD-10-CM

## 2023-08-07 DIAGNOSIS — M75.42 IMPINGEMENT SYNDROME OF LEFT SHOULDER: ICD-10-CM

## 2023-08-07 PROCEDURE — 99213 OFFICE O/P EST LOW 20 MIN: CPT | Performed by: ORTHOPAEDIC SURGERY

## 2023-08-07 PROCEDURE — 1159F MED LIST DOCD IN RCRD: CPT | Performed by: ORTHOPAEDIC SURGERY

## 2023-08-07 PROCEDURE — 1160F RVW MEDS BY RX/DR IN RCRD: CPT | Performed by: ORTHOPAEDIC SURGERY

## 2023-08-07 NOTE — PROGRESS NOTES
"  Patient: Jose Maria Judge  YOB: 1965  Date of Service: 8/7/2023    Chief Complaints: Left shoulder pain    Subjective:    History of Present Illness: Pt is seen in the office today with complaints of left shoulder pain we have treated this conservatively he has improved he states he is about 50% better but still not normal we do have an MRI from August which shows some tendinopathy of the rotator cuff some mild glenohumeral arthritis        Allergies:   Allergies   Allergen Reactions    Bactrim [Sulfamethoxazole-Trimethoprim] Shortness Of Breath    Hydrocodone Shortness Of Breath    Naproxen Shortness Of Breath    Sulfa Antibiotics Shortness Of Breath     TROUBLE BREATHING    Alprazolam Other (See Comments)     Annotation - 06Jan2016: Patient stated he can not take this medication because it \"makes him feel weird.\"      Diclofenac Nausea Only and Other (See Comments)     Severe heartburn    Erythromycin GI Intolerance    Promethazine Tinnitus    Tramadol Headache    Ceftin [Cefuroxime] Other (See Comments)     cough    Levaquin [Levofloxacin] Unknown - Low Severity     Made tendons tight    Mobic [Meloxicam] Nausea Only and Other (See Comments)     Severe heartburn       Medications:   Home Medications:  Current Outpatient Medications on File Prior to Visit   Medication Sig    acetaminophen (TYLENOL) 500 MG tablet Take 1 to 2 tablets every 6 hours as needed for fever, aches, pains    diazePAM (Valium) 2 MG tablet Take 1 tablet by mouth Daily As Needed for Anxiety. (Patient not taking: Reported on 8/7/2023)     No current facility-administered medications on file prior to visit.     Current Medications:  Scheduled Meds:  Continuous Infusions:No current facility-administered medications for this visit.    PRN Meds:.    I have reviewed the patient's medical history in detail and updated the computerized patient record.  Review and summarization of old records include:    Past Medical History:   Diagnosis " Date    Acid reflux     HX    Anal fistula     Anxiety     R/T PAIN    Arthritis of back     can't remember    Carpal tunnel syndrome, left     DDD (degenerative disc disease), lumbar     Hemorrhoids     Hodgkin's lymphoma     left axillary adenopathy leading to a biopsy that was nondiagnostic but suspicious and eventually an excisional biopsy that was eventually felt to be consistent with follicular hyperplasia and progressive transformation of germinal centers with 2 foci suspicious for early involvement by nodules lymphocyte predominant Hodgkin's lymphoma.  This was likely treated by excision    Hyperlipidemia     Hyperosmia     Labral tear of hip joint     RIGHT    Low back strain     Male pattern alopecia     Periarthritis of shoulder     need to check file    Postnasal drip     Pudendal neuralgia     Tinnitus of left ear     Varicose vein of leg     left        Past Surgical History:   Procedure Laterality Date    ABSCESS DRAINAGE  08/2012    ANAL FISTULA REPAIR N/A 09/2012, 10/2012    AXILLARY LYMPH NODE BIOPSY/EXCISION Left 11/09/2018    Procedure: AXILLARY LYMPH NODE BIOPSY/EXCISION;  Surgeon: Amando Nguyễn MD;  Location: [facility] OR [facility] [place];  Service: General    COLONOSCOPY N/A 2013    done at [facility]    COLONOSCOPY N/A 07/10/2019    Procedure: COLONOSCOPY to CECUM;  Surgeon: Amando Nguyễn MD;  Location: [facility] ENDOSCOPY;  Service: General    ENDOSCOPY N/A 01/09/2019    Procedure: ESOPHAGOGASTRODUODENOSCOPY;  Surgeon: Amando Nguyễn MD;  Location: [facility] ENDOSCOPY;  Service: General    ENDOSCOPY N/A 03/30/2023    Procedure: ESOPHAGOGASTRODUODENOSCOPY WITH BIOPSY;  Surgeon: Amando Nguyễn MD;  Location: [facility];  Service: Gastroenterology;  Laterality: N/A;  Gastric biopsy; Normal    FINE NEEDLE ASPIRATION Left 08/09/2018    Left axillary lymph node FNA    FLEXIBLE SIGMOIDOSCOPY N/A 06/25/2003    Normal-Dr. Cezar Aviles    HEMORRHOIDECTOMY N/A 1996    HIP ARTHROSCOPY W/ LABRAL REPAIR Right  "04/03/2017    Dr. Lonnie Lemons    HIP SURGERY  2017    right labral repair    MEDIAL BRANCH BLOCK Bilateral 03/12/2021    Procedure: BILATERAL MEDIAL BRANCH BLOCK W/MAC;  Surgeon: Francisco Velez MD;  Location: [facility] OR;  Service: Pain Management;  Laterality: Bilateral;    SIGMOIDOSCOPY N/A 09/23/2020    Procedure: FLEXIBLE SIGMOIDOSCOPY WITH BIOPSY;  Surgeon: Shena Ring MD;  Location: [facility] ENDOSCOPY;  Service: Gastroenterology;  Laterality: N/A;  pre- anal/rectal pain  post- hemorrhoids    TONSILLECTOMY Bilateral         Social History     Occupational History    Occupation: disabled     Comment: carpentry, plumbing and remodeling   Tobacco Use    Smoking status: Never    Smokeless tobacco: Never   Vaping Use    Vaping Use: Never used   Substance and Sexual Activity    Alcohol use: Never    Drug use: Never    Sexual activity: Not Currently     Partners: Female      Social History     Social History Narrative    Not on file        Family History   Problem Relation Age of Onset    Arthritis Mother     Atrial fibrillation Father     Aneurysm Father     Hypertension Father     Dementia Father     Lymphoma Brother     Cancer Brother     Malig Hyperthermia Neg Hx     Colon cancer Neg Hx     Colon polyps Neg Hx     Crohn's disease Neg Hx     Irritable bowel syndrome Neg Hx     Ulcerative colitis Neg Hx        ROS: 14 point review of systems was performed and was negative except for documented findings in HPI and today's encounter.     Allergies:   Allergies   Allergen Reactions    Bactrim [Sulfamethoxazole-Trimethoprim] Shortness Of Breath    Hydrocodone Shortness Of Breath    Naproxen Shortness Of Breath    Sulfa Antibiotics Shortness Of Breath     TROUBLE BREATHING    Alprazolam Other (See Comments)     Annotation - 06Jan2016: Patient stated he can not take this medication because it \"makes him feel weird.\"      Diclofenac Nausea Only and Other (See Comments)     Severe heartburn    Erythromycin GI " Intolerance    Promethazine Tinnitus    Tramadol Headache    Ceftin [Cefuroxime] Other (See Comments)     cough    Levaquin [Levofloxacin] Unknown - Low Severity     Made tendons tight    Mobic [Meloxicam] Nausea Only and Other (See Comments)     Severe heartburn     Constitutional:  Denies fever, shaking or chills   Eyes:  Denies change in visual acuity   HENT:  Denies nasal congestion or sore throat   Respiratory:  Denies cough or shortness of breath   Cardiovascular:  Denies chest pain or severe LE edema   GI:  Denies abdominal pain, nausea, vomiting, bloody stools or diarrhea   Musculoskeletal:  Numbness, tingling, or loss of motor function only as noted above in history of present illness.  : Denies painful urination or hematuria  Integument:  Denies rash, lesion or ulceration   Neurologic:  Denies headache or focal weakness  Endocrine:  Denies lymphadenopathy  Psych:  Denies confusion or change in mental status   Hem:  Denies active bleeding      Physical Exam: 58 y.o. male  Wt Readings from Last 3 Encounters:   08/07/23 96.8 kg (213 lb 6.4 oz)   06/16/23 93.1 kg (205 lb 4.8 oz)   04/18/23 93 kg (205 lb)       Body mass index is 29.78 kg/mý.    Vitals:    08/07/23 1300   Temp: 97.7 øF (36.5 øC)     Vital signs reviewed.   General Appearance:    Alert, cooperative, in no acute distress                    Ortho exam    Physical exam of the left shoulder reveals no overlying skin changes no lymphedema no lymphadenopathy.  Patient has active flexion 175 with mild symptoms abduction is similar external rotation is to 50 and internal rotation to the lower lumbar spine with mild symptoms.  Patient has good rotator cuff strength 4+ over 5 with isometric strength testing with pain.  Patient has a positive impingement and a positive Greco sign.  Patient has good cervical range of motion which is full and asymptomatic no radicular symptoms.  Patient has a normal elbow exam.  Good distal pulses are presentPatient has  pain with overhead activity and a positive Neer sign and a positive empty can sign  They have a positive drop arm any definitive painful arc              Assessment: Left shoulder pain I still think this is impingement he is getting better with injection physical therapy if he plateaus I would consider arthroscopic intervention    Plan: Is as above  Follow up as indicated.  Ice, elevate, and rest as needed.  Discussed conservative measures of pain control including ice, bracing.  Also talked about the importance of strengthening we did discuss things to do things do not do continuation of strengthening I really want them to push that last little bit of range of motion as well  Ronit Lynn M.D.

## 2023-08-08 ENCOUNTER — HOSPITAL ENCOUNTER (OUTPATIENT)
Dept: PHYSICAL THERAPY | Facility: HOSPITAL | Age: 58
Setting detail: THERAPIES SERIES
Discharge: HOME | End: 2023-08-08
Payer: COMMERCIAL

## 2023-08-08 DIAGNOSIS — M25.512 LEFT SHOULDER PAIN, UNSPECIFIED CHRONICITY: Primary | ICD-10-CM

## 2023-08-08 DIAGNOSIS — R29.898 WEAKNESS OF LEFT UPPER EXTREMITY: ICD-10-CM

## 2023-08-08 PROCEDURE — 97110 THERAPEUTIC EXERCISES: CPT

## 2023-08-08 PROCEDURE — 97140 MANUAL THERAPY 1/> REGIONS: CPT

## 2023-08-09 NOTE — THERAPY TREATMENT NOTE
Outpatient Physical Therapy Ortho Treatment Note  [facility]     Patient Name: Jose Maria Judge  : 1965  MRN: [id number]  Today's Date: 2023      Visit Date: 2023    Visit Dx:    ICD-10-CM ICD-9-CM   1. Left shoulder pain, unspecified chronicity  M25.512 719.41   2. Weakness of left upper extremity  R29.898 729.89       Patient Active Problem List   Diagnosis    Bilateral hip pain    Right hip pain    Left hip pain    Trochanteric bursitis    Tear of acetabular labrum    Loss of hair    Anal burning    Elevated blood pressure    Hamstring strain    Hypertension    Neuritis of foot    Hamstring muscle strain    Rectal pain    Herpes zoster    Varicose veins of bilateral lower extremities with pain    Lymph node enlargement    Epigastric pain    Axillary lymphadenopathy    Olfaction disorder    Acute bilateral low back pain without sciatica    Nausea    Anal or rectal pain    Arthropathy of lumbar facet joint    Pudendal neuralgia    Tinnitus    High risk medication use    Varicose veins of left lower extremity with pain    Rib pain    Lymphadenopathy    Bradycardia    Shoulder pain    Chronic pain of both feet    Illness    Fatigue    Metatarsalgia of both feet    Paresthesia of foot    Well adult health check    Vertigo    Varicocele    Tinea corporis    Thoracic back pain    Swelling of structure of right eye    Snoring    Seasonal allergies    Rash    Posterior rhinorrhea    Other specified diseases of anus and rectum    Night sweats    Neuropathy    Muscle spasm of back    Miller's metatarsalgia    Mass of axilla    Male pattern alopecia    Low back strain    Irritable bowel syndrome    Intolerant of cold    Insomnia    Injury of right foot    Gastroesophageal reflux disease    Ganglion    Elevated low density lipoprotein (LDL) cholesterol level    Disorder of gallbladder    Degeneration of lumbar intervertebral disc    Carpal tunnel syndrome    Benign prostatic hyperplasia    Alopecia     Allergic rhinitis due to pollen    Allergic rhinitis    Allergic conjunctivitis    Acute sinusitis    Acute COVID-19    Acute anal fissure    Pharyngitis    Body aches    Stress    Sore throat    Melena    RUQ abdominal pain        Past Medical History:   Diagnosis Date    Acid reflux     HX    Anal fistula     Anxiety     R/T PAIN    Arthritis of back     can't remember    Carpal tunnel syndrome, left     DDD (degenerative disc disease), lumbar     Hemorrhoids     Hodgkin's lymphoma     left axillary adenopathy leading to a biopsy that was nondiagnostic but suspicious and eventually an excisional biopsy that was eventually felt to be consistent with follicular hyperplasia and progressive transformation of germinal centers with 2 foci suspicious for early involvement by nodules lymphocyte predominant Hodgkin's lymphoma.  This was likely treated by excision    Hyperlipidemia     Hyperosmia     Labral tear of hip joint     RIGHT    Low back strain     Male pattern alopecia     Periarthritis of shoulder     need to check file    Postnasal drip     Pudendal neuralgia     Tinnitus of left ear     Varicose vein of leg     left        Past Surgical History:   Procedure Laterality Date    ABSCESS DRAINAGE  08/2012    ANAL FISTULA REPAIR N/A 09/2012, 10/2012    AXILLARY LYMPH NODE BIOPSY/EXCISION Left 11/09/2018    Procedure: AXILLARY LYMPH NODE BIOPSY/EXCISION;  Surgeon: Amando Nguyễn MD;  Location: [facility] OR [facility] [place];  Service: General    COLONOSCOPY N/A 2013    done at [facility]    COLONOSCOPY N/A 07/10/2019    Procedure: COLONOSCOPY to CECUM;  Surgeon: Amando Nguyễn MD;  Location: [facility] ENDOSCOPY;  Service: General    ENDOSCOPY N/A 01/09/2019    Procedure: ESOPHAGOGASTRODUODENOSCOPY;  Surgeon: Amando Nguyễn MD;  Location: [facility] ENDOSCOPY;  Service: General    ENDOSCOPY N/A 03/30/2023    Procedure: ESOPHAGOGASTRODUODENOSCOPY WITH BIOPSY;  Surgeon: Amando Nguyễn MD;  Location: [facility] OR;  Service:  "Gastroenterology;  Laterality: N/A;  Gastric biopsy; Normal    FINE NEEDLE ASPIRATION Left 08/09/2018    Left axillary lymph node FNA    FLEXIBLE SIGMOIDOSCOPY N/A 06/25/2003    Normal-Dr. Cezar Aviles    HEMORRHOIDECTOMY N/A 1996    HIP ARTHROSCOPY W/ LABRAL REPAIR Right 04/03/2017    Dr. Lonnie Lemons    HIP SURGERY  2017    right labral repair    MEDIAL BRANCH BLOCK Bilateral 03/12/2021    Procedure: BILATERAL MEDIAL BRANCH BLOCK W/MAC;  Surgeon: Francisco Velez MD;  Location: [facility] [place];  Service: Pain Management;  Laterality: Bilateral;    SIGMOIDOSCOPY N/A 09/23/2020    Procedure: FLEXIBLE SIGMOIDOSCOPY WITH BIOPSY;  Surgeon: Shena Ring MD;  Location: [facility] ENDOSCOPY;  Service: Gastroenterology;  Laterality: N/A;  pre- anal/rectal pain  post- hemorrhoids    TONSILLECTOMY Bilateral         08/08/23 1500   PT Assessment   Assessment Comments Jose Maria reports shoulder pain calmed and he reports feeling improving mobility of L shoulder. He saw Dr Lynn last week and understands she wants ROM pushed. He tolerated increased PROM and addition of lightly resisted PNF D2 in small range. He will benefit from continuing skilled therapy services to restore ROM and increase strength for return to PLOF.   PT Plan   PT Plan Comments assess response to last visit, cont to push ROM, consider prone flex/ext/hz abd is position tolerated, progress SL AROM            08/08/23 1500   Subjective Comments   Subjective Comments Running late due to parking and traffic. My shoulder is doing better. Saw Dr. Lynn and she said to push ROM.   Subjective Pain   Able to rate subjective pain? yes   Pre-Treatment Pain Level 0   Total Minutes   22427 - PT Therapeutic Exercise Minutes 15   Exercise 1   Exercise Name 1 UBE   Reps 1 sat on 2\" foam pad on the seat (due to hx pudendal neuralgia).   Time 1 4 min   Exercise 2   Exercise Name 2 shoulder shrug w/reverse rolls   Cueing 2 Verbal;Tactile;Demo   Reps 2 15   Exercise 3 "   Exercise Name 3 scapular retraction   Cueing 3 Verbal;Tactile;Demo   Sets 3 2 (1 set elbows ext, 1 set elbws flexed)   Reps 3 10 ea   Time 3 added RTB rows however pain increased   Exercise 4   Exercise Name 4 shoulder pulley for flexion and scaption   Cueing 4 Verbal;Tactile;Demo   Reps 4 resume next visit   Exercise 5   Exercise Name 5 small foam roller on wall  (forearms on foam roller w/pressure while rolling up wall)   Sets 5 2   Reps 5 5   Exercise 6   Exercise Name 6 UT stretch in standing   Cueing 6 Verbal;Demo   Reps 6 3   Time 6 15-20sec   Exercise 7   Exercise Name 7 deltoid isometrics   Reps 7 resume next visit   Exercise 8   Exercise Name 8 shoulder FIR stretch (begin with radhames shld ext)   Cueing 8 Verbal;Demo   Reps 8 reviewed   Time 8 20sec   Exercise 10   Exercise Name 10 wall push up plus   Reps 10 12   Exercise 11   Exercise Name 11 SL ER w/towel   Reps 11 15 no pain   Additional Comments increased range observed   Exercise 12   Exercise Name 12 supine HzAbd   Reps 12 1 x 10   Time 12 YTB   Exercise 13   Exercise Name 13 Biceps curl L   Reps 13 will do at home   Exercise 14   Exercise Name 14 SAP   Sets 14 1   Reps 14 5   Time 14 did not tolerate 2nd set today   Exercise 15   Exercise Name 15 seated thor ext in chair with towel behind   Cueing 15 Verbal;Tactile;Demo   Reps 15 8                                  Manual Rx (last 36 hours)       Manual Treatments       Row Name 08/08/23 1500             Total Minutes    09441 - PT Manual Therapy Minutes 23  -JA         Manual Rx 1    Manual Rx 1 Location L shoulder supine  -JA      Manual Rx 1 Type GH jt mobs, PROM  -JA      Manual Rx 1 Grade improved tolerance  -JA         Manual Rx 2    Manual Rx 2 Location SL scap mobilizaiton, STM periscap muscles  -JA      Manual Rx 2 Type scap pro-retr resisted retaction; rhythmic stab at 90 deg abd  -JA      Manual Rx 2 Grade no irritaton today  -JA         Manual Rx 3    Manual Rx 3 Location isometrics for  ER/IR in neutral at 30 deg abd; RS at 90 deg flex; slow-reversal 0-30 ER and IR to tolerance  -LISET      Manual Rx 3 Type rhythmic stab at 90 deg flex  -LISET      Manual Rx 3 Grade added small range D2  -LISET                User Key  (r) = Recorded By, (t) = Taken By, (c) = Cosigned By      Initials Name Provider Type    Kirstin Chao, PT Physical Therapist                                       Time Calculation:   Start Time: 1507  Stop Time: 1551  Time Calculation (min): 44 min  Timed Charges  79792 - PT Therapeutic Exercise Minutes: 15  11244 - PT Manual Therapy Minutes: 23  Total Minutes  Timed Charges Total Minutes: 38   Total Minutes: 38  Therapy Charges for Today       Code Description Service Date Service Provider Modifiers Qty    51338924405  PT THER PROC EA 15 MIN 8/8/2023 Kirstin Dale, PT GP 1    [id number]  PT MANUAL THERAPY EA 15 MIN 8/8/2023 Kirstin Dale, PT GP 2                      Kirstin Dale PT  8/8/2023

## 2023-08-10 ENCOUNTER — HOSPITAL ENCOUNTER (OUTPATIENT)
Dept: PHYSICAL THERAPY | Facility: HOSPITAL | Age: 58
Setting detail: THERAPIES SERIES
Discharge: HOME | End: 2023-08-10
Payer: COMMERCIAL

## 2023-08-10 DIAGNOSIS — M25.512 LEFT SHOULDER PAIN, UNSPECIFIED CHRONICITY: Primary | ICD-10-CM

## 2023-08-10 DIAGNOSIS — M25.612 DECREASED RANGE OF MOTION OF LEFT SHOULDER: ICD-10-CM

## 2023-08-10 DIAGNOSIS — R29.898 WEAKNESS OF LEFT UPPER EXTREMITY: ICD-10-CM

## 2023-08-10 PROCEDURE — 97140 MANUAL THERAPY 1/> REGIONS: CPT

## 2023-08-10 PROCEDURE — 97110 THERAPEUTIC EXERCISES: CPT

## 2023-08-10 NOTE — THERAPY TREATMENT NOTE
Outpatient Physical Therapy Ortho Treatment Note  [facility]     Patient Name: Jose Maria Judge  : 1965  MRN: [id number]  Today's Date: 8/10/2023      Visit Date: 08/10/2023    Visit Dx:    ICD-10-CM ICD-9-CM   1. Left shoulder pain, unspecified chronicity  M25.512 719.41   2. Weakness of left upper extremity  R29.898 729.89   3. Decreased range of motion of left shoulder  M25.612 719.51       Patient Active Problem List   Diagnosis    Bilateral hip pain    Right hip pain    Left hip pain    Trochanteric bursitis    Tear of acetabular labrum    Loss of hair    Anal burning    Elevated blood pressure    Hamstring strain    Hypertension    Neuritis of foot    Hamstring muscle strain    Rectal pain    Herpes zoster    Varicose veins of bilateral lower extremities with pain    Lymph node enlargement    Epigastric pain    Axillary lymphadenopathy    Olfaction disorder    Acute bilateral low back pain without sciatica    Nausea    Anal or rectal pain    Arthropathy of lumbar facet joint    Pudendal neuralgia    Tinnitus    High risk medication use    Varicose veins of left lower extremity with pain    Rib pain    Lymphadenopathy    Bradycardia    Shoulder pain    Chronic pain of both feet    Illness    Fatigue    Metatarsalgia of both feet    Paresthesia of foot    Well adult health check    Vertigo    Varicocele    Tinea corporis    Thoracic back pain    Swelling of structure of right eye    Snoring    Seasonal allergies    Rash    Posterior rhinorrhea    Other specified diseases of anus and rectum    Night sweats    Neuropathy    Muscle spasm of back    Miller's metatarsalgia    Mass of axilla    Male pattern alopecia    Low back strain    Irritable bowel syndrome    Intolerant of cold    Insomnia    Injury of right foot    Gastroesophageal reflux disease    Ganglion    Elevated low density lipoprotein (LDL) cholesterol level    Disorder of gallbladder    Degeneration of lumbar intervertebral disc    Carpal  tunnel syndrome    Benign prostatic hyperplasia    Alopecia    Allergic rhinitis due to pollen    Allergic rhinitis    Allergic conjunctivitis    Acute sinusitis    Acute COVID-19    Acute anal fissure    Pharyngitis    Body aches    Stress    Sore throat    Melena    RUQ abdominal pain        Past Medical History:   Diagnosis Date    Acid reflux     HX    Anal fistula     Anxiety     R/T PAIN    Arthritis of back     can't remember    Carpal tunnel syndrome, left     DDD (degenerative disc disease), lumbar     Hemorrhoids     Hodgkin's lymphoma     left axillary adenopathy leading to a biopsy that was nondiagnostic but suspicious and eventually an excisional biopsy that was eventually felt to be consistent with follicular hyperplasia and progressive transformation of germinal centers with 2 foci suspicious for early involvement by nodules lymphocyte predominant Hodgkin's lymphoma.  This was likely treated by excision    Hyperlipidemia     Hyperosmia     Labral tear of hip joint     RIGHT    Low back strain     Male pattern alopecia     Periarthritis of shoulder     need to check file    Postnasal drip     Pudendal neuralgia     Tinnitus of left ear     Varicose vein of leg     left        Past Surgical History:   Procedure Laterality Date    ABSCESS DRAINAGE  08/2012    ANAL FISTULA REPAIR N/A 09/2012, 10/2012    AXILLARY LYMPH NODE BIOPSY/EXCISION Left 11/09/2018    Procedure: AXILLARY LYMPH NODE BIOPSY/EXCISION;  Surgeon: Amando Nguyễn MD;  [place] [facility] [place] [facility] [place]  Service: General    COLONOSCOPY N/A 2013    done at [facility]    COLONOSCOPY N/A 07/10/2019    Procedure: COLONOSCOPY to CECUM;  Surgeon: Amando Nguyễn MD;  Location: [facility] ENDOSCOPY;  Service: General    ENDOSCOPY N/A 01/09/2019    Procedure: ESOPHAGOGASTRODUODENOSCOPY;  Surgeon: Amando Nguyễn MD;  Location: [facility] ENDOSCOPY;  Service: General    ENDOSCOPY N/A 03/30/2023    Procedure: ESOPHAGOGASTRODUODENOSCOPY WITH BIOPSY;  Surgeon:  Amando Nguyễn MD;  Location:  [facility] OR;  Service: Gastroenterology;  Laterality: N/A;  Gastric biopsy; Normal    FINE NEEDLE ASPIRATION Left 08/09/2018    Left axillary lymph node FNA    FLEXIBLE SIGMOIDOSCOPY N/A 06/25/2003    Normal-Dr. Cezar Aviles    HEMORRHOIDECTOMY N/A 1996    HIP ARTHROSCOPY W/ LABRAL REPAIR Right 04/03/2017    Dr. Lonnie Lemons    HIP SURGERY  2017    right labral repair    MEDIAL BRANCH BLOCK Bilateral 03/12/2021    Procedure: BILATERAL MEDIAL BRANCH BLOCK W/MAC;  Surgeon: Francisco Velez MD;  Location: [facility] MAIN OR;  Service: Pain Management;  Laterality: Bilateral;    SIGMOIDOSCOPY N/A 09/23/2020    Procedure: FLEXIBLE SIGMOIDOSCOPY WITH BIOPSY;  Surgeon: Shena Ring MD;  Location:  [facility] ENDOSCOPY;  Service: Gastroenterology;  Laterality: N/A;  pre- anal/rectal pain  post- hemorrhoids    TONSILLECTOMY Bilateral                         PT Assessment/Plan       Row Name 08/10/23 1600          PT Assessment    Assessment Comments Jose Maria was able to tolerate small increases in reps of ROM and strengthening ex's.  He benefits from reassurance of safety of internal rotation stretches although he feels pain from stretching his muscles. Continues to respond well to PNF-type manual techniques. Requested more visits.  -JA        PT Plan    PT Plan Comments how is wall walking? cont to push ROM, thoracic ext, consider prone flex/ext/hz abd is position tolerated, progress SL AROM; PNF-type manual  -LISET               User Key  (r) = Recorded By, (t) = Taken By, (c) = Cosigned By      Initials Name Provider Type    Kirstin Chao, PT Physical Therapist                       OP Exercises       Row Name 08/10/23 1500             Subjective Comments    Subjective Comments I doing okay.  -LISET         Subjective Pain    Able to rate subjective pain? yes  -LISET      Pre-Treatment Pain Level 0  -LISET         Total Minutes    09103 - PT Therapeutic Exercise Minutes 20  -JA      24971 - PT  "Manual Therapy Minutes 25  -JA         Exercise 1    Exercise Name 1 UBE  -JA      Reps 1 sat on 2\" foam pad on the seat (due to hx pudendal neuralgia).  -JA      Time 1 4 min  -JA         Exercise 2    Exercise Name 2 shoulder shrug w/reverse rolls  -JA      Cueing 2 Verbal;Tactile;Demo  -JA      Reps 2 15  -JA         Exercise 3    Exercise Name 3 scapular retraction  -JA      Cueing 3 --  -JA      Sets 3 --  -JA      Reps 3 held due to pain  -JA      Time 3 --  -JA         Exercise 4    Exercise Name 4 shoulder pulley for flexion and scaption  -JA      Cueing 4 Verbal;Tactile;Demo  -JA      Reps 4 10 flexion, 10 scaption w/extended elbow  -JA      Additional Comments states flexion feels especially good  -JA         Exercise 5    Exercise Name 5 finger walk up wall  -JA      Sets 5 --  -JA      Reps 5 3  -JA      Time 5 pause and allow to relax and cont a little further if able.  -JA         Exercise 6    Exercise Name 6 UT stretch in standing  -JA      Cueing 6 Verbal;Demo  -JA      Reps 6 3  -JA      Time 6 15-20sec  -JA         Exercise 7    Exercise Name 7 deltoid isometrics  -JA      Sets 7 post delts  -JA      Reps 7 10  -JA         Exercise 8    Exercise Name 8 shoulder FIR stretch (begin with radhames shld ext)  -JA      Cueing 8 Verbal;Demo  -JA      Reps 8 3  -JA      Time 8 20sec  -JA         Exercise 9    Exercise Name 9 radhames shld ext  -JA      Reps 9 10  -JA      Time 9 5sec  -JA      Additional Comments prior to FIR stretch  -JA         Exercise 10    Exercise Name 10 wall push up plus  -JA      Reps 10 10, 6  -JA         Exercise 11    Exercise Name 11 SL ER w/towel  -JA      Sets 11 2  -JA      Reps 11 10  -JA         Exercise 12    Exercise Name 12 --  -JA      Reps 12 --  -JA      Time 12 --  -JA         Exercise 13    Exercise Name 13 Biceps curl L  -JA      Reps 13 ind at home  -JA         Exercise 14    Exercise Name 14 SAP  -JA      Sets 14 --  -JA      Reps 14 resume next visit  -JA      Time 14 " --  -LISET         Exercise 15    Exercise Name 15 seated thor ext in chair with towel behind  -LISET      Cueing 15 Verbal;Tactile;Demo  -LISET      Reps 15 5  -JA      Time 15 5sec  -LISET                User Key  (r) = Recorded By, (t) = Taken By, (c) = Cosigned By      Initials Name Provider Type    Kirstin Chao, PT Physical Therapist                             Manual Rx (last 36 hours)       Manual Treatments       Row Name 08/10/23 1500             Total Minutes    58272 - PT Manual Therapy Minutes 25  -         Manual Rx 1    Manual Rx 1 Location L shoulder supine  -      Manual Rx 1 Type GH jt mobs, PROM  -      Manual Rx 1 Grade improved tolerance  -         Manual Rx 2    Manual Rx 2 Location SL scap mobilizaiton, STM periscap muscles and lift-tilt  -      Manual Rx 2 Type scap pro-retr resisted retaction;  -      Manual Rx 2 Grade no irritaton today  -         Manual Rx 3    Manual Rx 3 Location slow reversal D2 in partial range working toward pain limits, HZ abd also -able to reach nearly full range  -      Manual Rx 3 Type rhythmic stab at 90 deg flex  -      Manual Rx 3 Grade --  -LISET                User Key  (r) = Recorded By, (t) = Taken By, (c) = Cosigned By      Initials Name Provider Type    Kirstin Chao, PT Physical Therapist                     PT OP Goals       Row Name 08/10/23 1500          PT Short Term Goals    STG Date to Achieve 07/27/23  -LISET     STG 1 Pt will be independent with initial HEP.  -LISET     STG 1 Progress Met  -LISET     STG 2 Pt will demonstrate good posture in sitting and standing.  -LISET     STG 2 Progress Progressing;Partially Met  -LISET     STG 3 Pt will demonstrate increased AAROM/PROM to 80% of full or better.  -LISET     STG 3 Progress Ongoing  -        Long Term Goals    LTG Date to Achieve 08/26/23  -LISET     LTG 1 Pt will demonstrate increased L shoulder AROM to WFL to complete dressing/grooming ADLs and household chores with ease.  -LISET     LTG 1  Progress Ongoing  -LISET     LTG 2 Pt will be independent with advanced HEP for RC strengthening and scapular stabilization.  -LISET     LTG 2 Progress Ongoing  -LISET     LTG 3 Pt will score 44 on QuickDASH indicating decrease in perceived functional disability.  -LISET     LTG 3 Progress Ongoing  -LISET     LTG 4 Pt will demonstrate increased L shoulder strength to strength 4+/5.  -LISET     LTG 4 Progress Ongoing  -LISET     LTG 5 Pt will report 75% decrease in shoulder pain during ADLs.  -LISET     LTG 5 Progress Ongoing  -LISET               User Key  (r) = Recorded By, (t) = Taken By, (c) = Cosigned By      Initials Name Provider Type    Kirstin Chao, PT Physical Therapist                    Therapy Education  Education Details: verbally added wall walking to HEP, discussed progessions with ex's to gain ROM and strength.  Given: Symptoms/condition management, Pain management, Posture/body mechanics  Program: Reinforced, Progressed  How Provided: Verbal, Demonstration  Provided to: Patient  Level of Understanding: Verbalized, Demonstrated              Time Calculation:   Start Time: 1500  Stop Time: 1550  Time Calculation (min): 50 min  Timed Charges  80822 - PT Therapeutic Exercise Minutes: 20  69961 - PT Manual Therapy Minutes: 25  Total Minutes  Timed Charges Total Minutes: 45   Total Minutes: 45  Therapy Charges for Today       Code Description Service Date Service Provider Modifiers Qty    45442006291 HC PT THER PROC EA 15 MIN 8/10/2023 Kirstin Dael, PT GP 1    [id number] HC PT MANUAL THERAPY EA 15 MIN 8/10/2023 Kirstin Dale PT GP 2                      Kisrtin Dale PT  8/10/2023

## 2023-08-11 ENCOUNTER — OFFICE VISIT (OUTPATIENT)
Dept: INTERNAL MEDICINE | Facility: CLINIC | Age: 58
End: 2023-08-11
Payer: COMMERCIAL

## 2023-08-11 ENCOUNTER — TELEPHONE (OUTPATIENT)
Dept: INTERNAL MEDICINE | Facility: CLINIC | Age: 58
End: 2023-08-11

## 2023-08-11 VITALS
BODY MASS INDEX: 30.13 KG/M2 | SYSTOLIC BLOOD PRESSURE: 124 MMHG | HEART RATE: 70 BPM | DIASTOLIC BLOOD PRESSURE: 80 MMHG | OXYGEN SATURATION: 98 % | WEIGHT: 215.2 LBS | HEIGHT: 71 IN

## 2023-08-11 DIAGNOSIS — B35.2 TINEA MANUUM: Primary | ICD-10-CM

## 2023-08-11 PROCEDURE — 99212 OFFICE O/P EST SF 10 MIN: CPT | Performed by: STUDENT IN AN ORGANIZED HEALTH CARE EDUCATION/TRAINING PROGRAM

## 2023-08-11 PROCEDURE — 3074F SYST BP LT 130 MM HG: CPT | Performed by: STUDENT IN AN ORGANIZED HEALTH CARE EDUCATION/TRAINING PROGRAM

## 2023-08-11 PROCEDURE — 3079F DIAST BP 80-89 MM HG: CPT | Performed by: STUDENT IN AN ORGANIZED HEALTH CARE EDUCATION/TRAINING PROGRAM

## 2023-08-11 RX ORDER — PRENATAL VIT 91/IRON/FOLIC/DHA 28-975-200
1 COMBINATION PACKAGE (EA) ORAL 2 TIMES DAILY
Qty: 42 G | Refills: 1 | Status: SHIPPED | OUTPATIENT
Start: 2023-08-11

## 2023-08-11 NOTE — TELEPHONE ENCOUNTER
Caller: Jose Maria Judge    Relationship: Self    Best call back number: [phone]     What medications are you currently taking:   Current Outpatient Medications on File Prior to Visit   Medication Sig Dispense Refill    acetaminophen (TYLENOL) 500 MG tablet Take 1 to 2 tablets every 6 hours as needed for fever, aches, pains 30 tablet 0    diazePAM (Valium) 2 MG tablet Take 1 tablet by mouth Daily As Needed for Anxiety. (Patient not taking: Reported on 8/7/2023) 30 tablet 0    terbinafine (lamISIL) 1 % cream Apply 1 application  topically to the appropriate area as directed 2 (Two) Times a Day. Apply to the wrists for 3 weeks total. 42 g 1     No current facility-administered medications on file prior to visit.          What are your concerns:     PATIENT IS WANTING TO KNOW IF YOU CAN PRESCRIBE HIM SOMETHING STRONGER THAT THE OTC MEDICATION 1% CREME THAT YOU GAVE HIM.  HE STATES THAT HE ASKED FOR SOMETHING STRONGER THAN OVER THE COUNTER MEDICATION, WHICH THIS ONE IS OTC.    PLEASE ADVISE

## 2023-08-11 NOTE — PROGRESS NOTES
"  Nico Mora D.O.  Internal Medicine  [facility]  [address], [address]  [place] [address]  [phone]      Chief Complaint  loosing hair on both hand, left hand itching    SUBJECTIVE    History of Present Illness    Jose Maria Judge is a 58 y.o. male who presents to the office today as an established patient that last saw me on 4/5/2023.   Has noticed hair loss and itching of the wrist and hands. He denies pain, swelling, heat to these areas. He never has a fever.   Has been wearing gloves since start of COVID pandemic. He uses these pretty much anytime he is in public as well as around the house. He has tried benadryl and antibiotic cream over the counter.   He has used an over the counter Chiggerex on his hands which helps.   Allergies   Allergen Reactions    Bactrim [Sulfamethoxazole-Trimethoprim] Shortness Of Breath    Hydrocodone Shortness Of Breath    Naproxen Shortness Of Breath    Sulfa Antibiotics Shortness Of Breath     TROUBLE BREATHING    Alprazolam Other (See Comments)     Annotation - 06Jan2016: Patient stated he can not take this medication because it \"makes him feel weird.\"      Diclofenac Nausea Only and Other (See Comments)     Severe heartburn    Erythromycin GI Intolerance    Promethazine Tinnitus    Tramadol Headache    Ceftin [Cefuroxime] Other (See Comments)     cough    Levaquin [Levofloxacin] Unknown - Low Severity     Made tendons tight    Mobic [Meloxicam] Nausea Only and Other (See Comments)     Severe heartburn        Outpatient Medications Marked as Taking for the 8/11/23 encounter (Office Visit) with Nico Mora, DO   Medication Sig Dispense Refill    acetaminophen (TYLENOL) 500 MG tablet Take 1 to 2 tablets every 6 hours as needed for fever, aches, pains 30 tablet 0        Past Medical History:   Diagnosis Date    Acid reflux     HX    Anal fistula     Anxiety     R/T PAIN    Arthritis of back     can't remember    Carpal tunnel syndrome, left     DDD " "(degenerative disc disease), lumbar     Hemorrhoids     Hodgkin's lymphoma     left axillary adenopathy leading to a biopsy that was nondiagnostic but suspicious and eventually an excisional biopsy that was eventually felt to be consistent with follicular hyperplasia and progressive transformation of germinal centers with 2 foci suspicious for early involvement by nodules lymphocyte predominant Hodgkin's lymphoma.  This was likely treated by excision    Hyperlipidemia     Hyperosmia     Labral tear of hip joint     RIGHT    Low back strain     Male pattern alopecia     Periarthritis of shoulder     need to check file    Postnasal drip     Pudendal neuralgia     Tinnitus of left ear     Varicose vein of leg     left       OBJECTIVE    Vital Signs:   /80   Pulse 70   Ht 180.3 cm (70.98\")   Wt 97.6 kg (215 lb 3.2 oz)   SpO2 98%   BMI 30.03 kg/mý     Physical Exam  Musculoskeletal:      Comments: Bilateral hands with patchy loss of hair over the dorsal wrist as well as areas of dry skin. When palpated, patient states these areas itch. No active discharge or drainage. No swelling or erythema.                            ASSESSMENT & PLAN     Diagnoses and all orders for this visit:    1. Tinea manuum (Primary)  -trial of terbinafine cream   -recommended he keep his hands dry frequently  -limit unnecessary use of gloves as this traps moisture  -return to office if no improvement and can refer to dermatology   -     terbinafine (lamISIL) 1 % cream; Apply 1 application  topically to the appropriate area as directed 2 (Two) Times a Day. Apply to the wrists for 3 weeks total.  Dispense: 42 g; Refill: 1            The following social determinates of health impact the patient's medical decision making: No social determinates of health were factored in to today's visit.     Follow Up  No follow-ups on file.    Patient/family had no further questions at this time and verbalized understanding of the plan discussed " today.

## 2023-08-14 ENCOUNTER — HOSPITAL ENCOUNTER (OUTPATIENT)
Dept: PHYSICAL THERAPY | Facility: HOSPITAL | Age: 58
Setting detail: THERAPIES SERIES
Discharge: HOME | End: 2023-08-14
Payer: COMMERCIAL

## 2023-08-14 DIAGNOSIS — R29.898 WEAKNESS OF LEFT UPPER EXTREMITY: ICD-10-CM

## 2023-08-14 DIAGNOSIS — M25.512 LEFT SHOULDER PAIN, UNSPECIFIED CHRONICITY: Primary | ICD-10-CM

## 2023-08-14 DIAGNOSIS — M25.612 DECREASED RANGE OF MOTION OF LEFT SHOULDER: ICD-10-CM

## 2023-08-14 PROCEDURE — 97140 MANUAL THERAPY 1/> REGIONS: CPT

## 2023-08-14 PROCEDURE — 97110 THERAPEUTIC EXERCISES: CPT

## 2023-08-14 NOTE — THERAPY TREATMENT NOTE
Outpatient Physical Therapy Ortho Treatment Note  [facility]     Patient Name: Jose Maria Judge  : 1965  MRN: [id number]  Today's Date: 2023      Visit Date: 2023    Visit Dx:    ICD-10-CM ICD-9-CM   1. Left shoulder pain, unspecified chronicity  M25.512 719.41   2. Weakness of left upper extremity  R29.898 729.89   3. Decreased range of motion of left shoulder  M25.612 719.51       Patient Active Problem List   Diagnosis    Bilateral hip pain    Right hip pain    Left hip pain    Trochanteric bursitis    Tear of acetabular labrum    Loss of hair    Anal burning    Elevated blood pressure    Hamstring strain    Hypertension    Neuritis of foot    Hamstring muscle strain    Rectal pain    Herpes zoster    Varicose veins of bilateral lower extremities with pain    Lymph node enlargement    Epigastric pain    Axillary lymphadenopathy    Olfaction disorder    Acute bilateral low back pain without sciatica    Nausea    Anal or rectal pain    Arthropathy of lumbar facet joint    Pudendal neuralgia    Tinnitus    High risk medication use    Varicose veins of left lower extremity with pain    Rib pain    Lymphadenopathy    Bradycardia    Shoulder pain    Chronic pain of both feet    Illness    Fatigue    Metatarsalgia of both feet    Paresthesia of foot    Well adult health check    Vertigo    Varicocele    Tinea corporis    Thoracic back pain    Swelling of structure of right eye    Snoring    Seasonal allergies    Rash    Posterior rhinorrhea    Other specified diseases of anus and rectum    Night sweats    Neuropathy    Muscle spasm of back    Miller's metatarsalgia    Mass of axilla    Male pattern alopecia    Low back strain    Irritable bowel syndrome    Intolerant of cold    Insomnia    Injury of right foot    Gastroesophageal reflux disease    Ganglion    Elevated low density lipoprotein (LDL) cholesterol level    Disorder of gallbladder    Degeneration of lumbar intervertebral disc    Carpal  tunnel syndrome    Benign prostatic hyperplasia    Alopecia    Allergic rhinitis due to pollen    Allergic rhinitis    Allergic conjunctivitis    Acute sinusitis    Acute COVID-19    Acute anal fissure    Pharyngitis    Body aches    Stress    Sore throat    Melena    RUQ abdominal pain        Past Medical History:   Diagnosis Date    Acid reflux     HX    Anal fistula     Anxiety     R/T PAIN    Arthritis of back     can't remember    Carpal tunnel syndrome, left     DDD (degenerative disc disease), lumbar     Hemorrhoids     Hodgkin's lymphoma     left axillary adenopathy leading to a biopsy that was nondiagnostic but suspicious and eventually an excisional biopsy that was eventually felt to be consistent with follicular hyperplasia and progressive transformation of germinal centers with 2 foci suspicious for early involvement by nodules lymphocyte predominant Hodgkin's lymphoma.  This was likely treated by excision    Hyperlipidemia     Hyperosmia     Labral tear of hip joint     RIGHT    Low back strain     Male pattern alopecia     Periarthritis of shoulder     need to check file    Postnasal drip     Pudendal neuralgia     Tinnitus of left ear     Varicose vein of leg     left        Past Surgical History:   Procedure Laterality Date    ABSCESS DRAINAGE  08/2012    ANAL FISTULA REPAIR N/A 09/2012, 10/2012    AXILLARY LYMPH NODE BIOPSY/EXCISION Left 11/09/2018    Procedure: AXILLARY LYMPH NODE BIOPSY/EXCISION;  Surgeon: Amando Nguyễn MD;  [place] [facility] [place] [facility]  Service: General    COLONOSCOPY N/A 2013    done at [facility]    COLONOSCOPY N/A 07/10/2019    Procedure: COLONOSCOPY to CECUM;  Surgeon: Amando Nguyễn MD;  Location: [facility] ENDOSCOPY;  Service: General    ENDOSCOPY N/A 01/09/2019    Procedure: ESOPHAGOGASTRODUODENOSCOPY;  Surgeon: Amando Nguyễn MD;  Location: [facility] ENDOSCOPY;  Service: General    ENDOSCOPY N/A 03/30/2023    Procedure: ESOPHAGOGASTRODUODENOSCOPY WITH BIOPSY;  Surgeon:  Amando Nguyễn MD;  Location:  [facility] OR;  Service: Gastroenterology;  Laterality: N/A;  Gastric biopsy; Normal    FINE NEEDLE ASPIRATION Left 08/09/2018    Left axillary lymph node FNA    FLEXIBLE SIGMOIDOSCOPY N/A 06/25/2003    Normal-Dr. Cezar Aviles    HEMORRHOIDECTOMY N/A 1996    HIP ARTHROSCOPY W/ LABRAL REPAIR Right 04/03/2017    Dr. Lonnie Lemons    HIP SURGERY  2017    right labral repair    MEDIAL BRANCH BLOCK Bilateral 03/12/2021    Procedure: BILATERAL MEDIAL BRANCH BLOCK W/MAC;  Surgeon: Francisco Velez MD;  Location: [facility];  Service: Pain Management;  Laterality: Bilateral;    SIGMOIDOSCOPY N/A 09/23/2020    Procedure: FLEXIBLE SIGMOIDOSCOPY WITH BIOPSY;  Surgeon: Shena Ring MD;  Location:  [facility];  Service: Gastroenterology;  Laterality: N/A;  pre- anal/rectal pain  post- hemorrhoids    TONSILLECTOMY Bilateral                         PT Assessment/Plan       Row Name 08/14/23 1600          PT Assessment    Assessment Comments Jose Maria reports the posterior deltoid isometric irritated his L shoulder pain last visit. He feels resisted scapular retraction also exacerbates his pain. Had him perform all scap strengthening without resistance. Continued SL strengthening with mild irritation complaints. Discussed improtance of other exercise and posture awareness/work (he is walking) and release of endorphins for pain relief. Worked on moving shoulder recognizing presence of pain/muscle guarding does not necessarily indicate a problem. He was able to reach higher in FIR today and in supine scaption was 135-140. He is responding well to wall finger-walking and seated thor ext. He will benefit from continuing skilled therapy services.  -LISET        PT Plan    PT Plan Comments assess response to last visit.  -LISET               User Key  (r) = Recorded By, (t) = Taken By, (c) = Cosigned By      Initials Name Provider Type    Kirstin Chao, PT Physical Therapist               "         OP Exercises       Row Name 08/14/23 1500 08/14/23 1400          Subjective Comments    Subjective Comments -- The new isometric exercises irritated my shoulder.  -JA        Subjective Pain    Able to rate subjective pain? -- yes  -JA        Total Minutes    65967 - PT Therapeutic Exercise Minutes -- 30  -JA     70830 - PT Manual Therapy Minutes 15  -JA --        Exercise 1    Exercise Name 1 -- UBE  -JA     Reps 1 -- sat on 2\" foam pad on the seat (due to hx pudendal neuralgia).  -JA     Time 1 -- 3 min  -JA        Exercise 2    Exercise Name 2 -- shoulder shrug w/reverse rolls  -JA     Cueing 2 -- Verbal;Tactile;Demo  -JA     Reps 2 -- 15  -JA        Exercise 3    Exercise Name 3 -- scapular retraction  -JA     Reps 3 -- held due to pain  -JA        Exercise 4    Exercise Name 4 -- shoulder pulley for flexion and scaption  -JA     Cueing 4 -- Verbal;Tactile;Demo  -JA     Reps 4 -- 10 flexion, 10 scaption w/extended elbow  -JA        Exercise 5    Exercise Name 5 -- finger walk up wall  -JA     Reps 5 -- 3  -JA     Time 5 -- pause and allow to relax and cont a little further if able.  -JA        Exercise 6    Exercise Name 6 -- UT stretch in standing  -JA     Cueing 6 -- Verbal;Demo  -JA     Reps 6 -- 3  -JA     Time 6 -- 15-20sec  -JA        Exercise 7    Exercise Name 7 -- ROM lateral delt  0-70  -JA     Sets 7 -- --  -JA     Reps 7 -- 10  -JA     Time 7 -- pt c/o increased pain with isometrics  -JA        Exercise 8    Exercise Name 8 -- shoulder FIR stretch (begin with radhames shld ext)  -JA     Cueing 8 -- Verbal;Demo  -JA     Reps 8 -- 10  -JA     Time 8 -- 5sec  -JA     Additional Comments -- able to reach above belt line  -JA        Exercise 9    Exercise Name 9 -- radhames shld ext  -JA     Reps 9 -- 10  -JA     Time 9 -- 5sec  -JA        Exercise 10    Exercise Name 10 -- wall push up plus  -JA     Reps 10 -- 10, 6  -JA        Exercise 11    Exercise Name 11 -- SL ER w/towel  -JA     Sets 11 -- 2  -JA     " Reps 11 -- 10  -JA        Exercise 13    Exercise Name 13 -- Biceps curl L  -JA     Reps 13 -- ind at home  -JA        Exercise 14    Exercise Name 14 -- SAP  -JA     Sets 14 -- 2  -JA     Reps 14 -- 10  -JA     Time 14 -- radhames then alt  -JA        Exercise 15    Exercise Name 15 -- seated thor ext in chair with towel behind  -LISET     Cueing 15 -- Verbal;Tactile;Demo  -LISET     Reps 15 -- 5  -JA     Time 15 -- 5sec  -JA               User Key  (r) = Recorded By, (t) = Taken By, (c) = Cosigned By      Initials Name Provider Type    Kirstin Chao, PT Physical Therapist                             Manual Rx (last 36 hours)       Manual Treatments       Row Name 08/14/23 1500             Total Minutes    56003 - PT Manual Therapy Minutes 15  -         Manual Rx 1    Manual Rx 1 Location L shoulder supine  -      Manual Rx 1 Type GH jt mobs, PROM  -      Manual Rx 1 Grade improved tolerance  -         Manual Rx 3    Manual Rx 3 Location slow reversal D2 in partial range working toward pain limits  -      Manual Rx 3 Type rhythmic stab at 90 deg flex  -      Manual Rx 3 Grade multiple angle iso ER/IR  -                User Key  (r) = Recorded By, (t) = Taken By, (c) = Cosigned By      Initials Name Provider Type    Kirstin Chao, PT Physical Therapist                                       Time Calculation:   Start Time: 1503  Stop Time: 1550  Time Calculation (min): 47 min  Timed Charges  41001 - PT Therapeutic Exercise Minutes: 30  29212 - PT Manual Therapy Minutes: 15  Total Minutes  Timed Charges Total Minutes: 15   Total Minutes: 15  Therapy Charges for Today       Code Description Service Date Service Provider Modifiers Qty    60874314076 HC PT THER PROC EA 15 MIN 8/14/2023 Kirstin Dale, PT GP 2    71925310561 HC PT MANUAL THERAPY EA 15 MIN 8/14/2023 Kirstin Dale, PT GP 1                      Kirstin Dale PT  8/14/2023

## 2023-08-16 ENCOUNTER — HOSPITAL ENCOUNTER (OUTPATIENT)
Dept: PHYSICAL THERAPY | Facility: HOSPITAL | Age: 58
Setting detail: THERAPIES SERIES
Discharge: HOME | End: 2023-08-16
Payer: COMMERCIAL

## 2023-08-16 DIAGNOSIS — R29.898 WEAKNESS OF LEFT UPPER EXTREMITY: ICD-10-CM

## 2023-08-16 DIAGNOSIS — M25.612 DECREASED RANGE OF MOTION OF LEFT SHOULDER: ICD-10-CM

## 2023-08-16 DIAGNOSIS — M25.512 LEFT SHOULDER PAIN, UNSPECIFIED CHRONICITY: Primary | ICD-10-CM

## 2023-08-16 PROCEDURE — 97140 MANUAL THERAPY 1/> REGIONS: CPT

## 2023-08-16 PROCEDURE — 97110 THERAPEUTIC EXERCISES: CPT

## 2023-08-16 NOTE — THERAPY TREATMENT NOTE
Outpatient Physical Therapy Ortho Treatment Note  [facility]     Patient Name: Jose Maria Judge  : 1965  MRN: [id number]  Today's Date: 2023      Visit Date: 2023    Visit Dx:    ICD-10-CM ICD-9-CM   1. Left shoulder pain, unspecified chronicity  M25.512 719.41   2. Weakness of left upper extremity  R29.898 729.89   3. Decreased range of motion of left shoulder  M25.612 719.51       Patient Active Problem List   Diagnosis    Bilateral hip pain    Right hip pain    Left hip pain    Trochanteric bursitis    Tear of acetabular labrum    Loss of hair    Anal burning    Elevated blood pressure    Hamstring strain    Hypertension    Neuritis of foot    Hamstring muscle strain    Rectal pain    Herpes zoster    Varicose veins of bilateral lower extremities with pain    Lymph node enlargement    Epigastric pain    Axillary lymphadenopathy    Olfaction disorder    Acute bilateral low back pain without sciatica    Nausea    Anal or rectal pain    Arthropathy of lumbar facet joint    Pudendal neuralgia    Tinnitus    High risk medication use    Varicose veins of left lower extremity with pain    Rib pain    Lymphadenopathy    Bradycardia    Shoulder pain    Chronic pain of both feet    Illness    Fatigue    Metatarsalgia of both feet    Paresthesia of foot    Well adult health check    Vertigo    Varicocele    Tinea corporis    Thoracic back pain    Swelling of structure of right eye    Snoring    Seasonal allergies    Rash    Posterior rhinorrhea    Other specified diseases of anus and rectum    Night sweats    Neuropathy    Muscle spasm of back    Miller's metatarsalgia    Mass of axilla    Male pattern alopecia    Low back strain    Irritable bowel syndrome    Intolerant of cold    Insomnia    Injury of right foot    Gastroesophageal reflux disease    Ganglion    Elevated low density lipoprotein (LDL) cholesterol level    Disorder of gallbladder    Degeneration of lumbar intervertebral disc    Carpal  tunnel syndrome    Benign prostatic hyperplasia    Alopecia    Allergic rhinitis due to pollen    Allergic rhinitis    Allergic conjunctivitis    Acute sinusitis    Acute COVID-19    Acute anal fissure    Pharyngitis    Body aches    Stress    Sore throat    Melena    RUQ abdominal pain        Past Medical History:   Diagnosis Date    Acid reflux     HX    Anal fistula     Anxiety     R/T PAIN    Arthritis of back     can't remember    Carpal tunnel syndrome, left     DDD (degenerative disc disease), lumbar     Hemorrhoids     Hodgkin's lymphoma     left axillary adenopathy leading to a biopsy that was nondiagnostic but suspicious and eventually an excisional biopsy that was eventually felt to be consistent with follicular hyperplasia and progressive transformation of germinal centers with 2 foci suspicious for early involvement by nodules lymphocyte predominant Hodgkin's lymphoma.  This was likely treated by excision    Hyperlipidemia     Hyperosmia     Labral tear of hip joint     RIGHT    Low back strain     Male pattern alopecia     Periarthritis of shoulder     need to check file    Postnasal drip     Pudendal neuralgia     Tinnitus of left ear     Varicose vein of leg     left        Past Surgical History:   Procedure Laterality Date    ABSCESS DRAINAGE  08/2012    ANAL FISTULA REPAIR N/A 09/2012, 10/2012    AXILLARY LYMPH NODE BIOPSY/EXCISION Left 11/09/2018    Procedure: AXILLARY LYMPH NODE BIOPSY/EXCISION;  Surgeon: Amando Nguyễn MD;  Location: [facility] [place];  Service: General    COLONOSCOPY N/A 2013    done at [facility]    COLONOSCOPY N/A 07/10/2019    Procedure: COLONOSCOPY to CECUM;  Surgeon: Amando Nguyễn MD;  Location: [facility] ENDOSCOPY;  Service: General    ENDOSCOPY N/A 01/09/2019    Procedure: ESOPHAGOGASTRODUODENOSCOPY;  Surgeon: Amando Nguyễn MD;  Location: [facility] ENDOSCOPY;  Service: General    ENDOSCOPY N/A 03/30/2023    Procedure: ESOPHAGOGASTRODUODENOSCOPY WITH BIOPSY;  Surgeon:  Amando Nguyễn MD;  Location:  [facility] OR;  Service: Gastroenterology;  Laterality: N/A;  Gastric biopsy; Normal    FINE NEEDLE ASPIRATION Left 08/09/2018    Left axillary lymph node FNA    FLEXIBLE SIGMOIDOSCOPY N/A 06/25/2003    Normal-Dr. Cezar Aviles    HEMORRHOIDECTOMY N/A 1996    HIP ARTHROSCOPY W/ LABRAL REPAIR Right 04/03/2017    Dr. Lonnie Lemons    HIP SURGERY  2017    right labral repair    MEDIAL BRANCH BLOCK Bilateral 03/12/2021    Procedure: BILATERAL MEDIAL BRANCH BLOCK W/MAC;  Surgeon: Francisco Velez MD;  Location: [facility];  Service: Pain Management;  Laterality: Bilateral;    SIGMOIDOSCOPY N/A 09/23/2020    Procedure: FLEXIBLE SIGMOIDOSCOPY WITH BIOPSY;  Surgeon: Shena Ring MD;  Location:  [facility];  Service: Gastroenterology;  Laterality: N/A;  pre- anal/rectal pain  post- hemorrhoids    TONSILLECTOMY Bilateral                         PT Assessment/Plan       Row Name 08/16/23 1600          PT Assessment    Assessment Comments Jose Maria reports improving FIR movement, noted he was able to reach behind his back while showering. He demonstates guarding/substitution with AROM flexion >105 degrees however fingers walking up wall he can reach 135 deg before compensating. Working on increasing awareness of pain with AAROM and PROM that indicates tissues response but not damage/injury and he is responding with increasing supine ROM to 150. Able to add supine radhames flex with resistance band around L wrist and held in R hand without increased pain/guarding. He will benefit from continuing skilled therapy services.  -LISET        PT Plan    PT Plan Comments how is radhames shld flex with resistance going at home?, did he work on delts and ER in  with 1#?  -LISET               User Key  (r) = Recorded By, (t) = Taken By, (c) = Cosigned By      Initials Name Provider Type    Kirstin Chao, PT Physical Therapist                       OP Exercises       Row Name 08/16/23 1500              "Subjective Comments    Subjective Comments I was able to reach my back a little better in the shower.  -JA         Subjective Pain    Able to rate subjective pain? yes  -JA      Pre-Treatment Pain Level 0  -JA         Total Minutes    43224 - PT Therapeutic Exercise Minutes 30  -JA      94327 - PT Manual Therapy Minutes 15  -JA         Exercise 1    Exercise Name 1 UBE  -JA      Reps 1 sat on 2\" foam pad on the seat (due to hx pudendal neuralgia).  -JA      Time 1 3 min  -JA         Exercise 2    Exercise Name 2 shoulder shrug w/reverse rolls  -JA      Cueing 2 Verbal;Tactile;Demo  -JA      Reps 2 15  -JA         Exercise 3    Exercise Name 3 scapular retraction  -JA      Cueing 3 Verbal;Tactile;Demo  -JA      Reps 3 15  -JA      Time 3 5sec  -JA         Exercise 4    Exercise Name 4 shoulder pulley for flexion and scaption  -JA      Cueing 4 Verbal;Tactile;Demo  -JA      Reps 4 10 flexion, 10 scaption w/extended elbow  -JA         Exercise 5    Exercise Name 5 finger walk up wall  -JA      Reps 5 10  -JA      Time 5 pause and allow to relax and cont a little further if able.  -JA         Exercise 6    Exercise Name 6 UT stretch in standing  -JA      Cueing 6 Verbal;Demo  -JA      Reps 6 3  -JA      Time 6 15-20sec  -JA         Exercise 7    Exercise Name 7 ROM lateral delt  0-70  -JA      Reps 7 20  -JA      Time 7 pt c/o increased pain with isometrics  -JA         Exercise 8    Exercise Name 8 shoulder FIR stretch (begin with radhames shld ext ex's)  -JA      Cueing 8 Verbal;Demo  -JA      Reps 8 3  -JA      Time 8 20 sec  -JA         Exercise 9    Exercise Name 9 radhames shld ext  -JA      Reps 9 10  -JA      Time 9 5sec  -JA         Exercise 10    Exercise Name 10 wall push up plus  -JA      Reps 10 10, 6  -JA         Exercise 11    Exercise Name 11 SL ER w/towel  -JA      Sets 11 2  -JA      Reps 11 10  -JA         Exercise 12    Exercise Name 12 SL Flex, SL Abd  -JA      Reps 12 6 ea  -JA         Exercise 13    " Exercise Name 13 Biceps curl L  -JA      Reps 13 ind at home  -JA         Exercise 14    Exercise Name 14 SAP  -JA      Sets 14 2  -JA      Reps 14 10  -JA      Time 14 radhames then alt  -JA         Exercise 15    Exercise Name 15 seated thor ext in chair with towel behind  -JA      Cueing 15 Verbal;Tactile;Demo  -JA      Reps 15 5  -JA      Time 15 5sec  -JA         Exercise 16    Exercise Name 16 supine radhames shoulder flex w/resistance around L wrist holding tband in R with mild to moderate tension on band  -JA      Cueing 16 Verbal;Tactile  -JA      Reps 16 10  -JA      Time 16 RTB  -JA                User Key  (r) = Recorded By, (t) = Taken By, (c) = Cosigned By      Initials Name Provider Type    Kirstin Chao, PT Physical Therapist                             Manual Rx (last 36 hours)       Manual Treatments       Row Name 08/16/23 1500             Total Minutes    88658 - PT Manual Therapy Minutes 15  -JA         Manual Rx 1    Manual Rx 1 Location L shoulder supine  -      Manual Rx 1 Type GH jt mobs, PROM  -      Manual Rx 1 Grade improved tolerance  -         Manual Rx 3    Manual Rx 3 Location slow reversal D2 in partial range working toward pain limits  -      Manual Rx 3 Type rhythmic stab at 90 deg flex  -      Manual Rx 3 Grade multiple angle iso ER/IR  -                User Key  (r) = Recorded By, (t) = Taken By, (c) = Cosigned By      Initials Name Provider Type    Kirstin Chao, PT Physical Therapist                     PT OP Goals       Row Name 08/16/23 1500          PT Short Term Goals    STG Date to Achieve 07/27/23  -LISET     STG 1 Pt will be independent with initial HEP.  -LISET     STG 1 Progress Met  -     STG 2 Pt will demonstrate good posture in sitting and standing.  -LISET     STG 2 Progress Met  -     STG 3 Pt will demonstrate increased AAROM/PROM to 80% of full or better.  -LISET     STG 3 Progress Ongoing  -        Long Term Goals    LTG Date to Achieve 08/26/23   -LISET     LTG 1 Pt will demonstrate increased L shoulder AROM to WFL to complete dressing/grooming ADLs and household chores with ease.  -LISET     LTG 1 Progress Ongoing  -LISET     LTG 2 Pt will be independent with advanced HEP for RC strengthening and scapular stabilization.  -LISET     LTG 2 Progress Ongoing  -JA     LTG 3 Pt will score 44 on QuickDASH indicating decrease in perceived functional disability.  -LISET     LTG 3 Progress Ongoing  -LISET     LTG 4 Pt will demonstrate increased L shoulder strength to strength 4+/5.  -LISET     LTG 4 Progress Ongoing  -JA     LTG 5 Pt will report 75% decrease in shoulder pain during ADLs.  -LISET     LTG 5 Progress Ongoing  -LISET               User Key  (r) = Recorded By, (t) = Taken By, (c) = Cosigned By      Initials Name Provider Type    Kirstin Chao, PT Physical Therapist                    Therapy Education  Education Details: added sup radhames shld flex with tband loop to HEP, pt to add more SL ER reps and seated lateral delt in 0-70 range              Time Calculation:   Start Time: 1500  Stop Time: 1545  Time Calculation (min): 45 min  Timed Charges  87749 - PT Therapeutic Exercise Minutes: 30  76336 - PT Manual Therapy Minutes: 15  Total Minutes  Timed Charges Total Minutes: 45   Total Minutes: 45  Therapy Charges for Today       Code Description Service Date Service Provider Modifiers Qty    74586219720 HC PT THER PROC EA 15 MIN 8/16/2023 Kirstin Dale, PT GP 2    45497331250 HC PT MANUAL THERAPY EA 15 MIN 8/16/2023 Kirstin Dale PT GP 1                      Kirstin Dale PT  8/16/2023

## 2023-08-30 ENCOUNTER — TELEPHONE (OUTPATIENT)
Dept: INTERNAL MEDICINE | Facility: CLINIC | Age: 58
End: 2023-08-30

## 2023-08-30 DIAGNOSIS — R21 RASH OF HAND: Primary | ICD-10-CM

## 2023-08-30 NOTE — TELEPHONE ENCOUNTER
Caller: Jose Maria Judge    Relationship: Self    Best call back number: [phone]     What is the medical concern/diagnosis: SKIN IRRITATION ON WRISTS AND TOP OF HAND    What specialty or service is being requested: DERMATOLOGY    What is the provider, practice or medical service name: WHOEVER DR MOORE RECOMMENDS    Any additional details: PATIENT HAS TRIED MEDICATION THAT DR MOORE GAVE HIM AND ANOTHER ONE THAT [facility] GAVE HIM AS WELL AND NOTHING IS HELPING. PLEASE ADVISE.

## 2023-09-06 ENCOUNTER — APPOINTMENT (OUTPATIENT)
Dept: PHYSICAL THERAPY | Facility: HOSPITAL | Age: 58
End: 2023-09-06
Payer: COMMERCIAL

## 2023-09-07 ENCOUNTER — TELEPHONE (OUTPATIENT)
Dept: INTERNAL MEDICINE | Facility: CLINIC | Age: 58
End: 2023-09-07
Payer: COMMERCIAL

## 2023-09-07 NOTE — PROGRESS NOTES
"  Patient: Jose Maria Judge  YOB: 1965  Date of Service: 9/7/2023    Chief Complaints: Left and right shoulder pain    Subjective:    History of Present Illness: Pt is seen in the office today with complaints of left shoulder pain he does have an MRI which shows some tendinopathy of the rotator cuff as well as some glenohumeral arthritis he has improved he states he is finally getting over the hump he is working well with physical therapy is complaining of some right shoulder pain but therapy is working on that as well        Allergies:   Allergies   Allergen Reactions    Bactrim [Sulfamethoxazole-Trimethoprim] Shortness Of Breath    Hydrocodone Shortness Of Breath    Naproxen Shortness Of Breath    Sulfa Antibiotics Shortness Of Breath     TROUBLE BREATHING    Alprazolam Other (See Comments)     Annotation - 06Jan2016: Patient stated he can not take this medication because it \"makes him feel weird.\"      Diclofenac Nausea Only and Other (See Comments)     Severe heartburn    Erythromycin GI Intolerance    Promethazine Tinnitus    Tramadol Headache    Ceftin [Cefuroxime] Other (See Comments)     cough    Levaquin [Levofloxacin] Unknown - Low Severity     Made tendons tight    Mobic [Meloxicam] Nausea Only and Other (See Comments)     Severe heartburn       Medications:   Home Medications:  Current Outpatient Medications on File Prior to Visit   Medication Sig    acetaminophen (TYLENOL) 500 MG tablet Take 1 to 2 tablets every 6 hours as needed for fever, aches, pains    diazePAM (Valium) 2 MG tablet Take 1 tablet by mouth Daily As Needed for Anxiety.    terbinafine (lamISIL) 1 % cream Apply 1 application  topically to the appropriate area as directed 2 (Two) Times a Day. Apply to the wrists for 3 weeks total.     No current facility-administered medications on file prior to visit.     Current Medications:  Scheduled Meds:  Continuous Infusions:No current facility-administered medications for this " visit.    PRN Meds:.    I have reviewed the patient's medical history in detail and updated the computerized patient record.  Review and summarization of old records include:    Past Medical History:   Diagnosis Date    Acid reflux     HX    Anal fistula     Anxiety     R/T PAIN    Arthritis of back     can't remember    Carpal tunnel syndrome, left     DDD (degenerative disc disease), lumbar     Hemorrhoids     Hodgkin's lymphoma     left axillary adenopathy leading to a biopsy that was nondiagnostic but suspicious and eventually an excisional biopsy that was eventually felt to be consistent with follicular hyperplasia and progressive transformation of germinal centers with 2 foci suspicious for early involvement by nodules lymphocyte predominant Hodgkin's lymphoma.  This was likely treated by excision    Hyperlipidemia     Hyperosmia     Labral tear of hip joint     RIGHT    Low back strain     Male pattern alopecia     Periarthritis of shoulder     need to check file    Postnasal drip     Pudendal neuralgia     Tinnitus of left ear     Varicose vein of leg     left        Past Surgical History:   Procedure Laterality Date    ABSCESS DRAINAGE  08/2012    ANAL FISTULA REPAIR N/A 09/2012, 10/2012    AXILLARY LYMPH NODE BIOPSY/EXCISION Left 11/09/2018    Procedure: AXILLARY LYMPH NODE BIOPSY/EXCISION;  Surgeon: Amando Nguyễn MD;  [place] [facility] [place] [facility] [place]  Service: General    COLONOSCOPY N/A 2013    done at [facility]    COLONOSCOPY N/A 07/10/2019    Procedure: COLONOSCOPY to CECUM;  Surgeon: Amando Nguyễn MD;  Location: [facility] ENDOSCOPY;  Service: General    ENDOSCOPY N/A 01/09/2019    Procedure: ESOPHAGOGASTRODUODENOSCOPY;  Surgeon: Amando Nguyễn MD;  Location: [facility] ENDOSCOPY;  Service: General    ENDOSCOPY N/A 03/30/2023    Procedure: ESOPHAGOGASTRODUODENOSCOPY WITH BIOPSY;  Surgeon: Amando Nguyễn MD;  Location: [facility];  Service: Gastroenterology;  Laterality: N/A;  Gastric biopsy; Normal     FINE NEEDLE ASPIRATION Left 08/09/2018    Left axillary lymph node FNA    FLEXIBLE SIGMOIDOSCOPY N/A 06/25/2003    Normal-Dr. Cezar Aviles    HEMORRHOIDECTOMY N/A 1996    HIP ARTHROSCOPY W/ LABRAL REPAIR Right 04/03/2017    Dr. Lonnie Lemons    HIP SURGERY  2017    right labral repair    MEDIAL BRANCH BLOCK Bilateral 03/12/2021    Procedure: BILATERAL MEDIAL BRANCH BLOCK W/MAC;  Surgeon: Francisco Velez MD;  [place] [facility] [place];  Service: Pain Management;  Laterality: Bilateral;    SIGMOIDOSCOPY N/A 09/23/2020    Procedure: FLEXIBLE SIGMOIDOSCOPY WITH BIOPSY;  Surgeon: Shena Ring MD;  Location: [facility] ENDOSCOPY;  Service: Gastroenterology;  Laterality: N/A;  pre- anal/rectal pain  post- hemorrhoids    TONSILLECTOMY Bilateral         Social History     Occupational History    Occupation: disabled     Comment: carpentry, plumbing and remodeling   Tobacco Use    Smoking status: Never    Smokeless tobacco: Never   Vaping Use    Vaping Use: Never used   Substance and Sexual Activity    Alcohol use: Never    Drug use: Never    Sexual activity: Not Currently     Partners: Female      Social History     Social History Narrative    Not on file        Family History   Problem Relation Age of Onset    Arthritis Mother     Atrial fibrillation Father     Aneurysm Father     Hypertension Father     Dementia Father     Lymphoma Brother     Cancer Brother     Malig Hyperthermia Neg Hx     Colon cancer Neg Hx     Colon polyps Neg Hx     Crohn's disease Neg Hx     Irritable bowel syndrome Neg Hx     Ulcerative colitis Neg Hx        ROS: 14 point review of systems was performed and was negative except for documented findings in HPI and today's encounter.     Allergies:   Allergies   Allergen Reactions    Bactrim [Sulfamethoxazole-Trimethoprim] Shortness Of Breath    Hydrocodone Shortness Of Breath    Naproxen Shortness Of Breath    Sulfa Antibiotics Shortness Of Breath     TROUBLE BREATHING    Alprazolam Other (See  "Comments)     Annotation - 06Jan2016: Patient stated he can not take this medication because it \"makes him feel weird.\"      Diclofenac Nausea Only and Other (See Comments)     Severe heartburn    Erythromycin GI Intolerance    Promethazine Tinnitus    Tramadol Headache    Ceftin [Cefuroxime] Other (See Comments)     cough    Levaquin [Levofloxacin] Unknown - Low Severity     Made tendons tight    Mobic [Meloxicam] Nausea Only and Other (See Comments)     Severe heartburn     Constitutional:  Denies fever, shaking or chills   Eyes:  Denies change in visual acuity   HENT:  Denies nasal congestion or sore throat   Respiratory:  Denies cough or shortness of breath   Cardiovascular:  Denies chest pain or severe LE edema   GI:  Denies abdominal pain, nausea, vomiting, bloody stools or diarrhea   Musculoskeletal:  Numbness, tingling, or loss of motor function only as noted above in history of present illness.  : Denies painful urination or hematuria  Integument:  Denies rash, lesion or ulceration   Neurologic:  Denies headache or focal weakness  Endocrine:  Denies lymphadenopathy  Psych:  Denies confusion or change in mental status   Hem:  Denies active bleeding      Physical Exam: 58 y.o. male  Wt Readings from Last 3 Encounters:   08/17/23 95.3 kg (210 lb)   08/11/23 97.6 kg (215 lb 3.2 oz)   08/07/23 96.8 kg (213 lb 6.4 oz)       There is no height or weight on file to calculate BMI.    There were no vitals filed for this visit.  Vital signs reviewed.   General Appearance:    Alert, cooperative, in no acute distress                    Ortho exam  Physical exam of the right and left shoulder reveals no overlying skin changes no lymphedema no lymphadenopathy.  Patient has active flexion 180 with mild symptoms abduction is similar external rotation is to 50 and internal rotation to the upper lumbar spine with mild symptoms.  Patient has good rotator cuff strength 4+ over 5 with isometric strength testing with pain.  " Patient has a positive impingement and a positive Greco sign.  Patient has good cervical range of motion which is full and asymptomatic no radicular symptoms.  Patient has a normal elbow exam.  Good distal pulses are present  Patient has pain with overhead activity and a positive Neer sign and a positive empty can sign , a positive drop arm and a definitive painful arc                Assessment: Bilateral shoulder pain I still think it is more impingement on both he is doing better this is the best have seen him I will have him continue with physical therapy as long as he is doing well he can follow-up as needed we can always inject the right or pursue the right should the symptoms persist    Plan:   Follow up as indicated.  Ice, elevate, and rest as needed.  Discussed conservative measures of pain control including ice, bracing.  Also talked about the importance of strengthening   Ronit Lynn M.D.

## 2023-09-07 NOTE — TELEPHONE ENCOUNTER
Caller: Jose Maria Judge    Relationship: Self    Best call back number: [phone]     What was the call regarding: PLEASE SEND REFERRAL TO [facility]. PATIENT STATES HE WOULD LIKE TO SEE AN MD, NOT AN APRN.     OFFICE #[phone]  FAX #[phone]    PLEASE ADVISE WHEN SENT TO NEW LOCATION. IS THERE ANY WAY DR MOORE CAN HELP GET HIM IN SOON?

## 2023-09-07 NOTE — TELEPHONE ENCOUNTER
Caller: Jose Maria Judge    Relationship: Self    Best call back number: [phone]    What specialty or service is being requested: DERMATOLOGIST     Any additional details: PATIENT STATED DR BOBO DOES NOT ACCEPT HIS INSURANCE.    HE WOULD LIKE ANOTHER REFERRAL BE SENT SOMEWHERE ELSE.    PLEASE CALL WHEN SENT.

## 2023-09-11 ENCOUNTER — OFFICE VISIT (OUTPATIENT)
Dept: ORTHOPEDIC SURGERY | Facility: CLINIC | Age: 58
End: 2023-09-11
Payer: COMMERCIAL

## 2023-09-11 VITALS — TEMPERATURE: 97.4 F | HEIGHT: 71 IN | BODY MASS INDEX: 30.74 KG/M2 | WEIGHT: 219.6 LBS

## 2023-09-11 DIAGNOSIS — M75.40 IMPINGEMENT SYNDROME OF SHOULDER REGION, UNSPECIFIED LATERALITY: Primary | ICD-10-CM

## 2023-09-11 PROCEDURE — 1160F RVW MEDS BY RX/DR IN RCRD: CPT | Performed by: ORTHOPAEDIC SURGERY

## 2023-09-11 PROCEDURE — 99213 OFFICE O/P EST LOW 20 MIN: CPT | Performed by: ORTHOPAEDIC SURGERY

## 2023-09-11 PROCEDURE — 1159F MED LIST DOCD IN RCRD: CPT | Performed by: ORTHOPAEDIC SURGERY

## 2023-09-12 ENCOUNTER — HOSPITAL ENCOUNTER (OUTPATIENT)
Dept: PHYSICAL THERAPY | Facility: HOSPITAL | Age: 58
Setting detail: THERAPIES SERIES
Discharge: HOME | End: 2023-09-12
Payer: COMMERCIAL

## 2023-09-12 DIAGNOSIS — M25.612 DECREASED RANGE OF MOTION OF LEFT SHOULDER: ICD-10-CM

## 2023-09-12 DIAGNOSIS — R29.898 WEAKNESS OF LEFT UPPER EXTREMITY: ICD-10-CM

## 2023-09-12 DIAGNOSIS — M25.512 LEFT SHOULDER PAIN, UNSPECIFIED CHRONICITY: Primary | ICD-10-CM

## 2023-09-12 PROCEDURE — 97140 MANUAL THERAPY 1/> REGIONS: CPT

## 2023-09-12 PROCEDURE — 97110 THERAPEUTIC EXERCISES: CPT

## 2023-09-13 NOTE — THERAPY PROGRESS REPORT/RE-CERT
Outpatient Physical Therapy Ortho Progress Note  [facility]     Patient Name: Jose Maria Judge  : 1965  MRN: [id number]  Today's Date: 2023      Visit Date: 2023    Patient Active Problem List   Diagnosis    Bilateral hip pain    Right hip pain    Left hip pain    Trochanteric bursitis    Tear of acetabular labrum    Loss of hair    Anal burning    Elevated blood pressure    Hamstring strain    Hypertension    Neuritis of foot    Hamstring muscle strain    Rectal pain    Herpes zoster    Varicose veins of bilateral lower extremities with pain    Lymph node enlargement    Epigastric pain    Axillary lymphadenopathy    Olfaction disorder    Acute bilateral low back pain without sciatica    Nausea    Anal or rectal pain    Arthropathy of lumbar facet joint    Pudendal neuralgia    Tinnitus    High risk medication use    Varicose veins of left lower extremity with pain    Rib pain    Lymphadenopathy    Bradycardia    Shoulder pain    Chronic pain of both feet    Illness    Fatigue    Metatarsalgia of both feet    Paresthesia of foot    Well adult health check    Vertigo    Varicocele    Tinea corporis    Thoracic back pain    Swelling of structure of right eye    Snoring    Seasonal allergies    Rash    Posterior rhinorrhea    Other specified diseases of anus and rectum    Night sweats    Neuropathy    Muscle spasm of back    Miller's metatarsalgia    Mass of axilla    Male pattern alopecia    Low back strain    Irritable bowel syndrome    Intolerant of cold    Insomnia    Injury of right foot    Gastroesophageal reflux disease    Ganglion    Elevated low density lipoprotein (LDL) cholesterol level    Disorder of gallbladder    Degeneration of lumbar intervertebral disc    Carpal tunnel syndrome    Benign prostatic hyperplasia    Alopecia    Allergic rhinitis due to pollen    Allergic rhinitis    Allergic conjunctivitis    Acute sinusitis    Acute COVID-19    Acute anal fissure    Pharyngitis    Body  aches    Stress    Sore throat    Melena    RUQ abdominal pain        Past Medical History:   Diagnosis Date    Acid reflux     HX    Anal fistula     Anxiety     R/T PAIN    Arthritis of back     can't remember    Carpal tunnel syndrome, left     DDD (degenerative disc disease), lumbar     Frozen shoulder     Hemorrhoids     Hodgkin's lymphoma     left axillary adenopathy leading to a biopsy that was nondiagnostic but suspicious and eventually an excisional biopsy that was eventually felt to be consistent with follicular hyperplasia and progressive transformation of germinal centers with 2 foci suspicious for early involvement by nodules lymphocyte predominant Hodgkin's lymphoma.  This was likely treated by excision    Hyperlipidemia     Hyperosmia     Labral tear of hip joint     RIGHT    Low back strain     Male pattern alopecia     Periarthritis of shoulder     need to check file    Postnasal drip     Pudendal neuralgia     Tinnitus of left ear     Varicose vein of leg     left        Past Surgical History:   Procedure Laterality Date    ABSCESS DRAINAGE  08/2012    ANAL FISTULA REPAIR N/A 09/2012, 10/2012    AXILLARY LYMPH NODE BIOPSY/EXCISION Left 11/09/2018    Procedure: AXILLARY LYMPH NODE BIOPSY/EXCISION;  Surgeon: Amando Nguyễn MD;  Location: [facility] OR [facility] [place];  Service: General    COLONOSCOPY N/A 2013    done at [facility]    COLONOSCOPY N/A 07/10/2019    Procedure: COLONOSCOPY to CECUM;  Surgeon: Amando Nguyễn MD;  Location: [facility] ENDOSCOPY;  Service: General    ENDOSCOPY N/A 01/09/2019    Procedure: ESOPHAGOGASTRODUODENOSCOPY;  Surgeon: Amando Nguyễn MD;  Location: [facility] ENDOSCOPY;  Service: General    ENDOSCOPY N/A 03/30/2023    Procedure: ESOPHAGOGASTRODUODENOSCOPY WITH BIOPSY;  Surgeon: Amando Nguyễn MD;  Location: [facility];  Service: Gastroenterology;  Laterality: N/A;  Gastric biopsy; Normal    FINE NEEDLE ASPIRATION Left 08/09/2018    Left axillary lymph node FNA    FLEXIBLE  SIGMOIDOSCOPY N/A 06/25/2003    Normal-Dr. Cezar Aviles    HEMORRHOIDECTOMY N/A 1996    HIP ARTHROSCOPY W/ LABRAL REPAIR Right 04/03/2017    Dr. Lonnie Lemons    HIP SURGERY  2017    right labral repair    MEDIAL BRANCH BLOCK Bilateral 03/12/2021    Procedure: BILATERAL MEDIAL BRANCH BLOCK W/MAC;  Surgeon: Francisco Velez MD;  Location: [facility] OR;  Service: Pain Management;  Laterality: Bilateral;    SIGMOIDOSCOPY N/A 09/23/2020    Procedure: FLEXIBLE SIGMOIDOSCOPY WITH BIOPSY;  Surgeon: Shena Ring MD;  Location: [facility] ENDOSCOPY;  Service: Gastroenterology;  Laterality: N/A;  pre- anal/rectal pain  post- hemorrhoids    TONSILLECTOMY Bilateral        Visit Dx:     ICD-10-CM ICD-9-CM   1. Left shoulder pain, unspecified chronicity  M25.512 719.41   2. Weakness of left upper extremity  R29.898 729.89   3. Decreased range of motion of left shoulder  M25.612 719.51                             Therapy Education  Education Details: Recommended increased reps to SL ex's and to add limited number in L SL for strengthening R. Reviewed shoulder impingement and importnce of posture, RC and scap stab muscle strength and upper/mid back flexibility.  Given: Symptoms/condition management, Pain management, Posture/body mechanics  Program: Reinforced, Progressed  How Provided: Verbal, Demonstration  Provided to: Patient  Level of Understanding: Verbalized, Demonstrated      PT OP Goals       Row Name 09/12/23 1500          PT Short Term Goals    STG Date to Achieve 07/27/23  -     STG 1 Pt will be independent with initial HEP.  -     STG 1 Progress Met  -     STG 2 Pt will demonstrate good posture in sitting and standing.  -     STG 2 Progress Met  -     STG 3 Pt will demonstrate increased AAROM/PROM to 80% of full or better.  -     STG 3 Progress Progressing  -        Long Term Goals    LTG Date to Achieve 08/26/23  -     LTG 1 Pt will demonstrate increased L shoulder AROM to WFL to complete  dressing/grooming ADLs and household chores with ease.  -JA     LTG 1 Progress Progressing  -JA     LTG 2 Pt will be independent with advanced HEP for RC strengthening and scapular stabilization.  -JA     LTG 2 Progress Progressing  -JA     LTG 3 Pt will score 44 on QuickDASH indicating decrease in perceived functional disability.  -JA     LTG 3 Progress Progressing  -JA     LTG 4 Pt will demonstrate increased L shoulder strength to strength 4+/5.  -JA     LTG 4 Progress Progressing  -JA     LTG 5 Pt will report 75% decrease in shoulder pain during ADLs.  -JA     LTG 5 Progress Progressing  -JA     LTG 5 Progress Comments reports ability to now use L hand to reach behind back to wash his back in limited range  -LISET               User Key  (r) = Recorded By, (t) = Taken By, (c) = Cosigned By      Initials Name Provider Type    Kirstin Chao, PT Physical Therapist                     PT Assessment/Plan       Row Name 09/12/23 1500          PT Assessment    Assessment Comments Jose Maria Judge has been seen for 14 visits for L shoulder pain of over one year duration. He demonstrates slowly increasing ROM, 75% of full in flexion and 60% of full in other planes of motion. Progress toward goals STGs met 2/3, progressing toward all LTGs. He is less guarded than initially and tolerates slow  progression of strengthening. Jose Maria presented today with report of episode of increased R shoulder pain consistent with impingement. He has been over-using due to compensating for L UE limitations. He will benefit from continuing skilled therapy services.  -LISET        PT Plan    PT Plan Comments did he add RC ex's to R as well as L? was he able to increase reps? add resisted isos  -LISET               User Key  (r) = Recorded By, (t) = Taken By, (c) = Cosigned By      Initials Name Provider Type    Kirstin Chao, PT Physical Therapist                       OP Exercises       Row Name 09/12/23 1500             Subjective     "Subjective Comments I had 5/10 pain tossing somethnglightly the other day.  -JA         Subjective Pain    Able to rate subjective pain? yes  -JA      Pre-Treatment Pain Level 0  -JA         Total Minutes    41353 - PT Therapeutic Exercise Minutes 25  -JA      39158 - PT Manual Therapy Minutes 18  -JA         Exercise 1    Exercise Name 1 UBE  -JA      Reps 1 sat on 2\" foam pad on the seat (due to hx pudendal neuralgia).  -JA      Time 1 3 min  -JA         Exercise 2    Exercise Name 2 shoulder shrug w/reverse rolls  -JA      Cueing 2 Verbal;Tactile;Demo  -JA      Reps 2 15  -JA         Exercise 3    Exercise Name 3 scapular retraction  -JA      Cueing 3 Verbal;Tactile;Demo  -JA      Reps 3 15  -JA      Time 3 5sec  -JA         Exercise 4    Exercise Name 4 shoulder pulley for flexion and scaption  -JA      Cueing 4 Verbal;Tactile;Demo  -JA      Reps 4 10 flexion, 10 scaption w/extended elbow  -JA         Exercise 5    Exercise Name 5 finger walk up wall  -JA      Reps 5 reviewed - do with R as well  -JA      Time 5 pause and allow to relax and cont a little further if able.  -JA         Exercise 6    Exercise Name 6 UT stretch in standing  -JA      Cueing 6 Verbal;Demo  -JA      Reps 6 3  -JA      Time 6 15-20sec  -JA         Exercise 7    Exercise Name 7 reassessed ROM, goals  -JA      Reps 7 --  -JA      Time 7 --  -JA         Exercise 8    Exercise Name 8 shoulder FIR stretch (begin with radhames shld ext ex's)  -JA      Cueing 8 Verbal;Demo  -JA      Reps 8 3  -JA      Time 8 20 sec  -JA         Exercise 9    Exercise Name 9 radhames shld ext  -JA      Reps 9 10  -JA      Time 9 5sec  -JA         Exercise 10    Exercise Name 10 wall push up plus  -JA      Reps 10 hold for now  -JA         Exercise 11    Exercise Name 11 SL ER w/towel  -JA      Sets 11 2  -JA      Reps 11 10  -JA      Additional Comments L  -JA         Exercise 12    Exercise Name 12 SL Flex, SL Abd  -JA      Reps 12 7 ea  -JA      Additional Comments L; " recommended for R shld as well, to start at home  -JA         Exercise 13    Exercise Name 13 Biceps curl L  -JA      Reps 13 ind at home  -JA         Exercise 14    Exercise Name 14 SAP  -JA      Reps 14 ind at home  -JA      Time 14 radhames then alt  -JA         Exercise 15    Exercise Name 15 seated thor ext in chair with towel behind  -JA      Cueing 15 Verbal;Tactile;Demo  -JA      Reps 15 5  -JA      Time 15 5sec  -JA         Exercise 16    Exercise Name 16 supine radhames shoulder flex w/resistance around L wrist holding tband in R with mild to moderate tension on band  -JA      Cueing 16 Verbal;Tactile  -JA      Reps 16 10  -JA      Time 16 RTB  -JA      Additional Comments had stopped doing at home but able to restart now  -                User Key  (r) = Recorded By, (t) = Taken By, (c) = Cosigned By      Initials Name Provider Type    Kirstin Chao, PT Physical Therapist                  Manual Rx (last 36 hours)       Manual Treatments       Row Name 09/12/23 1500             Total Minutes    62392 - PT Manual Therapy Minutes 18  -JA         Manual Rx 1    Manual Rx 1 Location L shoulder supine, lying on towel roll  -      Manual Rx 1 Type GH jt mobs, PROM, intermittent oscillations, gentle LAD  -      Manual Rx 1 Grade improving tolerance, some increased guarding of ER in greater abduction  -         Manual Rx 3    Manual Rx 3 Location slow reversal D2 in partial range working toward pain limits  -      Manual Rx 3 Type rhythmic stab at 90 deg flex  -      Manual Rx 3 Grade multiple angle iso ER/IR  -JA                User Key  (r) = Recorded By, (t) = Taken By, (c) = Cosigned By      Initials Name Provider Type    Kirstin Chao, PT Physical Therapist                                          Time Calculation:     Start Time: 1503  Stop Time: 1550  Time Calculation (min): 47 min  Timed Charges  25395 - PT Therapeutic Exercise Minutes: 25  84326 - PT Manual Therapy Minutes: 18  Total  Minutes  Timed Charges Total Minutes: 43   Total Minutes: 43     Therapy Charges for Today       Code Description Service Date Service Provider Modifiers Qty    [id number]  PT THER PROC EA 15 MIN 9/12/2023 Kirstin Dale, PT GP 2    [id number]  PT MANUAL THERAPY EA 15 MIN 9/12/2023 Kirstin Dale, PT GP 1                      Kirstin Dale, PT  9/12/2023

## 2023-09-14 ENCOUNTER — HOSPITAL ENCOUNTER (OUTPATIENT)
Dept: PHYSICAL THERAPY | Facility: HOSPITAL | Age: 58
Setting detail: THERAPIES SERIES
Discharge: HOME | End: 2023-09-14
Payer: COMMERCIAL

## 2023-09-14 DIAGNOSIS — M25.612 DECREASED RANGE OF MOTION OF LEFT SHOULDER: ICD-10-CM

## 2023-09-14 DIAGNOSIS — M25.512 LEFT SHOULDER PAIN, UNSPECIFIED CHRONICITY: Primary | ICD-10-CM

## 2023-09-14 DIAGNOSIS — R29.898 WEAKNESS OF LEFT UPPER EXTREMITY: ICD-10-CM

## 2023-09-14 PROCEDURE — 97140 MANUAL THERAPY 1/> REGIONS: CPT

## 2023-09-14 PROCEDURE — 97110 THERAPEUTIC EXERCISES: CPT

## 2023-09-15 NOTE — THERAPY TREATMENT NOTE
Outpatient Physical Therapy Ortho Treatment Note  [facility]     Patient Name: Jose Maria Judge  : 1965  MRN: [id number]  Today's Date: 2023      Visit Date: 2023    Visit Dx:    ICD-10-CM ICD-9-CM   1. Left shoulder pain, unspecified chronicity  M25.512 719.41   2. Weakness of left upper extremity  R29.898 729.89   3. Decreased range of motion of left shoulder  M25.612 719.51       Patient Active Problem List   Diagnosis    Bilateral hip pain    Right hip pain    Left hip pain    Trochanteric bursitis    Tear of acetabular labrum    Loss of hair    Anal burning    Elevated blood pressure    Hamstring strain    Hypertension    Neuritis of foot    Hamstring muscle strain    Rectal pain    Herpes zoster    Varicose veins of bilateral lower extremities with pain    Lymph node enlargement    Epigastric pain    Axillary lymphadenopathy    Olfaction disorder    Acute bilateral low back pain without sciatica    Nausea    Anal or rectal pain    Arthropathy of lumbar facet joint    Pudendal neuralgia    Tinnitus    High risk medication use    Varicose veins of left lower extremity with pain    Rib pain    Lymphadenopathy    Bradycardia    Shoulder pain    Chronic pain of both feet    Illness    Fatigue    Metatarsalgia of both feet    Paresthesia of foot    Well adult health check    Vertigo    Varicocele    Tinea corporis    Thoracic back pain    Swelling of structure of right eye    Snoring    Seasonal allergies    Rash    Posterior rhinorrhea    Other specified diseases of anus and rectum    Night sweats    Neuropathy    Muscle spasm of back    Miller's metatarsalgia    Mass of axilla    Male pattern alopecia    Low back strain    Irritable bowel syndrome    Intolerant of cold    Insomnia    Injury of right foot    Gastroesophageal reflux disease    Ganglion    Elevated low density lipoprotein (LDL) cholesterol level    Disorder of gallbladder    Degeneration of lumbar intervertebral disc    Carpal  tunnel syndrome    Benign prostatic hyperplasia    Alopecia    Allergic rhinitis due to pollen    Allergic rhinitis    Allergic conjunctivitis    Acute sinusitis    Acute COVID-19    Acute anal fissure    Pharyngitis    Body aches    Stress    Sore throat    Melena    RUQ abdominal pain        Past Medical History:   Diagnosis Date    Acid reflux     HX    Anal fistula     Anxiety     R/T PAIN    Arthritis of back     can't remember    Carpal tunnel syndrome, left     DDD (degenerative disc disease), lumbar     Frozen shoulder     Hemorrhoids     Hodgkin's lymphoma     left axillary adenopathy leading to a biopsy that was nondiagnostic but suspicious and eventually an excisional biopsy that was eventually felt to be consistent with follicular hyperplasia and progressive transformation of germinal centers with 2 foci suspicious for early involvement by nodules lymphocyte predominant Hodgkin's lymphoma.  This was likely treated by excision    Hyperlipidemia     Hyperosmia     Labral tear of hip joint     RIGHT    Low back strain     Male pattern alopecia     Periarthritis of shoulder     need to check file    Postnasal drip     Pudendal neuralgia     Tinnitus of left ear     Varicose vein of leg     left        Past Surgical History:   Procedure Laterality Date    ABSCESS DRAINAGE  08/2012    ANAL FISTULA REPAIR N/A 09/2012, 10/2012    AXILLARY LYMPH NODE BIOPSY/EXCISION Left 11/09/2018    Procedure: AXILLARY LYMPH NODE BIOPSY/EXCISION;  Surgeon: Amando Nguyễn MD;  Location: [facility] [place] [facility] [place];  Service: General    COLONOSCOPY N/A 2013    done at [facility]    COLONOSCOPY N/A 07/10/2019    Procedure: COLONOSCOPY to CECUM;  Surgeon: Amando Nguyễn MD;  Location: [facility] ENDOSCOPY;  Service: General    ENDOSCOPY N/A 01/09/2019    Procedure: ESOPHAGOGASTRODUODENOSCOPY;  Surgeon: Amando Nguyễn MD;  Location: [facility] ENDOSCOPY;  Service: General    ENDOSCOPY N/A 03/30/2023    Procedure: ESOPHAGOGASTRODUODENOSCOPY WITH  BIOPSY;  Surgeon: Amando Nguyễn MD;  Location:  [facility] OR;  Service: Gastroenterology;  Laterality: N/A;  Gastric biopsy; Normal    FINE NEEDLE ASPIRATION Left 08/09/2018    Left axillary lymph node FNA    FLEXIBLE SIGMOIDOSCOPY N/A 06/25/2003    Normal-Dr. Cezar Aviles    HEMORRHOIDECTOMY N/A 1996    HIP ARTHROSCOPY W/ LABRAL REPAIR Right 04/03/2017    Dr. Lonnie Lemons    HIP SURGERY  2017    right labral repair    MEDIAL BRANCH BLOCK Bilateral 03/12/2021    Procedure: BILATERAL MEDIAL BRANCH BLOCK W/MAC;  Surgeon: Francisco Velez MD;  [place] [facility] [place];  Service: Pain Management;  Laterality: Bilateral;    SIGMOIDOSCOPY N/A 09/23/2020    Procedure: FLEXIBLE SIGMOIDOSCOPY WITH BIOPSY;  Surgeon: Shena Ring MD;  Location:  [facility];  Service: Gastroenterology;  Laterality: N/A;  pre- anal/rectal pain  post- hemorrhoids    TONSILLECTOMY Bilateral                         PT Assessment/Plan       Row Name 09/14/23 1500          PT Assessment    Assessment Comments Jose Maria reports his R shoulder pain is getter better every day after being flared up from compensating for L shoulder limitations. Continuing to work on L shoulder ROM. Noted somewhat improving tolerance to stretching and good response to rhythmic stab and slow reversals follwing stretches for muscle re-ed. We discussed concern regarding number of visits; we may need to spread visits out. Added the reactive iso with light resistance to HEP to be done once at home this weekend after he assess response to perfomring them today.  -LISET        PT Plan    PT Plan Comments did he add RC ex's to R as well as L? was he able to increase reps? how did reactive isos go at home?  -LISET               User Key  (r) = Recorded By, (t) = Taken By, (c) = Cosigned By      Initials Name Provider Type    Kirstin Chao, PT Physical Therapist                       OP Exercises       Row Name 09/14/23 1500             Subjective    Subjective  "Comments Seems to be less painful, getting better.  -JA         Subjective Pain    Able to rate subjective pain? yes  -JA      Pre-Treatment Pain Level 0  -JA         Total Minutes    40038 - PT Therapeutic Exercise Minutes 25  -JA      08412 - PT Manual Therapy Minutes 20  -JA         Exercise 1    Exercise Name 1 UBE  -JA      Reps 1 sat on 2\" foam pad on the seat (due to hx pudendal neuralgia).  -JA      Time 1 3 min  -JA         Exercise 2    Exercise Name 2 shoulder shrug w/reverse rolls  -JA      Cueing 2 Verbal;Tactile;Demo  -JA      Reps 2 15  -JA         Exercise 3    Exercise Name 3 scapular retraction  -JA      Cueing 3 Verbal;Tactile;Demo  -JA      Reps 3 15  -JA      Time 3 5sec  -JA         Exercise 4    Exercise Name 4 shoulder pulley for flexion and scaption  -JA      Cueing 4 Verbal;Tactile;Demo  -JA      Reps 4 10 flexion, 10 scaption w/extended elbow  -JA         Exercise 5    Exercise Name 5 finger walk up wall  -JA      Reps 5 3 stopped due to fatigue  -JA      Time 5 pause and allow to relax and cont a little further if able.  -JA         Exercise 6    Exercise Name 6 UT stretch in standing  -JA      Cueing 6 Verbal;Demo  -JA      Reps 6 3  -JA      Time 6 15 sec  -JA         Exercise 7    Exercise Name 7 added resisted isos  -JA      Sets 7 5 ways  -JA      Reps 7 5  -JA      Additional Comments YTB single strand  -JA         Exercise 8    Exercise Name 8 shoulder FIR stretch (begin with radhames shld ext ex's)  -JA      Cueing 8 Verbal;Demo  -JA      Reps 8 --  -JA      Time 8 --  -JA         Exercise 9    Exercise Name 9 radhames shld ext  -JA      Reps 9 10  -JA      Time 9 5sec  -JA         Exercise 10    Exercise Name 10 wall push up plus  -JA      Reps 10 hold for now  -JA         Exercise 11    Exercise Name 11 SL ER w/towel  -JA      Sets 11 2  -JA      Reps 11 10  -JA         Exercise 12    Exercise Name 12 SL Flex, SL Abd  -JA      Reps 12 7 ea  -JA         Exercise 13    Exercise Name 13 " Biceps curl L  -JA      Reps 13 ind at home  -JA         Exercise 14    Exercise Name 14 SAP  -JA      Reps 14 ind at home  -JA      Time 14 radhames then alt  -JA         Exercise 15    Exercise Name 15 seated thor ext in chair with towel behind  -JA      Cueing 15 Verbal;Tactile;Demo  -JA      Reps 15 5  -JA      Time 15 5sec  -JA         Exercise 16    Exercise Name 16 supine radhames shoulder flex w/resistance around L wrist holding tband in R with mild to moderate tension on band  -JA      Cueing 16 Verbal;Tactile  -JA      Reps 16 to do  -JA      Time 16 --  -JA         Exercise 17    Exercise Name 17 added supine alphabet  -JA      Cueing 17 Verbal;Tactile;Demo  -JA      Reps 17 A-W  -JA                User Key  (r) = Recorded By, (t) = Taken By, (c) = Cosigned By      Initials Name Provider Type    Kirstin Chao, PT Physical Therapist                             Manual Rx (last 36 hours)       Manual Treatments       Row Name 09/14/23 1500             Total Minutes    87958 - PT Manual Therapy Minutes 20  -JA         Manual Rx 1    Manual Rx 1 Location L shoulder supine, lying on towel roll  -      Manual Rx 1 Type GH jt mobs, PROM, intermittent oscillations, gentle LAD  -      Manual Rx 1 Grade improving tolerance, some increased guarding of ER in greater abduction  -         Manual Rx 2    Manual Rx 2 Location SL  -JA      Manual Rx 2 Type STM parascap, scap mobilzation, resisted scap ret  -         Manual Rx 3    Manual Rx 3 Location slow reversal D2 in partial range working toward pain limits  -      Manual Rx 3 Type rhythmic stab at 90 deg flex  -      Manual Rx 3 Grade multiple angle iso ER/IR  -                User Key  (r) = Recorded By, (t) = Taken By, (c) = Cosigned By      Initials Name Provider Type    Kirstin Chao, PT Physical Therapist                                       Time Calculation:   Start Time: 1505  Stop Time: 1550  Time Calculation (min): 45 min  Timed  Charges  58514 - PT Therapeutic Exercise Minutes: 25  90418 - PT Manual Therapy Minutes: 20  Total Minutes  Timed Charges Total Minutes: 45   Total Minutes: 45  Therapy Charges for Today       Code Description Service Date Service Provider Modifiers Qty    [id number]  PT THER PROC EA 15 MIN 9/14/2023 Kirstin Dale, PT GP 2    [id number]  PT MANUAL THERAPY EA 15 MIN 9/14/2023 Kirstin Dale, PT GP 1                      Kirstin Dale, PT  9/14/2023

## 2023-09-19 ENCOUNTER — HOSPITAL ENCOUNTER (OUTPATIENT)
Dept: PHYSICAL THERAPY | Facility: HOSPITAL | Age: 58
Setting detail: THERAPIES SERIES
Discharge: HOME | End: 2023-09-19
Payer: COMMERCIAL

## 2023-09-19 DIAGNOSIS — R29.898 WEAKNESS OF LEFT UPPER EXTREMITY: ICD-10-CM

## 2023-09-19 DIAGNOSIS — M25.612 DECREASED RANGE OF MOTION OF LEFT SHOULDER: ICD-10-CM

## 2023-09-19 DIAGNOSIS — M25.512 LEFT SHOULDER PAIN, UNSPECIFIED CHRONICITY: Primary | ICD-10-CM

## 2023-09-19 PROCEDURE — 97110 THERAPEUTIC EXERCISES: CPT

## 2023-09-19 PROCEDURE — 97140 MANUAL THERAPY 1/> REGIONS: CPT

## 2023-09-20 NOTE — THERAPY TREATMENT NOTE
Outpatient Physical Therapy Ortho Treatment Note  [facility]     Patient Name: Jose Maria Judge  : 1965  MRN: [id number]  Today's Date: 2023      Visit Date: 2023    Visit Dx:    ICD-10-CM ICD-9-CM   1. Left shoulder pain, unspecified chronicity  M25.512 719.41   2. Weakness of left upper extremity  R29.898 729.89   3. Decreased range of motion of left shoulder  M25.612 719.51       Patient Active Problem List   Diagnosis    Bilateral hip pain    Right hip pain    Left hip pain    Trochanteric bursitis    Tear of acetabular labrum    Loss of hair    Anal burning    Elevated blood pressure    Hamstring strain    Hypertension    Neuritis of foot    Hamstring muscle strain    Rectal pain    Herpes zoster    Varicose veins of bilateral lower extremities with pain    Lymph node enlargement    Epigastric pain    Axillary lymphadenopathy    Olfaction disorder    Acute bilateral low back pain without sciatica    Nausea    Anal or rectal pain    Arthropathy of lumbar facet joint    Pudendal neuralgia    Tinnitus    High risk medication use    Varicose veins of left lower extremity with pain    Rib pain    Lymphadenopathy    Bradycardia    Shoulder pain    Chronic pain of both feet    Illness    Fatigue    Metatarsalgia of both feet    Paresthesia of foot    Well adult health check    Vertigo    Varicocele    Tinea corporis    Thoracic back pain    Swelling of structure of right eye    Snoring    Seasonal allergies    Rash    Posterior rhinorrhea    Other specified diseases of anus and rectum    Night sweats    Neuropathy    Muscle spasm of back    Miller's metatarsalgia    Mass of axilla    Male pattern alopecia    Low back strain    Irritable bowel syndrome    Intolerant of cold    Insomnia    Injury of right foot    Gastroesophageal reflux disease    Ganglion    Elevated low density lipoprotein (LDL) cholesterol level    Disorder of gallbladder    Degeneration of lumbar intervertebral disc    Carpal  tunnel syndrome    Benign prostatic hyperplasia    Alopecia    Allergic rhinitis due to pollen    Allergic rhinitis    Allergic conjunctivitis    Acute sinusitis    Acute COVID-19    Acute anal fissure    Pharyngitis    Body aches    Stress    Sore throat    Melena    RUQ abdominal pain        Past Medical History:   Diagnosis Date    Acid reflux     HX    Anal fistula     Anxiety     R/T PAIN    Arthritis of back     can't remember    Carpal tunnel syndrome, left     DDD (degenerative disc disease), lumbar     Frozen shoulder     Hemorrhoids     Hodgkin's lymphoma     left axillary adenopathy leading to a biopsy that was nondiagnostic but suspicious and eventually an excisional biopsy that was eventually felt to be consistent with follicular hyperplasia and progressive transformation of germinal centers with 2 foci suspicious for early involvement by nodules lymphocyte predominant Hodgkin's lymphoma.  This was likely treated by excision    Hyperlipidemia     Hyperosmia     Labral tear of hip joint     RIGHT    Low back strain     Male pattern alopecia     Periarthritis of shoulder     need to check file    Postnasal drip     Pudendal neuralgia     Tinnitus of left ear     Varicose vein of leg     left        Past Surgical History:   Procedure Laterality Date    ABSCESS DRAINAGE  08/2012    ANAL FISTULA REPAIR N/A 09/2012, 10/2012    AXILLARY LYMPH NODE BIOPSY/EXCISION Left 11/09/2018    Procedure: AXILLARY LYMPH NODE BIOPSY/EXCISION;  Surgeon: Amando Nguyễn MD;  [place] [facility] [place] [facility] [place]  Service: General    COLONOSCOPY N/A 2013    done at [facility]    COLONOSCOPY N/A 07/10/2019    Procedure: COLONOSCOPY to CECUM;  Surgeon: Amando Nguyễn MD;  Location: [facility] ENDOSCOPY;  Service: General    ENDOSCOPY N/A 01/09/2019    Procedure: ESOPHAGOGASTRODUODENOSCOPY;  Surgeon: Amando Nguyễn MD;  Location: [facility] ENDOSCOPY;  Service: General    ENDOSCOPY N/A 03/30/2023    Procedure: ESOPHAGOGASTRODUODENOSCOPY WITH  BIOPSY;  Surgeon: Amando Nguyễn MD;  Location:  [facility] OR;  Service: Gastroenterology;  Laterality: N/A;  Gastric biopsy; Normal    FINE NEEDLE ASPIRATION Left 08/09/2018    Left axillary lymph node FNA    FLEXIBLE SIGMOIDOSCOPY N/A 06/25/2003    Normal-Dr. Cezar Aviles    HEMORRHOIDECTOMY N/A 1996    HIP ARTHROSCOPY W/ LABRAL REPAIR Right 04/03/2017    Dr. Lonnie Lemons    HIP SURGERY  2017    right labral repair    MEDIAL BRANCH BLOCK Bilateral 03/12/2021    Procedure: BILATERAL MEDIAL BRANCH BLOCK W/MAC;  Surgeon: Francisco Velez MD;  Location: [facility];  Service: Pain Management;  Laterality: Bilateral;    SIGMOIDOSCOPY N/A 09/23/2020    Procedure: FLEXIBLE SIGMOIDOSCOPY WITH BIOPSY;  Surgeon: Shena Ring MD;  Location:  [facility];  Service: Gastroenterology;  Laterality: N/A;  pre- anal/rectal pain  post- hemorrhoids    TONSILLECTOMY Bilateral                         PT Assessment/Plan       Row Name 09/19/23 1700          PT Assessment    Assessment Comments Focus on increasing stretching of IR and ER to tolerance is improving comfort in both planes of motion. Observed him reach behind back to pass theraband to other hand smoothly and without any c/o pain. Pt reports increased attention to posture during ADLs and he has been observed standing with more erect posture, less round-shouldered positioning. Noted improving tolerance to GHjt mobs post and inferior without c/o pain. He will benefit from continuing skilled therapy services.  -LISET        PT Plan    PT Plan Comments assess response to last visit, did he add RC ex's to R as well as L? was he able to increase reps? how did reactive isos go at home?  -LISET               User Key  (r) = Recorded By, (t) = Taken By, (c) = Cosigned By      Initials Name Provider Type    Kirstin Chao, PT Physical Therapist                                   Manual Rx (last 36 hours)       Manual Treatments       Row Name 09/19/23 9702           "   Total Minutes    33730 - PT Manual Therapy Minutes 25  -         Manual Rx 1    Manual Rx 1 Location L shoulder supine  -      Manual Rx 1 Type GH jt mobs, PROM, intermittent oscillations, gentle LAD  -      Manual Rx 1 Grade improving tolerance, some increased guarding of ER in greater abduction  -         Manual Rx 2    Manual Rx 2 Location SL  -      Manual Rx 2 Type STM parascap, scap mobilzation, resisted scap ret, lift-tilt  -         Manual Rx 3    Manual Rx 3 Location slow reversal D2 in partial range working toward pain limits  -      Manual Rx 3 Type rhythmic stab at 90 deg flex  -      Manual Rx 3 Grade multiple angle iso ER/IR  -                User Key  (r) = Recorded By, (t) = Taken By, (c) = Cosigned By      Initials Name Provider Type    Kirstin Chao, PT Physical Therapist                     09/19/23 1500   Subjective   Subjective Comments I was too busy to do any exercises yesterday.   Subjective Pain   Able to rate subjective pain? yes   Pre-Treatment Pain Level 0   Total Minutes   44115 - PT Therapeutic Exercise Minutes 20   Exercise 1   Exercise Name 1 UBE   Reps 1 sat on 2\" foam pad on the seat (due to hx pudendal neuralgia).   Time 1 3 min   Exercise 2   Exercise Name 2 shoulder shrug w/reverse rolls   Cueing 2 Verbal;Tactile;Demo   Reps 2 15   Exercise 3   Exercise Name 3 scapular retraction   Cueing 3 Verbal;Tactile;Demo   Reps 3 15   Time 3 5sec   Exercise 4   Exercise Name 4 shoulder pulley for flexion and scaption   Cueing 4 Verbal;Tactile;Demo   Reps 4 10 flexion, 10 scaption w/extended elbow   Exercise 5   Exercise Name 5 finger walk up wall   Time 5 pause and allow to relax and cont a little further if able.   Exercise 6   Exercise Name 6 UT stretch in standing   Cueing 6 Verbal;Demo   Reps 6 3   Time 6 15 sec   Exercise 7   Exercise Name 7 resisted isos   Sets 7 5 ways   Reps 7 5   Additional Comments YTB single strand   Exercise 8   Exercise Name 8 " shoulder FIR stretch (begin with radhames shld ext ex's)   Cueing 8 Verbal;Demo   Reps 8 3   Time 8 20sec   Exercise 9   Exercise Name 9 radhames shld ext   Reps 9 10   Time 9 5sec   Exercise 10   Exercise Name 10 wall push up plus   Reps 10 hold for now   Exercise 11   Exercise Name 11 SL ER w/towel   Sets 11 2   Reps 11 10   Exercise 12   Exercise Name 12 SL Flex, SL Abd   Reps 12 discussed only   Exercise 13   Exercise Name 13 Biceps curl L   Reps 13 ind at home   Exercise 14   Exercise Name 14 SAP   Reps 14 ind at home   Time 14 radhames then alt   Exercise 15   Exercise Name 15 seated thor ext in chair with towel behind   Cueing 15 Verbal;Tactile;Demo   Reps 15 5   Time 15 5sec   Exercise 16   Exercise Name 16 supine radhames shoulder flex w/resistance around L wrist holding tband in R with mild to moderate tension on band   Cueing 16 Verbal;Tactile   Sets 16 2   Reps 16 10   Exercise 17   Exercise Name 17 supine alphabet   Cueing 17 Verbal;Tactile;Demo   Reps 17 reviewed, discussed benefits of increasing stability of shoulder       Therapy Education  Education Details: more frequent cuing for posture and to keep head level.              Time Calculation:   Start Time: 1508  Stop Time: 1553  Time Calculation (min): 45 min  Timed Charges  62436 - PT Therapeutic Exercise Minutes: 20  14971 - PT Manual Therapy Minutes: 25  Total Minutes  Timed Charges Total Minutes: 45   Total Minutes: 45  Therapy Charges for Today       Code Description Service Date Service Provider Modifiers Qty    40580222109 HC PT THER PROC EA 15 MIN 9/19/2023 Kirstin Dale, PT GP 1    35074088521 HC PT MANUAL THERAPY EA 15 MIN 9/19/2023 Kirstin Dale, PT GP 2                      Kirstin Dale PT  9/19/2023

## 2023-09-21 ENCOUNTER — HOSPITAL ENCOUNTER (OUTPATIENT)
Dept: PHYSICAL THERAPY | Facility: HOSPITAL | Age: 58
Setting detail: THERAPIES SERIES
Discharge: HOME | End: 2023-09-21
Payer: COMMERCIAL

## 2023-09-21 DIAGNOSIS — M25.512 LEFT SHOULDER PAIN, UNSPECIFIED CHRONICITY: Primary | ICD-10-CM

## 2023-09-21 DIAGNOSIS — R29.898 WEAKNESS OF LEFT UPPER EXTREMITY: ICD-10-CM

## 2023-09-21 DIAGNOSIS — M25.612 DECREASED RANGE OF MOTION OF LEFT SHOULDER: ICD-10-CM

## 2023-09-21 PROCEDURE — 97140 MANUAL THERAPY 1/> REGIONS: CPT

## 2023-09-21 PROCEDURE — 97110 THERAPEUTIC EXERCISES: CPT

## 2023-09-21 NOTE — THERAPY TREATMENT NOTE
Outpatient Physical Therapy Ortho Treatment Note  [facility]     Patient Name: Jose Maria Judge  : 1965  MRN: [id number]  Today's Date: 2023      Visit Date: 2023    Visit Dx:    ICD-10-CM ICD-9-CM   1. Left shoulder pain, unspecified chronicity  M25.512 719.41   2. Weakness of left upper extremity  R29.898 729.89   3. Decreased range of motion of left shoulder  M25.612 719.51       Patient Active Problem List   Diagnosis    Bilateral hip pain    Right hip pain    Left hip pain    Trochanteric bursitis    Tear of acetabular labrum    Loss of hair    Anal burning    Elevated blood pressure    Hamstring strain    Hypertension    Neuritis of foot    Hamstring muscle strain    Rectal pain    Herpes zoster    Varicose veins of bilateral lower extremities with pain    Lymph node enlargement    Epigastric pain    Axillary lymphadenopathy    Olfaction disorder    Acute bilateral low back pain without sciatica    Nausea    Anal or rectal pain    Arthropathy of lumbar facet joint    Pudendal neuralgia    Tinnitus    High risk medication use    Varicose veins of left lower extremity with pain    Rib pain    Lymphadenopathy    Bradycardia    Shoulder pain    Chronic pain of both feet    Illness    Fatigue    Metatarsalgia of both feet    Paresthesia of foot    Well adult health check    Vertigo    Varicocele    Tinea corporis    Thoracic back pain    Swelling of structure of right eye    Snoring    Seasonal allergies    Rash    Posterior rhinorrhea    Other specified diseases of anus and rectum    Night sweats    Neuropathy    Muscle spasm of back    Miller's metatarsalgia    Mass of axilla    Male pattern alopecia    Low back strain    Irritable bowel syndrome    Intolerant of cold    Insomnia    Injury of right foot    Gastroesophageal reflux disease    Ganglion    Elevated low density lipoprotein (LDL) cholesterol level    Disorder of gallbladder    Degeneration of lumbar intervertebral disc    Carpal  tunnel syndrome    Benign prostatic hyperplasia    Alopecia    Allergic rhinitis due to pollen    Allergic rhinitis    Allergic conjunctivitis    Acute sinusitis    Acute COVID-19    Acute anal fissure    Pharyngitis    Body aches    Stress    Sore throat    Melena    RUQ abdominal pain        Past Medical History:   Diagnosis Date    Acid reflux     HX    Anal fistula     Anxiety     R/T PAIN    Arthritis of back     can't remember    Carpal tunnel syndrome, left     DDD (degenerative disc disease), lumbar     Frozen shoulder     Hemorrhoids     Hodgkin's lymphoma     left axillary adenopathy leading to a biopsy that was nondiagnostic but suspicious and eventually an excisional biopsy that was eventually felt to be consistent with follicular hyperplasia and progressive transformation of germinal centers with 2 foci suspicious for early involvement by nodules lymphocyte predominant Hodgkin's lymphoma.  This was likely treated by excision    Hyperlipidemia     Hyperosmia     Labral tear of hip joint     RIGHT    Low back strain     Male pattern alopecia     Periarthritis of shoulder     need to check file    Postnasal drip     Pudendal neuralgia     Tinnitus of left ear     Varicose vein of leg     left        Past Surgical History:   Procedure Laterality Date    ABSCESS DRAINAGE  08/2012    ANAL FISTULA REPAIR N/A 09/2012, 10/2012    AXILLARY LYMPH NODE BIOPSY/EXCISION Left 11/09/2018    Procedure: AXILLARY LYMPH NODE BIOPSY/EXCISION;  Surgeon: Amando Nguyễn MD;  [place] [facility] [place] [facility] [place]  Service: General    COLONOSCOPY N/A 2013    done at [facility]    COLONOSCOPY N/A 07/10/2019    Procedure: COLONOSCOPY to CECUM;  Surgeon: Amando Nguyễn MD;  Location: [facility] ENDOSCOPY;  Service: General    ENDOSCOPY N/A 01/09/2019    Procedure: ESOPHAGOGASTRODUODENOSCOPY;  Surgeon: Amando Nguyễn MD;  Location: [facility] ENDOSCOPY;  Service: General    ENDOSCOPY N/A 03/30/2023    Procedure: ESOPHAGOGASTRODUODENOSCOPY WITH  "BIOPSY;  Surgeon: Amando Nguyễn MD;  Location:  [facility] OR;  Service: Gastroenterology;  Laterality: N/A;  Gastric biopsy; Normal    FINE NEEDLE ASPIRATION Left 08/09/2018    Left axillary lymph node FNA    FLEXIBLE SIGMOIDOSCOPY N/A 06/25/2003    Normal-Dr. Cezar Aviles    HEMORRHOIDECTOMY N/A 1996    HIP ARTHROSCOPY W/ LABRAL REPAIR Right 04/03/2017    Dr. Lonnie Lemons    HIP SURGERY  2017    right labral repair    MEDIAL BRANCH BLOCK Bilateral 03/12/2021    Procedure: BILATERAL MEDIAL BRANCH BLOCK W/MAC;  Surgeon: Francisco Velez MD;  [place] [facility] [place] [facility];  Service: Pain Management;  Laterality: Bilateral;    SIGMOIDOSCOPY N/A 09/23/2020    Procedure: FLEXIBLE SIGMOIDOSCOPY WITH BIOPSY;  Surgeon: Shena Ring MD;  Location:  [facility];  Service: Gastroenterology;  Laterality: N/A;  pre- anal/rectal pain  post- hemorrhoids    TONSILLECTOMY Bilateral                         PT Assessment/Plan       Row Name 09/21/23 1500          PT Assessment    Assessment Comments Jose Maria reports his R (non-involved) shoulder woke him last night describing the pain as gripping and it hurt to move in any direction so he rested for 15 minutes and then was okay to move it. He states he did perform the exercises, not sure if that was related. L shoulder AROM is slowly improving and tolerated addition of functional internal rotation \"around the world\" exercise without increased pain, only fatigue. Continuing to work on  movement patterns for muscle re-ed with good response to D2 slow reversal today. He will benefit from continuing skilled therapy services.  -LISET        PT Plan    PT Plan Comments assess AROM, QuickDASH, L shoulder w/ADLs - need to spread out last 2 visits, insurance limit approaching  -LISET               User Key  (r) = Recorded By, (t) = Taken By, (c) = Cosigned By      Initials Name Provider Type    Kirstin Chao, PT Physical Therapist                       OP Exercises       Row Name " "09/21/23 1500             Subjective    Subjective Comments I did the exercises for my R shoulder yesterday. I woke up in the middle of the night with R shoulder pain. My L shoulder is doing better but it still has that catch, doesn't move smoothly.  -JA         Subjective Pain    Able to rate subjective pain? yes  -JA      Pre-Treatment Pain Level 0  -JA         Total Minutes    80059 - PT Therapeutic Exercise Minutes 23  -JA      96750 - PT Manual Therapy Minutes 21  -JA         Exercise 1    Exercise Name 1 UBE  -JA      Reps 1 sat on 2\" foam pad on the seat (due to hx pudendal neuralgia).  -JA      Time 1 4 min  -JA         Exercise 2    Exercise Name 2 shoulder shrug w/reverse rolls  -JA      Cueing 2 Verbal;Tactile;Demo  -JA      Reps 2 15  -JA         Exercise 3    Exercise Name 3 scapular retraction  -JA      Cueing 3 Verbal;Tactile;Demo  -JA      Reps 3 15  -JA      Time 3 5sec  -JA         Exercise 4    Exercise Name 4 shoulder pulley for flexion and scaption  -JA      Cueing 4 Verbal;Tactile;Demo  -JA      Reps 4 10 flexion, 10 scaption w/extended elbow  -JA         Exercise 5    Exercise Name 5 finger walk up wall  -JA      Reps 5 10  -JA      Time 5 pause and allow to relax and cont a little further if able.  -JA         Exercise 6    Exercise Name 6 UT stretch in standing  -JA      Cueing 6 Verbal;Demo  -JA      Reps 6 3  -JA      Time 6 15 sec  -JA         Exercise 7    Exercise Name 7 resisted isos  -JA      Sets 7 reviewed  -JA      Reps 7 --  -JA      Additional Comments --  -JA         Exercise 8    Exercise Name 8 shoulder FIR stretch (begin with radhames shld ext ex's)  -JA      Cueing 8 Verbal;Demo  -JA      Reps 8 3  -JA      Time 8 20sec  -JA         Exercise 9    Exercise Name 9 radhames shld ext  -JA      Reps 9 10  -JA      Time 9 5sec  -JA         Exercise 10    Exercise Name 10 added around the world  -JA      Reps 10 10 e  -JA      Time 10 CW/CCW  -JA      Additional Comments Ball#1  -JA         " Exercise 11    Exercise Name 11 SL ER w/towel  -JA      Sets 11 2  -JA      Reps 11 10  -JA         Exercise 12    Exercise Name 12 SL Flex  -JA      Reps 12 10  -JA         Exercise 13    Exercise Name 13 Biceps curl L  -JA      Reps 13 ind at home  -JA         Exercise 14    Exercise Name 14 SAP  -JA      Reps 14 ind at home  -JA      Time 14 radhames then alt  -JA         Exercise 15    Exercise Name 15 --  -JA      Cueing 15 --  -JA      Reps 15 --  -JA      Time 15 --  -JA         Exercise 16    Exercise Name 16 supine radhames shoulder flex w/resistance around L wrist holding tband in R with mild to moderate tension on band  -JA      Cueing 16 Verbal;Tactile  -JA      Sets 16 2  -JA      Reps 16 10  -JA         Exercise 17    Exercise Name 17 supine alphabet  -JA      Cueing 17 Verbal;Tactile;Demo  -JA      Reps 17 reviewed, discussed benefits of increasing stability of shoulder  -JA                User Key  (r) = Recorded By, (t) = Taken By, (c) = Cosigned By      Initials Name Provider Type    Kirstin Chao, PT Physical Therapist                             Manual Rx (last 36 hours)       Manual Treatments       Row Name 09/21/23 1500             Total Minutes    00946 - PT Manual Therapy Minutes 21  -JA         Manual Rx 1    Manual Rx 1 Location L shoulder supine  -JA      Manual Rx 1 Type GH jt mobs, PROM, intermittent oscillations, gentle LAD  -JA      Manual Rx 1 Grade improving tolerance, some increased guarding of ER in greater abduction  -         Manual Rx 2    Manual Rx 2 Location SL  -JA      Manual Rx 2 Type STM parascap, scap mobilzation, resisted scap ret, lift-tilt  -         Manual Rx 3    Manual Rx 3 Location slow reversal D2 in partial range working toward pain limits  -      Manual Rx 3 Type rhythmic stab at 90 deg flex  -      Manual Rx 3 Grade multiple angle iso ER/IR  -JA                User Key  (r) = Recorded By, (t) = Taken By, (c) = Cosigned By      Initials Name Provider  Type    JA Kirstin Dale, PT Physical Therapist                        Therapy Education  Education Details: Review/restarted SL Flexion for home, he noted pain with return to start position so had him add an arc and he was able to perform without pain. Reinforced that some pain will occur during ther ex however it doesn't necessarily signal dysfunction, rather it can happen as muscles learn to work together to produce certain movements and sometimes are not in sync and need re-educating. Printed summary of HEP with instructions on ex's to focus on this weekend  Given: HEP, Symptoms/condition management, Pain management, Mobility training  Program: Reinforced, Progressed  How Provided: Verbal, Demonstration, Written  Provided to: Patient  Level of Understanding: Verbalized, Demonstrated              Time Calculation:   Start Time: 1501  Stop Time: 1550  Time Calculation (min): 49 min  Timed Charges  87879 - PT Therapeutic Exercise Minutes: 23  39417 - PT Manual Therapy Minutes: 21  Total Minutes  Timed Charges Total Minutes: 44   Total Minutes: 44  Therapy Charges for Today       Code Description Service Date Service Provider Modifiers Qty    [id number]  PT THER PROC EA 15 MIN 9/21/2023 Kirstin Dale, PT GP 2    33969066463 HC PT MANUAL THERAPY EA 15 MIN 9/21/2023 Kirstin Dale, PT GP 1                      Kirstin Dale PT  9/21/2023

## 2023-09-26 ENCOUNTER — HOSPITAL ENCOUNTER (OUTPATIENT)
Dept: PHYSICAL THERAPY | Facility: HOSPITAL | Age: 58
Setting detail: THERAPIES SERIES
Discharge: HOME | End: 2023-09-26
Payer: COMMERCIAL

## 2023-09-26 DIAGNOSIS — M25.512 LEFT SHOULDER PAIN, UNSPECIFIED CHRONICITY: Primary | ICD-10-CM

## 2023-09-26 DIAGNOSIS — R29.898 WEAKNESS OF LEFT UPPER EXTREMITY: ICD-10-CM

## 2023-09-26 DIAGNOSIS — M25.612 DECREASED RANGE OF MOTION OF LEFT SHOULDER: ICD-10-CM

## 2023-09-26 PROCEDURE — 97110 THERAPEUTIC EXERCISES: CPT

## 2023-09-26 PROCEDURE — 97140 MANUAL THERAPY 1/> REGIONS: CPT

## 2023-09-26 NOTE — THERAPY TREATMENT NOTE
Outpatient Physical Therapy Ortho Treatment Note  [facility]     Patient Name: Jose Maria Judge  : 1965  MRN: [id number]  Today's Date: 2023      Visit Date: 2023    Visit Dx:    ICD-10-CM ICD-9-CM   1. Left shoulder pain, unspecified chronicity  M25.512 719.41   2. Weakness of left upper extremity  R29.898 729.89   3. Decreased range of motion of left shoulder  M25.612 719.51       Patient Active Problem List   Diagnosis    Bilateral hip pain    Right hip pain    Left hip pain    Trochanteric bursitis    Tear of acetabular labrum    Loss of hair    Anal burning    Elevated blood pressure    Hamstring strain    Hypertension    Neuritis of foot    Hamstring muscle strain    Rectal pain    Herpes zoster    Varicose veins of bilateral lower extremities with pain    Lymph node enlargement    Epigastric pain    Axillary lymphadenopathy    Olfaction disorder    Acute bilateral low back pain without sciatica    Nausea    Anal or rectal pain    Arthropathy of lumbar facet joint    Pudendal neuralgia    Tinnitus    High risk medication use    Varicose veins of left lower extremity with pain    Rib pain    Lymphadenopathy    Bradycardia    Shoulder pain    Chronic pain of both feet    Illness    Fatigue    Metatarsalgia of both feet    Paresthesia of foot    Well adult health check    Vertigo    Varicocele    Tinea corporis    Thoracic back pain    Swelling of structure of right eye    Snoring    Seasonal allergies    Rash    Posterior rhinorrhea    Other specified diseases of anus and rectum    Night sweats    Neuropathy    Muscle spasm of back    Miller's metatarsalgia    Mass of axilla    Male pattern alopecia    Low back strain    Irritable bowel syndrome    Intolerant of cold    Insomnia    Injury of right foot    Gastroesophageal reflux disease    Ganglion    Elevated low density lipoprotein (LDL) cholesterol level    Disorder of gallbladder    Degeneration of lumbar intervertebral disc    Carpal  tunnel syndrome    Benign prostatic hyperplasia    Alopecia    Allergic rhinitis due to pollen    Allergic rhinitis    Allergic conjunctivitis    Acute sinusitis    Acute COVID-19    Acute anal fissure    Pharyngitis    Body aches    Stress    Sore throat    Melena    RUQ abdominal pain        Past Medical History:   Diagnosis Date    Acid reflux     HX    Anal fistula     Anxiety     R/T PAIN    Arthritis of back     can't remember    Carpal tunnel syndrome, left     DDD (degenerative disc disease), lumbar     Frozen shoulder     Hemorrhoids     Hodgkin's lymphoma     left axillary adenopathy leading to a biopsy that was nondiagnostic but suspicious and eventually an excisional biopsy that was eventually felt to be consistent with follicular hyperplasia and progressive transformation of germinal centers with 2 foci suspicious for early involvement by nodules lymphocyte predominant Hodgkin's lymphoma.  This was likely treated by excision    Hyperlipidemia     Hyperosmia     Labral tear of hip joint     RIGHT    Low back strain     Male pattern alopecia     Periarthritis of shoulder     need to check file    Postnasal drip     Pudendal neuralgia     Tinnitus of left ear     Varicose vein of leg     left        Past Surgical History:   Procedure Laterality Date    ABSCESS DRAINAGE  08/2012    ANAL FISTULA REPAIR N/A 09/2012, 10/2012    AXILLARY LYMPH NODE BIOPSY/EXCISION Left 11/09/2018    Procedure: AXILLARY LYMPH NODE BIOPSY/EXCISION;  Surgeon: Amando Nguyễn MD;  Location: [facility] [place] [facility] [place];  Service: General    COLONOSCOPY N/A 2013    done at [facility]    COLONOSCOPY N/A 07/10/2019    Procedure: COLONOSCOPY to CECUM;  Surgeon: Amando Nguyễn MD;  Location: [facility] ENDOSCOPY;  Service: General    ENDOSCOPY N/A 01/09/2019    Procedure: ESOPHAGOGASTRODUODENOSCOPY;  Surgeon: Amando Nguyễn MD;  Location: [facility] ENDOSCOPY;  Service: General    ENDOSCOPY N/A 03/30/2023    Procedure: ESOPHAGOGASTRODUODENOSCOPY WITH  BIOPSY;  Surgeon: Amando Nguyễn MD;  Location:  [facility] OR;  Service: Gastroenterology;  Laterality: N/A;  Gastric biopsy; Normal    FINE NEEDLE ASPIRATION Left 08/09/2018    Left axillary lymph node FNA    FLEXIBLE SIGMOIDOSCOPY N/A 06/25/2003    Normal-Dr. Cezar Aviles    HEMORRHOIDECTOMY N/A 1996    HIP ARTHROSCOPY W/ LABRAL REPAIR Right 04/03/2017    Dr. Lonnie Lemons    HIP SURGERY  2017    right labral repair    MEDIAL BRANCH BLOCK Bilateral 03/12/2021    Procedure: BILATERAL MEDIAL BRANCH BLOCK W/MAC;  Surgeon: Francisco Velez MD;  [place] [facility] [place]  Service: Pain Management;  Laterality: Bilateral;    SIGMOIDOSCOPY N/A 09/23/2020    Procedure: FLEXIBLE SIGMOIDOSCOPY WITH BIOPSY;  Surgeon: Shena Ring MD;  Location:  [facility];  Service: Gastroenterology;  Laterality: N/A;  pre- anal/rectal pain  post- hemorrhoids    TONSILLECTOMY Bilateral                         PT Assessment/Plan       Row Name 09/26/23 1700          PT Assessment    Assessment Comments Jose Maria reports increased pain/soreness after last visit, describes irritation may have occurred during scapular mobilization; worked on lift-tilt to improve scap mobility. Today L parascap muscles had decreased palpable facilitation and could lift medial border of scapula with minimal restrication. Pt reported increased awareness of tightness in pec and importance of RROM v AROM in supine to increase into last 1/3 of range. He continues to be guarded with many movements however is demonstrating slow but somewhat steady progress and funcitonal use. He will benefit from continuing skilled therapy services. Next visit will reassess and request additional visits at 1x/wk.  -LISET        PT Plan    PT Plan Comments assess AROM, QuickDASH, L shoulder w/ADLs ; assess response to D2 last visit  -LISET               User Key  (r) = Recorded By, (t) = Taken By, (c) = Cosigned By      Initials Name Provider Type    Kirstin Chao, PT  "Physical Therapist                       OP Exercises       Row Name 09/26/23 1500             Subjective    Subjective Comments I had more pain later after last time. It was the exercise when I was on my side (scapular mobilization). Couldn't do the exercises due to pain, it finally felt better this morning.  -JA         Subjective Pain    Able to rate subjective pain? yes  -JA      Pre-Treatment Pain Level 0  -JA         Total Minutes    97069 - PT Therapeutic Exercise Minutes 30  -JA      30730 - PT Manual Therapy Minutes 15  -JA         Exercise 1    Exercise Name 1 UBE  -JA      Reps 1 sat on 2\" foam pad on the seat (due to hx pudendal neuralgia) and pillow folded behnd back.  -JA      Time 1 4 min  -JA         Exercise 2    Exercise Name 2 shoulder shrug w/reverse rolls  -JA      Cueing 2 Verbal;Tactile;Demo  -JA      Reps 2 15  -JA         Exercise 3    Exercise Name 3 scapular retraction  -JA      Cueing 3 Verbal;Tactile;Demo  -JA      Reps 3 15  -JA      Time 3 5sec  -JA         Exercise 4    Exercise Name 4 shoulder pulley for flexion and scaption  -JA      Cueing 4 Verbal;Tactile;Demo  -JA      Reps 4 10 flexion, 10 scaption w/extended elbow  -JA         Exercise 5    Exercise Name 5 finger walk up wall  -JA      Reps 5 10  -JA      Time 5 pause and allow to relax and cont a little further if able.  -JA         Exercise 6    Exercise Name 6 UT stretch in standing  -JA      Cueing 6 Verbal;Demo  -JA      Reps 6 3  -JA      Time 6 15 sec  -JA      Additional Comments difficulty keeping head aligned instead of rotated  -JA         Exercise 7    Exercise Name 7 resisted isos: 3-way delt and IR/ER  -JA      Sets 7 1  -JA      Reps 7 5 ea  -JA      Time 7 tolerates very small step  -JA         Exercise 8    Exercise Name 8 shoulder FIR stretch (begin with radhames shld ext ex's)  -JA      Cueing 8 Verbal;Demo  -JA      Reps 8 3  -JA      Time 8 20sec  -JA      Additional Comments performed easily at end of session  " -JA         Exercise 9    Exercise Name 9 radhames shld ext  -JA      Reps 9 0  -JA      Time 9 --  -JA         Exercise 10    Exercise Name 10 around the world  -JA      Reps 10 10 e  -JA      Time 10 CW/CCW  -JA         Exercise 11    Exercise Name 11 SL ER w/towel  -JA      Sets 11 reviewed only  -JA      Reps 11 --  -JA         Exercise 12    Exercise Name 12 SL Flex  -JA      Reps 12 reviewed only  -JA         Exercise 13    Exercise Name 13 added supine AROM D2 in range as tolerated  -JA      Reps 13 10  -JA      Time 13 pt noted pain/pulling anterior shld limiting end range  -JA         Exercise 14    Exercise Name 14 small range D2 w/resistance  -JA      Reps 14 6, YTB  -JA      Time 14 --  -JA         Exercise 15    Exercise Name 15 HzA AROM in supine  -JA      Reps 15 3  -JA      Time 15 20sec  -JA      Additional Comments pt noted tightness limitind end range (pec)  -JA         Exercise 16    Exercise Name 16 supine radhames shoulder flex w/resistance around L wrist holding tband in R with mild to moderate tension on band  -JA      Cueing 16 Verbal;Tactile  -JA      Sets 16 2  -JA      Reps 16 10  -JA         Exercise 17    Exercise Name 17 supine alphabet  -JA      Cueing 17 Verbal;Tactile;Demo  -JA      Reps 17 reviewed, discussed benefits of increasing stability of shoulder  -JA                User Key  (r) = Recorded By, (t) = Taken By, (c) = Cosigned By      Initials Name Provider Type    Kirstin Chao N, PT Physical Therapist                             Manual Rx (last 36 hours)       Manual Treatments       Row Name 09/26/23 1500             Total Minutes    54196 - PT Manual Therapy Minutes 15  -JA         Manual Rx 1    Manual Rx 1 Location L shoulder supine  -JA      Manual Rx 1 Type GH jt mobs, PROM, intermittent oscillations, gentle LAD  -JA      Manual Rx 1 Grade improving tolerance, some increased guarding of ER in greater abduction  -JA         Manual Rx 3    Manual Rx 3 Location slow reversal  D2 in partial range working toward pain limits  -LISET      Manual Rx 3 Type rhythmic stab at 90 deg flex  -LISET      Manual Rx 3 Grade multiple angle iso ER/IR  -LISET                User Key  (r) = Recorded By, (t) = Taken By, (c) = Cosigned By      Initials Name Provider Type    Kirstin Chao, PT Physical Therapist                     09/26/23 1600   PT Short Term Goals   STG Date to Achieve 07/27/23   STG 1 Pt will be independent with initial HEP.   STG 1 Progress Met   STG 2 Pt will demonstrate good posture in sitting and standing.   STG 2 Progress Met   STG 3 Pt will demonstrate increased AAROM/PROM to 80% of full or better.   STG 3 Progress Progressing   Long Term Goals   LTG Date to Achieve 08/26/23   LTG 1 Pt will demonstrate increased L shoulder AROM to WFL to complete dressing/grooming ADLs and household chores with ease.   LTG 1 Progress Progressing   LTG 2 Pt will be independent with advanced HEP for RC strengthening and scapular stabilization.   LTG 2 Progress Progressing   LTG 3 Pt will score 44 on QuickDASH indicating decrease in perceived functional disability.   LTG 3 Progress Met   LTG 4 Pt will demonstrate increased L shoulder strength to strength 4+/5.   LTG 4 Progress Progressing   LTG 5 Pt will report 75% decrease in shoulder pain during ADLs.   LTG 5 Progress Progressing         Therapy Education  Education Details: Discussed and instructed in self-masaage of L pec to reduce tightness; reinforced stretching in position of hz abd supported on bed for passive pec stretch  Given: Symptoms/condition management, Pain management  Program: Progressed  How Provided: Verbal, Demonstration  Provided to: Patient  Level of Understanding: Verbalized, Demonstrated           09/28/23 1800   Quick DASH   Open a tight or new jar. 3   Do heavy household chores (e.g., wash walls, wash floors) 2   Carry a shopping bag or briefcase 3   Wash your back 3   Use a knife to cut food 2   Recreational activities in  which you take some force or impact through your arm, should or hand (e.g. golf, hammering, tennis, etc.) 4   During the past week, to what extent has your arm, shoulder, or hand problem interfered with your normal social activites with family, friends, neighbors or groups? 1   During the past week, were you limited in your work or other regular daily activities as a result of your arm, shoulder or hand problem? 2   Arm, Shoulder, or hand pain 2   Tingling (pins and needles) in your arm, shoulder, or hand 2   During the past week, how much difficulty have you had sleeping because of the pain in your arm, shoulder or hand? 2   Number of Questions Answered 11   Quick DASH Score 34.09   Functional Assessment   Outcome Measure Options Quick DASH         Time Calculation:   Start Time: 1505  Stop Time: 1550  Time Calculation (min): 45 min  Timed Charges  72529 - PT Therapeutic Exercise Minutes: 30  58319 - PT Manual Therapy Minutes: 15  Total Minutes  Timed Charges Total Minutes: 45   Total Minutes: 45  Therapy Charges for Today       Code Description Service Date Service Provider Modifiers Qty    80247259784 HC PT THER PROC EA 15 MIN 9/26/2023 Kirstin Dale, PT GP 2    10735141262 HC PT MANUAL THERAPY EA 15 MIN 9/26/2023 Kirstin Dale, PT GP 1                      Kirstin Dale PT  9/26/2023

## 2023-09-28 ENCOUNTER — TELEPHONE (OUTPATIENT)
Dept: PHYSICAL THERAPY | Facility: HOSPITAL | Age: 58
End: 2023-09-28
Payer: COMMERCIAL

## 2023-09-28 ENCOUNTER — TELEPHONE (OUTPATIENT)
Dept: ORTHOPEDIC SURGERY | Facility: CLINIC | Age: 58
End: 2023-09-28

## 2023-09-28 NOTE — TELEPHONE ENCOUNTER
Called to f/u about missing today's appt, pt did not remember time changed after he requested earlier time. He completed [employer] survey on the phone for AIM form.

## 2023-09-28 NOTE — TELEPHONE ENCOUNTER
Caller: Jose Maria Judge    Relationship to patient: Self    Best call back number: [phone]    Patient is needing: PATIENT HAS RIGHT HEEL PAIN AND WOULD LIKE TO BE SEEN SOONER THAN HUB FIRST AVAILABLE OF 10.23.23. WOULD LIKE A CALL BACK .

## 2023-10-02 ENCOUNTER — OFFICE VISIT (OUTPATIENT)
Dept: ORTHOPEDIC SURGERY | Facility: CLINIC | Age: 58
End: 2023-10-02
Payer: COMMERCIAL

## 2023-10-02 VITALS — BODY MASS INDEX: 31.42 KG/M2 | TEMPERATURE: 96.8 F | WEIGHT: 224.4 LBS | HEIGHT: 71 IN

## 2023-10-02 DIAGNOSIS — G57.90 NEURITIS OF FOOT, UNSPECIFIED LATERALITY: ICD-10-CM

## 2023-10-02 DIAGNOSIS — M65.28 CALCIFIC ACHILLES TENDONITIS: Primary | ICD-10-CM

## 2023-10-02 DIAGNOSIS — M92.60 HAGLUND'S DEFORMITY: ICD-10-CM

## 2023-10-02 DIAGNOSIS — R52 PAIN: ICD-10-CM

## 2023-10-02 PROCEDURE — 99214 OFFICE O/P EST MOD 30 MIN: CPT | Performed by: ORTHOPAEDIC SURGERY

## 2023-10-02 NOTE — PROGRESS NOTES
Foot Follow Up      Patient: Jose Maria Judge    YOB: 1965 58 y.o. male    Chief Complaints: Heel hurts      History of Present Illness: Patient reports an approximate 10-day history of pain in the posterior aspect of his right heel at the insertion of his Achilles but no specific injury.  He feels somewhat tight in the morning and has start of pain when he first gets up.    Does not recall any specific injury    I have followed him extensively in the past please see note from 10/13/2023 for details.  Since that time he had MRIs of his feet which were done in November 2022 and reviewed with him which did not show any obvious pathology other than some mild arthritis around the right first MTP joint.  Outside EMG nerve conduction study which I reviewed with him did not show any clear pathology and was having persistent pain and feelings of numbness in his feet without clear etiology.  In addition to our office visit on 10/13/2022 I spoke with him on the telephone on 10/26/2022, 11/29/2022 and 12/8/2022 at which time he can do some therapy and then get back with me    He still feels some numbness in his forefoot and the feeling like he is walking on marbles and continues to wear slides.  HPI    ROS: Foot pain  Past Medical History:   Diagnosis Date    Acid reflux     HX    Anal fistula     Anxiety     R/T PAIN    Arthritis of back     can't remember    Carpal tunnel syndrome, left     DDD (degenerative disc disease), lumbar     Frozen shoulder     Hemorrhoids     Hodgkin's lymphoma     left axillary adenopathy leading to a biopsy that was nondiagnostic but suspicious and eventually an excisional biopsy that was eventually felt to be consistent with follicular hyperplasia and progressive transformation of germinal centers with 2 foci suspicious for early involvement by nodules lymphocyte predominant Hodgkin's lymphoma.  This was likely treated by excision    Hyperlipidemia     Hyperosmia     Labral tear of  "hip joint     RIGHT    Low back strain     Male pattern alopecia     Periarthritis of shoulder     need to check file    Postnasal drip     Pudendal neuralgia     Tinnitus of left ear     Varicose vein of leg     left     Physical Exam:   Vitals:    10/02/23 1041   Temp: 96.8 °F (36 °C)   Weight: 102 kg (224 lb 6.4 oz)   Height: 180.3 cm (71\")   PainSc:   8     Well developed with normal mood.  On exam he has neutral dorsiflexion of the right heel cord.  He was moderate tenderness palpation at the insertion of the right Achilles no palpable defects.  Mild prominence.  No exacerbation of pain in the lateral calcaneal compression.      Radiology: 2 views of the right heel including lateral view of the foot ordered evaluate pain reviewed and compared with prior x-rays of the right foot from 10/13/2023 2.  There remains some plantar calcaneal spurring no obvious fracture no osteopenia there is some mild bony prominence at the insertion of the Achilles no obvious fracture.  There is prominent superior calcaneal tuberosity      Assessment/Plan: 1.  Right heel pain consistent with insertional Achilles tendinosis and Paras deformity  2.  Previous bilateral fifth metatarsal pain and bilateral foot pain with chronic neuritic symptoms unclear etiology    Discussed with him I really do not know anything further I know to offer him for the chronic symptoms he is having in his feet he had extensive work-up treatment etc. and do not really know that I have anything further to offer him for that    Regarding his right heel pain we discussed treatment options and was fitted with a night splint discussed etiology of its use as well as towel stretch prior to first up in the morning.  He also instructed on heel cord stretching exercises to do at least 4 times a day.  Instruction sheet provided and demonstrated for him and will prescribe compounding cream    Also get him started in some physical therapy again he was instructed on use " of ice massage with skin precautions    We will see him back in 6 weeks with x-rays of his right heel still having pain.

## 2023-10-19 ENCOUNTER — HOSPITAL ENCOUNTER (OUTPATIENT)
Dept: PHYSICAL THERAPY | Facility: HOSPITAL | Age: 58
Setting detail: THERAPIES SERIES
Discharge: HOME | End: 2023-10-19
Payer: COMMERCIAL

## 2023-10-19 DIAGNOSIS — M25.612 DECREASED RANGE OF MOTION OF LEFT SHOULDER: ICD-10-CM

## 2023-10-19 DIAGNOSIS — R29.898 WEAKNESS OF LEFT UPPER EXTREMITY: ICD-10-CM

## 2023-10-19 DIAGNOSIS — M25.512 LEFT SHOULDER PAIN, UNSPECIFIED CHRONICITY: Primary | ICD-10-CM

## 2023-10-19 PROCEDURE — 97110 THERAPEUTIC EXERCISES: CPT

## 2023-10-19 NOTE — THERAPY PROGRESS REPORT/RE-CERT
Outpatient Physical Therapy Ortho Re-Evaluation  [facility]     Patient Name: Jose Maria Judge  : 1965  MRN: [id number]  Today's Date: 10/19/2023      Visit Date: 10/19/2023    Patient Active Problem List   Diagnosis    Bilateral hip pain    Right hip pain    Left hip pain    Trochanteric bursitis    Tear of acetabular labrum    Loss of hair    Anal burning    Elevated blood pressure    Hamstring strain    Hypertension    Neuritis of foot    Hamstring muscle strain    Rectal pain    Herpes zoster    Varicose veins of bilateral lower extremities with pain    Lymph node enlargement    Epigastric pain    Axillary lymphadenopathy    Olfaction disorder    Acute bilateral low back pain without sciatica    Nausea    Anal or rectal pain    Arthropathy of lumbar facet joint    Pudendal neuralgia    Tinnitus    High risk medication use    Varicose veins of left lower extremity with pain    Rib pain    Lymphadenopathy    Bradycardia    Shoulder pain    Chronic pain of both feet    Illness    Fatigue    Metatarsalgia of both feet    Paresthesia of foot    Well adult health check    Vertigo    Varicocele    Tinea corporis    Thoracic back pain    Swelling of structure of right eye    Snoring    Seasonal allergies    Rash    Posterior rhinorrhea    Other specified diseases of anus and rectum    Night sweats    Neuropathy    Muscle spasm of back    Miller's metatarsalgia    Mass of axilla    Male pattern alopecia    Low back strain    Irritable bowel syndrome    Intolerant of cold    Insomnia    Injury of right foot    Gastroesophageal reflux disease    Ganglion    Elevated low density lipoprotein (LDL) cholesterol level    Disorder of gallbladder    Degeneration of lumbar intervertebral disc    Carpal tunnel syndrome    Benign prostatic hyperplasia    Alopecia    Allergic rhinitis due to pollen    Allergic rhinitis    Allergic conjunctivitis    Acute sinusitis    Acute COVID-19    Acute anal fissure    Pharyngitis     - Please obtain blood tests today or at your earliest convenience.       In the next few weeks you may receive a Press Ganey survey regarding your most recent clinic visit with us.  Please take a few moments out of your day to accurately evaluate your visit.  We strive to provide you with the best medical care. Thank you for your time and we look forward to your next visit.     Results: If you recently had testing performed and your results are normal, we will notify you of your results in a letter. If we need to contact you regarding any abnormal results, we will make 2 attempts to reach you at the number you have listed during your office visit today. If we are unable to reach you, a letter with your results and any further instructions will be mailed to your home.    Refills: If you need a refill of your medication, please contact your pharmacy FIRST. You may have refills on file with them; if not, they will contact our office to refill the prescription on your behalf.     [facility] Hours:  Monday - Thursday: 7:00 am - 6:00 pm  Friday: 7:00 am - 5:00 pm  Saturday: 7:00 am - noon    Please do not hesitate to call our office with any questions or concerns AT [phone].       Body aches    Stress    Sore throat    Melena    RUQ abdominal pain        Past Medical History:   Diagnosis Date    Acid reflux     HX    Anal fistula     Anxiety     R/T PAIN    Arthritis of back     can't remember    Carpal tunnel syndrome, left     DDD (degenerative disc disease), lumbar     Frozen shoulder     Hemorrhoids     Hodgkin's lymphoma     left axillary adenopathy leading to a biopsy that was nondiagnostic but suspicious and eventually an excisional biopsy that was eventually felt to be consistent with follicular hyperplasia and progressive transformation of germinal centers with 2 foci suspicious for early involvement by nodules lymphocyte predominant Hodgkin's lymphoma.  This was likely treated by excision    Hyperlipidemia     Hyperosmia     Labral tear of hip joint     RIGHT    Low back strain     Male pattern alopecia     Periarthritis of shoulder     need to check file    Postnasal drip     Pudendal neuralgia     Tinnitus of left ear     Varicose vein of leg     left        Past Surgical History:   Procedure Laterality Date    ABSCESS DRAINAGE  08/2012    ANAL FISTULA REPAIR N/A 09/2012, 10/2012    AXILLARY LYMPH NODE BIOPSY/EXCISION Left 11/09/2018    Procedure: AXILLARY LYMPH NODE BIOPSY/EXCISION;  Surgeon: Amando Nguyễn MD;  [place] [facility] [place] [facility] [place]: [place]    COLONOSCOPY N/A 2013    done at [facility]    COLONOSCOPY N/A 07/10/2019    Procedure: COLONOSCOPY to CECUM;  Surgeon: Amando Nguyễn MD;  Location: [facility] ENDOSCOPY;  Service: General    ENDOSCOPY N/A 01/09/2019    Procedure: ESOPHAGOGASTRODUODENOSCOPY;  Surgeon: Amando Nguyễn MD;  Location: [facility] ENDOSCOPY;  Service: General    ENDOSCOPY N/A 03/30/2023    Procedure: ESOPHAGOGASTRODUODENOSCOPY WITH BIOPSY;  Surgeon: Amando Nguyễn MD;  Location: [facility];  Service: Gastroenterology;  Laterality: N/A;  Gastric biopsy; Normal    FINE NEEDLE ASPIRATION Left 08/09/2018    Left axillary lymph node FNA    FLEXIBLE  SIGMOIDOSCOPY N/A 06/25/2003    Normal-Dr. Cezar Aviles    HEMORRHOIDECTOMY N/A 1996    HIP ARTHROSCOPY W/ LABRAL REPAIR Right 04/03/2017    Dr. Lonnie Lemons    HIP SURGERY  2017    right labral repair    MEDIAL BRANCH BLOCK Bilateral 03/12/2021    Procedure: BILATERAL MEDIAL BRANCH BLOCK W/MAC;  Surgeon: Francisco Velez MD;  Location: [facility] [place];  Service: Pain Management;  Laterality: Bilateral;    SIGMOIDOSCOPY N/A 09/23/2020    Procedure: FLEXIBLE SIGMOIDOSCOPY WITH BIOPSY;  Surgeon: Shena Ring MD;  Location: [facility] ENDOSCOPY;  Service: Gastroenterology;  Laterality: N/A;  pre- anal/rectal pain  post- hemorrhoids    TONSILLECTOMY Bilateral        Visit Dx:     ICD-10-CM ICD-9-CM   1. Left shoulder pain, unspecified chronicity  M25.512 719.41   2. Weakness of left upper extremity  R29.898 729.89   3. Decreased range of motion of left shoulder  M25.612 719.51              PT Ortho       Row Name 10/19/23 1400       Posture/Observations    Forward Head Moderate  -JA    Cervical Lordosis Decreased  -JA    Thoracic Kyphosis Increased  -JA    Rounded Shoulders Moderate  -JA       Shoulder Impingement/Rotator Cuff Special Tests    [place] Test (RC Lesion vs. Bursitis) Left:;Positive  positive however not as painful  -JA    Neer Impingement Test (RC Lesion vs. Bursitis) Left:;Positive  positive however not as painful  -JA    Empty Can Test (RC Lesion) Left:;Positive  -JA       Left Upper Ext    Lt Shoulder Abduction AROM in POS WFL; in pure abd 110  -JA    Lt Shoulder Extension AROM equal to R  -JA    Lt Shoulder Flexion AROM 145  -JA    Lt Shoulder Flexion PROM 150  -JA    Lt Shoulder External Rotation AROM MARIEL WNL, smooth motion to reaching T1  -JA    Lt Shoulder External Rotation PROM 60  -JA    Lt Shoulder Internal Rotation AROM FIR  -JA    Lt Shoulder Internal Rotation PROM 55  -JA       MMT Left Upper Ext    Lt Shoulder Flexion MMT, Gross Movement (4-/5) good minus  -JA    Lt Shoulder  ABduction MMT, Gross Movement (4-/5) good minus  -JA    Lt Shoulder Internal Rotation MMT, Gross Movement (4/5) good  -LISET    Lt Shoulder External Rotation MMT, Gross Movement (4-/5) good minus  -LISET    Lt Upper Extremity Comments  pain limited  -LISET              User Key  (r) = Recorded By, (t) = Taken By, (c) = Cosigned By      Initials Name Provider Type    Kirstin Chao, PT Physical Therapist                                Therapy Education  Education Details: Discussed progress, he feels good progress especially more recently when noting using back srucb brush without pain. Advised to add SL Hza.  Given: Symptoms/condition management, Posture/body mechanics  Program: Reinforced, Progressed  How Provided: Demonstration  Provided to: Patient  Level of Understanding: Verbalized      PT OP Goals       Row Name 10/19/23 1400          PT Short Term Goals    STG Date to Achieve 07/27/23  -LISET     STG 1 Pt will be independent with initial HEP.  -LISET     STG 1 Progress Met  -     STG 2 Pt will demonstrate good posture in sitting and standing.  -     STG 2 Progress Met  -     STG 3 Pt will demonstrate increased AAROM/PROM to 80% of full or better.  -     STG 3 Progress Partially Met  -     STG 3 Progress Comments met for flexion and FIR; not yet for ER and abd  -        Long Term Goals    LTG Date to Achieve 08/26/23  -LISET     LTG 1 Pt will demonstrate increased L shoulder AROM to WFL to complete dressing/grooming ADLs and household chores with ease.  -     LTG 1 Progress Partially Met  -LISET     LTG 1 Progress Comments FIR met, remaining 3 planes of motion not yet met for AROM  -     LTG 2 Pt will be independent with advanced HEP for RC strengthening and scapular stabilization.  -LISET     LTG 2 Progress Progressing  -     LTG 2 Progress Comments cannot tolerate resisted strengthening including isometrics for RC yet  -     LTG 3 Pt will score 44 on QuickDASH indicating decrease in perceived functional  disability.  -LISET     LTG 3 Progress Met  -LISET     LTG 4 Pt will demonstrate increased L shoulder strength to strength 4+/5.  -LISET     LTG 4 Progress Progressing  -LISET     LTG 5 Pt will report 75% decrease in shoulder pain during ADLs.  -LISET     LTG 5 Progress Progressing  -LISET     LTG 5 Progress Comments reports he can raise arm high enough and easily now to apply deodorant without pain and was able to use back scrubber brush behind upper back wihtout pain  -LISET               User Key  (r) = Recorded By, (t) = Taken By, (c) = Cosigned By      Initials Name Provider Type    Kirstin Chao, PT Physical Therapist                     PT Assessment/Plan       Row Name 10/19/23 1600          PT Assessment    Assessment Comments Jose Maria Judge has been seen for a total of 19 visits for L shoulder pain of > 1 year duration due to a fall on it last year. Therapy has consisted of therapeutic exercise, manual techniques and education to address deficits that include decreased ROM, weakness, and imparied functional use of L arm for ADLs including self-care and caring for elderly parents. His progress has been slow however he notes more recent good improvement in pain with some ADLs and demonstrates increased A/PROM. He contineus to have muscle guarding with mobility and strenghtening that slows progress at times. He has now met/partially met 3/3 STGs, 2/5 LTGs met/partially met and progressing toward remaining goals. Good response noted toward PNF-type manual techniqeus with increased ROM. He remains appropriate for skilled therapy services.  -LISET        PT Plan    PT Plan Comments focus on SL strengthening v resisted, increase PNF-type strengthening/ROM, consider supine D2ext with YTB if tolerated  -LISET               User Key  (r) = Recorded By, (t) = Taken By, (c) = Cosigned By      Initials Name Provider Type    Kirstin Chao, PT Physical Therapist                       OP Exercises       Row Name 10/19/23 1400        "      Subjective    Subjective Comments The therapy is helping.  -JA         Total Minutes    45878 - PT Therapeutic Exercise Minutes 30  -JA         Exercise 1    Exercise Name 1 UBE  -JA      Reps 1 sat on 2\" foam pad on the seat (due to hx pudendal neuralgia) and pillow folded behnd back.  -JA      Time 1 4 min  -JA         Exercise 2    Exercise Name 2 shoulder shrug w/reverse rolls  -JA      Cueing 2 Verbal;Tactile;Demo  -JA      Reps 2 15  -JA         Exercise 3    Exercise Name 3 scapular retraction  -JA      Cueing 3 Verbal;Tactile;Demo  -JA      Reps 3 15  -JA      Time 3 5sec  -JA         Exercise 4    Exercise Name 4 shoulder pulley for flexion and scaption  -JA      Cueing 4 Verbal;Tactile;Demo  -JA      Reps 4 10 flexion, 10 scaption w/extended elbow  -JA         Exercise 5    Exercise Name 5 finger walk up wall  -JA      Reps 5 10  -JA      Time 5 pause and allow to relax and cont a little further if able.  -JA         Exercise 6    Exercise Name 6 UT stretch in standing  -JA      Cueing 6 Verbal;Demo  -JA      Reps 6 3  -JA      Time 6 15 sec  -JA         Exercise 7    Exercise Name 7 resisted isos: 3-way delt and IR/ER  -JA      Sets 7 1  -JA      Reps 7 5 ea  -JA      Time 7 tolerates very small step  -JA         Exercise 8    Exercise Name 8 shoulder FIR stretch (begin with radhames shld ext ex's)  -JA      Cueing 8 Verbal;Demo  -JA      Reps 8 3  -JA      Time 8 20sec  -JA      Additional Comments observed AROM now WFL  -JA         Exercise 9    Exercise Name 9 radhames shld ext  -JA      Reps 9 10  -JA         Exercise 10    Exercise Name 10 around the world  -JA      Reps 10 ind at home  -JA      Time 10 --  -JA         Exercise 11    Exercise Name 11 SL ER w/towel  -JA      Sets 11 added slow eccentric  -JA         Exercise 12    Exercise Name 12 SL Flex  -JA      Reps 12 3, pain limited  -JA         Exercise 13    Exercise Name 13 SL HzA  -JA      Cueing 13 Verbal;Tactile  -JA      Reps 13 10  -JA      " Time 13 copious cuing to engage dorsal scap muscles  -JA         Exercise 14    Exercise Name 14 small range D2 w/resistance  -JA      Reps 14 resume next visit  -JA         Exercise 15    Exercise Name 15 HzA AROM in supine  -JA      Reps 15 3  -JA      Time 15 20sec  -JA         Exercise 16    Exercise Name 16 supine radhames shoulder flex w/resistance around L wrist holding tband in R with mild to moderate tension on band  -JA      Cueing 16 Verbal;Tactile  -JA      Sets 16 --  -JA      Reps 16 resume next visit  -JA         Exercise 17    Exercise Name 17 supine alphabet  -JA      Cueing 17 Verbal;Tactile;Demo  -JA      Reps 17 reviewed, discussed benefits of increasing stability of shoulder  -JA         Exercise 18    Exercise Name 18 assess for MMT, ROM, goals  -JA      Time 18 8 min  -JA                User Key  (r) = Recorded By, (t) = Taken By, (c) = Cosigned By      Initials Name Provider Type    Kirstin Chao, PT Physical Therapist                  Manual Rx (last 36 hours)       Manual Treatments       Row Name 10/19/23 1500             Manual Rx 1    Manual Rx 1 Location L shoulder supine  -      Manual Rx 1 Type GH jt mobs, PROM, intermittent oscillations, gentle LAD  -JA      Manual Rx 1 Grade improving tolerance, some increased guarding of ER in greater abduction  -         Manual Rx 3    Manual Rx 3 Location slow reversal D2 in partial range working toward pain limits  -      Manual Rx 3 Type rhythmic stab at 90 deg flex  -      Manual Rx 3 Grade multiple angle iso ER/IR  -JA                User Key  (r) = Recorded By, (t) = Taken By, (c) = Cosigned By      Initials Name Provider Type    Kirstin Chao, PT Physical Therapist                                          Time Calculation:     Start Time: 1420  Stop Time: 1510  Time Calculation (min): 50 min  Timed Charges  37152 - PT Therapeutic Exercise Minutes: 30  Total Minutes  Timed Charges Total Minutes: 30   Total Minutes: 30      Therapy Charges for Today       Code Description Service Date Service Provider Modifiers Qty    [id number] HC PT THER PROC EA 15 MIN 10/19/2023 Kirstin Dale, PT GP 2                      Kirstin Dale, PT  10/19/2023

## 2023-10-24 ENCOUNTER — HOSPITAL ENCOUNTER (OUTPATIENT)
Dept: PHYSICAL THERAPY | Facility: HOSPITAL | Age: 58
Setting detail: THERAPIES SERIES
Discharge: HOME | End: 2023-10-24
Payer: COMMERCIAL

## 2023-10-24 DIAGNOSIS — M25.512 LEFT SHOULDER PAIN, UNSPECIFIED CHRONICITY: Primary | ICD-10-CM

## 2023-10-24 DIAGNOSIS — R29.898 WEAKNESS OF LEFT UPPER EXTREMITY: ICD-10-CM

## 2023-10-24 DIAGNOSIS — M25.612 DECREASED RANGE OF MOTION OF LEFT SHOULDER: ICD-10-CM

## 2023-10-24 PROCEDURE — 97110 THERAPEUTIC EXERCISES: CPT

## 2023-10-24 PROCEDURE — 97140 MANUAL THERAPY 1/> REGIONS: CPT

## 2023-10-24 NOTE — THERAPY TREATMENT NOTE
Outpatient Physical Therapy Ortho Treatment Note  [facility]     Patient Name: Jose Maria Judge  : 1965  MRN: [id number]  Today's Date: 10/24/2023      Visit Date: 10/24/2023    Visit Dx:    ICD-10-CM ICD-9-CM   1. Left shoulder pain, unspecified chronicity  M25.512 719.41   2. Weakness of left upper extremity  R29.898 729.89   3. Decreased range of motion of left shoulder  M25.612 719.51       Patient Active Problem List   Diagnosis    Bilateral hip pain    Right hip pain    Left hip pain    Trochanteric bursitis    Tear of acetabular labrum    Loss of hair    Anal burning    Elevated blood pressure    Hamstring strain    Hypertension    Neuritis of foot    Hamstring muscle strain    Rectal pain    Herpes zoster    Varicose veins of bilateral lower extremities with pain    Lymph node enlargement    Epigastric pain    Axillary lymphadenopathy    Olfaction disorder    Acute bilateral low back pain without sciatica    Nausea    Anal or rectal pain    Arthropathy of lumbar facet joint    Pudendal neuralgia    Tinnitus    High risk medication use    Varicose veins of left lower extremity with pain    Rib pain    Lymphadenopathy    Bradycardia    Shoulder pain    Chronic pain of both feet    Illness    Fatigue    Metatarsalgia of both feet    Paresthesia of foot    Well adult health check    Vertigo    Varicocele    Tinea corporis    Thoracic back pain    Swelling of structure of right eye    Snoring    Seasonal allergies    Rash    Posterior rhinorrhea    Other specified diseases of anus and rectum    Night sweats    Neuropathy    Muscle spasm of back    Miller's metatarsalgia    Mass of axilla    Male pattern alopecia    Low back strain    Irritable bowel syndrome    Intolerant of cold    Insomnia    Injury of right foot    Gastroesophageal reflux disease    Ganglion    Elevated low density lipoprotein (LDL) cholesterol level    Disorder of gallbladder    Degeneration of lumbar intervertebral disc    Carpal  tunnel syndrome    Benign prostatic hyperplasia    Alopecia    Allergic rhinitis due to pollen    Allergic rhinitis    Allergic conjunctivitis    Acute sinusitis    Acute COVID-19    Acute anal fissure    Pharyngitis    Body aches    Stress    Sore throat    Melena    RUQ abdominal pain        Past Medical History:   Diagnosis Date    Acid reflux     HX    Anal fistula     Anxiety     R/T PAIN    Arthritis of back     can't remember    Carpal tunnel syndrome, left     DDD (degenerative disc disease), lumbar     Frozen shoulder     Hemorrhoids     Hodgkin's lymphoma     left axillary adenopathy leading to a biopsy that was nondiagnostic but suspicious and eventually an excisional biopsy that was eventually felt to be consistent with follicular hyperplasia and progressive transformation of germinal centers with 2 foci suspicious for early involvement by nodules lymphocyte predominant Hodgkin's lymphoma.  This was likely treated by excision    Hyperlipidemia     Hyperosmia     Labral tear of hip joint     RIGHT    Low back strain     Male pattern alopecia     Periarthritis of shoulder     need to check file    Postnasal drip     Pudendal neuralgia     Tinnitus of left ear     Varicose vein of leg     left        Past Surgical History:   Procedure Laterality Date    ABSCESS DRAINAGE  08/2012    ANAL FISTULA REPAIR N/A 09/2012, 10/2012    AXILLARY LYMPH NODE BIOPSY/EXCISION Left 11/09/2018    Procedure: AXILLARY LYMPH NODE BIOPSY/EXCISION;  Surgeon: Amando Nguyễn MD;  Location: [facility] OR [facility] [place];  Service: General    COLONOSCOPY N/A 2013    done at [facility]    COLONOSCOPY N/A 07/10/2019    Procedure: COLONOSCOPY to CECUM;  Surgeon: Amando Nguyễn MD;  Location: [facility] ENDOSCOPY;  Service: General    ENDOSCOPY N/A 01/09/2019    Procedure: ESOPHAGOGASTRODUODENOSCOPY;  Surgeon: Amando Nguyễn MD;  Location: [facility] ENDOSCOPY;  Service: General    ENDOSCOPY N/A 03/30/2023    Procedure: ESOPHAGOGASTRODUODENOSCOPY WITH  "BIOPSY;  Surgeon: Amando Nguyễn MD;  Location:  [facility] OR;  Service: Gastroenterology;  Laterality: N/A;  Gastric biopsy; Normal    FINE NEEDLE ASPIRATION Left 08/09/2018    Left axillary lymph node FNA    FLEXIBLE SIGMOIDOSCOPY N/A 06/25/2003    Normal-Dr. Cezar Aviles    HEMORRHOIDECTOMY N/A 1996    HIP ARTHROSCOPY W/ LABRAL REPAIR Right 04/03/2017    Dr. Lonnie Lemons    HIP SURGERY  2017    right labral repair    MEDIAL BRANCH BLOCK Bilateral 03/12/2021    Procedure: BILATERAL MEDIAL BRANCH BLOCK W/MAC;  Surgeon: Francisco Velez MD;  Location: [facility] [place] MAIN OR;  Service: Pain Management;  Laterality: Bilateral;    SIGMOIDOSCOPY N/A 09/23/2020    Procedure: FLEXIBLE SIGMOIDOSCOPY WITH BIOPSY;  Surgeon: Shena Ring MD;  Location:  [facility];  Service: Gastroenterology;  Laterality: N/A;  pre- anal/rectal pain  post- hemorrhoids    TONSILLECTOMY Bilateral                         PT Assessment/Plan       Row Name 10/24/23 1500          PT Assessment    Assessment Comments Jose Maria present to therapy with c/o R foot pain and R shoulder pain in addition to L shoulder problem that he is here for. We have discussed those in the past however there we had mutally agreed to wait until his L shoulder improved. L shoulder is now to the point of being treated 1x/week and he would like to be seen for the other issues. We discussed that he needs script for R shoulder to be specifically addressed, he does have script for feet. Today, L shoulder responded well to PNF-type muscle re-education for isometrics, rhythmic stabilization, and slow reversals with minimal discomfort and increasing ability. He would benefit from continuing skilled therapy services to address the new issues as well as the previous.  -JA        PT Plan    PT Plan Comments did he get scrip for R shld?, ready to start foot eval?, begin funcitonal \"circuit\" for L arm if tolerated, consider wts v resistance bands  -LISET               User Key  (r) " "= Recorded By, (t) = Taken By, (c) = Cosigned By      Initials Name Provider Type    Kirstin Chao, PT Physical Therapist                       OP Exercises       Row Name 10/24/23 1500             Subjective    Subjective Comments My feet are huring so much i need to get something done. Also my R shoulder had a sharp pain moving to use it to wash dishes. also it hurt when I went to lay down on my back; took 2 minutes to go back to normal.  The R shoulder is scaring me because it is hurting me more.  -JA         Subjective Pain    Able to rate subjective pain? yes  -JA      Pre-Treatment Pain Level 0  -JA      Subjective Pain Comment c/o R shoulder pain > than L at this point.  -JA         Total Minutes    18617 - PT Therapeutic Exercise Minutes 40  -JA      34912 - PT Manual Therapy Minutes 15  -JA         Exercise 1    Exercise Name 1 UBE  -JA      Reps 1 sat on 2\" foam pad on the seat (due to hx pudendal neuralgia) and pillow folded behnd back.  -JA      Time 1 4 min  -JA         Exercise 2    Exercise Name 2 shoulder shrug w/reverse rolls  -JA      Cueing 2 Verbal;Tactile;Demo  -JA      Reps 2 15  -JA         Exercise 3    Exercise Name 3 scapular retraction  -JA      Cueing 3 Verbal;Tactile;Demo  -JA      Reps 3 15  -JA      Time 3 5sec  -JA         Exercise 4    Exercise Name 4 shoulder pulley for flexion and scaption  -JA      Cueing 4 Verbal;Tactile;Demo  -JA      Reps 4 10 flexion, 10 scaption w/extended elbow  -JA      Time 4 SLOWLY lower  -JA         Exercise 5    Exercise Name 5 finger walk up wall  -JA      Reps 5 10  -JA      Time 5 pause and allow to relax and cont a little further if able.  -JA      Additional Comments added 5 on R  -JA         Exercise 6    Exercise Name 6 UT stretch in standing  -JA      Cueing 6 Verbal;Demo  -JA      Reps 6 held due to pain  -JA         Exercise 7    Exercise Name 7 resisted isos: 3-way delt and IR/ER  -JA      Sets 7 held due to pain  -JA         " Exercise 8    Exercise Name 8 shoulder FIR stretch (begin with radhames shld ext ex's)  -JA      Cueing 8 Verbal;Demo  -JA      Reps 8 3  -JA      Time 8 20sec  -JA         Exercise 9    Exercise Name 9 radhames shld ext  -JA      Reps 9 10  -JA         Exercise 10    Exercise Name 10 --  -JA      Reps 10 --  -JA         Exercise 11    Exercise Name 11 SL ER w/towel  -JA      Sets 11 added slow eccentric  -JA         Exercise 12    Exercise Name 12 L SL Flex for R shld  -JA      Reps 12 --  -JA         Exercise 13    Exercise Name 13 SL HzA  -JA      Cueing 13 Verbal;Tactile;Demo  -JA      Reps 13 set 1: 8, set 2: 4  -JA      Time 13 copious cuing to engage dorsal scap muscles  -JA         Exercise 14    Exercise Name 14 SL ER  -JA      Cueing 14 Verbal;Tactile;Demo  -JA      Reps 14 set 1: 8, set 2: 4  -JA         Exercise 15    Exercise Name 15 HzA AROM in supine  -JA      Sets 15 L only  -JA      Reps 15 3  -JA      Time 15 --  -JA         Exercise 16    Exercise Name 16 supine radhames shoulder flex w/resistance around L wrist holding tband in R with mild to moderate tension on band  -JA      Cueing 16 Verbal;Tactile  -JA      Reps 16 resume next visit  -JA         Exercise 17    Exercise Name 17 supine alphabet  -JA      Cueing 17 Verbal;Tactile;Demo  -JA      Reps 17 ind at home  -JA         Exercise 18    Exercise Name 18 Lengthy discussion about R shoulder pain, screened for special tests: + empty can, + H-K, - Neers, + TTP of LHB but - Speeds; did not MMT flex due to pt's hesitant it will increase pain. Pt also brought up his feet problem and particuarly R foot pain in lateral aspect from lateral heel to 5th MT and posterior Achilles pain. He has a script for this but had previously wanted to wait until L shoulder improve, now L shld has improved to point of decreased to 1x/wk he would like to address his feet; also his pain has increased that walking from car to clinic is difficult.  -JA      Time 18 25  -JA                 User Key  (r) = Recorded By, (t) = Taken By, (c) = Cosigned By      Initials Name Provider Type    Kirstin Chao PT Physical Therapist                             Manual Rx (last 36 hours)       Manual Treatments       Row Name 10/24/23 1500             Total Minutes    34286 - PT Manual Therapy Minutes 15  -JA         Manual Rx 1    Manual Rx 1 Location L shoulder supine  -      Manual Rx 1 Type GH jt mobs, PROM, intermittent oscillations, gentle LAD  -      Manual Rx 1 Grade improving tolerance, some increased guarding of ER in greater abduction  -         Manual Rx 3    Manual Rx 3 Location L shld: slow reversal D2 in partial range working toward pain limits  -      Manual Rx 3 Type rhythmic stab at 90 deg flex; added slow reversal HzA with good response  -      Manual Rx 3 Grade multiple angle iso ER/IR  -         Manual Rx 4    Manual Rx 4 Location R shld  -      Manual Rx 4 Type SL scap ret resistance; supine osciallations and RS at 90  -JA                User Key  (r) = Recorded By, (t) = Taken By, (c) = Cosigned By      Initials Name Provider Type    Kirstin Chao PT Physical Therapist                                       Time Calculation:   Start Time: 1510  Stop Time: 1610  Time Calculation (min): 60 min  Timed Charges  50241 - PT Therapeutic Exercise Minutes: 40  65538 - PT Manual Therapy Minutes: 15  Total Minutes  Timed Charges Total Minutes: 55   Total Minutes: 55  Therapy Charges for Today       Code Description Service Date Service Provider Modifiers Qty    55571104621  PT THER PROC EA 15 MIN 10/24/2023 Kirstin Dale PT GP 3    [id number] HC PT MANUAL THERAPY EA 15 MIN 10/24/2023 Kirstin Dale PT GP 1                      Kirstin Dale PT  10/24/2023

## 2023-10-27 ENCOUNTER — TELEPHONE (OUTPATIENT)
Dept: ORTHOPEDIC SURGERY | Facility: CLINIC | Age: 58
End: 2023-10-27

## 2023-10-27 DIAGNOSIS — M25.511 RIGHT SHOULDER PAIN, UNSPECIFIED CHRONICITY: Primary | ICD-10-CM

## 2023-10-27 NOTE — TELEPHONE ENCOUNTER
Caller: DUY HERNANDEZ     Relationship to patient:     Best call back number: [phone]    Patient is needing: RIGHT SHOULDER HAS BEEN HAVING SPORADIC PAINFUL SPASMS THEY LAST ABOUT 2 MIN AND THEN GOES AWAY- DISCUSSED WITH PHYSICAL THERAPIST AND WOULD LIKE A PHYSICAL THERAPY  ORDER FOR THERAPY ON THE RIGHT SHOULDER - PLEASE REACH OUT AND ADVISE            ----- Message from Karel Duckworth sent at 12/20/2021  3:38 PM EST -----  Subject: Refill Request    QUESTIONS  Name of Medication? amphetamine-dextroamphetamine (ADDERALL XR) 25 MG   extended release capsule  Patient-reported dosage and instructions? 25mg 1 x a day  How many days do you have left? 5  Preferred Pharmacy? [address]  Pharmacy phone number (if available)? [phone]  Additional Information for Provider? Pts mom called these in and wasn't   sure how many he has left.   ---------------------------------------------------------------------------  --------------  CALL BACK INFO  What is the best way for the office to contact you? OK to leave message on   voicemail  Preferred Call Back Phone Number?  [phone]

## 2023-11-01 ENCOUNTER — HOSPITAL ENCOUNTER (OUTPATIENT)
Dept: PHYSICAL THERAPY | Facility: HOSPITAL | Age: 58
Setting detail: THERAPIES SERIES
Discharge: HOME | End: 2023-11-01
Payer: COMMERCIAL

## 2023-11-01 DIAGNOSIS — M76.61 ACHILLES TENDINITIS, RIGHT LEG: Primary | ICD-10-CM

## 2023-11-01 DIAGNOSIS — M62.81 MUSCLE WEAKNESS OF LOWER EXTREMITY: ICD-10-CM

## 2023-11-01 DIAGNOSIS — R26.9 GAIT DIFFICULTY: ICD-10-CM

## 2023-11-01 DIAGNOSIS — M25.671 DECREASED RANGE OF MOTION OF RIGHT ANKLE: ICD-10-CM

## 2023-11-01 PROCEDURE — 97161 PT EVAL LOW COMPLEX 20 MIN: CPT

## 2023-11-01 PROCEDURE — 97110 THERAPEUTIC EXERCISES: CPT

## 2023-11-02 NOTE — THERAPY EVALUATION
Outpatient Physical Therapy Ortho Initial Evaluation  [facility]     Patient Name: Jose Maria Judge  : 1965  MRN: [id number]  Today's Date: 2023      Visit Date: 2023    Patient Active Problem List   Diagnosis    Bilateral hip pain    Right hip pain    Left hip pain    Trochanteric bursitis    Tear of acetabular labrum    Loss of hair    Anal burning    Elevated blood pressure    Hamstring strain    Hypertension    Neuritis of foot    Hamstring muscle strain    Rectal pain    Herpes zoster    Varicose veins of bilateral lower extremities with pain    Lymph node enlargement    Epigastric pain    Axillary lymphadenopathy    Olfaction disorder    Acute bilateral low back pain without sciatica    Nausea    Anal or rectal pain    Arthropathy of lumbar facet joint    Pudendal neuralgia    Tinnitus    High risk medication use    Varicose veins of left lower extremity with pain    Rib pain    Lymphadenopathy    Bradycardia    Shoulder pain    Chronic pain of both feet    Illness    Fatigue    Metatarsalgia of both feet    Paresthesia of foot    Well adult health check    Vertigo    Varicocele    Tinea corporis    Thoracic back pain    Swelling of structure of right eye    Snoring    Seasonal allergies    Rash    Posterior rhinorrhea    Other specified diseases of anus and rectum    Night sweats    Neuropathy    Muscle spasm of back    Miller's metatarsalgia    Mass of axilla    Male pattern alopecia    Low back strain    Irritable bowel syndrome    Intolerant of cold    Insomnia    Injury of right foot    Gastroesophageal reflux disease    Ganglion    Elevated low density lipoprotein (LDL) cholesterol level    Disorder of gallbladder    Degeneration of lumbar intervertebral disc    Carpal tunnel syndrome    Benign prostatic hyperplasia    Alopecia    Allergic rhinitis due to pollen    Allergic rhinitis    Allergic conjunctivitis    Acute sinusitis    Acute COVID-19    Acute anal fissure    Pharyngitis     Body aches    Stress    Sore throat    Melena    RUQ abdominal pain        Past Medical History:   Diagnosis Date    Acid reflux     HX    Anal fistula     Anxiety     R/T PAIN    Arthritis of back     can't remember    Carpal tunnel syndrome, left     DDD (degenerative disc disease), lumbar     Frozen shoulder     Hemorrhoids     Hodgkin's lymphoma     left axillary adenopathy leading to a biopsy that was nondiagnostic but suspicious and eventually an excisional biopsy that was eventually felt to be consistent with follicular hyperplasia and progressive transformation of germinal centers with 2 foci suspicious for early involvement by nodules lymphocyte predominant Hodgkin's lymphoma.  This was likely treated by excision    Hyperlipidemia     Hyperosmia     Labral tear of hip joint     RIGHT    Low back strain     Male pattern alopecia     Periarthritis of shoulder     need to check file    Postnasal drip     Pudendal neuralgia     Tinnitus of left ear     Varicose vein of leg     left        Past Surgical History:   Procedure Laterality Date    ABSCESS DRAINAGE  08/2012    ANAL FISTULA REPAIR N/A 09/2012, 10/2012    AXILLARY LYMPH NODE BIOPSY/EXCISION Left 11/09/2018    Procedure: AXILLARY LYMPH NODE BIOPSY/EXCISION;  Surgeon: Amando Nguyễn MD;  [place] [facility] [place];  Service: General    COLONOSCOPY N/A 2013    done at [facility]    COLONOSCOPY N/A 07/10/2019    Procedure: COLONOSCOPY to CECUM;  Surgeon: Amando Nguyễn MD;  Location: [facility] ENDOSCOPY;  Service: General    ENDOSCOPY N/A 01/09/2019    Procedure: ESOPHAGOGASTRODUODENOSCOPY;  Surgeon: Amando Nguyễn MD;  Location: [facility] ENDOSCOPY;  Service: General    ENDOSCOPY N/A 03/30/2023    Procedure: ESOPHAGOGASTRODUODENOSCOPY WITH BIOPSY;  Surgeon: Amando Nguyễn MD;  Location: [facility];  Service: Gastroenterology;  Laterality: N/A;  Gastric biopsy; Normal    FINE NEEDLE ASPIRATION Left 08/09/2018    Left axillary lymph node FNA    FLEXIBLE  SIGMOIDOSCOPY N/A 06/25/2003    Normal-Dr. Cezar Aviles    HEMORRHOIDECTOMY N/A 1996    HIP ARTHROSCOPY W/ LABRAL REPAIR Right 04/03/2017    Dr. Lonnie Lemons    HIP SURGERY  2017    right labral repair    MEDIAL BRANCH BLOCK Bilateral 03/12/2021    Procedure: BILATERAL MEDIAL BRANCH BLOCK W/MAC;  Surgeon: Francisco Velez MD;  [place] [facility] [place];  Service: Pain Management;  Laterality: Bilateral;    SIGMOIDOSCOPY N/A 09/23/2020    Procedure: FLEXIBLE SIGMOIDOSCOPY WITH BIOPSY;  Surgeon: Shena Ring MD;  Location: [facility] ENDOSCOPY;  Service: Gastroenterology;  Laterality: N/A;  pre- anal/rectal pain  post- hemorrhoids    TONSILLECTOMY Bilateral        Visit Dx:     ICD-10-CM ICD-9-CM   1. Achilles tendinitis, right leg  M76.61 726.71   2. Decreased range of motion of right ankle  M25.671 719.57   3. Muscle weakness of lower extremity  M62.81 728.87   4. Gait difficulty  R26.9 781.2          Patient History       Row Name 11/01/23 1500             History    Chief Complaint Pain;Difficulty Walking  -JA      Type of Pain Foot pain  -JA      Date Current Problem(s) Began --  gradually increased and became severe about three weeks ago  -JA      Brief Description of Current Complaint Has been seen for L shoulder pain, presents with new script for achilles tendonitis on R medial heel. had an xray and the ortho showed him a bone spur. He reports hx of pain in dorsum of radhames feet, R foot feels like he is standing on marbles. Pain under metatarsals, stinging burning and swelling. Saw Dr Mccracken yesterday for the R Achilles tendinitis. Saw an ortho in 2012 due to Achilles pain and he did stretches and it went way. The last 3 weeks he began having tightness when he first gets up and stands up. Has been trying to warm up foot ankle before getting up that has helped some. States he used to have a good vertical jump but his calf on each popped at different times and his jump was never the same - he reports  feeling his calves were tight when that happened but states he normally had stretched, L happened when was playing a  game of basketball, the R one popped when jumping into a pool (1995). For the last 7 years he hasn't been able to tolerate wearing shoes and has to wear slides. This pain came up about 3 weeks ago, he recalls having stood more this summer with his nephew to fish and would feel a strain and it has gradually worsened. He has taped his ankle horizontally from medial malleolus posteriorly to lateral malleolus because it helped ease pain with walking.  -JA      Patient/Caregiver Goals Relieve pain;Improve mobility;Know what to do to help the symptoms  -JA      Hand Dominance right-handed  -JA      Occupation/sports/leisure activities household chaores, does not lift heavy things due to pudendal nerve issue  -JA         Pain     Pain Location Foot  -JA      Pain at Present 2  2 w/sitting but worsens with walking or standing  -JA      Pain at Worst 7;8  -JA      Pain Frequency Constant/continuous  -JA      What Performance Factors Make the Current Problem(s) WORSE? walking, standing  -JA      Tolerance Time- Standing 5 min  -JA      Tolerance Time- Sitting no prob  -JA      Tolerance Time- Walking 5 min  -JA      Tolerance Time- Lying no prob  -JA      Difficulties with ADL's? yes  -JA         Fall Risk Assessment    Any falls in the past year: No  -JA         Daily Activities    Primary Language English  -JA      Are you able to read Yes  -JA      Are you able to write Yes  -JA      How does patient learn best? Listening;Reading;Demonstration;Pictures/Video  -JA      Teaching needs identified Home Exercise Program;Management of Condition  -JA      Barriers to learning None  -JA      Recommended Referrals Physical Therapy  -JA      Pt Participated in POC and Goals Yes  -JA         Safety    Are you being hurt, hit, or frightened by anyone at home or in your life? No  -JA                User Key  (r)  = Recorded By, (t) = Taken By, (c) = Cosigned By      Initials Name Provider Type    Kirstin Chao N, PT Physical Therapist                     PT Ortho       Row Name 11/01/23 1500       Posture/Observations    Posture/Observations Comments pronatory foot  -JA       Special Tests/Palpation    Special Tests/Palpation Ankle/Foot  -JA       Foot/Ankle Palpation    Plantar Fascia Right:;Tender;Guarded/taut;Fibrotic;Trigger point  -    Soleus Right:;Tender;Guarded/taut  -    Peroneals Right:;Tender;Guarded/taut  Distally  -JA       General ROM    RT Lower Ext Rt Ankle Dorsiflexion;Rt Ankle Plantarflexion;Rt Ankle Inversion;Rt Ankle Eversion  -JA    LT Lower Ext Lt Ankle Dorsiflexion;Lt Ankle Plantarflexion;Lt Ankle Inversion;Lt Ankle Eversion  -JA       Right Lower Ext    Rt Ankle Dorsiflexion AROM 5  knee extnsion positon 15  -JA    Rt Ankle Plantarflexion AROM 42  45 in knee ext  -JA    Rt Ankle Inversion AROM 30  -JA    Rt Ankle Eversion AROM 20  -JA    RT Lower Extremity Comments involved  -JA       Left Lower Ext    Lt Ankle Dorsiflexion AROM 15  -JA    Lt Ankle Plantarflexion AROM 40  -JA    Lt Ankle Inversion AROM 40  -JA    Lt Ankle Eversion AROM 35 forefoot  -JA       MMT (Manual Muscle Testing)    Rt Lower Ext Rt Ankle Plantarflexion;Rt Ankle Dorsiflexion;Rt Ankle Subtalar Inversion;Rt Ankle Subtalar Eversion  -JA    Lt Lower Ext Lt Ankle Plantarflexion;Lt Ankle Dorsiflexion;Lt Ankle Subtalar Inversion;Lt Ankle Subtalar Eversion  -JA       MMT Right Lower Ext    Rt Ankle Plantarflexion MMT, Gross Movement (4/5) good  -JA    Rt Ankle Dorsiflexion MMT, Gross Movement (4/5) good  -JA    Rt Ankle Subtalar Inversion MT, Gross Movement (4-/5) good minus  -JA    Rt Ankle Subtalar Eversion MMT, Gross Movement (4-/5) good minus  -JA    Rt Lower Extremity Comments  pain-limited  -JA       MMT Left Lower Ext    Lt Ankle Plantarflexion MMT, Gross Movement (5/5) normal  -JA    Lt Ankle Dorsiflexion MMT, Gross  Movement (5/5) normal  -LISET    Lt Ankle Subtalar Inversion MMT, Gross Movement (5/5) normal  -LISET    Lt Ankle Subtalar Eversion MMT, Gross Movement (5/5) normal  -LISET              User Key  (r) = Recorded By, (t) = Taken By, (c) = Cosigned By      Initials Name Provider Type    Kirstin Chao, PT Physical Therapist                                Therapy Education  Education Details: [id number] educated for achilles tendonitis, improtance of restoring ROM and stretching for gastroc and soleus separately, POC may include manual technques to address soft tissue restrictions, eccentric program given by ortho MD, use of cold/ice for pain relief, heat for muscle and poss for stretching  Given: HEP, Symptoms/condition management  Program: New  How Provided: Verbal, Demonstration, Written  Provided to: Patient  Level of Understanding: Verbalized, Demonstrated      PT OP Goals       Row Name 11/01/23 1940          PT Short Term Goals    STG Date to Achieve 12/02/23  -LISET     STG 1 Patient will be independent and compliant with initial HEP.  -LISET     STG 1 Progress New  -LISET     STG 2 Pt will demonstrate increased ankle ROM DF, inv, ev to within 5 degrees of uninvolved L ankle.  -LISET     STG 2 Progress New  -     STG 3 Pt will tolerate wearing athletic shoe for 15 minutes.  -LISET     STG 3 Progress New  -     STG 4 Pt will ambulate 150' with normal heel strike to toe off with symmetrical stride and jerri.  -LISET     STG 4 Progress New  -        Long Term Goals    LTG Date to Achieve 01/01/24  -LISET     LTG 1 Pt will be independent with advanced HEP for strengthening to increase functional mobility.  -LISET     LTG 1 Progress New  -LISET     LTG 2 Pt will be able to walk or perform physical activity for 15 minutes or more with no more than 3/10 pain.  -LISET     LTG 2 Progress New  -     LTG 3 Pt will demonstrate increased strength in R ankle to 4+/5 or better to facilitate safe return to previous level of activity.  -LISET     LTG 3  Progress New  -     LTG 4 Pt will score 40/84 on FAAM indicating decrease in perceived functional disability.  -LISET     LTG 4 Progress New  -               User Key  (r) = Recorded By, (t) = Taken By, (c) = Cosigned By      Initials Name Provider Type    Kirstin Chao, PT Physical Therapist                     PT Assessment/Plan       Row Name 11/01/23 1940          PT Assessment    Functional Limitations Impaired gait;Limitation in home management;Limitations in community activities;Limitations in functional capacity and performance;Performance in leisure activities;Performance in self-care ADL  -LISET     Impairments Pain;Range of motion;Muscle strength;Gait;Endurance;Balance  -LISET     Assessment Comments Jose Maria Judge is a 58 y.o. male referred to outpatient physical therapy for evaluation and treatment of right achilles pain of insidious onset 3 weeks ago; he does recall spending increased time this past summer standing to fish and other activities with his nephew. Patient presents with decreased R ankle ROM, weakness in ankle stabilizers, poor tolerance to standing and walking, unable to tolerate wearing regular shoes (wears slides only), and impaired gait/functional mobility. Signs and symptoms are consistent with referring diagnosis. Pertinent comorbidities and personal factors that may affect progress include, but are not limited to, history of radhames foot pain of undetermined duration, bone spur, decreased activity level due to chronic pain from pudendal neuralgia. This condition is stable. Recommend skilled PT to address functional deficits. Thank you for this referral.  -LISET     Please refer to paper survey for additional self-reported information No  -LISET     Rehab Potential Good  -LISET     Patient/caregiver participated in establishment of treatment plan and goals Yes  -LISET     Patient would benefit from skilled therapy intervention Yes  -LISET        PT Plan    PT Frequency 1x/week;2x/week  -LISET      Predicted Duration of Therapy Intervention (PT) 8-12 visits  -     Planned CPT's? PT EVAL LOW COMPLEXITY: 31812;PT RE-EVAL: 42066;PT THER PROC EA 15 MIN: 29182;PT THER ACT EA 15 MIN: 66811;PT MANUAL THERAPY EA 15 MIN: 20709;PT NEUROMUSC RE-EDUCATION EA 15 MIN: 20395;PT GAIT TRAINING EA 15 MIN: 13057;PT HOT OR COLD PACK TREAT [facility]  -     PT Plan Comments assess response to initial HEP, consider KT for navicular sling for medial stability? warm up on nustep or rec bike if the seat is tolerated (try adding foam pad), review/practice each ex on HEP for good form at home, standing wt shift ant-post, towel scrunch, towel push inv/ev  -JA               User Key  (r) = Recorded By, (t) = Taken By, (c) = Cosigned By      Initials Name Provider Type    Kirstin Chao, PT Physical Therapist                       OP Exercises       Row Name 11/01/23 1940             Subjective    Subjective Comments initial eval  -JA         Total Minutes    33977 - PT Therapeutic Exercise Minutes 25  -JA         Exercise 1    Exercise Name 1 Educated for achilles tendonitis, eccentric program and gradual progression; initiated HEP  -      Additional Comments trial nustep or rec bike  -JA         Exercise 2    Exercise Name 2 long sit gastroc stretch w/belt  -JA      Cueing 2 Verbal;Tactile;Demo  -JA      Reps 2 3  -JA      Time 2 20sec  -JA         Exercise 3    Exercise Name 3 long-sit knee flexed soleus stretch w/belt  -JA      Cueing 3 Verbal;Tactile;Demo  -JA      Reps 3 3  -JA      Time 3 20sec  -JA         Exercise 4    Exercise Name 4 seated plantar fascia stretch  -JA      Cueing 4 Verbal;Tactile;Demo  -JA      Reps 4 3  -JA      Time 4 20sec  -JA         Exercise 5    Exercise Name 5 ankle DF/PF pumps (slow, controlled)  -JA      Cueing 5 Verbal;Demo  -JA      Reps 5 10  -JA         Exercise 6    Exercise Name 6 ankle inv/ev AROM seated  -JA      Cueing 6 Verbal;Tactile;Demo  -JA      Reps 6 10  -JA      Time 6  slow/controlled  -JA      Additional Comments initially had pain but improved when perfomring slowly with control  -JA                User Key  (r) = Recorded By, (t) = Taken By, (c) = Cosigned By      Initials Name Provider Type    Kirstin Chao, PT Physical Therapist                                  Outcome Measure Options: FAAM  FAAM  FAAM: 22/84 (14,8,0)      Time Calculation:     Start Time: 1505  Stop Time: 1605  Time Calculation (min): 60 min  Timed Charges  51906 - PT Therapeutic Exercise Minutes: 25  Untimed Charges  PT Eval/Re-eval Minutes: 30  Total Minutes  Timed Charges Total Minutes: 25  Untimed Charges Total Minutes: 30   Total Minutes: 55     Therapy Charges for Today       Code Description Service Date Service Provider Modifiers Qty    14731306810 HC PT THER PROC EA 15 MIN 11/1/2023 Kirstin Dale, PT GP 2    08717697518 HC PT EVAL LOW COMPLEXITY 2 11/1/2023 Kirstin Dale, PT GP 1            PT G-Codes  Outcome Measure Options: PENELOPE Dale PT  11/1/2023

## 2023-11-03 ENCOUNTER — TRANSCRIBE ORDERS (OUTPATIENT)
Dept: PHYSICAL THERAPY | Facility: HOSPITAL | Age: 58
End: 2023-11-03
Payer: COMMERCIAL

## 2023-11-03 DIAGNOSIS — M75.02 ADHESIVE CAPSULITIS OF LEFT SHOULDER: Primary | ICD-10-CM

## 2023-11-03 DIAGNOSIS — M75.42 IMPINGEMENT SYNDROME OF LEFT SHOULDER: ICD-10-CM

## 2023-11-07 ENCOUNTER — APPOINTMENT (OUTPATIENT)
Dept: PHYSICAL THERAPY | Facility: HOSPITAL | Age: 58
End: 2023-11-07
Payer: COMMERCIAL

## 2023-11-15 ENCOUNTER — HOSPITAL ENCOUNTER (OUTPATIENT)
Dept: PHYSICAL THERAPY | Facility: HOSPITAL | Age: 58
Setting detail: THERAPIES SERIES
Discharge: HOME | End: 2023-11-15
Payer: COMMERCIAL

## 2023-11-15 DIAGNOSIS — M76.61 ACHILLES TENDINITIS, RIGHT LEG: Primary | ICD-10-CM

## 2023-11-15 DIAGNOSIS — R26.9 GAIT DIFFICULTY: ICD-10-CM

## 2023-11-15 DIAGNOSIS — M62.81 MUSCLE WEAKNESS OF LOWER EXTREMITY: ICD-10-CM

## 2023-11-15 DIAGNOSIS — M25.671 DECREASED RANGE OF MOTION OF RIGHT ANKLE: ICD-10-CM

## 2023-11-15 PROCEDURE — 97110 THERAPEUTIC EXERCISES: CPT

## 2023-11-15 PROCEDURE — 97140 MANUAL THERAPY 1/> REGIONS: CPT

## 2023-11-16 NOTE — THERAPY TREATMENT NOTE
Outpatient Physical Therapy Ortho Treatment Note  [facility]     Patient Name: Jose Maria Judge  : 1965  MRN: [id number]  Today's Date: 11/15/2023      Visit Date: 11/15/2023    Visit Dx:    ICD-10-CM ICD-9-CM   1. Achilles tendinitis, right leg  M76.61 726.71   2. Decreased range of motion of right ankle  M25.671 719.57   3. Muscle weakness of lower extremity  M62.81 728.87   4. Gait difficulty  R26.9 781.2       Patient Active Problem List   Diagnosis    Bilateral hip pain    Right hip pain    Left hip pain    Trochanteric bursitis    Tear of acetabular labrum    Loss of hair    Anal burning    Elevated blood pressure    Hamstring strain    Hypertension    Neuritis of foot    Hamstring muscle strain    Rectal pain    Herpes zoster    Varicose veins of bilateral lower extremities with pain    Lymph node enlargement    Epigastric pain    Axillary lymphadenopathy    Olfaction disorder    Acute bilateral low back pain without sciatica    Nausea    Anal or rectal pain    Arthropathy of lumbar facet joint    Pudendal neuralgia    Tinnitus    High risk medication use    Varicose veins of left lower extremity with pain    Rib pain    Lymphadenopathy    Bradycardia    Shoulder pain    Chronic pain of both feet    Illness    Fatigue    Metatarsalgia of both feet    Paresthesia of foot    Well adult health check    Vertigo    Varicocele    Tinea corporis    Thoracic back pain    Swelling of structure of right eye    Snoring    Seasonal allergies    Rash    Posterior rhinorrhea    Other specified diseases of anus and rectum    Night sweats    Neuropathy    Muscle spasm of back    Miller's metatarsalgia    Mass of axilla    Male pattern alopecia    Low back strain    Irritable bowel syndrome    Intolerant of cold    Insomnia    Injury of right foot    Gastroesophageal reflux disease    Ganglion    Elevated low density lipoprotein (LDL) cholesterol level    Disorder of gallbladder    Degeneration of lumbar  intervertebral disc    Carpal tunnel syndrome    Benign prostatic hyperplasia    Alopecia    Allergic rhinitis due to pollen    Allergic rhinitis    Allergic conjunctivitis    Acute sinusitis    Acute COVID-19    Acute anal fissure    Pharyngitis    Body aches    Stress    Sore throat    Melena    RUQ abdominal pain        Past Medical History:   Diagnosis Date    Acid reflux     HX    Anal fistula     Anxiety     R/T PAIN    Arthritis of back     can't remember    Carpal tunnel syndrome, left     DDD (degenerative disc disease), lumbar     Frozen shoulder     Hemorrhoids     Hodgkin's lymphoma     left axillary adenopathy leading to a biopsy that was nondiagnostic but suspicious and eventually an excisional biopsy that was eventually felt to be consistent with follicular hyperplasia and progressive transformation of germinal centers with 2 foci suspicious for early involvement by nodules lymphocyte predominant Hodgkin's lymphoma.  This was likely treated by excision    Hyperlipidemia     Hyperosmia     Labral tear of hip joint     RIGHT    Low back strain     Male pattern alopecia     Periarthritis of shoulder     need to check file    Postnasal drip     Pudendal neuralgia     Tinnitus of left ear     Varicose vein of leg     left        Past Surgical History:   Procedure Laterality Date    ABSCESS DRAINAGE  08/2012    ANAL FISTULA REPAIR N/A 09/2012, 10/2012    AXILLARY LYMPH NODE BIOPSY/EXCISION Left 11/09/2018    Procedure: AXILLARY LYMPH NODE BIOPSY/EXCISION;  Surgeon: Amando Nguyễn MD;  Location: [facility] OR [facility] [place];  Service: General    COLONOSCOPY N/A 2013    done at [facility]    COLONOSCOPY N/A 07/10/2019    Procedure: COLONOSCOPY to CECUM;  Surgeon: Amando Nguyễn MD;  Location: [facility] ENDOSCOPY;  Service: General    ENDOSCOPY N/A 01/09/2019    Procedure: ESOPHAGOGASTRODUODENOSCOPY;  Surgeon: Amando Nguyễn MD;  Location: [facility] ENDOSCOPY;  Service: General    ENDOSCOPY N/A 03/30/2023    Procedure:  ESOPHAGOGASTRODUODENOSCOPY WITH BIOPSY;  Surgeon: Amando Nguyễn MD;  Location:  [facility] OR;  Service: Gastroenterology;  Laterality: N/A;  Gastric biopsy; Normal    FINE NEEDLE ASPIRATION Left 08/09/2018    Left axillary lymph node FNA    FLEXIBLE SIGMOIDOSCOPY N/A 06/25/2003    Normal-Dr. Cezar Aviles    HEMORRHOIDECTOMY N/A 1996    HIP ARTHROSCOPY W/ LABRAL REPAIR Right 04/03/2017    Dr. Lonnie Lemons    HIP SURGERY  2017    right labral repair    MEDIAL BRANCH BLOCK Bilateral 03/12/2021    Procedure: BILATERAL MEDIAL BRANCH BLOCK W/MAC;  Surgeon: Francisco Velez MD;  Location: [facility] [place] MAIN OR;  Service: Pain Management;  Laterality: Bilateral;    SIGMOIDOSCOPY N/A 09/23/2020    Procedure: FLEXIBLE SIGMOIDOSCOPY WITH BIOPSY;  Surgeon: Shena Ring MD;  Location:  [facility];  Service: Gastroenterology;  Laterality: N/A;  pre- anal/rectal pain  post- hemorrhoids    TONSILLECTOMY Bilateral                         PT Assessment/Plan       Row Name 11/15/23 1500          PT Assessment    Assessment Comments Jose Maria returns to therapy and had performed the exercises until he was ill then noted they caused more pain when he resumed them. Educated on what exercises do/how they help but that they need to be done regularly to improve flxibility of calf/Achilles/plantar fascia to reduce stress/strain whenever placing weight on foot after a period off his feet such as sleepng at night or sitting for periods of time. Also highly recommended getting a pair of lace-up athletic shoes for support and stabilization. Added manual techniques to address soft tissue restrictions in R calf/achilles/plantar fascia. Discussed a night splint and recommended trying for shorter period of time a few times a day since wearing all night immediately would likely be too intense. He will benefit  -JA        PT Plan    PT Plan Comments did he get lace-up shoes?, did her perform DF AROM/stretch?, assess response to initial HEP,  consider KT for navicular sling for medial stability? warm up on nustep or rec bike if the seat is tolerated (try adding foam pad), review/practice each ex on HEP for good form at home, standing wt shift ant-post, towel scrunch, towel push inv/ev  he also has script for R shoulder pain, will eval that in future  -LISET               User Key  (r) = Recorded By, (t) = Taken By, (c) = Cosigned By      Initials Name Provider Type    Kirstin Chao, PT Physical Therapist                       OP Exercises       Row Name 11/15/23 1500             Subjective    Subjective Comments Pt states years ago he wore a slider shoe and wore a hole in it that rubbed so much it irritated a nerve on the bottom of the foot. He reports had been doing the home exercises but became ill and missed a few days and then when he resumed the stretches he felt more pain.  -LISET         Subjective Pain    Able to rate subjective pain? yes  -LISET      Pre-Treatment Pain Level 5  -LISET         Total Minutes    76397 - PT Therapeutic Exercise Minutes 25  -      73565 - PT Manual Therapy Minutes 18  -         Exercise 1    Exercise Name 1 Discussed that being off his feet when ill allows the tissues to become tighter, more drawn, and can cause more pain when getting up due to the stretch from loading those tissues. Highly recommended establishing a habit of active dorsiflexion stretch prior to getting out of bed or whenever he sits for a period of time. This will help decrease the strain of body weight acceptance. Discussed importance of getting into lace up shoes. He states he hasn't had a pair for 10 years or more because his feet hurt and couldn't tolerate them. We discussed a wearing schedule to progressively adapt to them, for example begin with donning and wearing for 1 hour a day for a 5-7 days. Lace up shoes help stabilize and support foot, not wearing them causes spread of foot due to strain on ligaments and adaptive change and increased  pressure.  Stretches on HEP need to be perfomred at least 1x/day, every day and the active dorsiflexion with hold intermittently through day.  -JA      Time 1 8 min  -JA         Exercise 2    Exercise Name 2 long sit gastroc stretch w/belt  -JA      Cueing 2 Verbal;Tactile;Demo  -JA      Reps 2 3  -JA      Time 2 20sec  -JA         Exercise 3    Exercise Name 3 long-sit knee flexed soleus stretch w/belt  -JA      Cueing 3 Verbal;Tactile;Demo  -JA      Reps 3 3  -JA      Time 3 20sec  -JA         Exercise 4    Exercise Name 4 seated plantar fascia stretch  -JA      Cueing 4 Verbal;Tactile;Demo  -JA      Reps 4 3  -JA      Time 4 20sec  -JA         Exercise 5    Exercise Name 5 ankle DF/PF pumps (slow, controlled)  -JA      Cueing 5 Verbal;Demo  -JA      Reps 5 10  -JA         Exercise 6    Exercise Name 6 ankle inv/ev AROM seated  -JA      Cueing 6 Verbal;Tactile;Demo  -JA      Reps 6 10  -JA      Time 6 slow/controlled  -JA         Exercise 7    Exercise Name 7 AROM DF w/hold to stretch calf  -JA      Cueing 7 Verbal;Tactile;Demo  -JA      Reps 7 3  -JA      Time 7 20sec  -JA                User Key  (r) = Recorded By, (t) = Taken By, (c) = Cosigned By      Initials Name Provider Type    Kirstin Chao, PT Physical Therapist                             Manual Rx (last 36 hours)       Manual Treatments       Row Name 11/15/23 1500             Total Minutes    97986 - PT Manual Therapy Minutes 18  -JA         Manual Rx 1    Manual Rx 1 Location prone for R calf  -JA      Manual Rx 1 Type STM, gua sha, TP release  -JA      Manual Rx 1 Grade passive stretch calf and plantar fascia stretch  -JA                User Key  (r) = Recorded By, (t) = Taken By, (c) = Cosigned By      Initials Name Provider Type    Kirstin Chao, PT Physical Therapist                     PT OP Goals       Row Name 11/15/23 1500          PT Short Term Goals    STG Date to Achieve 12/02/23  -JA     STG 1 Patient will be independent  and compliant with initial HEP.  -     STG 1 Progress Progressing  -     STG 2 Pt will demonstrate increased ankle ROM DF, inv, ev to within 5 degrees of uninvolved L ankle.  -     STG 2 Progress Ongoing  -     STG 3 Pt will tolerate wearing athletic shoe for 15 minutes.  -     STG 3 Progress Ongoing  -     STG 4 Pt will ambulate 150' with normal heel strike to toe off with symmetrical stride and jerri.  -     STG 4 Progress Ongoing  -        Long Term Goals    LTG Date to Achieve 01/01/24  -     LTG 1 Pt will be independent with advanced HEP for strengthening to increase functional mobility.  -     LTG 1 Progress New  -     LTG 2 Pt will be able to walk or perform physical activity for 15 minutes or more with no more than 3/10 pain.  -     LTG 2 Progress New  -     LTG 3 Pt will demonstrate increased strength in R ankle to 4+/5 or better to facilitate safe return to previous level of activity.  -     LTG 3 Progress New  -     LTG 4 Pt will score 40/84 on FAAM indicating decrease in perceived functional disability.  -     LTG 4 Progress New  -               User Key  (r) = Recorded By, (t) = Taken By, (c) = Cosigned By      Initials Name Provider Type    Kirstin Chao N, PT Physical Therapist                    Therapy Education  Education Details: see OP ex for details  Given: Symptoms/condition management, Pain management  Program: Reinforced  How Provided: Verbal, Demonstration  Provided to: Patient  Level of Understanding: Verbalized, Demonstrated              Time Calculation:   Start Time: 1510  Stop Time: 1553  Time Calculation (min): 43 min  Timed Charges  89688 - PT Therapeutic Exercise Minutes: 25  55547 - PT Manual Therapy Minutes: 18  Total Minutes  Timed Charges Total Minutes: 43   Total Minutes: 43  Therapy Charges for Today       Code Description Service Date Service Provider Modifiers Qty    60350059411 HC PT THER PROC EA 15 MIN 11/15/2023 Kirstin Dale  N, PT GP 2    [id number]  PT MANUAL THERAPY EA 15 MIN 11/15/2023 Kirstin Dale, PT GP 1                      Kirstin Dale, PT  11/15/2023

## 2023-11-21 ENCOUNTER — APPOINTMENT (OUTPATIENT)
Dept: PHYSICAL THERAPY | Facility: HOSPITAL | Age: 58
End: 2023-11-21
Payer: COMMERCIAL

## 2023-11-28 ENCOUNTER — APPOINTMENT (OUTPATIENT)
Dept: PHYSICAL THERAPY | Facility: HOSPITAL | Age: 58
End: 2023-11-28
Payer: COMMERCIAL

## 2023-12-14 ENCOUNTER — TRANSCRIBE ORDERS (OUTPATIENT)
Dept: PHYSICAL THERAPY | Facility: HOSPITAL | Age: 58
End: 2023-12-14
Payer: COMMERCIAL

## 2023-12-14 DIAGNOSIS — G57.90 NEURITIS OF FOOT, UNSPECIFIED LATERALITY: Primary | ICD-10-CM

## 2023-12-14 DIAGNOSIS — M65.28 CALCIFIC ACHILLES TENDONITIS: ICD-10-CM

## 2023-12-14 DIAGNOSIS — M92.60 HAGLUND'S DEFORMITY: ICD-10-CM

## 2024-01-20 NOTE — DISCHARGE INSTRUCTIONS
Take the following medications the morning of surgery with a small sip of water:  NONE    Arrive to hospital on your day of surgery at 8:00 AM.    General Instructions:  • Do not eat solid food after midnight the night before surgery.  • You may drink clear liquids day of surgery but must stop at least one hour before your hospital arrival time.  • It is beneficial for you to have a clear drink that contains carbohydrates the day of surgery.  We suggest a 12 to 20 ounce bottle of Gatorade or Powerade for non-diabetic patients or a 12 to 20 ounce bottle of G2 or Powerade Zero for diabetic patients. (Pediatric patients, are not advised to drink a 12 to 20 ounce carbohydrate drink)    Clear liquids are liquids you can see through.  Nothing red in color.     Plain water                               Sports drinks  Sodas                                   Gelatin (Jell-O)  Fruit juices without pulp such as white grape juice and apple juice  Popsicles that contain no fruit or yogurt  Tea or coffee (no cream or milk added)  Gatorade / Powerade  G2 / Powerade Zero    • Infants may have breast milk up to four hours before surgery.  • Infants drinking formula may drink formula up to six hours before surgery.   • Patients who avoid smoking, chewing tobacco and alcohol for 4 weeks prior to surgery have a reduced risk of post-operative complications.  Quit smoking as many days before surgery as you can.  • Do not smoke, use chewing tobacco or drink alcohol the day of surgery.   • If applicable bring your C-PAP/ BI-PAP machine.  • Bring any papers given to you in the doctor’s office.  • Wear clean comfortable clothes and socks.  • Do not wear contact lenses or make-up.  Bring a case for your glasses.   • Bring crutches or walker if applicable.  • Remove all piercings.  Leave jewelry and any other valuables at home.  • Hair extensions with metal clips must be removed prior to surgery.  • The Pre-Admission Testing nurse will  instruct you to bring medications if unable to obtain an accurate list in Pre-Admission Testing.        If you were given a blood bank ID arm band remember to bring it with you the day of surgery.    Preventing a Surgical Site Infection:  • For 2 to 3 days before surgery, avoid shaving with a razor because the razor can irritate skin and make it easier to develop an infection.    • Any areas of open skin can increase the risk of a post-operative wound infection by allowing bacteria to enter and travel throughout the body.  Notify your surgeon if you have any skin wounds / rashes even if it is not near the expected surgical site.  The area will need assessed to determine if surgery should be delayed until it is healed.  • The night prior to surgery sleep in a clean bed with clean clothing.  Do not allow pets to sleep with you.  • Shower on the morning of surgery using a fresh bar of anti-bacterial soap (such as Dial) and clean washcloth.  Dry with a clean towel and dress in clean clothing.  • Ask your surgeon if you will be receiving antibiotics prior to surgery.  • Make sure you, your family, and all healthcare providers clean their hands with soap and water or an alcohol based hand  before caring for you or your wound.    Day of surgery:  Upon arrival, a Pre-op nurse and Anesthesiologist will review your health history, obtain vital signs, and answer questions you may have.  The only belongings needed at this time will be your home medications and if applicable your C-PAP/BI-PAP machine.  If you are staying overnight your family can leave the rest of your belongings in the car and bring them to your room later.  A Pre-op nurse will start an IV and you may receive medication in preparation for surgery, including something to help you relax.  Your family will be able to see you in the Pre-op area.  While you are in surgery your family should notify the waiting room  if they leave the waiting room  Increasing Dyspnea  Tachycardia  Pleural effusion area and provide a contact phone number.    Please be aware that surgery does come with discomfort.  We want to make every effort to control your discomfort so please discuss any uncontrolled symptoms with your nurse.   Your doctor will most likely have prescribed pain medications.      If you are going home after surgery you will receive individualized written care instructions before being discharged.  A responsible adult must drive you to and from the hospital on the day of your surgery and stay with you for 24 hours.    If you are staying overnight following surgery, you will be transported to your hospital room following the recovery period.  [facility] has all private rooms.    You have received a list of surgical assistants for your reference.  If you have any questions please call Pre-Admission Testing at [phone].  Deductibles and co-payments are collected on the day of service. Please be prepared to pay the required co-pay, deductible or deposit on the day of service as defined by your plan.

## 2024-02-02 ENCOUNTER — HOSPITAL ENCOUNTER (OUTPATIENT)
Dept: PHYSICAL THERAPY | Facility: HOSPITAL | Age: 59
Setting detail: THERAPIES SERIES
Discharge: HOME | End: 2024-02-02
Payer: COMMERCIAL

## 2024-02-02 DIAGNOSIS — M76.61 ACHILLES TENDINITIS, RIGHT LEG: Primary | ICD-10-CM

## 2024-02-02 DIAGNOSIS — M25.671 DECREASED RANGE OF MOTION OF RIGHT ANKLE: ICD-10-CM

## 2024-02-02 DIAGNOSIS — R26.9 GAIT DIFFICULTY: ICD-10-CM

## 2024-02-02 DIAGNOSIS — M62.81 MUSCLE WEAKNESS OF LOWER EXTREMITY: ICD-10-CM

## 2024-02-02 PROCEDURE — 97535 SELF CARE MNGMENT TRAINING: CPT | Performed by: PHYSICAL THERAPIST

## 2024-02-02 NOTE — THERAPY DISCHARGE NOTE
Outpatient Physical Therapy Ortho Treatment Note/Discharge Summary  [facility]     Patient Name: Jose Maria Judge  : 1965  MRN: [id number]  Today's Date: 2024      Visit Date: 2024    Visit Dx:    ICD-10-CM ICD-9-CM   1. Achilles tendinitis, right leg  M76.61 726.71   2. Decreased range of motion of right ankle  M25.671 719.57   3. Muscle weakness of lower extremity  M62.81 728.87   4. Gait difficulty  R26.9 781.2       Patient Active Problem List   Diagnosis    Bilateral hip pain    Right hip pain    Left hip pain    Trochanteric bursitis    Tear of acetabular labrum    Loss of hair    Anal burning    Elevated blood pressure    Hamstring strain    Hypertension    Neuritis of foot    Hamstring muscle strain    Rectal pain    Herpes zoster    Varicose veins of bilateral lower extremities with pain    Lymph node enlargement    Epigastric pain    Axillary lymphadenopathy    Olfaction disorder    Acute bilateral low back pain without sciatica    Nausea    Anal or rectal pain    Arthropathy of lumbar facet joint    Pudendal neuralgia    Tinnitus    High risk medication use    Varicose veins of left lower extremity with pain    Rib pain    Lymphadenopathy    Bradycardia    Shoulder pain    Chronic pain of both feet    Illness    Fatigue    Metatarsalgia of both feet    Paresthesia of foot    Well adult health check    Vertigo    Varicocele    Tinea corporis    Thoracic back pain    Swelling of structure of right eye    Snoring    Seasonal allergies    Rash    Posterior rhinorrhea    Other specified diseases of anus and rectum    Night sweats    Neuropathy    Muscle spasm of back    Miller's metatarsalgia    Mass of axilla    Male pattern alopecia    Low back strain    Irritable bowel syndrome    Intolerant of cold    Insomnia    Injury of right foot    Gastroesophageal reflux disease    Ganglion    Elevated low density lipoprotein (LDL) cholesterol level    Disorder of gallbladder    Degeneration of  lumbar intervertebral disc    Carpal tunnel syndrome    Benign prostatic hyperplasia    Alopecia    Allergic rhinitis due to pollen    Allergic rhinitis    Allergic conjunctivitis    Acute sinusitis    Acute COVID-19    Acute anal fissure    Pharyngitis    Body aches    Stress    Sore throat    Melena    RUQ abdominal pain        Past Medical History:   Diagnosis Date    Acid reflux     HX    Anal fistula     Anxiety     R/T PAIN    Arthritis of back     can't remember    Carpal tunnel syndrome, left     DDD (degenerative disc disease), lumbar     Frozen shoulder     Hemorrhoids     Hodgkin's lymphoma     left axillary adenopathy leading to a biopsy that was nondiagnostic but suspicious and eventually an excisional biopsy that was eventually felt to be consistent with follicular hyperplasia and progressive transformation of germinal centers with 2 foci suspicious for early involvement by nodules lymphocyte predominant Hodgkin's lymphoma.  This was likely treated by excision    Hyperlipidemia     Hyperosmia     Labral tear of hip joint     RIGHT    Low back strain     Male pattern alopecia     Periarthritis of shoulder     need to check file    Postnasal drip     Pudendal neuralgia     Tinnitus of left ear     Varicose vein of leg     left        Past Surgical History:   Procedure Laterality Date    ABSCESS DRAINAGE  08/2012    ANAL FISTULA REPAIR N/A 09/2012, 10/2012    AXILLARY LYMPH NODE BIOPSY/EXCISION Left 11/09/2018    Procedure: AXILLARY LYMPH NODE BIOPSY/EXCISION;  Surgeon: Amando Nguyễn MD;  Location: [facility];  Service: General    COLONOSCOPY N/A 2013    done at [facility]    COLONOSCOPY N/A 07/10/2019    Procedure: COLONOSCOPY to CECUM;  Surgeon: Amando Nguyễn MD;  Location: [facility] ENDOSCOPY;  Service: General    ENDOSCOPY N/A 01/09/2019    Procedure: ESOPHAGOGASTRODUODENOSCOPY;  Surgeon: Amando Nguyễn MD;  Location: [facility] ENDOSCOPY;  Service: General    ENDOSCOPY N/A 03/30/2023     Procedure: ESOPHAGOGASTRODUODENOSCOPY WITH BIOPSY;  Surgeon: Amando Nguyễn MD;  Location:  [facility] OR;  Service: Gastroenterology;  Laterality: N/A;  Gastric biopsy; Normal    FINE NEEDLE ASPIRATION Left 08/09/2018    Left axillary lymph node FNA    FLEXIBLE SIGMOIDOSCOPY N/A 06/25/2003    Normal-Dr. Cezar Aviles    HEMORRHOIDECTOMY N/A 1996    HIP ARTHROSCOPY W/ LABRAL REPAIR Right 04/03/2017    Dr. Lonnie Lemons    HIP SURGERY  2017    right labral repair    MEDIAL BRANCH BLOCK Bilateral 03/12/2021    Procedure: BILATERAL MEDIAL BRANCH BLOCK W/MAC;  Surgeon: Francisco Velez MD;  Location: [facility] OR;  Service: Pain Management;  Laterality: Bilateral;    SIGMOIDOSCOPY N/A 09/23/2020    Procedure: FLEXIBLE SIGMOIDOSCOPY WITH BIOPSY;  Surgeon: Shena Ring MD;  Location:  [facility];  Service: Gastroenterology;  Laterality: N/A;  pre- anal/rectal pain  post- hemorrhoids    TONSILLECTOMY Bilateral         PT Ortho       Row Name 02/02/24 1100       Subjective Pain    Able to rate subjective pain? yes  -GR    Pre-Treatment Pain Level 8  -GR    Subjective Pain Comment best 6 worst 8  -GR       Right Lower Ext    Rt Ankle Dorsiflexion AROM 15  -GR    Rt Ankle Plantarflexion AROM 45  -GR    Rt Ankle Inversion AROM 22  -GR    Rt Ankle Eversion AROM 18  -GR              User Key  (r) = Recorded By, (t) = Taken By, (c) = Cosigned By      Initials Name Provider Type    GR Jimbo Murillo, PT Physical Therapist                                 PT Assessment/Plan       Row Name 02/02/24 1300          PT Assessment    Assessment Comments Mr. Judge returns to PT for re-evalution of R ankle/foot with c/o lingering pain. He was last seen on 11/15/23 and held waiting for PT on LA, declining opportunities to see other PTs in the interim. Today he demonstrates improved ROM and fair strength. Despite this his pain remains at a 8-9/10 on the VAS and he has continuous nerve paresthesias when weight bearing.  He  is educated on a home program and desensitization techniques but there are no further sklled PT needs at this time secondary to lack of benefit. Recommend return to ortho MD and vascular given vascular history on contralateral extremity.  -GR        PT Plan    PT Plan Comments return to MD due to lack of progress.  -GR               User Key  (r) = Recorded By, (t) = Taken By, (c) = Cosigned By      Initials Name Provider Type    Jimbo Burger, PT Physical Therapist                         OP Exercises       Row Name 02/02/24 1100             Subjective    Subjective Comments Patient arrives at 1100 AM for 1045 appt. Patient was doing PT with Yina and states he was having trouble with pain modulation. He sees Dr. Stokes. He was last seen in PT on 11/16/23.  He put therapy on pause because his therapist was on FMLA and he declined seeing another provider.  He has been doing some self-massage and toe stretching but all other exercises flare him up.  He thinks his pain has been going for at least 4 months, around the time that a shoulder injury was starting to improve.  -GR         Subjective Pain    Able to rate subjective pain? yes  -GR      Pre-Treatment Pain Level 8  -GR      Subjective Pain Comment best 6 worst 8  -GR                User Key  (r) = Recorded By, (t) = Taken By, (c) = Cosigned By      Initials Name Provider Type    Jimbo Burger, PT Physical Therapist                                    PT OP Goals       Row Name 02/02/24 1100          PT Short Term Goals    STG Date to Achieve 12/02/23  -GR     STG 1 Patient will be independent and compliant with initial HEP.  -GR     STG 1 Progress Met  -GR     STG 2 Pt will demonstrate increased ankle ROM DF, inv, ev to within 5 degrees of uninvolved L ankle.  -GR     STG 2 Progress Met  -GR     STG 3 Pt will tolerate wearing athletic shoe for 15 minutes.  -GR     STG 3 Progress Not Met  -GR     STG 4 Pt will ambulate 150' with normal heel strike  to toe off with symmetrical stride and jerri.  -     STG 4 Progress Not Met  -GR        Long Term Goals    LTG Date to Achieve 01/01/24  -     LTG 1 Pt will be independent with advanced HEP for strengthening to increase functional mobility.  -     LTG 1 Progress Not Met  -GR     LTG 2 Pt will be able to walk or perform physical activity for 15 minutes or more with no more than 3/10 pain.  -     LTG 2 Progress Not Met  -GR     LTG 3 Pt will demonstrate increased strength in R ankle to 4+/5 or better to facilitate safe return to previous level of activity.  -GR     LTG 3 Progress Not Met  -GR     LTG 4 Pt will score 40/84 on FAAM indicating decrease in perceived functional disability.  -     LTG 4 Progress Not Met  -GR               User Key  (r) = Recorded By, (t) = Taken By, (c) = Cosigned By      Initials Name Provider Type    Jimbo Burger, PT Physical Therapist                    Therapy Education  Education Details: objective findings and meaning, options for ankle braces- neoprene sleeve and lace up style, desensitization technique- rice, beans, water, instructions for return to MD  65057 - PT Self Care/Mgmt Minutes: 25              Time Calculation:   Start Time: 1100  Stop Time: 1130  Time Calculation (min): 30 min  Total Timed Code Minutes- PT: 25 minute(s)  Timed Charges  02168 - PT Self Care/Mgmt Minutes: 25  Total Minutes  Timed Charges Total Minutes: 25   Total Minutes: 25  Therapy Charges for Today       Code Description Service Date Service Provider Modifiers Qty    [id number]  PT SELF CARE/MGMT/TRAIN EA 15 MIN 2/2/2024 Jimbo Murillo, PT GP 2                  OP PT Discharge Summary  Date of Discharge: 02/02/24  Reason for Discharge: Lack of progress  Outcomes Achieved: Patient able to partially acheive established goals  Discharge Destination: Outpatient services      Jimbo Murillo, NAVEEN  2/2/2024

## 2024-02-29 ENCOUNTER — OFFICE VISIT (OUTPATIENT)
Dept: FAMILY MEDICINE CLINIC | Facility: CLINIC | Age: 59
End: 2024-02-29
Payer: COMMERCIAL

## 2024-02-29 VITALS
HEIGHT: 71 IN | BODY MASS INDEX: 30.91 KG/M2 | RESPIRATION RATE: 16 BRPM | OXYGEN SATURATION: 95 % | DIASTOLIC BLOOD PRESSURE: 76 MMHG | WEIGHT: 220.8 LBS | SYSTOLIC BLOOD PRESSURE: 128 MMHG | HEART RATE: 76 BPM

## 2024-02-29 DIAGNOSIS — M19.049 CMC ARTHRITIS: ICD-10-CM

## 2024-02-29 DIAGNOSIS — Z13.220 LIPID SCREENING: ICD-10-CM

## 2024-02-29 DIAGNOSIS — Z13.1 SCREENING FOR DIABETES MELLITUS: ICD-10-CM

## 2024-02-29 DIAGNOSIS — M79.671 CHRONIC FOOT PAIN, RIGHT: Primary | ICD-10-CM

## 2024-02-29 DIAGNOSIS — Z11.59 ENCOUNTER FOR HCV SCREENING TEST FOR LOW RISK PATIENT: ICD-10-CM

## 2024-02-29 DIAGNOSIS — G89.29 CHRONIC FOOT PAIN, RIGHT: Primary | ICD-10-CM

## 2024-02-29 PROBLEM — K62.89 ANAL OR RECTAL PAIN: Status: RESOLVED | Noted: 2020-08-24 | Resolved: 2024-02-29

## 2024-02-29 PROBLEM — J34.89 POSTERIOR RHINORRHEA: Status: RESOLVED | Noted: 2020-10-02 | Resolved: 2024-02-29

## 2024-02-29 PROBLEM — S99.921A INJURY OF RIGHT FOOT: Status: RESOLVED | Noted: 2018-09-16 | Resolved: 2024-02-29

## 2024-02-29 PROBLEM — I83.812 VARICOSE VEINS OF LEFT LOWER EXTREMITY WITH PAIN: Status: RESOLVED | Noted: 2022-04-14 | Resolved: 2024-02-29

## 2024-02-29 PROBLEM — S76.319A HAMSTRING MUSCLE STRAIN: Status: RESOLVED | Noted: 2017-06-16 | Resolved: 2024-02-29

## 2024-02-29 PROBLEM — Z79.899 HIGH RISK MEDICATION USE: Status: RESOLVED | Noted: 2022-04-14 | Resolved: 2024-02-29

## 2024-02-29 PROBLEM — R21 RASH: Status: RESOLVED | Noted: 2019-03-13 | Resolved: 2024-02-29

## 2024-02-29 PROBLEM — R00.1 BRADYCARDIA: Status: RESOLVED | Noted: 2022-04-27 | Resolved: 2024-02-29

## 2024-02-29 PROBLEM — U07.1 ACUTE COVID-19: Status: RESOLVED | Noted: 2021-11-20 | Resolved: 2024-02-29

## 2024-02-29 PROBLEM — J02.9 SORE THROAT: Status: RESOLVED | Noted: 2022-11-28 | Resolved: 2024-02-29

## 2024-02-29 PROBLEM — R59.0 AXILLARY LYMPHADENOPATHY: Status: RESOLVED | Noted: 2018-07-24 | Resolved: 2024-02-29

## 2024-02-29 PROBLEM — R59.9 LYMPH NODE ENLARGEMENT: Status: RESOLVED | Noted: 2018-10-04 | Resolved: 2024-02-29

## 2024-02-29 PROBLEM — Z00.00 WELL ADULT HEALTH CHECK: Status: RESOLVED | Noted: 2021-03-19 | Resolved: 2024-02-29

## 2024-02-29 PROBLEM — F43.9 STRESS: Status: RESOLVED | Noted: 2022-11-28 | Resolved: 2024-02-29

## 2024-02-29 PROBLEM — J30.2 SEASONAL ALLERGIES: Status: RESOLVED | Noted: 2019-05-13 | Resolved: 2024-02-29

## 2024-02-29 PROBLEM — J02.9 PHARYNGITIS: Status: RESOLVED | Noted: 2022-11-18 | Resolved: 2024-02-29

## 2024-02-29 PROBLEM — H10.10 ALLERGIC CONJUNCTIVITIS: Status: RESOLVED | Noted: 2021-03-30 | Resolved: 2024-02-29

## 2024-02-29 PROBLEM — R69 ILLNESS: Status: RESOLVED | Noted: 2022-07-08 | Resolved: 2024-02-29

## 2024-02-29 PROBLEM — J30.1 ALLERGIC RHINITIS DUE TO POLLEN: Status: RESOLVED | Noted: 2021-07-30 | Resolved: 2024-02-29

## 2024-03-01 NOTE — PROGRESS NOTES
"        Hollis Guillen MD  Internal Medicine  [phone] (office)             02/29/2024    Patient Information  Jose Maria Judge                                                                                          [address] [facility] [address]  1965        Chief Complaint   Patient presents with    Establish Care     Foot pain, Thump pain          History of Present Illness:    Jose Maria Judge is a 58 y.o. male who presents to the office to establish care with new PCP.     He reports chronic R foot pain of unknown etiology. Pain is located at insertion of Achilles tendon and also basically entire plantar surface. First steps in the morning are usually the worst. He's undergone MRI (although he denies this) and EMG which have been unremarkable. He's also undergone orthopedics and podiatry evaluations (see Dr. Fink note 10/2/23). Patient requesting information regarding another foot/ankle specialist. PT has not been helpful.    He notes base of R thumb pain.       Health Maintenance Due   Topic Date Due    ANNUAL PHYSICAL  07/08/2023        Allergies   Allergen Reactions    Bactrim [Sulfamethoxazole-Trimethoprim] Shortness Of Breath    Hydrocodone Shortness Of Breath    Naproxen Shortness Of Breath    Sulfa Antibiotics Shortness Of Breath     TROUBLE BREATHING    Alprazolam Other (See Comments)     Annotation - 06Jan2016: Patient stated he can not take this medication because it \"makes him feel weird.\"      Diclofenac Nausea Only and Other (See Comments)     Severe heartburn    Erythromycin GI Intolerance    Promethazine Tinnitus    Tramadol Headache    Ceftin [Cefuroxime] Other (See Comments)     cough    Levaquin [Levofloxacin] Unknown - Low Severity     Made tendons tight    Mobic [Meloxicam] Nausea Only and Other (See Comments)     Severe heartburn           Current Outpatient Medications:     acetaminophen (TYLENOL) 500 MG tablet, Take 1 to 2 tablets every 6 hours as needed for fever, " "aches, pains, Disp: 30 tablet, Rfl: 0      Social History     Socioeconomic History    Marital status: Single    Number of children: 0    Years of education: COLLEGE    Highest education level: Not asked   Tobacco Use    Smoking status: Never    Smokeless tobacco: Never   Vaping Use    Vaping status: Never Used   Substance and Sexual Activity    Alcohol use: Never    Drug use: Never    Sexual activity: Not Currently     Partners: Female         Vitals:    02/29/24 1340   BP: 128/76   BP Location: Right arm   Patient Position: Sitting   Cuff Size: Adult   Pulse: 76   Resp: 16   SpO2: 95%   Weight: 100 kg (220 lb 12.8 oz)   Height: 180.3 cm (70.98\")      Wt Readings from Last 3 Encounters:   02/29/24 100 kg (220 lb 12.8 oz)   10/02/23 102 kg (224 lb 6.4 oz)   09/11/23 99.6 kg (219 lb 9.6 oz)     Body mass index is 30.81 kg/m².      Physical Exam  Vitals reviewed.   Constitutional:       Appearance: Normal appearance. He is obese.   Pulmonary:      Effort: Pulmonary effort is normal.   Musculoskeletal:      Left foot: Normal range of motion. No deformity.      Comments: R Finkelstein negative, thumb grind test elicited pain   Feet:      Left foot:      Skin integrity: Skin integrity normal.      Toenail Condition: Left toenails are normal.      Comments: No swelling, warmth, erythema, TTP of R foot  Neurological:      Mental Status: He is alert and oriented to person, place, and time.   Psychiatric:         Mood and Affect: Mood normal.         Behavior: Behavior normal.            Lab/other results:  Common labs          2/29/2024    14:17   Common Labs   Glucose 88    BUN 20    Creatinine 0.93    Sodium 141    Potassium 4.4    Chloride 105    Calcium 9.5    Total Protein 7.4    Albumin 4.6    Total Bilirubin 0.5    Alkaline Phosphatase 55    AST (SGOT) 19    ALT (SGPT) 18    Total Cholesterol 181    Triglycerides 79    HDL Cholesterol 40    LDL Cholesterol  126    Hemoglobin A1C 5.3    Microalbumin, Urine 5.1  "       Assessment/Plan:    Diagnoses and all orders for this visit:    1. Chronic foot pain, right (Primary)  Comments:  Etiology unknown. Advised patient to continue to follow-up with podiatry, orthopedics, PT.    2. Screening for diabetes mellitus  -     Comprehensive Metabolic Panel  -     Hemoglobin A1c  -     Microalbumin / Creatinine Urine Ratio - Urine, Clean Catch    3. Lipid screening  -     Lipid Panel  -     Apolipoprotein B    4. Encounter for HCV screening test for low risk patient  -     Hepatitis C Virus Ab Cascade  -     Interpretation:    5. CMC arthritis  Comments:  Discussed exercises and OTC analgesics that may be helpful.

## 2024-03-02 LAB
ALBUMIN SERPL-MCNC: 4.6 G/DL (ref 3.8–4.9)
ALBUMIN/CREAT UR: 4 MG/G CREAT (ref 0–29)
ALBUMIN/GLOB SERPL: 1.6 {RATIO} (ref 1.2–2.2)
ALP SERPL-CCNC: 55 IU/L (ref 44–121)
ALT SERPL-CCNC: 18 IU/L (ref 0–44)
APO B SERPL-MCNC: 97 MG/DL
AST SERPL-CCNC: 19 IU/L (ref 0–40)
BILIRUB SERPL-MCNC: 0.5 MG/DL (ref 0–1.2)
BUN SERPL-MCNC: 20 MG/DL (ref 6–24)
BUN/CREAT SERPL: 22 (ref 9–20)
CALCIUM SERPL-MCNC: 9.5 MG/DL (ref 8.7–10.2)
CHLORIDE SERPL-SCNC: 105 MMOL/L (ref 96–106)
CHOLEST SERPL-MCNC: 181 MG/DL (ref 100–199)
CO2 SERPL-SCNC: 22 MMOL/L (ref 20–29)
CREAT SERPL-MCNC: 0.93 MG/DL (ref 0.76–1.27)
CREAT UR-MCNC: 128.2 MG/DL
EGFRCR SERPLBLD CKD-EPI 2021: 95 ML/MIN/1.73
GLOBULIN SER CALC-MCNC: 2.8 G/DL (ref 1.5–4.5)
GLUCOSE SERPL-MCNC: 88 MG/DL (ref 70–99)
HBA1C MFR BLD: 5.3 % (ref 4.8–5.6)
HCV AB SERPL QL IA: NORMAL
HCV IGG SERPL QL IA: NON REACTIVE
HDLC SERPL-MCNC: 40 MG/DL
LDLC SERPL CALC-MCNC: 126 MG/DL (ref 0–99)
MICROALBUMIN UR-MCNC: 5.1 UG/ML
POTASSIUM SERPL-SCNC: 4.4 MMOL/L (ref 3.5–5.2)
PROT SERPL-MCNC: 7.4 G/DL (ref 6–8.5)
SODIUM SERPL-SCNC: 141 MMOL/L (ref 134–144)
TRIGL SERPL-MCNC: 79 MG/DL (ref 0–149)
VLDLC SERPL CALC-MCNC: 15 MG/DL (ref 5–40)

## 2024-03-04 ENCOUNTER — PATIENT MESSAGE (OUTPATIENT)
Dept: FAMILY MEDICINE CLINIC | Facility: CLINIC | Age: 59
End: 2024-03-04
Payer: COMMERCIAL

## 2024-03-04 ENCOUNTER — TELEPHONE (OUTPATIENT)
Dept: FAMILY MEDICINE CLINIC | Facility: CLINIC | Age: 59
End: 2024-03-04
Payer: COMMERCIAL

## 2024-03-04 PROBLEM — M19.049 CMC ARTHRITIS: Status: ACTIVE | Noted: 2024-03-04

## 2024-03-04 NOTE — TELEPHONE ENCOUNTER
Caller: Jose Maria Judge    Relationship: Self    Best call back number: [phone]      What is the best time to reach you: ANY TIME    Who are you requesting to speak with (clinical staff, provider,  specific staff member): CLINICAL STAFF    What was the call regarding: PATIENT STATES THAT HIS AFTER VISIT SUMMARY INCORRECTLY LISTS LEFT FOOT AND HE WAS SEEN FOR ISSUES WITH HIS RIGHT FOOT.    HE ALSO SAYS THAT THE DOSAGE ON ONE OF HIS MEDICATIONS IS LISTED WRONG.    HE REQUESTS A CALLBACK TO DISCUSS.    Is it okay if the provider responds through [employer]:     PLEASE ADVISE.

## 2024-03-08 RX ORDER — ACETAMINOPHEN 325 MG/1
325 TABLET ORAL EVERY 6 HOURS PRN
Qty: 90 TABLET | Refills: 1 | Status: SHIPPED | OUTPATIENT
Start: 2024-03-08

## 2024-03-08 NOTE — TELEPHONE ENCOUNTER
Called and informed pt that Dr. Guillen had changed the mistake in his note. Pt verbally understood. Pt also wanted me to change the tylenol form 500 to 325 as needed.

## 2024-03-20 ENCOUNTER — PATIENT MESSAGE (OUTPATIENT)
Dept: FAMILY MEDICINE CLINIC | Facility: CLINIC | Age: 59
End: 2024-03-20
Payer: COMMERCIAL

## 2024-06-06 ENCOUNTER — OFFICE VISIT (OUTPATIENT)
Dept: FAMILY MEDICINE CLINIC | Facility: CLINIC | Age: 59
End: 2024-06-06
Payer: COMMERCIAL

## 2024-06-06 VITALS
HEIGHT: 71 IN | DIASTOLIC BLOOD PRESSURE: 79 MMHG | HEART RATE: 52 BPM | SYSTOLIC BLOOD PRESSURE: 130 MMHG | WEIGHT: 220 LBS | RESPIRATION RATE: 19 BRPM | BODY MASS INDEX: 30.8 KG/M2

## 2024-06-06 DIAGNOSIS — M54.50 CHRONIC BILATERAL LOW BACK PAIN, UNSPECIFIED WHETHER SCIATICA PRESENT: Primary | ICD-10-CM

## 2024-06-06 DIAGNOSIS — G89.29 CHRONIC BILATERAL LOW BACK PAIN, UNSPECIFIED WHETHER SCIATICA PRESENT: Primary | ICD-10-CM

## 2024-06-06 PROCEDURE — 1125F AMNT PAIN NOTED PAIN PRSNT: CPT | Performed by: INTERNAL MEDICINE

## 2024-06-06 PROCEDURE — 3075F SYST BP GE 130 - 139MM HG: CPT | Performed by: INTERNAL MEDICINE

## 2024-06-06 PROCEDURE — 3078F DIAST BP <80 MM HG: CPT | Performed by: INTERNAL MEDICINE

## 2024-06-06 PROCEDURE — 99213 OFFICE O/P EST LOW 20 MIN: CPT | Performed by: INTERNAL MEDICINE

## 2024-06-06 RX ORDER — LIDOCAINE 50 MG/G
1 PATCH TOPICAL EVERY 24 HOURS
COMMUNITY
Start: 2024-03-03

## 2024-06-06 NOTE — PROGRESS NOTES
"    Chief Complaint   Patient presents with    Establish Care    Back Pain   History of Present Illness:  Patient presented to the clinic today to establish care.  He has a history of chronic back problems, foot problems, pudendal neuralgia, arthritis and neuropathy.    Today he reports worsening chronic low back pain for the past few weeks. He reports to have been doing a lot of physical activities lately.  He states pain is severe and debilitating.  Having difficulties with forward bending or prolonged sitting.  Associated with back stiffness & swelling and most times feels radiating pain to the perineal area with associated increased sensitivity to the area.  No trouble with bowel movements or urinary problems. No shooting pain down the legs, weakness, numbness or tingling sensation in the lower extremities.      Last MRI lumbar spine done in 2020 revealed multilevel disc degenerative disease with mild degree of neuroforaminal compromise.    PMH:  Outpatient Medications Prior to Visit   Medication Sig Dispense Refill    acetaminophen ([facility] Acetaminophen) 325 MG tablet Take 1 tablet by mouth Every 6 (Six) Hours As Needed for Mild Pain. 90 tablet 1    lidocaine (LIDODERM) 5 % Place 1 patch on the skin as directed by provider Daily.      pseudoephedrine-guaifenesin (MUCINEX D)  MG per 12 hr tablet Take 1 tablet by mouth Every 12 (Twelve) Hours.      guaiFENesin (MUCINEX) 600 MG 12 hr tablet Take 1 tablet by mouth 2 (Two) Times a Day.       No facility-administered medications prior to visit.      Allergies   Allergen Reactions    Bactrim [Sulfamethoxazole-Trimethoprim] Shortness Of Breath    Hydrocodone Shortness Of Breath    Naproxen Shortness Of Breath    Sulfa Antibiotics Shortness Of Breath     TROUBLE BREATHING    Alprazolam Other (See Comments)     Annotation - 06Jan2016: Patient stated he can not take this medication because it \"makes him feel weird.\"      Diclofenac Nausea Only and Other (See Comments) "     Severe heartburn    Erythromycin GI Intolerance    Promethazine Tinnitus    Tramadol Headache    Ceftin [Cefuroxime] Other (See Comments)     cough    Levaquin [Levofloxacin] Unknown - Low Severity     Made tendons tight    Mobic [Meloxicam] Nausea Only and Other (See Comments)     Severe heartburn     Past Surgical History:   Procedure Laterality Date    ABSCESS DRAINAGE  08/2012    ANAL FISTULA REPAIR N/A 09/2012, 10/2012    AXILLARY LYMPH NODE BIOPSY/EXCISION Left 11/09/2018    Procedure: AXILLARY LYMPH NODE BIOPSY/EXCISION;  Surgeon: Amando Nguyễn MD;  [facility];  Service: General    COLONOSCOPY N/A 2013    done at [facility]    COLONOSCOPY N/A 07/10/2019    Procedure: COLONOSCOPY to CECUM;  Surgeon: Amando Nguyễn MD;  [facility];  Service: General    ENDOSCOPY N/A 01/09/2019    Procedure: ESOPHAGOGASTRODUODENOSCOPY;  Surgeon: Amando Nguyễn MD;  [facility];  Service: General    ENDOSCOPY N/A 03/30/2023    Procedure: ESOPHAGOGASTRODUODENOSCOPY WITH BIOPSY;  Surgeon: Amando Nguyễn MD;  Location: [facility] OR;  Service: Gastroenterology;  Laterality: N/A;  Gastric biopsy; Normal    FINE NEEDLE ASPIRATION Left 08/09/2018    Left axillary lymph node FNA    FLEXIBLE SIGMOIDOSCOPY N/A 06/25/2003    Normal-Dr. Cezar Aviles    HEMORRHOIDECTOMY N/A 1996    HIP ARTHROSCOPY W/ LABRAL REPAIR Right 04/03/2017    Dr. Lonnie Lemons    HIP SURGERY  2017    right labral repair    MEDIAL BRANCH BLOCK Bilateral 03/12/2021    Procedure: BILATERAL MEDIAL BRANCH BLOCK W/MAC;  Surgeon: Francisco Velez MD;  Location: [facility] MAIN OR;  Service: Pain Management;  Laterality: Bilateral;    SIGMOIDOSCOPY N/A 09/23/2020    Procedure: FLEXIBLE SIGMOIDOSCOPY WITH BIOPSY;  Surgeon: Shena Ring MD;  Location: [facility] ENDOSCOPY;  Service: Gastroenterology;  Laterality: N/A;  pre- anal/rectal pain  post- hemorrhoids    TONSILLECTOMY Bilateral      family history includes  "Aneurysm in his father; Arthritis in his mother; Atrial fibrillation in his father; Cancer in his brother; Dementia in his father; Hypertension in his father; Lymphoma in his brother.   reports that he has never smoked. He has been exposed to tobacco smoke. He has never used smokeless tobacco. He reports that he does not drink alcohol and does not use drugs.     /79 (BP Location: Right arm, Patient Position: Sitting, Cuff Size: Adult)   Pulse 52   Resp 19   Ht 180.3 cm (71\")   Wt 99.8 kg (220 lb)   BMI 30.68 kg/m²   Physical Exam  Musculoskeletal:      Cervical back: Normal.      Thoracic back: Normal.      Lumbar back: Tenderness and bony tenderness present. No swelling or deformity. Decreased range of motion. Positive right straight leg raise test and positive left straight leg raise test.        Back:           The following data was reviewed by: Johanna Cole MD on 06/06/2024:    Data reviewed : Radiologic studies Multilevel disc desiccation, broad-based disc osteophyte complexes and facet degenerative disease  . Endplate degenerative changes at L2-L3 anterolaterally to the left with mild enhancement, & mild neural foraminal compromise bilaterally.    Diagnoses and all orders for this visit:    1. Chronic bilateral low back pain, unspecified whether sciatica present (Primary)  -     Cancel: MRI Lumbar Spine Without Contrast  -     MRI Lumbar Spine Without Contrast    Chronic back problems flared up due to recent physical activities  Will repeat MRI lumbar spine for further eval  Further treatment plan will be decided after MRI review  Advice to avoid provocative activities & continue on pain medications PRN        No follow-ups on file.     "

## 2024-06-10 ENCOUNTER — TELEPHONE (OUTPATIENT)
Dept: FAMILY MEDICINE CLINIC | Facility: CLINIC | Age: 59
End: 2024-06-10
Payer: COMMERCIAL

## 2024-06-10 NOTE — TELEPHONE ENCOUNTER
"  Caller: CAROLANN BARRAZA/[facility] RADIOLOGY SCHEDULING    Best call back number: [phone]*    What was the call regarding: CAROLANN CALLING STATING THAT THE ORDER FOR THE MRI LUMBAR SPINE, NEEDS TO SAY \"WITH ANESTHESIA\". CAROLANN ALSO NEEDS TO KNOW DATE OF THE PATIENT'S LAST EKG.    CAROLANN REQUEST A CALL BACK TO ADVISE.    "

## 2024-07-08 DIAGNOSIS — M54.50 CHRONIC BILATERAL LOW BACK PAIN, UNSPECIFIED WHETHER SCIATICA PRESENT: Primary | ICD-10-CM

## 2024-07-08 DIAGNOSIS — G89.29 CHRONIC BILATERAL LOW BACK PAIN, UNSPECIFIED WHETHER SCIATICA PRESENT: Primary | ICD-10-CM

## 2024-07-08 NOTE — PROGRESS NOTES
07/09/24 0001   Pre-Procedure Phone Call   Procedure Time Verified Yes   Arrival Time 1215   Procedure Location Verified Yes   Medical History Reviewed Yes   NPO Status Reinforced Yes   Ride and Caregiver Arranged Yes   Phone Number for Ride/Caregiver Nu [employer] -[phone]   Patient Knows to Bring Current Medications Yes   Bring Outside Films Requested No

## 2024-07-09 ENCOUNTER — ANESTHESIA EVENT (OUTPATIENT)
Dept: MRI IMAGING | Facility: HOSPITAL | Age: 59
End: 2024-07-09
Payer: COMMERCIAL

## 2024-07-09 ENCOUNTER — ANESTHESIA (OUTPATIENT)
Dept: MRI IMAGING | Facility: HOSPITAL | Age: 59
End: 2024-07-09
Payer: COMMERCIAL

## 2024-07-09 ENCOUNTER — HOSPITAL ENCOUNTER (OUTPATIENT)
Dept: MRI IMAGING | Facility: HOSPITAL | Age: 59
Discharge: HOME | End: 2024-07-09
Payer: COMMERCIAL

## 2024-07-09 VITALS
HEART RATE: 60 BPM | SYSTOLIC BLOOD PRESSURE: 126 MMHG | DIASTOLIC BLOOD PRESSURE: 84 MMHG | RESPIRATION RATE: 17 BRPM | TEMPERATURE: 97.7 F | OXYGEN SATURATION: 96 %

## 2024-07-09 LAB — CREAT BLDA-MCNC: 1 MG/DL (ref 0.6–1.3)

## 2024-07-09 PROCEDURE — A9577 INJ MULTIHANCE: HCPCS | Performed by: INTERNAL MEDICINE

## 2024-07-09 PROCEDURE — 72158 MRI LUMBAR SPINE W/O & W/DYE: CPT

## 2024-07-09 PROCEDURE — 25010000002 PROPOFOL 200 MG/20ML EMULSION: Performed by: NURSE ANESTHETIST, CERTIFIED REGISTERED

## 2024-07-09 PROCEDURE — 82565 ASSAY OF CREATININE: CPT

## 2024-07-09 PROCEDURE — 25810000003 LACTATED RINGERS PER 1000 ML: Performed by: NURSE ANESTHETIST, CERTIFIED REGISTERED

## 2024-07-09 PROCEDURE — 0 GADOBENATE DIMEGLUMINE 529 MG/ML SOLUTION: Performed by: INTERNAL MEDICINE

## 2024-07-09 RX ORDER — SODIUM CHLORIDE, SODIUM LACTATE, POTASSIUM CHLORIDE, CALCIUM CHLORIDE 600; 310; 30; 20 MG/100ML; MG/100ML; MG/100ML; MG/100ML
INJECTION, SOLUTION INTRAVENOUS CONTINUOUS PRN
Status: DISCONTINUED | OUTPATIENT
Start: 2024-07-09 | End: 2024-07-09 | Stop reason: SURG

## 2024-07-09 RX ORDER — LIDOCAINE HYDROCHLORIDE 20 MG/ML
INJECTION, SOLUTION EPIDURAL; INFILTRATION; INTRACAUDAL; PERINEURAL AS NEEDED
Status: DISCONTINUED | OUTPATIENT
Start: 2024-07-09 | End: 2024-07-09 | Stop reason: SURG

## 2024-07-09 RX ORDER — PROPOFOL 10 MG/ML
INJECTION, EMULSION INTRAVENOUS AS NEEDED
Status: DISCONTINUED | OUTPATIENT
Start: 2024-07-09 | End: 2024-07-09 | Stop reason: SURG

## 2024-07-09 RX ADMIN — PROPOFOL 50 MG: 10 INJECTION, EMULSION INTRAVENOUS at 14:05

## 2024-07-09 RX ADMIN — LIDOCAINE HYDROCHLORIDE 60 MG: 20 INJECTION, SOLUTION EPIDURAL; INFILTRATION; INTRACAUDAL; PERINEURAL at 14:05

## 2024-07-09 RX ADMIN — GADOBENATE DIMEGLUMINE 20 ML: 529 INJECTION, SOLUTION INTRAVENOUS at 14:35

## 2024-07-09 RX ADMIN — PROPOFOL 75 MCG/KG/MIN: 10 INJECTION, EMULSION INTRAVENOUS at 14:07

## 2024-07-09 RX ADMIN — SODIUM CHLORIDE, POTASSIUM CHLORIDE, SODIUM LACTATE AND CALCIUM CHLORIDE: 600; 310; 30; 20 INJECTION, SOLUTION INTRAVENOUS at 13:55

## 2024-07-09 NOTE — NURSING NOTE
Patient arrived to bay 17  in IR preop area.  PPE worn per patient and care providers for any bedside care.

## 2024-07-09 NOTE — NURSING NOTE
Discharged home via private car.  Escorted to vehicle per this RN.  No acute distress noted and tolerating po well.  All belongings returned to patient prior to discharge and discharge instructions given verbally and in writing including to expect postop discharge phone call.

## 2024-07-09 NOTE — ANESTHESIA PREPROCEDURE EVALUATION
Anesthesia Evaluation     Patient summary reviewed and Nursing notes reviewed   no history of anesthetic complications:   NPO Solid Status: > 8 hours  NPO Liquid Status: > 8 hours           Airway   Mallampati: II  TM distance: >3 FB  Neck ROM: full  No difficulty expected  Dental - normal exam     Pulmonary    (-) asthma, sleep apnea, rhonchi, decreased breath sounds, wheezes, not a smoker  Cardiovascular   Exercise tolerance: good (4-7 METS)    Rhythm: regular  Rate: normal    (+) hypertension, hyperlipidemia  (-) CAD, dysrhythmias, angina, MURPHY, murmur      Neuro/Psych  (+) dizziness/light headedness  (-) seizures, CVA    ROS Comment: Tinnitus unable to tolerate MRI noise   GI/Hepatic/Renal/Endo    (+) GERD  (-) liver disease, no renal disease, diabetes, no thyroid disorder    Musculoskeletal     (+) back pain  Abdominal     Abdomen: soft.   Substance History      OB/GYN          Other   arthritis,                 Anesthesia Plan    ASA 2     MAC   total IV anesthesia  intravenous induction     Anesthetic plan, risks, benefits, and alternatives have been provided, discussed and informed consent has been obtained with: patient.    CODE STATUS:

## 2024-07-17 ENCOUNTER — OFFICE VISIT (OUTPATIENT)
Age: 59
End: 2024-07-17
Payer: COMMERCIAL

## 2024-07-17 VITALS
HEIGHT: 71 IN | WEIGHT: 218 LBS | SYSTOLIC BLOOD PRESSURE: 120 MMHG | TEMPERATURE: 97.5 F | BODY MASS INDEX: 30.52 KG/M2 | HEART RATE: 62 BPM | DIASTOLIC BLOOD PRESSURE: 75 MMHG | OXYGEN SATURATION: 95 %

## 2024-07-17 DIAGNOSIS — M18.11 ARTHRITIS OF CARPOMETACARPAL (CMC) JOINT OF RIGHT THUMB: Primary | ICD-10-CM

## 2024-07-17 RX ORDER — DICLOFENAC SODIUM 20 MG/G
2 SOLUTION TOPICAL 2 TIMES DAILY PRN
Qty: 112 G | Refills: 1 | Status: SHIPPED | OUTPATIENT
Start: 2024-07-17

## 2024-07-17 NOTE — PROGRESS NOTES
Chief Complaint   Patient presents with    Right Hand - Initial Evaluation, Pain       History of Present Illness  Answers submitted by the patient for this visit:  Primary Reason for Visit (Submitted on 7/16/2024)  What is the primary reason for your visit?: Other  Other (Submitted on 7/16/2024)  Please describe your symptoms.: My right thumb has been hurting for over 3 months. it just suddenly started hurting  Have you had these symptoms before?: No  How long have you been having these symptoms?: Greater than 2 weeks  Please list any medications you are currently taking for this condition.: Tylenol  325 as needed for pain  Please describe any probable cause for these symptoms. : Don't know    Jose Maria is a 59 y.o. year old male here today for right thumb pain that has been present for 3+ months with no known injury or trauma.   Pain is currently rated 7/10 and is located at the first CMC and MCP joints.   Denies any swelling, bruising, erythema, warmth, numbness or tingling. No pain proximally at the wrist or forearm.  Pain worsens with fishing (when he hits the release button while casting), and increased activity.  Has been managing symptoms with Tylenol 325 as needed. (Can't take NSAIDs because it worsens his tinnitus.) Has used Voltaren previously, but states that it caused numbness. His PCP (Mindy) gave him some exercises to do at home, but he denies any change.  Had numbness from Voltaren and it caused numbness. Denies any previous injury or occurrence.        The following data was reviewed by: Sruthi Meehan DO on 07/17/2024:  Data reviewed :        Lab Results   Component Value Date    GLUCOSE 88 02/29/2024    BUN 20 02/29/2024    CREATININE 1.00 07/09/2024    EGFRRESULT 95 02/29/2024    EGFR 92.8 02/22/2023    BCR 22 (H) 02/29/2024    K 4.4 02/29/2024    CO2 22 02/29/2024    CALCIUM 9.5 02/29/2024    PROTENTOTREF 7.4 02/29/2024    ALBUMIN 4.6 02/29/2024    BILITOT 0.5 02/29/2024    AST 19 02/29/2024  "   ALT 18 02/29/2024         /75 (BP Location: Right arm, Patient Position: Sitting, Cuff Size: Large Adult)   Pulse 62   Temp 97.5 °F (36.4 °C) (Temporal)   Ht 180.3 cm (71\")   Wt 98.9 kg (218 lb)   SpO2 95%   BMI 30.40 kg/m²        Physical Exam  Vital signs reviewed.   General: Well developed, well nourished; No acute distress.  Eyes: conjunctiva clear; pupils equally round and reactive  ENT: external ears and nose atraumatic; hearing normal  CV: no peripheral edema, 2+ distal pulses  Resp: normal respiratory effort, no use of accessory muscles  Skin: normal color and pigmentation; no rashes or wounds; normal turgor  Psych: alert and oriented; mood and affect appropriate; recent and remote memory intact  Neuro: sensation to light touch intact    MSK Exam:  The right hand is without obvious signs of acute bony deformity, swelling, erythema or ecchymosis. There is tenderness at the first CMC and MCP joint. No tenderness of remaining digits or hand. Mild tenderness of the anatomic snuffbox, first dorsal compartment, and scapholunate interval. Tenderness of first dorsal compartment does not extend proximally. Active and passive range of motion at the wrist are full, symmetrical, and pain-free. Thumb pain with opposition. Strength testing of the wrist is 5/5, pain-free, and equal to the opposite arm.  Finkelstein's maneuver tests is negative. Sensory and vascular exams are otherwise normal.       Right Finger (thumb) X-Ray  Indication: Pain  Views: AP, Lateral, and Oblique  Findings:  No fracture  No bony lesion  Normal soft tissues  Degenerative changes at the MCP and CMC joint  No prior studies were available for comparison.    Assessment and Plan  Diagnoses and all orders for this visit:    1. Arthritis of carpometacarpal (CMC) joint of right thumb (Primary)  -     Diclofenac Sodium (Pennsaid) 2 % solution; Apply 2 Act topically 2 (Two) Times a Day As Needed (pain).  Dispense: 112 g; Refill: 1    Jose Maria " is a 59 y.o. year old male here today for right thumb pain that has been present for 3+ months with no known injury or trauma. We reviewed images and exam findings. Initial x-rays show degenerative changes at the CMC and MCP joints.  History and exam consistent with osteoarthritis. I recommended conservative management. I recommended bracing, however we do not carry it from presents office.  He we will plan on coming to other office to  Thumb-o-prene brace at his earliest convenience.  Unfortunately, he must avoid NSAIDs due to them causing worsening tendinitis.  We reviewed potential side effects of topical diclofenac.  I do not see numbness or paresthesias as a frequent or common side effect, though it is possibility.  I recommend a trial of Pennsaid cream to hopefully provide some relief without the side effect he reported with Voltaren.  He may continue with Tylenol as needed.  He may continue with activity as tolerated (including his HEP) but should avoid painful or overly strenuous activity. We will follow-up in 6 weeks. Will consider injection if no improvement. All of his questions were answered and he is agreeable with the plan.    Dictated utilizing Dragon dictation.

## 2024-07-26 ENCOUNTER — TELEPHONE (OUTPATIENT)
Dept: SPORTS MEDICINE | Facility: CLINIC | Age: 59
End: 2024-07-26
Payer: COMMERCIAL

## 2024-07-26 NOTE — TELEPHONE ENCOUNTER
Hub staff attempted to follow warm transfer process and was unsuccessful     Caller: Jose Maria Judge    Relationship to patient: Self    Best call back number: [phone]    Patient is needing: PT IS CALLING TO SEE IF DR GARG LEFT A THUMB BRACE AT THE OFFICE FOR .. PLEASE ADVISE..

## 2024-07-29 ENCOUNTER — TELEPHONE (OUTPATIENT)
Dept: SPORTS MEDICINE | Facility: CLINIC | Age: 59
End: 2024-07-29
Payer: COMMERCIAL

## 2024-07-29 NOTE — TELEPHONE ENCOUNTER
Patient called stating that Kamila needs a PA for the Pennsaid that you had prescribed. Patient wants to be sure that this is not the same as Voltaren. Can you please let me know that these are not the same medications so I can stat the PA for the pennsaid and confirm with the patient.

## 2024-07-30 NOTE — TELEPHONE ENCOUNTER
Called patient to let him know Dr Meehan's response. Pennsaid has been approved. Patient wants to try the pennsaid to see how it works for him.

## 2024-08-02 ENCOUNTER — HOSPITAL ENCOUNTER (OUTPATIENT)
Dept: CT IMAGING | Facility: HOSPITAL | Age: 59
Discharge: HOME | End: 2024-08-02
Payer: COMMERCIAL

## 2024-08-02 ENCOUNTER — TELEPHONE (OUTPATIENT)
Dept: ONCOLOGY | Facility: CLINIC | Age: 59
End: 2024-08-02

## 2024-08-02 NOTE — TELEPHONE ENCOUNTER
Caller: Jose Maria Judge    Relationship: Self    Best call back number: [phone]    What is the best time to reach you: ASAP    Who are you requesting to speak with (clinical staff, provider,  specific staff member): CLINICAL     What was the call regarding: PATIENT WAS UNAWARE HE HAD A CT SCAN FOR TODAY 8/2/2024 BUT HE EAT. SO HE WILL NEED TO CANCEL IT.    WILL NEED NEW ORDERS TO BE PUT IN FOR THE CT SCAN ABD & PELVIC  WITH CONTRAST & CHEST WITH CONTRAST.    CALL TO LET HIM KNOW YOU ADDED NEW ORDERS

## 2024-08-06 DIAGNOSIS — R59.1 LYMPHADENOPATHY: Primary | ICD-10-CM

## 2024-08-08 ENCOUNTER — TELEPHONE (OUTPATIENT)
Dept: ONCOLOGY | Facility: CLINIC | Age: 59
End: 2024-08-08
Payer: COMMERCIAL

## 2024-08-08 NOTE — TELEPHONE ENCOUNTER
Caller: Jose Maria Judge    Relationship to patient: Self    Best call back number: [phone]     Chief complaint: R/S    Type of visit: VITALS/FOLLOW UP 1    Requested date: AFTER CT SCAN WHICH IS NOW 8/20, PLEASE CALL PT TO R/S, NEEDS A CALL TO R/S THIS.    If rescheduling, when is the original appointment: 8/15

## 2024-08-09 ENCOUNTER — OFFICE VISIT (OUTPATIENT)
Dept: FAMILY MEDICINE CLINIC | Facility: CLINIC | Age: 59
End: 2024-08-09
Payer: COMMERCIAL

## 2024-08-09 VITALS
OXYGEN SATURATION: 98 % | TEMPERATURE: 98 F | WEIGHT: 222.8 LBS | RESPIRATION RATE: 18 BRPM | SYSTOLIC BLOOD PRESSURE: 124 MMHG | BODY MASS INDEX: 31.19 KG/M2 | HEART RATE: 73 BPM | HEIGHT: 71 IN | DIASTOLIC BLOOD PRESSURE: 86 MMHG

## 2024-08-09 DIAGNOSIS — G89.29 CHRONIC BILATERAL LOW BACK PAIN, UNSPECIFIED WHETHER SCIATICA PRESENT: Primary | ICD-10-CM

## 2024-08-09 DIAGNOSIS — M54.50 CHRONIC BILATERAL LOW BACK PAIN, UNSPECIFIED WHETHER SCIATICA PRESENT: Primary | ICD-10-CM

## 2024-08-09 DIAGNOSIS — L98.9 SKIN LESION: ICD-10-CM

## 2024-08-09 DIAGNOSIS — M19.049 HAND ARTHRITIS: ICD-10-CM

## 2024-08-09 PROCEDURE — 3074F SYST BP LT 130 MM HG: CPT | Performed by: INTERNAL MEDICINE

## 2024-08-09 PROCEDURE — 3079F DIAST BP 80-89 MM HG: CPT | Performed by: INTERNAL MEDICINE

## 2024-08-09 PROCEDURE — 99214 OFFICE O/P EST MOD 30 MIN: CPT | Performed by: INTERNAL MEDICINE

## 2024-08-09 PROCEDURE — 1125F AMNT PAIN NOTED PAIN PRSNT: CPT | Performed by: INTERNAL MEDICINE

## 2024-08-09 NOTE — PROGRESS NOTES
Chief Complaint   Patient presents with    Follow-up     Pt here to follow up on MRI results also has a list of  other things also says, spots on face     Back Pain    Pain    Hand Pain   History of Present Illness:  Patient is here today for follow up of back pain. Review of his MRI lumbar spine showed degenerative disc changes a disc bulge at L4-5 resulting in mild to moderate foraminal narrowing but no significant canal stenosis. 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    He reports pain & stiffness in his hands due to arthritis. Was given diclofenac gel but has not been using it due to concerns for side effects.     He reports abnormal skin lesion on his face and would like to get it checked out by a dermatologist.    Answers submitted by the patient for this visit:  Primary Reason for Visit (Submitted on 8/7/2024)  What is the primary reason for your visit?: Back Pain  Back Pain Questionnaire (Submitted on 8/7/2024)  Chief Complaint: Back pain  Chronicity: recurrent  Onset: more than 1 month ago  Frequency: intermittently  Progression since onset: coming and going  Pain location: sacro-iliac  Pain quality: aching, burning  Pain - numeric: 6/10  Pain is: worse during the day  Aggravated by: bending, position, sitting, standing, stress, twisting  Stiffness is present: in the morning  abdominal pain: No  bladder incontinence: No  bowel incontinence: No  chest pain: No  dysuria: No  fever: No  leg pain: No  numbness: No  paresthesias: Yes  pelvic pain: Yes  perianal numbness: No  tingling: No  weakness: Yes  weight loss: No  Risk factors: lack of exercise, sedentary lifestyle  Additional Information: Lower back strain leads to Pudendal Neuralgia, and when triggered, causes extreme pain in rectal anus, causing inability to move about. till my lower back pain subsides, in about 3 to 6 weeks.      PMH:   Outpatient Medications Prior to Visit   Medication  "Sig Dispense Refill    acetaminophen ([facility] Acetaminophen) 325 MG tablet Take 1 tablet by mouth Every 6 (Six) Hours As Needed for Mild Pain. 90 tablet 1    pseudoephedrine-guaifenesin (MUCINEX D)  MG per 12 hr tablet Take 1 tablet by mouth Every 12 (Twelve) Hours.      Diclofenac Sodium (Pennsaid) 2 % solution Apply 2 Act topically 2 (Two) Times a Day As Needed (pain). (Patient not taking: Reported on 8/9/2024) 112 g 1    guaiFENesin (MUCINEX) 600 MG 12 hr tablet Take 1 tablet by mouth 2 (Two) Times a Day. (Patient not taking: Reported on 8/9/2024)      lidocaine (LIDODERM) 5 % Place 1 patch on the skin as directed by provider Daily. (Patient not taking: Reported on 8/9/2024)       No facility-administered medications prior to visit.      Allergies   Allergen Reactions    Bactrim [Sulfamethoxazole-Trimethoprim] Shortness Of Breath    Hydrocodone Shortness Of Breath    Naproxen Shortness Of Breath    Sulfa Antibiotics Shortness Of Breath     TROUBLE BREATHING    Alprazolam Other (See Comments)     Annotation - 06Jan2016: Patient stated he can not take this medication because it \"makes him feel weird.\"      Diclofenac Nausea Only and Other (See Comments)     Severe heartburn    Erythromycin GI Intolerance    Promethazine Tinnitus    Doxycycline GI Intolerance    Tramadol Headache    Ceftin [Cefuroxime] Other (See Comments)     cough    Levaquin [Levofloxacin] Unknown - Low Severity     Made tendons tight    Mobic [Meloxicam] Nausea Only and Other (See Comments)     Severe heartburn     Past Surgical History:   Procedure Laterality Date    ABSCESS DRAINAGE  08/2012    ANAL FISTULA REPAIR N/A 09/2012, 10/2012    AXILLARY LYMPH NODE BIOPSY/EXCISION Left 11/09/2018    Procedure: AXILLARY LYMPH NODE BIOPSY/EXCISION;  Surgeon: Amando Nguyễn MD;  [facility] [place] [facility];  Service: General    COLONOSCOPY N/A 2013    done at [facility]    COLONOSCOPY N/A 07/10/2019    Procedure: COLONOSCOPY to CECUM;  Surgeon: " "Amando Nguyễn MD;  Location: [facility] ENDOSCOPY;  Service: General    ENDOSCOPY N/A 01/09/2019    Procedure: ESOPHAGOGASTRODUODENOSCOPY;  Surgeon: Amando Nguyễn MD;  Location[facility] END[facility]COPY;  Service: General    ENDOSCOPY N/A 03/30/2023    Procedure: ESOPHAGOGASTRODUODENOSCOPY WITH BIOPSY;  Surgeon: Amando Nguyễn MD;  Location: [facility] OR;  Service: Gastroenterology;  Laterality: N/A;  Gastric biopsy; Normal    FINE NEEDLE ASPIRATION Left 08/09/2018    Left axillary lymph node FNA    FLEXIBLE SIGMOIDOSCOPY N/A 06/25/2003    Normal-Dr. Cezar Aviles    HEMORRHOIDECTOMY N/A 1996    HIP ARTHROSCOPY W/ LABRAL REPAIR Right 04/03/2017    Dr. Lonnie Lemons    HIP SURGERY  2017    right labral repair    MEDIAL BRANCH BLOCK Bilateral 03/12/2021    Procedure: BILATERAL MEDIAL BRANCH BLOCK W/MAC;  Surgeon: Francisco Velez MD;  Location: [facility] OR;  Service: Pain Management;  Laterality: Bilateral;    SIGMOIDOSCOPY N/A 09/23/2020    Procedure: FLEXIBLE SIGMOIDOSCOPY WITH BIOPSY;  Surgeon: Shena Ring MD;  Location: [facility] ENDOSCOPY;  Service: Gastroenterology;  Laterality: N/A;  pre- anal/rectal pain  post- hemorrhoids    TONSILLECTOMY Bilateral      family history includes Aneurysm in his father; Arthritis in his mother; Atrial fibrillation in his father; Cancer in his brother; Dementia in his father; Hypertension in his father; Lymphoma in his brother.   reports that he has never smoked. He has been exposed to tobacco smoke. He has never used smokeless tobacco. He reports that he does not drink alcohol and does not use drugs.     /86 (BP Location: Right arm, Patient Position: Sitting, Cuff Size: Adult)   Pulse 73   Temp 98 °F (36.7 °C) (Temporal)   Resp 18   Ht 180.3 cm (70.98\")   Wt 101 kg (222 lb 12.8 oz)   SpO2 98%   BMI 31.09 kg/m²   Physical Exam  Constitutional:       Appearance: Normal appearance.   Cardiovascular:      Heart sounds: Normal heart sounds.   Pulmonary:      " Breath sounds: Normal breath sounds.   Musculoskeletal:      Cervical back: Normal.      Thoracic back: Normal.      Lumbar back: Tenderness present. Normal range of motion. Negative right straight leg raise test and negative left straight leg raise test.      Comments: Paraspinal tenderness on palpation on the lumbar region. No swelling or erythema noted.   Skin:     Comments: Skin mole in the right side of the face   Neurological:      General: No focal deficit present.      Mental Status: He is alert and oriented to person, place, and time.   Psychiatric:         Mood and Affect: Mood normal.          The following data was reviewed by: Johanna Cole MD on 08/09/2024:  Common labs          2/29/2024    14:17 7/9/2024    12:44   Common Labs   Glucose 88     BUN 20     Creatinine 0.93  1.00    Sodium 141     Potassium 4.4     Chloride 105     Calcium 9.5     Total Protein 7.4     Albumin 4.6     Total Bilirubin 0.5     Alkaline Phosphatase 55     AST (SGOT) 19     ALT (SGPT) 18     Total Cholesterol 181     Triglycerides 79     HDL Cholesterol 40     LDL Cholesterol  126     Hemoglobin A1C 5.3     Microalbumin, Urine 5.1       Data reviewed : Radiologic studies MRI lumbar spine reviewed as above      Diagnoses and all orders for this visit:    1. 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  Advice on back strengthening exercise. Will refer to physical therapy for therapeutic exercises  To apply ice compression and take OTC anti-inflammatory as needed for pain relief  Advice to avoid provocative activities that might aggravate or worsen the pain    Orders:  -     Ambulatory Referral to Physical Therapy for Evaluation & Treatment    2. Hand arthritis  Assessment & Plan:  Discuss side effects of medication  Counseled to apply diclofenac gel for pain relief  Encourage hand exercises. Instruction given      3. Skin  lesion  -     Ambulatory Referral to Dermatology             Return in about 6 months (around 2/9/2025) for Recheck.

## 2024-08-11 NOTE — ASSESSMENT & PLAN NOTE
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  Advice on back strengthening exercise. Will refer to physical therapy for therapeutic exercises  To apply ice compression and take OTC anti-inflammatory as needed for pain relief  Advice to avoid provocative activities that might aggravate or worsen the pain

## 2024-08-11 NOTE — ASSESSMENT & PLAN NOTE
Discuss side effects of medication  Counseled to apply diclofenac gel for pain relief  Encourage hand exercises. Instruction given

## 2024-08-12 ENCOUNTER — TELEPHONE (OUTPATIENT)
Dept: URGENT CARE | Facility: CLINIC | Age: 59
End: 2024-08-12
Payer: COMMERCIAL

## 2024-08-19 ENCOUNTER — HOSPITAL ENCOUNTER (OUTPATIENT)
Dept: PHYSICAL THERAPY | Facility: HOSPITAL | Age: 59
Setting detail: THERAPIES SERIES
Discharge: HOME | End: 2024-08-19
Payer: COMMERCIAL

## 2024-08-19 DIAGNOSIS — M25.60 DECREASED RANGE OF MOTION: ICD-10-CM

## 2024-08-19 DIAGNOSIS — G89.29 CHRONIC LOW BACK PAIN, UNSPECIFIED BACK PAIN LATERALITY, UNSPECIFIED WHETHER SCIATICA PRESENT: Primary | ICD-10-CM

## 2024-08-19 DIAGNOSIS — M62.81 MUSCLE WEAKNESS OF LOWER EXTREMITY: ICD-10-CM

## 2024-08-19 DIAGNOSIS — M54.50 CHRONIC LOW BACK PAIN, UNSPECIFIED BACK PAIN LATERALITY, UNSPECIFIED WHETHER SCIATICA PRESENT: Primary | ICD-10-CM

## 2024-08-19 PROCEDURE — 97161 PT EVAL LOW COMPLEX 20 MIN: CPT

## 2024-08-19 NOTE — THERAPY EVALUATION
Outpatient Physical Therapy Ortho Initial Evaluation  [facility]     Patient Name: Jose Maria Judge  : 1965  MRN: [id number]  Today's Date: 2024      Visit Date: 2024    Patient Active Problem List   Diagnosis    Bilateral hip pain    Trochanteric bursitis    Tear of acetabular labrum    Anal burning    Hamstring strain    Hypertension    Neuritis of foot    Rectal pain    Herpes zoster    Varicose veins of bilateral lower extremities with pain    Epigastric pain    Olfaction disorder    Chronic bilateral low back pain    Nausea    Arthropathy of lumbar facet joint    Pudendal neuralgia    Tinnitus    Rib pain    Lymphadenopathy    Shoulder pain    Chronic pain of both feet    Metatarsalgia of both feet    Paresthesia of foot    Vertigo    Varicocele    Tinea corporis    Thoracic back pain    Swelling of structure of right eye    Snoring    Other specified diseases of anus and rectum    Night sweats    Neuropathy    Muscle spasm of back    Miller's metatarsalgia    Mass of axilla    Male pattern alopecia    Low back strain    Irritable bowel syndrome    Intolerant of cold    Insomnia    Gastroesophageal reflux disease    Ganglion    Elevated low density lipoprotein (LDL) cholesterol level    Disorder of gallbladder    Degeneration of lumbar intervertebral disc    Carpal tunnel syndrome    Benign prostatic hyperplasia    Allergic rhinitis    Acute anal fissure    Body aches    Melena    RUQ abdominal pain    Hand arthritis        Past Medical History:   Diagnosis Date    Acid reflux     HX    Anal fistula     Anxiety     R/T PAIN    Arthritis of back     can't remember    Carpal tunnel syndrome, left     DDD (degenerative disc disease), lumbar     Frozen shoulder     Hemorrhoids     Hodgkin's lymphoma     left axillary adenopathy leading to a biopsy that was nondiagnostic but suspicious and eventually an excisional biopsy that was eventually felt to be consistent with follicular hyperplasia and  progressive transformation of germinal centers with 2 foci suspicious for early involvement by nodules lymphocyte predominant Hodgkin's lymphoma.  This was likely treated by excision    Hyperlipidemia     Hyperosmia     Labral tear of hip joint     RIGHT    Low back strain     Male pattern alopecia     Periarthritis of shoulder     need to check file    Postnasal drip     Pudendal neuralgia     Tinnitus of left ear     Trochanteric bursitis 08/25/2016    Varicose vein of leg     left        Past Surgical History:   Procedure Laterality Date    ABSCESS DRAINAGE  08/2012    ANAL FISTULA REPAIR N/A 09/2012, 10/2012    AXILLARY LYMPH NODE BIOPSY/EXCISION Left 11/09/2018    Procedure: AXILLARY LYMPH NODE BIOPSY/EXCISION;  Surgeon: Amando Nguyễn MD;  Location: [facility] OR [facility];  Service: General    COLONOSCOPY N/A 2013    done at [facility]    COLONOSCOPY N/A 07/10/2019    Procedure: COLONOSCOPY to CECUM;  Surgeon: Amando Nguyễn MD;  Location: [facility] END[facility];  Service: General    ENDOSCOPY N/A 01/09/2019    Procedure: ESOPHAGOGASTRODUODENOSCOPY;  Surgeon: Amando Nguyễn MD;  Location: [facility] END[facility];  Service: General    ENDOSCOPY N/A 03/30/2023    Procedure: ESOPHAGOGASTRODUODENOSCOPY WITH BIOPSY;  Surgeon: Amando Nguyễn MD;  Location: [facility] OR;  Service: Gastroenterology;  Laterality: N/A;  Gastric biopsy; Normal    FINE NEEDLE ASPIRATION Left 08/09/2018    Left axillary lymph node FNA    FLEXIBLE SIGMOIDOSCOPY N/A 06/25/2003    Normal-Dr. Cezar Aviles    HEMORRHOIDECTOMY N/A 1996    HIP ARTHROSCOPY W/ LABRAL REPAIR Right 04/03/2017    Dr. Lonnie Lemons    HIP SURGERY  2017    right labral repair    MEDIAL BRANCH BLOCK Bilateral 03/12/2021    Procedure: BILATERAL MEDIAL BRANCH BLOCK W/MAC;  Surgeon: Francisco Velez MD;  [facility];  Service: Pain Management;  Laterality: Bilateral;    SIGMOIDOSCOPY N/A 09/23/2020    Procedure: FLEXIBLE SIGMOIDOSCOPY WITH BIOPSY;  Surgeon: María Elena  "Shena ORELLANA MD;  Location: [facility] ENDOSCOPY;  Service: Gastroenterology;  Laterality: N/A;  pre- anal/rectal pain  post- hemorrhoids    TONSILLECTOMY Bilateral        Visit Dx:     ICD-10-CM ICD-9-CM   1. Chronic low back pain, unspecified back pain laterality, unspecified whether sciatica present  M54.50 724.2    G89.29 338.29   2. Muscle weakness of lower extremity  M62.81 728.87   3. Decreased range of motion  M25.60 719.50          Patient History       Row Name 08/19/24 1400             History    Chief Complaint Pain  -JA      Type of Pain Back pain  radhames  -JA      Date Current Problem(s) Began --  June 2024  -JA      Brief Description of Current Complaint States he had previously been very flexible aand now cannot reach to the floor. Cannot get onto knees to work on anything. Feels like pins and needles and feels swollen and pianful. Like little \"rips\". Using ice can calm it. The pain will irritate his pudendal nerve pain. Had rolled a stackable washer and dryer on rollers to work on it and he had pain that night, stinging and pain. iced it and took tylenol, cannot take other med due to tinnitus. Went to [facility] 6/6/24 and they ordered MRI, test was done on 7/9/24.  -LISET      Hand Dominance right-handed  -      Occupation/sports/leisure activities ADLs for self-care and household chores  -      What clinical tests have you had for this problem? MRI  -         Pain     Pain Location Back  -      What Performance Factors Make the Current Problem(s) WORSE? bending forward, leaning over, getting onto the floor  -      Tolerance Time- Standing 10 min (feet limit)  -      Tolerance Time- Sitting =/<10 min  -JA      Tolerance Time- Walking walking with small cart in grocery 30 minutes  -      Tolerance Time- Lying no limit if not too soft  -      What position do you sleep in? Right sidelying;Supine  bothers L shld in L sidelying but fell asleep watching videos on phone a lot and it started hurting.  -JA   "       Fall Risk Assessment    Any falls in the past year: No  -JA         Daily Activities    Primary Language English  -JA      Are you able to read Yes  -JA      Are you able to write Yes  -JA      How does patient learn best? Demonstration;Pictures/Video  -JA         Safety    Are you being hurt, hit, or frightened by anyone at home or in your life? No  -JA                User Key  (r) = Recorded By, (t) = Taken By, (c) = Cosigned By      Initials Name Provider Type    Kirstin Chao, PT Physical Therapist                     PT Ortho       Row Name 08/19/24 1400       Quarter Clearing    Quarter Clearing Lower Quarter Clearing  -JA       Myotomal Screen- Lower Quarter Clearing    Hip flexion (L2) Right:;3 (Fair);Left:;3+ (Fair +)  pain limited  -JA    Knee extension (L3) Right:;3+ (Fair +);Left:;4 (Good)  pain limited  -JA    Knee flexion (S2) Right:;3 (Fair);Left:;4- (Good -)  -JA       Lumbar ROM Screen- Lower Quarter Clearing    Lumbar Flexion Impaired  lack 75%, pain limited  -JA    Lumbar Extension Impaired  50% pain limited  -JA    Lumbar Lateral Flexion Impaired  pain limited  -JA    Lumbar Rotation Impaired  pain limited  -JA       SI/Hip Screen- Lower Quarter Clearing    Giuliana's/Vin's test Positive;Right:;Left:  -JA       Special Tests/Palpation    Special Tests/Palpation Lumbar/SI  -JA       Lumbosacral Palpation    Lumbosacral Palpation? Yes  -JA    Gluteus Reji Bilateral:;Tender;Guarded/taut  -JA    Erector Spinae (Paraspinals) Bilateral:;Tender;Guarded/taut  -JA    Lumbosacral Palpation Comments PSIS region, fascial bunching noted radhames, R>L  -JA       MMT (Manual Muscle Testing)    Rt Lower Ext Rt Hip Flexion;Rt Hip Internal (Medial) Rotation;Rt Hip External (Lateral) Rotation;Rt Hip Extension;Rt Hip ABduction;Rt Knee Extension;Rt Knee Flexion;Rt Ankle Plantarflexion;Rt Ankle Dorsiflexion  -JA    Lt Lower Ext Lt Hip Flexion;Lt Hip Extension;Lt Hip ABduction;Lt Hip Internal (Medial)  Rotation;Lt Hip External (Lateral) Rotation;Lt Ankle Plantarflexion;Lt Ankle Subtalar Inversion;Lt Knee Extension;Lt Knee Flexion  -       MMT Right Lower Ext    Rt Hip Flexion MMT, Gross Movement (3/5) fair  2 sec tolerance  -       MMT Left Lower Ext    Lt Hip Flexion MMT, Gross Movement (3+/5) fair plus  4 sec  -       Transfers    Comment, (Transfers) some UE assist at times  -       Gait/Stairs (Locomotion)    Comment, (Gait/Stairs) slow jerri, decreased stride  -              User Key  (r) = Recorded By, (t) = Taken By, (c) = Cosigned By      Initials Name Provider Type    Kirstin Chao, PT Physical Therapist                                Therapy Education  Education Details: Initiated HEP. Educated for log roll and practiced technique with cues required but good response noted. Sit to Stand: instructed in STS  technique with tipping from hips to forward weight-shift and pt noted improved ease of transition, worked to demonstrate how it can help control descent to sitting. Cued to move through hips v. extending from upper spine to stand erect (moving to hip/knee extension without involving lumbar spine motion). Discussed why tightness in calf/achilles and hamstring tightness affects balance.  Given: HEP, Symptoms/condition management, Posture/body mechanics, Mobility training  Program: New  How Provided: Verbal, Demonstration, Written  Provided to: Patient  Level of Understanding: Verbalized, Demonstrated      PT OP Goals       Row Name 08/19/24 1400          PT Short Term Goals    STG Date to Achieve 09/18/24  -     STG 1 Patient will be independent and compliant with initial HEP  -     STG 1 Progress New  -     STG 2 Pt will demonstrate heel strike to toe off gait to improve jerri.  -     STG 2 Progress New  [facility]     STG 3 Pt will demonstrate increased LB and hip ROM/flexibility to facilitate ease of donning socks and shoes.  -     STG 3 Progress New  -        Long Term  Goals    LTG Date to Achieve 10/18/24  -LISET     LTG 1 Pt will be independent with advanced HEP for hip girdle/LE and core strengthening and hip flexibility.  -LISET     LTG 1 Progress New  -JA     LTG 2 Pt will demonstrate improved ease of sit to stand with minimal to no pain and without assist of UEs.  -JA     LTG 2 Progress New  -JA     LTG 3 Pt will be able to ascend/descend stairs with normal reciprocal gait.  -JA     LTG 3 Progress New  -JA     LTG 4 Patient will demonstrate functional trunk range of motion enabling him to perform regular ADLs of dressing and household chores.  -JA     LTG 4 Progress New  -JA     LTG 5 Pt will score 54% on ALANNA indicating decrease in perceived functional disability.  -LISET     LTG 5 Progress New  -LISET        Time Calculation    PT Goal Re-Cert Due Date 11/17/24  -LISET               User Key  (r) = Recorded By, (t) = Taken By, (c) = Cosigned By      Initials Name Provider Type    Kirstin Chao, PT Physical Therapist                     PT Assessment/Plan       Row Name 08/19/24 1400          PT Assessment    Functional Limitations Impaired gait;Limitation in home management;Limitations in community activities;Limitations in functional capacity and performance;Performance in leisure activities;Performance in self-care ADL  -     Impairments Pain;Muscle strength;Range of motion;Posture;Poor body mechanics;Gait  -     Assessment Comments Jose Maria Judge is a 59 y.o. male, well-known to this therapist, referred to outpatient physical therapy for evaluation and treatment of chronic bilateral lumbar pain exacerbation that began in late May or early June of this year when he moved a stackable washer and dryer. Patient presents with significant decrease in trunk range of motion that is pain limited, weakness in bilateral lower extremities and hip girdle R>L, palpable facilitation of bilateral lumbosacral paravertebral muscles as well as fascial bunching R>L, difficulty with  transitional movements, impaired functional mobility. Signs and symptoms are consistent with referring diagnosis.  Pertinent comorbidities and personal factors that may affect progress include, but are not limited to, history and chronicity of lumbosacral pain and degenerative changes, pudendal neuralgia, foot pain that affects his gait/ambulation.  MRI indicates mild to moderate degree of left foraminal narrowing at L4-5 and no significant canal stenosis throughout the lumbar spine.  This condition is stable. Recommend skilled PT to address functional deficits. Thank you for this referral.  -LISET     Please refer to paper survey for additional self-reported information No  -JA     Rehab Potential Good  -LISET     Patient/caregiver participated in establishment of treatment plan and goals Yes  -LISET     Patient would benefit from skilled therapy intervention Yes  -LISET        PT Plan    PT Frequency 2x/week;1x/week  -LISET     Predicted Duration of Therapy Intervention (PT) 8 visits  -LISET     Planned CPT's? PT EVAL LOW COMPLEXITY: 53508;PT RE-EVAL: 33654;PT THER PROC EA 15 MIN: 89124;PT THER ACT EA 15 MIN: 13411;PT MANUAL THERAPY EA 15 MIN: 80716;PT NEUROMUSC RE-EDUCATION EA 15 MIN: 05050;PT GAIT TRAINING EA 15 MIN: 98994  -LISET     PT Plan Comments Consider NuStep if tolerated, see OP exercises for suggested initial HEP continue to educate and focus on core and hip girdle strength.  Work on transitional movements with JOSE CATALAN               User Key  (r) = Recorded By, (t) = Taken By, (c) = Cosigned By      Initials Name Provider Type    Kirstin Chao, PT Physical Therapist                       OP Exercises       Row Name 08/19/24 1400             Subjective    Subjective Comments initial eval  -LISET         Exercise 1    Exercise Name 1 Educated for importance of transversus abdominis engagement during functional mobility and transitional movements.  -LISET         Exercise 2    Exercise Name 2 Next visit LTR  -LISET          Exercise 3    Exercise Name 3 Piriformis stretch in hook lying  -LISET         Exercise 4    Exercise Name 4 SKC  -LISET         Exercise 5    Exercise Name 5 TA in sitting and/or supine  -LISET                User Key  (r) = Recorded By, (t) = Taken By, (c) = Cosigned By      Initials Name Provider Type    Kirstin Chao, PT Physical Therapist                                  Outcome Measure Options: Modified Oswestry  Modified Oswestry  Modified Oswestry Score/Comments: 32/50; 64%; VAS 5      Time Calculation:     Start Time: 1400  Stop Time: 1445  Time Calculation (min): 45 min     Therapy Charges for Today       Code Description Service Date Service Provider Modifiers Qty    43409460236 HC PT EVAL LOW COMPLEXITY 3 8/19/2024 Kirstin Dale, PT GP 1            PT G-Codes  Outcome Measure Options: Modified Oswestry  Modified Oswestry Score/Comments: 32/50; 64%; VAS 5         Kirstin Dale, PT  8/19/2024

## 2024-08-20 ENCOUNTER — TELEPHONE (OUTPATIENT)
Dept: ONCOLOGY | Facility: CLINIC | Age: 59
End: 2024-08-20
Payer: COMMERCIAL

## 2024-08-20 ENCOUNTER — HOSPITAL ENCOUNTER (OUTPATIENT)
Dept: CT IMAGING | Facility: HOSPITAL | Age: 59
Discharge: HOME | End: 2024-08-20
Admitting: INTERNAL MEDICINE
Payer: COMMERCIAL

## 2024-08-20 DIAGNOSIS — R59.1 LYMPHADENOPATHY: ICD-10-CM

## 2024-08-20 PROCEDURE — 71260 CT THORAX DX C+: CPT

## 2024-08-20 PROCEDURE — 0 DIATRIZOATE MEGLUMINE & SODIUM PER 1 ML: Performed by: INTERNAL MEDICINE

## 2024-08-20 PROCEDURE — 74177 CT ABD & PELVIS W/CONTRAST: CPT

## 2024-08-20 PROCEDURE — 25510000001 IOPAMIDOL 61 % SOLUTION: Performed by: INTERNAL MEDICINE

## 2024-08-20 RX ADMIN — DIATRIZOATE MEGLUMINE AND DIATRIZOATE SODIUM 30 ML: 660; 100 LIQUID ORAL; RECTAL at 09:45

## 2024-08-20 RX ADMIN — IOPAMIDOL 85 ML: 612 INJECTION, SOLUTION INTRAVENOUS at 10:56

## 2024-08-20 NOTE — TELEPHONE ENCOUNTER
Called patient to let him know the appt is a video visit through my chart. Patient said that would be fine. V/U

## 2024-08-20 NOTE — TELEPHONE ENCOUNTER
Caller: Jose Maria Judge    Relationship: Self    Best call back number: [phone]     Who are you requesting to speak with (clinical staff, provider,  specific staff member): CLINICAL      What was the call regarding: PATIENT REQUESTING IG APPT ON 8/27/24 CAN BE A PHONE CALL

## 2024-08-21 ENCOUNTER — DOCUMENTATION (OUTPATIENT)
Dept: FAMILY MEDICINE CLINIC | Facility: CLINIC | Age: 59
End: 2024-08-21
Payer: COMMERCIAL

## 2024-08-26 NOTE — PROGRESS NOTES
Subjective Ongoing pudendal neuralgia, right groin pain.  Patient contacted by videoconference-agrees to the call 8/27/2024      REASON FOR FOLLOWUP: Previous left axillary adenopathy with potential evidence of lymph abdominal Hodgkin's disease found negative.    History of Present Illness      The patient is a 59-year-old male who was recently been assessed by surgery having worsening epigastric discomfort since November.  He been seen by Dr. Nguyễn with gallbladder ultrasound and CT scan of abdomen pelvis done in late November demonstrating a possible calcified tiny gallstones within the lumen of the gallbladder.  The same report documents enlarged right external iliac adenopathy that was unchanged from January 2016. he had associated intermittent nausea and ultimately required admission to [facility].  He was admitted December 29-December 31.  His subsequent testing revealed a GI panel that was positive for norovirus and his symptoms improved with gut rest and hydration.  HIDA scan redemonstrated 50% ejection fraction and cholecystectomy was discussed with follow-up planned.   Additional testing continued as outpatient in January including EGD that demonstrated no gross abnormalities of the first and second portions of the duodenum nor the gastric antrum, body and retroflexed view of the stomach which were thought to be normal.      Importantly the patient and also been reviewed for enlarged left axillary lymph node in November leading to an excision of a deep left axillary lymph node November 9, 2018.  This had initially been evaluated by needle biopsy August 2018 revealing a polymorphous lymphoid population with histiocytes.  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  We have discussed the patient's history extensively and  that we were to see him potentially initially in November but as a result of his additional issues medically he is only been seen now approximately 6 months later.  He has no fevers, chills, does have weight loss result of change in his diet.  He describes in particular worsening of a anal fissure that has caused him pain for quite some time as well as symptoms that could potentially be referable to pudendal neuralgia.  The patient was assessed for potential lymphoproliferative disorder with a number of studies including CRP, sed rate, paraprotein analysis,  negative.  Subsequently repeat CT scan of chest and pelvis July 3, 2019 also failed to show any additional lymphadenopathy with a stable appearance to pelvic adenopathy and no intra-abdominal or intrapelvic process seen, no pathologic lymphadenopathy seen in the chest.  He is seen back July 11, 2019 indicating that his major issue has been hyperosmia and he wishes an ENT assessment.  The patient did follow-up with ENT and is now seen back December 30, 2020 still recovering from upper respiratory infection that he believes he developed after seeing ENT staff members.  He feels about the same, still with hyperosmia but has no fever, chills, appetite reduction or weight loss.  He remains concerned about his back and numbness in his extremities and is scheduled for NCV testing in mid February and follow-up with neurosurgery.  Patient underwent follow-up CT scans May 20 demonstrating right pelvic lymphadenopathy that was stable, no new abnormalities in the abdomen pelvis and no lymphadenopathy or abnormalities within the chest.      Unfortunately his continue to have severe pain in his lower back leading to neurosurgical assessment and MRI imaging the lumbar spine showing multilevel disc desiccation similar to May 2019.  Patient was contacted by telephone May 27, 2020 and his scan results reviewed with him.  He still has episodes of chilling that he notices but  otherwise is doing about the same but still has back pain that is bothering him excessively.  He does not have night sweats, weight loss, rash or additional systemic symptoms.  The patient is next contacted 5/27/2021 indicating that he has been having ongoing, and refractory, pain from pudendal neuralgia undergoing a series of treatments to pain management.  He is also quite concerned about his brother who is currently hospitalized.    The patient is next reviewed 5/26/2022 by telephone.  Unfortunately his brother recently passed away and he is continuing to grieve.  The patient, himself, also has ongoing pudendal neuralgia as well as having recent developed tinnitus.  Neurologic consult is pending concerning this.  Recent additional assessment included a right axillary ultrasound that was normal and CT scan of the chest 1/14/2022 also without new abnormality though there is a stable 0.4 cm nodule right middle lobe its been stable over 2 years.    The patient states he had surgery involving his right hip and has had been having groin pain since.    The patient underwent additional scans 12/28/2022 revealing enlarging right external iliac node 2.7 x 3.7 x 3.8 previously 2.2 x 3.4 x 3.4.  He was asked to return for follow-up indicating that he has been noting some tingling at the area.  There were no additional findings on his other scans including CT of the chest.    Follow-up exam next performed 8/20/2024 demonstrating right external iliac chain lymphadenopathy the largest measuring 4.0 x 2.9 cm compared to previously 3.7 x 2.6 with other sites measuring 12 mm and 10 mm also slightly enlarged.  This is over nearly a 2-year.  We discussed this with the patient and he is hesitant to have any other testing done including biopsy.  At length we have discussed potentially a PET/CT might help clarify this or any other site that could be approached.    Past Medical History:   Diagnosis Date    Acid reflux     HX    Anal  fistula     Anxiety     R/T PAIN    Arthritis of back     can't remember    Carpal tunnel syndrome, left     DDD (degenerative disc disease), lumbar     Frozen shoulder     Hemorrhoids     Hodgkin's lymphoma     left axillary adenopathy leading to a biopsy that was nondiagnostic but suspicious and eventually an excisional biopsy that was eventually felt to be consistent with follicular hyperplasia and progressive transformation of germinal centers with 2 foci suspicious for early involvement by nodules lymphocyte predominant Hodgkin's lymphoma.  This was likely treated by excision    Hyperlipidemia     Hyperosmia     Labral tear of hip joint     RIGHT    Low back strain     Male pattern alopecia     Periarthritis of shoulder     need to check file    Postnasal drip     Pudendal neuralgia     Tinnitus of left ear     Trochanteric bursitis 08/25/2016    Varicose vein of leg     left        Past Surgical History:   Procedure Laterality Date    ABSCESS DRAINAGE  08/2012    ANAL FISTULA REPAIR N/A 09/2012, 10/2012    AXILLARY LYMPH NODE BIOPSY/EXCISION Left 11/09/2018    Procedure: AXILLARY LYMPH NODE BIOPSY/EXCISION;  Surgeon: Amando Nguyễn MD;  Location: [facility];  Service: General    COLONOSCOPY N/A 2013    done at [facility]    COLONOSCOPY N/A 07/10/2019    Procedure: COLONOSCOPY to CECUM;  Surgeon: Amando Nguyễn MD;  Location: [facility] ENDOSCOPY;  Service: General    ENDOSCOPY N/A 01/09/2019    Procedure: ESOPHAGOGASTRODUODENOSCOPY;  Surgeon: Amando Nguyễn MD;  Location: [facility] ENDOSCOPY;  Service: General    ENDOSCOPY N/A 03/30/2023    Procedure: ESOPHAGOGASTRODUODENOSCOPY WITH BIOPSY;  Surgeon: Amando Nguyễn MD;  Location: [facility] OR;  Service: Gastroenterology;  Laterality: N/A;  Gastric biopsy; Normal    FINE NEEDLE ASPIRATION Left 08/09/2018    Left axillary lymph node FNA    FLEXIBLE SIGMOIDOSCOPY N/A 06/25/2003    Normal-Dr. Cezar Aviles    HEMORRHOIDECTOMY N/A 1996    HIP ARTHROSCOPY  "W/ LABRAL REPAIR Right 04/03/2017    Dr. Lonnie Lemons    HIP SURGERY  2017    right labral repair    MEDIAL BRANCH BLOCK Bilateral 03/12/2021    Procedure: BILATERAL MEDIAL BRANCH BLOCK W/MAC;  Surgeon: Francisco Velez MD;  [place] [facility];  Service: Pain Management;  Laterality: Bilateral;    SIGMOIDOSCOPY N/A 09/23/2020    Procedure: FLEXIBLE SIGMOIDOSCOPY WITH BIOPSY;  Surgeon: Shena Ring MD;  Location: [facility] ENDOSCOPY;  Service: Gastroenterology;  Laterality: N/A;  pre- anal/rectal pain  post- hemorrhoids    TONSILLECTOMY Bilateral         Current Outpatient Medications on File Prior to Visit   Medication Sig Dispense Refill    acetaminophen ([facility] Acetaminophen) 325 MG tablet Take 1 tablet by mouth Every 6 (Six) Hours As Needed for Mild Pain. 90 tablet 1    Diclofenac Sodium (Pennsaid) 2 % solution Apply 2 Act topically 2 (Two) Times a Day As Needed (pain). (Patient not taking: Reported on 8/9/2024) 112 g 1    guaiFENesin (MUCINEX) 600 MG 12 hr tablet Take 1 tablet by mouth 2 (Two) Times a Day. (Patient not taking: Reported on 8/9/2024)      lidocaine (LIDODERM) 5 % Place 1 patch on the skin as directed by provider Daily. (Patient not taking: Reported on 8/9/2024)      pseudoephedrine-guaifenesin (MUCINEX D)  MG per 12 hr tablet Take 1 tablet by mouth Every 12 (Twelve) Hours.       No current facility-administered medications on file prior to visit.        ALLERGIES:    Allergies   Allergen Reactions    Bactrim [Sulfamethoxazole-Trimethoprim] Shortness Of Breath    Hydrocodone Shortness Of Breath    Naproxen Shortness Of Breath    Sulfa Antibiotics Shortness Of Breath     TROUBLE BREATHING    Alprazolam Other (See Comments)     Annotation - 06Jan2016: Patient stated he can not take this medication because it \"makes him feel weird.\"      Diclofenac Nausea Only and Other (See Comments)     Severe heartburn    Erythromycin GI Intolerance    Promethazine Tinnitus    Doxycycline GI " Intolerance    Tramadol Headache    Ceftin [Cefuroxime] Other (See Comments)     cough    Levaquin [Levofloxacin] Unknown - Low Severity     Made tendons tight    Mobic [Meloxicam] Nausea Only and Other (See Comments)     Severe heartburn        Social History     Socioeconomic History    Marital status: Single    Number of children: 0    Years of education: COLLEGE    Highest education level: Not asked   Tobacco Use    Smoking status: Never     Passive exposure: Past    Smokeless tobacco: Never   Vaping Use    Vaping status: Never Used   Substance and Sexual Activity    Alcohol use: Never    Drug use: Never    Sexual activity: Not Currently     Partners: Female        Family History   Problem Relation Age of Onset    Arthritis Mother     Atrial fibrillation Father     Aneurysm Father     Hypertension Father     Dementia Father     Lymphoma Brother     Cancer Brother     Malig Hyperthermia Neg Hx     Colon cancer Neg Hx     Colon polyps Neg Hx     Crohn's disease Neg Hx     Irritable bowel syndrome Neg Hx     Ulcerative colitis Neg Hx         Review of Systems   Constitutional:  Positive for activity change. Negative for appetite change and unexpected weight change.   HENT:  Negative for congestion and rhinorrhea.    Respiratory: Negative.  Negative for chest tightness, shortness of breath and wheezing.    Cardiovascular: Negative.    Gastrointestinal:  Negative for abdominal pain, constipation, diarrhea, nausea and vomiting.   Genitourinary: Negative.    Musculoskeletal:  Positive for back pain and gait problem (Right hip surgery?).   Skin: Negative.    Neurological:  Positive for numbness.   Hematological: Negative.    Psychiatric/Behavioral: Negative.               Objective     There were no vitals filed for this visit.        2/22/2023     3:32 PM   Current Status   ECOG score 0           RECENT LABS:  Hematology WBC   Date Value Ref Range Status   02/22/2023 5.28 3.40 - 10.80 10*3/mm3 Final   07/08/2022 4.3  3.4 - 10.8 x10E3/uL Final     RBC   Date Value Ref Range Status   02/22/2023 4.75 4.14 - 5.80 10*6/mm3 Final   07/08/2022 5.02 4.14 - 5.80 x10E6/uL Final     Hemoglobin   Date Value Ref Range Status   03/07/2023 14.6 13.0 - 17.7 g/dL Final     Hematocrit   Date Value Ref Range Status   03/07/2023 43.0 37.5 - 51.0 % Final     Platelets   Date Value Ref Range Status   02/22/2023 154 140 - 450 10*3/mm3 Final          Assessment & Plan      The patient is a 57-year-old male who had previously been assessed for enlarged left axillary adenopathy in November leading to a biopsy that was nondiagnostic but suspicious and eventually an excisional biopsy that was eventually felt to be consistent with follicular hyperplasia and progressive transformation of germinal centers with 2 foci suspicious for early involvement by nodules lymphocyte predominant Hodgkin's lymphoma.  This was likely treated by excision and further therapy is not necessary in this patient though he has a number of symptoms that are at least suspicious for potential recurrence.  As he was admitted for his GI symptoms, described above, he underwent additional testing that demonstrated right external iliac adenopathy that have been unchanged from 2016 and further endoscopy was negative as well.  Again we had a long discussion today as to how he might of already been treated under the circumstances but that additional surveillance would be necessary in approximately 6 months from his last exam and he is now seen May 16 thus allowing us to reexamine him via scan in the next several months.  He and his mother are agreeable with this plan.  We had the patient proceed with a number of studies that were fortunately negative for evidence of lymphoproliferative disorder.  As he is seen July 11, 2019 his physical exam again does not reveal new lymphadenopathy or hepatosplenomegaly and CBC is normal.    The patient then seen February 3, 2020 doing about the same but  having issues with lower extremity numbness which is been reviewed by neurosurgery.  After discussion we had him undergo repeat assessments including CT of chest and pelvis 4 months later.  These are found to be without change and the patient was contacted in that we would like to see him at least on a yearly basis.     The patient is next contacted by telephone 5/27/2021 with further pain described as pudendal neuralgia for which he sees pain management routinely with only modest success.  Fortunately there is been no suggestion of development of a malignancy such as lymphoma.     This is again the case for the patient seen 5/26/2022 though he has a series of ongoing somatic complaints.  This includes his pudendal neuralgia and recent development of tinnitus on the left ear.    The patient states that he has been having right hip pain as well as foot pain recently undergoing surgery involving his right hip.      The patient underwent additional scans 12/28/2022 revealing enlarging right external iliac node 2.7 x 3.7 x 3.8 previously 2.2 x 3.4 x 3.4.  He was asked to return for follow-up indicating that he has been noting some tingling at the area.  There were no additional findings on his other scans including CT of the chest.    Follow-up exam next performed 8/20/2024 demonstrating right external iliac chain lymphadenopathy the largest measuring 4.0 x 2.9 cm compared to previously 3.7 x 2.6 with other sites measuring 12 mm and 10 mm also slightly enlarged.  This is over nearly a 2-year.  We discussed this with the patient and he is hesitant to have any other testing done including biopsy.  At length we have discussed potentially a PET/CT might help clarify this or any other site that could be approached.        Plan:     *PET/CT in the next 8 weeks.    *Follow-up MD 1 week after study completed

## 2024-08-27 ENCOUNTER — TELEMEDICINE (OUTPATIENT)
Dept: ONCOLOGY | Facility: CLINIC | Age: 59
End: 2024-08-27
Payer: COMMERCIAL

## 2024-08-27 ENCOUNTER — APPOINTMENT (OUTPATIENT)
Dept: PHYSICAL THERAPY | Facility: HOSPITAL | Age: 59
End: 2024-08-27
Payer: COMMERCIAL

## 2024-08-27 DIAGNOSIS — R59.1 LYMPHADENOPATHY: Primary | ICD-10-CM

## 2024-08-27 PROCEDURE — 99213 OFFICE O/P EST LOW 20 MIN: CPT | Performed by: INTERNAL MEDICINE

## 2024-08-27 PROCEDURE — 1125F AMNT PAIN NOTED PAIN PRSNT: CPT | Performed by: INTERNAL MEDICINE

## 2024-08-28 ENCOUNTER — TELEPHONE (OUTPATIENT)
Dept: ONCOLOGY | Facility: CLINIC | Age: 59
End: 2024-08-28
Payer: COMMERCIAL

## 2024-08-28 NOTE — TELEPHONE ENCOUNTER
"----- Message from Hira SHERIDAN sent at 8/28/2024  8:59 AM EDT -----  Can you please call Pt to discuss his PET/CT scan order - I called to inform of appt data - and he states he does not want another scan that he just had one and \"does not want all that radiation in his body\" - He has additional questions.  Thanks Hira"

## 2024-08-28 NOTE — TELEPHONE ENCOUNTER
"Called pt and explained that Dr. Alvarado feels the PET scan is the best way to evaluate the status of his disease. He purposefully made it for 6-8 weeks after his CT scan so as not to do them so close together. Pt was agreeable to schedule PET scan. Message sent to Jaida to schedule.     ----- Message from Sebastián SHERIDAN sent at 8/28/2024  8:59 AM EDT -----  Can you please call Pt to discuss his PET/CT scan order - I called to inform of appt data - and he states he does not want another scan that he just had one and \"does not want all that radiation in his body\" - He has additional questions.  Thanks Sebastián"

## 2024-09-09 ENCOUNTER — OFFICE VISIT (OUTPATIENT)
Dept: FAMILY MEDICINE CLINIC | Facility: CLINIC | Age: 59
End: 2024-09-09
Payer: COMMERCIAL

## 2024-09-09 VITALS
RESPIRATION RATE: 16 BRPM | BODY MASS INDEX: 30.53 KG/M2 | SYSTOLIC BLOOD PRESSURE: 118 MMHG | HEIGHT: 71 IN | DIASTOLIC BLOOD PRESSURE: 68 MMHG | WEIGHT: 218.1 LBS | HEART RATE: 65 BPM | OXYGEN SATURATION: 98 % | TEMPERATURE: 96.3 F

## 2024-09-09 DIAGNOSIS — R10.11 COLICKY RUQ ABDOMINAL PAIN: Primary | ICD-10-CM

## 2024-09-09 PROCEDURE — 3078F DIAST BP <80 MM HG: CPT | Performed by: INTERNAL MEDICINE

## 2024-09-09 PROCEDURE — 1125F AMNT PAIN NOTED PAIN PRSNT: CPT | Performed by: INTERNAL MEDICINE

## 2024-09-09 PROCEDURE — 3074F SYST BP LT 130 MM HG: CPT | Performed by: INTERNAL MEDICINE

## 2024-09-09 PROCEDURE — 99213 OFFICE O/P EST LOW 20 MIN: CPT | Performed by: INTERNAL MEDICINE

## 2024-09-09 RX ORDER — DIAZEPAM 2 MG
2 TABLET ORAL 2 TIMES DAILY PRN
COMMUNITY

## 2024-09-09 RX ORDER — PANTOPRAZOLE SODIUM 20 MG/1
20 TABLET, DELAYED RELEASE ORAL DAILY
COMMUNITY

## 2024-09-09 RX ORDER — DICYCLOMINE HYDROCHLORIDE 10 MG/1
CAPSULE ORAL
COMMUNITY
Start: 2024-09-07

## 2024-09-09 NOTE — PROGRESS NOTES
Chief Complaint   Patient presents with    Kidney Follow-up     [facility] 9/7/24    Abdominal Pain    Back Pain     [facility] 9/7/24   History of Present Illness:  Patient is here today for hospital follow up of abdominal pain. Was seen in the ER at [facility] few days ago for RUQ pain associated with nausea after ingestion of dates.  Colicky right upper quadrant abdominal pain that has been going on and off for the past few days, usually aggravated after fatty meals.  Pain worsened on Friday last week which prompted his ER visit.  Lab review from the ER showed no leukocytosis, normal lipase, normal liver function, chest x-ray unremarkable. He previously had similar colicky abdominal pain due to sluggish gallbladder.  Was seen by general surgery at the time & removal of gallbladder was suggested however patient deferred at that time.     PMH:   Outpatient Medications Prior to Visit   Medication Sig Dispense Refill    acetaminophen ([facility] Acetaminophen) 325 MG tablet Take 1 tablet by mouth Every 6 (Six) Hours As Needed for Mild Pain. 90 tablet 1    diazePAM (VALIUM) 2 MG tablet Take 1 tablet by mouth 2 (Two) Times a Day As Needed for Anxiety.      dicyclomine (BENTYL) 10 MG capsule       pantoprazole (PROTONIX) 20 MG EC tablet Take 1 tablet by mouth Daily.      Diclofenac Sodium (Pennsaid) 2 % solution Apply 2 Act topically 2 (Two) Times a Day As Needed (pain). (Patient not taking: Reported on 8/9/2024) 112 g 1    guaiFENesin (MUCINEX) 600 MG 12 hr tablet Take 1 tablet by mouth 2 (Two) Times a Day. (Patient not taking: Reported on 8/9/2024)      lidocaine (LIDODERM) 5 % Place 1 patch on the skin as directed by provider Daily. (Patient not taking: Reported on 8/9/2024)      pseudoephedrine-guaifenesin (MUCINEX D)  MG per 12 hr tablet Take 1 tablet by mouth Every 12 (Twelve) Hours. (Patient not taking: Reported on 9/9/2024)       No facility-administered medications prior to visit.      Allergies   Allergen Reactions     "Bactrim [Sulfamethoxazole-Trimethoprim] Shortness Of Breath    Hydrocodone Shortness Of Breath    Naproxen Shortness Of Breath    Sulfa Antibiotics Shortness Of Breath     TROUBLE BREATHING    Alprazolam Other (See Comments)     Annotation - 06Jan2016: Patient stated he can not take this medication because it \"makes him feel weird.\"      Diclofenac Nausea Only and Other (See Comments)     Severe heartburn    Erythromycin GI Intolerance    Promethazine Tinnitus    Doxycycline GI Intolerance    Tramadol Headache    Ceftin [Cefuroxime] Other (See Comments)     cough    Levaquin [Levofloxacin] Unknown - Low Severity     Made tendons tight    Mobic [Meloxicam] Nausea Only and Other (See Comments)     Severe heartburn     Past Surgical History:   Procedure Laterality Date    ABSCESS DRAINAGE  08/2012    ANAL FISTULA REPAIR N/A 09/2012, 10/2012    AXILLARY LYMPH NODE BIOPSY/EXCISION Left 11/09/2018    Procedure: AXILLARY LYMPH NODE BIOPSY/EXCISION;  Surgeon: Amando Nguyễn MD;  [place] [facility] [place] [facility] [place];  Service: General    COLONOSCOPY N/A 2013    done at [facility]    COLONOSCOPY N/A 07/10/2019    Procedure: COLONOSCOPY to CECUM;  Surgeon: Amando Nguyễn MD;  [facility] END[facility];  Service: General    ENDOSCOPY N/A 01/09/2019    Procedure: ESOPHAGOGASTRODUODENOSCOPY;  Surgeon: Amando Nguyễn MD;  Location: [facility] ENDOSCOPY;  Service: General    ENDOSCOPY N/A 03/30/2023    Procedure: ESOPHAGOGASTRODUODENOSCOPY WITH BIOPSY;  Surgeon: Amando Nguyễn MD;  Location: [facility];  Service: Gastroenterology;  Laterality: N/A;  Gastric biopsy; Normal    FINE NEEDLE ASPIRATION Left 08/09/2018    Left axillary lymph node FNA    FLEXIBLE SIGMOIDOSCOPY N/A 06/25/2003    Normal-Dr. Cezar Aviles    HEMORRHOIDECTOMY N/A 1996    HIP ARTHROSCOPY W/ LABRAL REPAIR Right 04/03/2017    Dr. Lonnie Lemons    HIP SURGERY  2017    right labral repair    MEDIAL BRANCH BLOCK Bilateral 03/12/2021    Procedure: BILATERAL MEDIAL " "BRANCH BLOCK W/MAC;  Surgeon: Francisco Velez MD;  Location: [facility] [place] [facility] OR;  Service: Pain Management;  Laterality: Bilateral;    SIGMOIDOSCOPY N/A 09/23/2020    Procedure: FLEXIBLE SIGMOIDOSCOPY WITH BIOPSY;  Surgeon: Shena Ring MD;  Location: [facility] ENDOSCOPY;  Service: Gastroenterology;  Laterality: N/A;  pre- anal/rectal pain  post- hemorrhoids    TONSILLECTOMY Bilateral      family history includes Aneurysm in his father; Arthritis in his mother; Atrial fibrillation in his father; Cancer in his brother; Dementia in his father; Hypertension in his father; Lymphoma in his brother.   reports that he has never smoked. He has been exposed to tobacco smoke. He has never used smokeless tobacco. He reports that he does not drink alcohol and does not use drugs.     /68 (BP Location: Right arm, Patient Position: Sitting, Cuff Size: Large Adult)   Pulse 65   Temp 96.3 °F (35.7 °C) (Temporal)   Resp 16   Ht 180.3 cm (70.98\")   Wt 98.9 kg (218 lb 1.6 oz)   SpO2 98%   BMI 30.43 kg/m²   Physical Exam  Constitutional:       Appearance: Normal appearance.   HENT:      Head: Normocephalic and atraumatic.   Abdominal:      General: Bowel sounds are normal. There is no distension.      Palpations: Abdomen is soft.      Tenderness: There is no abdominal tenderness.   Neurological:      Mental Status: He is alert and oriented to person, place, and time.          The following data was reviewed by: Johanna Cole MD on 09/09/2024:  Lab review from [facility] [09/07/2024]:  CMP: Electrolytes WNL, normal liver enzymes, BUN/creatinine normal, normal lipase  CBC: WBC normal, normal RBC and hemoglobin, platelets normal  UA unremarkable         Diagnoses and all orders for this visit:    1. Colicky RUQ abdominal pain (Primary)  -     US Abdomen Complete    Advised patient to avoid fatty meals.  Will check ultrasound for liver and gallbladder pathology.  Will continue conservative management.  To consider referral to " general surgery for elective cholecystectomy if symptoms persist.        Return for Next scheduled follow up.

## 2024-09-13 ENCOUNTER — HOSPITAL ENCOUNTER (OUTPATIENT)
Dept: ULTRASOUND IMAGING | Facility: HOSPITAL | Age: 59
Discharge: HOME | End: 2024-09-13
Payer: COMMERCIAL

## 2024-09-13 PROCEDURE — 76700 US EXAM ABDOM COMPLETE: CPT

## 2024-09-23 ENCOUNTER — TELEMEDICINE (OUTPATIENT)
Dept: FAMILY MEDICINE CLINIC | Facility: CLINIC | Age: 59
End: 2024-09-23
Payer: COMMERCIAL

## 2024-09-23 VITALS — BODY MASS INDEX: 30.85 KG/M2 | HEIGHT: 70 IN

## 2024-09-23 DIAGNOSIS — J32.9 RHINOSINUSITIS: Primary | ICD-10-CM

## 2024-09-23 PROCEDURE — 1159F MED LIST DOCD IN RCRD: CPT | Performed by: STUDENT IN AN ORGANIZED HEALTH CARE EDUCATION/TRAINING PROGRAM

## 2024-09-23 PROCEDURE — 1125F AMNT PAIN NOTED PAIN PRSNT: CPT | Performed by: STUDENT IN AN ORGANIZED HEALTH CARE EDUCATION/TRAINING PROGRAM

## 2024-09-23 PROCEDURE — 1160F RVW MEDS BY RX/DR IN RCRD: CPT | Performed by: STUDENT IN AN ORGANIZED HEALTH CARE EDUCATION/TRAINING PROGRAM

## 2024-09-23 PROCEDURE — 99213 OFFICE O/P EST LOW 20 MIN: CPT | Performed by: STUDENT IN AN ORGANIZED HEALTH CARE EDUCATION/TRAINING PROGRAM

## 2024-09-23 RX ORDER — AMOXICILLIN 500 MG/1
500 CAPSULE ORAL 3 TIMES DAILY
Qty: 15 CAPSULE | Refills: 0 | Status: SHIPPED | OUTPATIENT
Start: 2024-09-23

## 2024-09-23 RX ORDER — FLUTICASONE PROPIONATE 50 UG/1
1 SPRAY, METERED NASAL
Qty: 9.9 ML | Refills: 1 | Status: SHIPPED | OUTPATIENT
Start: 2024-09-23

## 2024-09-30 ENCOUNTER — TELEPHONE (OUTPATIENT)
Dept: ONCOLOGY | Facility: CLINIC | Age: 59
End: 2024-09-30
Payer: COMMERCIAL

## 2024-09-30 NOTE — TELEPHONE ENCOUNTER
Called patient, let them know he needs to be at his appt a little before 9am on thedate of the scan. Pt v/u

## 2024-09-30 NOTE — TELEPHONE ENCOUNTER
Caller: Jose Maria Judge    Relationship: Self    Best call back number [phone]    What is the best time to reach you: ANYTIME    Who are you requesting to speak with (clinical staff, provider,  specific staff member): CLINICAL        What was the call regarding:     WANTED TO GO OVER APPOINTMENT FOR PET SCAN SCHEDULED 10/14 AS FAR AS TIME TO BE THERE AND ANY PRIOR INSTRUCTIONS . AND DISCUSS APPOINTMENT HAD COUPLE QUESTIONS ABOUT IT.     Is it okay if the provider responds through MyChart:  NO

## 2024-10-07 ENCOUNTER — HOSPITAL ENCOUNTER (OUTPATIENT)
Dept: PHYSICAL THERAPY | Facility: HOSPITAL | Age: 59
Setting detail: THERAPIES SERIES
Discharge: HOME | End: 2024-10-07
Payer: COMMERCIAL

## 2024-10-07 DIAGNOSIS — M25.60 DECREASED RANGE OF MOTION: ICD-10-CM

## 2024-10-07 DIAGNOSIS — M62.81 MUSCLE WEAKNESS OF LOWER EXTREMITY: ICD-10-CM

## 2024-10-07 DIAGNOSIS — G89.29 CHRONIC LOW BACK PAIN, UNSPECIFIED BACK PAIN LATERALITY, UNSPECIFIED WHETHER SCIATICA PRESENT: Primary | ICD-10-CM

## 2024-10-07 DIAGNOSIS — M54.50 CHRONIC LOW BACK PAIN, UNSPECIFIED BACK PAIN LATERALITY, UNSPECIFIED WHETHER SCIATICA PRESENT: Primary | ICD-10-CM

## 2024-10-07 PROCEDURE — 97110 THERAPEUTIC EXERCISES: CPT

## 2024-10-07 NOTE — THERAPY PROGRESS REPORT/RE-CERT
Outpatient Physical Therapy Ortho Progress Note  [facility]     Patient Name: Jose Maria Judge  : 1965  MRN: [id number]  Today's Date: 10/7/2024      Visit Date: 10/07/2024    Visit Dx:    ICD-10-CM ICD-9-CM   1. Chronic low back pain, unspecified back pain laterality, unspecified whether sciatica present  M54.50 724.2    G89.29 338.29   2. Muscle weakness of lower extremity  M62.81 728.87   3. Decreased range of motion  M25.60 719.50       Patient Active Problem List   Diagnosis    Bilateral hip pain    Trochanteric bursitis    Tear of acetabular labrum    Anal burning    Hamstring strain    Hypertension    Neuritis of foot    Rectal pain    Herpes zoster    Varicose veins of bilateral lower extremities with pain    Epigastric pain    Olfaction disorder    Chronic bilateral low back pain    Nausea    Arthropathy of lumbar facet joint    Pudendal neuralgia    Tinnitus    Rib pain    Lymphadenopathy    Shoulder pain    Chronic pain of both feet    Metatarsalgia of both feet    Paresthesia of foot    Vertigo    Varicocele    Tinea corporis    Thoracic back pain    Swelling of structure of right eye    Snoring    Other specified diseases of anus and rectum    Night sweats    Neuropathy    Muscle spasm of back    Miller's metatarsalgia    Mass of axilla    Male pattern alopecia    Low back strain    Irritable bowel syndrome    Intolerant of cold    Insomnia    Gastroesophageal reflux disease    Ganglion    Elevated low density lipoprotein (LDL) cholesterol level    Disorder of gallbladder    Degeneration of lumbar intervertebral disc    Carpal tunnel syndrome    Benign prostatic hyperplasia    Allergic rhinitis    Acute anal fissure    Body aches    Melena    RUQ abdominal pain    Hand arthritis        Past Medical History:   Diagnosis Date    Acid reflux     HX    Anal fistula     Anxiety     R/T PAIN    Arthritis of back     can't remember    Carpal tunnel syndrome, left     DDD (degenerative disc disease),  lumbar     Frozen shoulder     Hemorrhoids     Hodgkin's lymphoma     left axillary adenopathy leading to a biopsy that was nondiagnostic but suspicious and eventually an excisional biopsy that was eventually felt to be consistent with follicular hyperplasia and progressive transformation of germinal centers with 2 foci suspicious for early involvement by nodules lymphocyte predominant Hodgkin's lymphoma.  This was likely treated by excision    Hyperlipidemia     Hyperosmia     Labral tear of hip joint     RIGHT    Low back strain     Male pattern alopecia     Periarthritis of shoulder     need to check file    Postnasal drip     Pudendal neuralgia     Tinnitus of left ear     Trochanteric bursitis 08/25/2016    Varicose vein of leg     left        Past Surgical History:   Procedure Laterality Date    ABSCESS DRAINAGE  08/2012    ANAL FISTULA REPAIR N/A 09/2012, 10/2012    AXILLARY LYMPH NODE BIOPSY/EXCISION Left 11/09/2018    Procedure: AXILLARY LYMPH NODE BIOPSY/EXCISION;  Surgeon: Amando Nguyễn MD;  Location: [facility];  Service: General    COLONOSCOPY N/A 2013    done at [facility]    COLONOSCOPY N/A 07/10/2019    Procedure: COLONOSCOPY to CECUM;  Surgeon: Amando Nguyễn MD;  Location: [facility] ENDOSCOPY;  Service: General    ENDOSCOPY N/A 01/09/2019    Procedure: ESOPHAGOGASTRODUODENOSCOPY;  Surgeon: Amando Nguyễn MD;  Location: [facility] ENDOSCOPY;  Service: General    ENDOSCOPY N/A 03/30/2023    Procedure: ESOPHAGOGASTRODUODENOSCOPY WITH BIOPSY;  Surgeon: Amando Nguyễn MD;  Location: [facility];  Service: Gastroenterology;  Laterality: N/A;  Gastric biopsy; Normal    FINE NEEDLE ASPIRATION Left 08/09/2018    Left axillary lymph node FNA    FLEXIBLE SIGMOIDOSCOPY N/A 06/25/2003    Normal-Dr. Cezar Aviles    HEMORRHOIDECTOMY N/A 1996    HIP ARTHROSCOPY W/ LABRAL REPAIR Right 04/03/2017    Dr. Lonnie Lemons    HIP SURGERY  2017    right labral repair    MEDIAL BRANCH BLOCK Bilateral 03/12/2021     Procedure: BILATERAL MEDIAL BRANCH BLOCK W/MAC;  Surgeon: Francisco Velez MD;  Location: [facility] MAIN OR;  Service: Pain Management;  Laterality: Bilateral;    SIGMOIDOSCOPY N/A 09/23/2020    Procedure: FLEXIBLE SIGMOIDOSCOPY WITH BIOPSY;  Surgeon: Shena Ring MD;  Location: [facility] ENDOSCOPY;  Service: Gastroenterology;  Laterality: N/A;  pre- anal/rectal pain  post- hemorrhoids    TONSILLECTOMY Bilateral                         PT Assessment/Plan       Row Name 10/07/24 1400          PT Assessment    Assessment Comments Jose Maria returns to therapy for first follow-up visit since his initial eval on 8/19/2024 for exacerbation of chronic bilateral lumbar pain.  He reports he had an exacerbation of pain from trimming some fig trees with a  (half the normal length to reduce strain on his back).  He states he felt  swelling and increased pain and was laid up 3 wks. also states he had a sinus infection that required him to cancel.  We were able to establish an HEP that included stretching hips and low back as well as strengthening/stabilization to engage core and hip girdle.  No immediate adverse response to therapeutic exercises today.  Due to no previous follow-up visits no goals have yet been met. He will benefit from continuing skilled therapy services.  -LISET        PT Plan    PT Plan Comments Assess response to last visit, review HEP and progress reps/sets prior to progressing resistance/weights;  -LISET               User Key  (r) = Recorded By, (t) = Taken By, (c) = Cosigned By      Initials Name Provider Type    Kirstin Chao, PT Physical Therapist                       OP Exercises       Row Name 10/07/24 1400             Subjective    Subjective Comments I was doing okay for about 3 weeks then I trimmed a fig tree and it had me laid up for 3 weeks. I didn't feel the pain at the time.  -LISET         Subjective Pain    Able to rate subjective pain? yes  -LISET      Pre-Treatment Pain Level 4   -JA      Post-Treatment Pain Level 4  -JA         Total Minutes    52691 - PT Therapeutic Exercise Minutes 38  -JA         Exercise 1    Exercise Name 1 TrA in HL  -JA      Cueing 1 Verbal;Tactile;Demo  -JA      Reps 1 10  -JA      Time 1 3 sec  -JA         Exercise 2    Exercise Name 2 LTR  -JA      Reps 2 10  -JA      Time 2 3 sec  -JA         Exercise 3    Exercise Name 3 Piriformis stretch in HL  -JA      Reps 3 3  -JA      Time 3 20sec  -JA         Exercise 4    Exercise Name 4 SKC  -JA      Reps 4 3  -JA      Time 4 20 sec  -JA         Exercise 5    Exercise Name 5 TA in sitting/standing with Alt bicep curls  -JA      Sets 5 2  -JA      Reps 5 5 R/L  -JA      Time 5 2#  -JA      Additional Comments 1 set ea sit then 1 set standing  -JA         Exercise 6    Exercise Name 6 mini lunge forward with TA  -JA      Reps 6 5 alt R/L  -JA         Exercise 7    Exercise Name 7 standing HA w/tb and TA  -JA      Cueing 7 Verbal;Demo  -JA      Sets 7 2  -JA      Reps 7 5  -JA      Time 7 YTB  -JA                User Key  (r) = Recorded By, (t) = Taken By, (c) = Cosigned By      Initials Name Provider Type    Kirstin Chao, PT Physical Therapist                                  PT OP Goals       Row Name 10/07/24 1500          PT Short Term Goals    STG Date to Achieve 09/18/24  -LISET     STG 1 Patient will be independent and compliant with initial HEP  -     STG 1 Progress --  -LISET     STG 2 Pt will demonstrate heel strike to toe off gait to improve jerri.  -LISET     STG 2 Progress Ongoing  -     STG 3 Pt will demonstrate increased LB and hip ROM/flexibility to facilitate ease of donning socks and shoes.  -LISET     STG 3 Progress Ongoing  -        Long Term Goals    LTG Date to Achieve 10/18/24  -LISET     LTG 1 Pt will be independent with advanced HEP for hip girdle/LE and core strengthening and hip flexibility.  -LISET     LTG 1 Progress New  -     LTG 2 Pt will demonstrate improved ease of sit to stand with  minimal to no pain and without assist of UEs.  -LISET     LTG 2 Progress New  -LISET     LTG 3 Pt will be able to ascend/descend stairs with normal reciprocal gait.  -LISET     LTG 3 Progress New  -LISET     LTG 4 Patient will demonstrate functional trunk range of motion enabling him to perform regular ADLs of dressing and household chores.  -LISET     LTG 4 Progress New  -LISET     LTG 5 Pt will score 54% on ALANNA indicating decrease in perceived functional disability.  -LISET     LTG 5 Progress New  -LISET               User Key  (r) = Recorded By, (t) = Taken By, (c) = Cosigned By      Initials Name Provider Type    Kirstin Chao, PT Physical Therapist                    Therapy Education  Education Details: Established HEP with there ex performed today, instructed in daily stretching but only 2 times a week for the strengthening exercises.  He can gradually increase reps as he is able.  Given: HEP, Symptoms/condition management, Pain management, Posture/body mechanics  Program: New  How Provided: Verbal, Demonstration, Written  Provided to: Patient  Level of Understanding: Verbalized, Demonstrated              Time Calculation:   Start Time: 1445  Stop Time: 1530  Time Calculation (min): 45 min  Timed Charges  63751 - PT Therapeutic Exercise Minutes: 38  Total Minutes  Timed Charges Total Minutes: 38   Total Minutes: 38  Therapy Charges for Today       Code Description Service Date Service Provider Modifiers Qty    17123356633  PT THER PROC EA 15 MIN 10/7/2024 Kirstin Dale PT GP 3                      Kirstin Dale PT  10/7/2024

## 2024-10-14 ENCOUNTER — APPOINTMENT (OUTPATIENT)
Dept: PHYSICAL THERAPY | Facility: HOSPITAL | Age: 59
End: 2024-10-14
Payer: COMMERCIAL

## 2024-10-14 ENCOUNTER — HOSPITAL ENCOUNTER (OUTPATIENT)
Dept: PET IMAGING | Facility: HOSPITAL | Age: 59
Discharge: HOME | End: 2024-10-14
Payer: COMMERCIAL

## 2024-10-14 ENCOUNTER — TELEPHONE (OUTPATIENT)
Dept: FAMILY MEDICINE CLINIC | Facility: CLINIC | Age: 59
End: 2024-10-14

## 2024-10-14 ENCOUNTER — TELEMEDICINE (OUTPATIENT)
Dept: FAMILY MEDICINE CLINIC | Facility: CLINIC | Age: 59
End: 2024-10-14
Payer: COMMERCIAL

## 2024-10-14 ENCOUNTER — TELEPHONE (OUTPATIENT)
Dept: ONCOLOGY | Facility: CLINIC | Age: 59
End: 2024-10-14

## 2024-10-14 VITALS — BODY MASS INDEX: 30.85 KG/M2 | WEIGHT: 215 LBS

## 2024-10-14 DIAGNOSIS — K21.9 GASTROESOPHAGEAL REFLUX DISEASE WITHOUT ESOPHAGITIS: Primary | ICD-10-CM

## 2024-10-14 PROCEDURE — 1125F AMNT PAIN NOTED PAIN PRSNT: CPT | Performed by: INTERNAL MEDICINE

## 2024-10-14 PROCEDURE — 99213 OFFICE O/P EST LOW 20 MIN: CPT | Performed by: INTERNAL MEDICINE

## 2024-10-14 RX ORDER — PANTOPRAZOLE SODIUM 40 MG/1
40 TABLET, DELAYED RELEASE ORAL DAILY
Qty: 30 TABLET | Refills: 1 | Status: SHIPPED | OUTPATIENT
Start: 2024-10-14 | End: 2024-10-16

## 2024-10-14 NOTE — TELEPHONE ENCOUNTER
Caller: Jose Maria Judge    Relationship: Self    Best call back number: [phone]    What is the best time to reach you: ANY TIME    Who are you requesting to speak with (clinical staff, provider,  specific staff member): CLINICAL       What was the call regarding: PATIENT HAS BEEN HAVING NAUSEA FOR ABOUT A WEEK, HE THINKS MAY BE ACID REFLUX. HE CANCELED HIS PET SCAN THAT WAS SCHEDULED FOR TODAY. PLEASE CALL TO ADVISE

## 2024-10-14 NOTE — TELEPHONE ENCOUNTER
Called patient, said that for the past week he has been having nausea and abd pain. Said he went to the ER about this and they believed it was a acid reflux problem. Said that he has an appt with his PCP today regarding this but wanted to let us know that he canceled his PET scan today because he was told it could causes nausea and he did want to do it since he has already been struggling with nausea. I told the patient to give us a call back after he see their PCP and that we can get him rescheduled for the Pet scan and push back his appt with Dr. Alvarado. PT v.u

## 2024-10-14 NOTE — PROGRESS NOTES
"Chief Complaint  Abdominal Pain (And nausea started on 10/7/24 after a meal )    Subjective        Jose Maria Judge presents to [facility]  History of Present Illness  Patient was seen today via a video visit for concerns of epigastric pain & nausea for the past 6 days. Pain is intermittent, located in the epigastric area, associated with nausea but no vomiting. Denies fever/chills, diarrhea or constipation. Also reports having sour taste in the mouth upon lying down at night.   Abdominal Pain  This is a new problem. The current episode started in the past 7 days. The onset quality is gradual. The problem occurs intermittently. The problem has been unchanged. The pain is located in the epigastric region. The abdominal pain does not radiate. Associated symptoms include nausea. Pertinent negatives include no constipation, diarrhea, fever or vomiting.       Objective   Vital Signs:  Wt 97.5 kg (215 lb)   BMI 30.85 kg/m²   Estimated body mass index is 30.85 kg/m² as calculated from the following:    Height as of 9/23/24: 177.8 cm (70\").    Weight as of this encounter: 97.5 kg (215 lb).            Physical Exam   PE not done as this was a video visit  Result Review :                Assessment and Plan   Diagnoses and all orders for this visit:    1. Gastroesophageal reflux disease without esophagitis (Primary)  -     pantoprazole (PROTONIX) 40 MG EC tablet; Take 1 tablet by mouth Daily.  Dispense: 30 tablet; Refill: 1    Pain likely due to gastritis resulting from acid reflux. Advice patient to avoid spicy foods, alcohol & caffeine. Instruction given. Counseled to avoid heavy meals at night and elevate head of bed. Trial of PPI for 8 weeks. To take pantoprazole 40 mg daily before meals. Encourage weight loss. If symptoms persist or worsens will consider referral to GI for EGD.         Follow Up   No follow-ups on file.  Patient was given instructions and counseling regarding his condition or " for health maintenance advice. Please see specific information pulled into the AVS if appropriate.     Mode of Visit: Video  Location of patient: Home  Location of provider: [facility]  You have chosen to receive care through a telehealth visit.  The patient has signed the video visit consent form.  The visit included audio and video interaction. No technical issues occurred during this visit.

## 2024-10-16 ENCOUNTER — TELEPHONE (OUTPATIENT)
Dept: FAMILY MEDICINE CLINIC | Facility: CLINIC | Age: 59
End: 2024-10-16
Payer: COMMERCIAL

## 2024-10-16 DIAGNOSIS — K21.9 GASTROESOPHAGEAL REFLUX DISEASE WITHOUT ESOPHAGITIS: ICD-10-CM

## 2024-10-16 RX ORDER — PANTOPRAZOLE SODIUM 20 MG/1
20 TABLET, DELAYED RELEASE ORAL DAILY
Qty: 30 TABLET | Refills: 1 | Status: SHIPPED | OUTPATIENT
Start: 2024-10-16

## 2024-10-16 NOTE — TELEPHONE ENCOUNTER
Caller: Jose Maria Judge    Relationship: Self    Best call back number: [phone]      What medication are you requesting: pantoprazole (PROTONIX) 20MG      Have you had these symptoms before:    [x] Yes  [] No    Have you been treated for these symptoms before:   [x] Yes  [] No    If a prescription is needed, what is your preferred pharmacy and phone number: [id number] - [place], [place] - [address] AT [facility] [phone] [facility] [phone] FX     Additional notes: HIS BODY IS SENSITIVE TO MEDICATION.  THE 40MG IS TOO MUCH.  HE WOULD LIKE TO TRY THE 20MG.  HE FELT LIKE HE HAD A CATRACHITA IN HIS UPPER STOMACH AND CATRACHITA IN HIS THROAT, LIKE PFLEM.

## 2024-10-29 ENCOUNTER — HOSPITAL ENCOUNTER (OUTPATIENT)
Dept: PHYSICAL THERAPY | Facility: HOSPITAL | Age: 59
Setting detail: THERAPIES SERIES
Discharge: HOME | End: 2024-10-29
Payer: COMMERCIAL

## 2024-10-29 DIAGNOSIS — M25.60 DECREASED RANGE OF MOTION: ICD-10-CM

## 2024-10-29 DIAGNOSIS — M54.50 CHRONIC LOW BACK PAIN, UNSPECIFIED BACK PAIN LATERALITY, UNSPECIFIED WHETHER SCIATICA PRESENT: Primary | ICD-10-CM

## 2024-10-29 DIAGNOSIS — G89.29 CHRONIC LOW BACK PAIN, UNSPECIFIED BACK PAIN LATERALITY, UNSPECIFIED WHETHER SCIATICA PRESENT: Primary | ICD-10-CM

## 2024-10-29 DIAGNOSIS — M62.81 MUSCLE WEAKNESS OF LOWER EXTREMITY: ICD-10-CM

## 2024-10-29 PROCEDURE — 97110 THERAPEUTIC EXERCISES: CPT

## 2024-10-29 NOTE — THERAPY TREATMENT NOTE
Outpatient Physical Therapy Ortho Treatment Note  [facility]     Patient Name: Jose Maria Judge  : 1965  MRN: [id number]  Today's Date: 10/29/2024      Visit Date: 10/29/2024    Visit Dx:    ICD-10-CM ICD-9-CM   1. Chronic low back pain, unspecified back pain laterality, unspecified whether sciatica present  M54.50 724.2    G89.29 338.29   2. Muscle weakness of lower extremity  M62.81 728.87   3. Decreased range of motion  M25.60 719.50       Patient Active Problem List   Diagnosis    Bilateral hip pain    Trochanteric bursitis    Tear of acetabular labrum    Anal burning    Hamstring strain    Hypertension    Neuritis of foot    Rectal pain    Herpes zoster    Varicose veins of bilateral lower extremities with pain    Epigastric pain    Olfaction disorder    Chronic bilateral low back pain    Nausea    Arthropathy of lumbar facet joint    Pudendal neuralgia    Tinnitus    Rib pain    Lymphadenopathy    Shoulder pain    Chronic pain of both feet    Metatarsalgia of both feet    Paresthesia of foot    Vertigo    Varicocele    Tinea corporis    Thoracic back pain    Swelling of structure of right eye    Snoring    Other specified diseases of anus and rectum    Night sweats    Neuropathy    Muscle spasm of back    Miller's metatarsalgia    Mass of axilla    Male pattern alopecia    Low back strain    Irritable bowel syndrome    Intolerant of cold    Insomnia    Gastroesophageal reflux disease    Ganglion    Elevated low density lipoprotein (LDL) cholesterol level    Disorder of gallbladder    Degeneration of lumbar intervertebral disc    Carpal tunnel syndrome    Benign prostatic hyperplasia    Allergic rhinitis    Acute anal fissure    Body aches    Melena    RUQ abdominal pain    Hand arthritis        Past Medical History:   Diagnosis Date    Acid reflux     HX    Anal fistula     Anxiety     R/T PAIN    Arthritis of back     can't remember    Carpal tunnel syndrome, left     DDD (degenerative disc disease),  lumbar     Frozen shoulder     Hemorrhoids     Hodgkin's lymphoma     left axillary adenopathy leading to a biopsy that was nondiagnostic but suspicious and eventually an excisional biopsy that was eventually felt to be consistent with follicular hyperplasia and progressive transformation of germinal centers with 2 foci suspicious for early involvement by nodules lymphocyte predominant Hodgkin's lymphoma.  This was likely treated by excision    Hyperlipidemia     Hyperosmia     Labral tear of hip joint     RIGHT    Low back strain     Male pattern alopecia     Periarthritis of shoulder     need to check file    Postnasal drip     Pudendal neuralgia     Tinnitus of left ear     Trochanteric bursitis 08/25/2016    Varicose vein of leg     left        Past Surgical History:   Procedure Laterality Date    ABSCESS DRAINAGE  08/2012    ANAL FISTULA REPAIR N/A 09/2012, 10/2012    AXILLARY LYMPH NODE BIOPSY/EXCISION Left 11/09/2018    Procedure: AXILLARY LYMPH NODE BIOPSY/EXCISION;  Surgeon: Amando Nguyễn MD;  Location: [facility] [place];  Service: General    COLONOSCOPY N/A 2013    done at [facility]    COLONOSCOPY N/A 07/10/2019    Procedure: COLONOSCOPY to CECUM;  Surgeon: Amando Nguyễn MD;  Location: [facility] ENDOSCOPY;  Service: General    ENDOSCOPY N/A 01/09/2019    Procedure: ESOPHAGOGASTRODUODENOSCOPY;  Surgeon: Amando Nguyễn MD;  Location: [facility] ENDOSCOPY;  Service: General    ENDOSCOPY N/A 03/30/2023    Procedure: ESOPHAGOGASTRODUODENOSCOPY WITH BIOPSY;  Surgeon: Amando Nguyễn MD;  Location: [facility];  Service: Gastroenterology;  Laterality: N/A;  Gastric biopsy; Normal    FINE NEEDLE ASPIRATION Left 08/09/2018    Left axillary lymph node FNA    FLEXIBLE SIGMOIDOSCOPY N/A 06/25/2003    Normal-Dr. Cezar Aviles    HEMORRHOIDECTOMY N/A 1996    HIP ARTHROSCOPY W/ LABRAL REPAIR Right 04/03/2017    Dr. Lonnie Lemons    HIP SURGERY  2017    right labral repair    MEDIAL BRANCH BLOCK Bilateral 03/12/2021     Procedure: BILATERAL MEDIAL BRANCH BLOCK W/MAC;  Surgeon: Francisco Velez MD;  Location: [facility];  Service: Pain Management;  Laterality: Bilateral;    SIGMOIDOSCOPY N/A 09/23/2020    Procedure: FLEXIBLE SIGMOIDOSCOPY WITH BIOPSY;  Surgeon: Shena Ring MD;  Location: [facility] ENDOSCOPY;  Service: Gastroenterology;  Laterality: N/A;  pre- anal/rectal pain  post- hemorrhoids    TONSILLECTOMY Bilateral                         PT Assessment/Plan       Row Name 10/29/24 1600          PT Assessment    Assessment Comments Jose Maria returns to PT this date reporting significant flare up in back pain last week following sitting on the ground, feels a sharp burning in BL paraspinals. He tolerates the nustep this date with addition of airex pad to the seat. Spent session providing extensive  PNF education, documented in education tab, and initiating additional gentle mobility exercises to work on overall pain relief. Initiated seated swiss ball roll out into pain free range as well as added HL PPT with TrA activation. Cueing throughout session to time breathing with activity with overall improved tolerance to exercises following. He demos good TrA activation this date and denies any pain in PF with exercise. Did not update HEP, will assess tolerance and progress as appropriate. Did discuss pelvic floor physical therapy, he may benefit from future referral should his symptoms remain.  -MO        PT Plan    PT Plan Comments response to last session, progress as tolerable and update HEP. HL angelia, beginner bridge with PPT  -MO               User Key  (r) = Recorded By, (t) = Taken By, (c) = Cosigned By      Initials Name Provider Type    Josey Manriquez, PT Physical Therapist                       OP Exercises       Row Name 10/29/24 1500             Subjective    Subjective Comments I was sitting on the floor last week putting felt pads on the bottom of dining rooms chairs and my back just gave out. It is  "sharp, burning pain on both sides  -MO         Total Minutes    79287 - PT Therapeutic Exercise Minutes 38  -MO         Exercise 1    Exercise Name 1 nustep  -MO      Time 1 4:30, lvl 3  -MO      Additional Comments seated on airex  -MO         Exercise 2    Exercise Name 2 LTR  -MO      Reps 2 10  -MO      Time 2 3 sec  -MO         Exercise 3    Exercise Name 3 Piriformis stretch in HL  -MO      Cueing 3 Verbal  -MO      Reps 3 3B  -MO      Time 3 20sec  -MO         Exercise 4    Exercise Name 4 SKC  -MO      Cueing 4 Verbal  -MO      Reps 4 3B  -MO      Time 4 20s  -MO         Exercise 8    Exercise Name 8 seated swiss ball roll out  -MO      Cueing 8 Verbal;Tactile;Demo  -MO      Sets 8 1  -MO      Reps 8 15  -MO      Time 8 2s  -MO      Additional Comments very small, pain free range. Cueing to allow body weight into the ball to limit back overactivation  -MO         Exercise 9    Exercise Name 9 HL passive PPT w/ TrA  -MO      Cueing 9 Verbal;Tactile  -MO      Sets 9 1  -MO      Reps 9 15  -MO      Time 9 3s  -MO      Additional Comments timed with breathing  -MO                User Key  (r) = Recorded By, (t) = Taken By, (c) = Cosigned By      Initials Name Provider Type    Josey Manriquez, PT Physical Therapist                                     Therapy Education  Education Details: PNS education on importance of pain free range of motion to \"retrain brain\" to improve tolerance for overall trunk motion. Spent time discussing coorelation between tight back musculature, poor core and hip girlde strength, and \"back giving out\". Additional dicussion on heightened nervous system response and importance of deep breathing throughout to. Additionally educated pt on role of pelvic floor physical therapy and discussed potential referral in the future should pt continue to have ongoing symptoms that may benefit from additoinal workup, he is very agreeable.              Time Calculation:   Start Time: 1445  Stop Time: " 1523  Time Calculation (min): 38 min  Timed Charges  17986 - PT Therapeutic Exercise Minutes: 38  Total Minutes  Timed Charges Total Minutes: 38   Total Minutes: 38  Therapy Charges for Today       Code Description Service Date Service Provider Modifiers Qty    02095477497 HC PT THER PROC EA 15 MIN 10/29/2024 Josey Black, PT GP 3                      Josey Black, PT  10/29/2024

## 2024-11-04 ENCOUNTER — HOSPITAL ENCOUNTER (OUTPATIENT)
Dept: PHYSICAL THERAPY | Facility: HOSPITAL | Age: 59
Setting detail: THERAPIES SERIES
Discharge: HOME | End: 2024-11-04
Payer: COMMERCIAL

## 2024-11-04 DIAGNOSIS — M54.50 CHRONIC LOW BACK PAIN, UNSPECIFIED BACK PAIN LATERALITY, UNSPECIFIED WHETHER SCIATICA PRESENT: Primary | ICD-10-CM

## 2024-11-04 DIAGNOSIS — M25.60 DECREASED RANGE OF MOTION: ICD-10-CM

## 2024-11-04 DIAGNOSIS — G89.29 CHRONIC LOW BACK PAIN, UNSPECIFIED BACK PAIN LATERALITY, UNSPECIFIED WHETHER SCIATICA PRESENT: Primary | ICD-10-CM

## 2024-11-04 DIAGNOSIS — M62.81 MUSCLE WEAKNESS OF LOWER EXTREMITY: ICD-10-CM

## 2024-11-04 PROCEDURE — 97110 THERAPEUTIC EXERCISES: CPT

## 2024-11-04 NOTE — THERAPY PROGRESS REPORT/RE-CERT
Outpatient Physical Therapy Ortho Progress Note  [facility]     Patient Name: Jose Maria Judge  : 1965  MRN: [id number]  Today's Date: 2024      Visit Date: 2024    Patient Active Problem List   Diagnosis    Bilateral hip pain    Trochanteric bursitis    Tear of acetabular labrum    Anal burning    Hamstring strain    Hypertension    Neuritis of foot    Rectal pain    Herpes zoster    Varicose veins of bilateral lower extremities with pain    Epigastric pain    Olfaction disorder    Chronic bilateral low back pain    Nausea    Arthropathy of lumbar facet joint    Pudendal neuralgia    Tinnitus    Rib pain    Lymphadenopathy    Shoulder pain    Chronic pain of both feet    Metatarsalgia of both feet    Paresthesia of foot    Vertigo    Varicocele    Tinea corporis    Thoracic back pain    Swelling of structure of right eye    Snoring    Other specified diseases of anus and rectum    Night sweats    Neuropathy    Muscle spasm of back    Miller's metatarsalgia    Mass of axilla    Male pattern alopecia    Low back strain    Irritable bowel syndrome    Intolerant of cold    Insomnia    Gastroesophageal reflux disease    Ganglion    Elevated low density lipoprotein (LDL) cholesterol level    Disorder of gallbladder    Degeneration of lumbar intervertebral disc    Carpal tunnel syndrome    Benign prostatic hyperplasia    Allergic rhinitis    Acute anal fissure    Body aches    Melena    RUQ abdominal pain    Hand arthritis        Past Medical History:   Diagnosis Date    Acid reflux     HX    Anal fistula     Anxiety     R/T PAIN    Arthritis of back     can't remember    Carpal tunnel syndrome, left     DDD (degenerative disc disease), lumbar     Frozen shoulder     Hemorrhoids     Hodgkin's lymphoma     left axillary adenopathy leading to a biopsy that was nondiagnostic but suspicious and eventually an excisional biopsy that was eventually felt to be consistent with follicular hyperplasia and  progressive transformation of germinal centers with 2 foci suspicious for early involvement by nodules lymphocyte predominant Hodgkin's lymphoma.  This was likely treated by excision    Hyperlipidemia     Hyperosmia     Labral tear of hip joint     RIGHT    Low back strain     Male pattern alopecia     Periarthritis of shoulder     need to check file    Postnasal drip     Pudendal neuralgia     Tinnitus of left ear     Trochanteric bursitis 08/25/2016    Varicose vein of leg     left        Past Surgical History:   Procedure Laterality Date    ABSCESS DRAINAGE  08/2012    ANAL FISTULA REPAIR N/A 09/2012, 10/2012    AXILLARY LYMPH NODE BIOPSY/EXCISION Left 11/09/2018    Procedure: AXILLARY LYMPH NODE BIOPSY/EXCISION;  Surgeon: Amando Nguyễn MD;  [facility];  Service: General    COLONOSCOPY N/A 2013    done at [facility]    COLONOSCOPY N/A 07/10/2019    Procedure: COLONOSCOPY to CECUM;  Surgeon: Amando Nguyễn MD;  [facility];  Service: General    ENDOSCOPY N/A 01/09/2019    Procedure: ESOPHAGOGASTRODUODENOSCOPY;  Surgeon: Amando Nguyễn MD;  [facility];  Service: General    ENDOSCOPY N/A 03/30/2023    Procedure: ESOPHAGOGASTRODUODENOSCOPY WITH BIOPSY;  Surgeon: Amando Nguyễn MD;  Location: [facility];  Service: Gastroenterology;  Laterality: N/A;  Gastric biopsy; Normal    FINE NEEDLE ASPIRATION Left 08/09/2018    Left axillary lymph node FNA    FLEXIBLE SIGMOIDOSCOPY N/A 06/25/2003    Normal-Dr. Cezar Aviles    HEMORRHOIDECTOMY N/A 1996    HIP ARTHROSCOPY W/ LABRAL REPAIR Right 04/03/2017    Dr. Lonnie Lemons    HIP SURGERY  2017    right labral repair    MEDIAL BRANCH BLOCK Bilateral 03/12/2021    Procedure: BILATERAL MEDIAL BRANCH BLOCK W/MAC;  Surgeon: Francisco Velez MD;  [place] [facility] [place];  Service: Pain Management;  Laterality: Bilateral;    SIGMOIDOSCOPY N/A 09/23/2020    Procedure: FLEXIBLE SIGMOIDOSCOPY WITH BIOPSY;  Surgeon: María Elena  Shena ORELLANA MD;  Location: [facility] ENDOSCOPY;  Service: Gastroenterology;  Laterality: N/A;  pre- anal/rectal pain  post- hemorrhoids    TONSILLECTOMY Bilateral        Visit Dx:     ICD-10-CM ICD-9-CM   1. Chronic low back pain, unspecified back pain laterality, unspecified whether sciatica present  M54.50 724.2    G89.29 338.29   2. Muscle weakness of lower extremity  M62.81 728.87   3. Decreased range of motion  M25.60 719.50                             Therapy Education  Education Details: Educated on lumbar spine using spine model.  Worked on concept of hip hinge, tipping forward from the hip joint v. forward flexing spine.  Copious verbal and tactile cueing required as well as visual with spine model and other model.  He will benefit from review and reinforcement.  He has muscle guarding due to fear avoidance from past history of pudendal neuralgia.  Discussed resuming his original HEP that included small range forward lunge and horizontal shoulder abduction while standing with TA  Given: HEP, Symptoms/condition management, Pain management, Posture/body mechanics  Program: Progressed  How Provided: Verbal, Demonstration  Provided to: Patient  Level of Understanding: Verbalized, Demonstrated          PT Assessment/Plan       Row Name 11/04/24 1400          PT Assessment    Assessment Comments Jose Maria Judge has been seen for a total of 4 visits for chronic lumbar pain exacerbation that began in late May or early June of this year when he was moving a heavy appliance.  He canceled several  appointments due to exacerbation of pain when he had to take care of a furnace that went out.  He reports he feels the exercises are helping him.  He does express concern about exacerbation of pudendal neuralgia as he states he has experienced this in the past when working on core strengthening.  We are working to reduce the fear avoidance and muscle guarding with hip hinge to reduce his habit of forward flexing his spine.  We are  also working to increase awareness and improvement of particular positions he may use as he is trying to do work around the house and repairs.  He will benefit from continuing skilled therapy services.  -JA        PT Plan    PT Plan Comments Assess response to last visit, continue to work on hip hinge with close monitoring to prevent forward flexing his spine, need to continue with gentle and controlled hip stretching to improve mobility, continue to address fear avoidance through small comfortable movements to reinforce ability to move without increasing his pain  -JA               User Key  (r) = Recorded By, (t) = Taken By, (c) = Cosigned By      Initials Name Provider Type    Kirstin Chao, PT Physical Therapist                       OP Exercises       Row Name 11/04/24 1300             Subjective    Subjective Comments The machine made it hurt more.  -JA         Subjective Pain    Able to rate subjective pain? yes  -JA      Pre-Treatment Pain Level 4  -JA      Post-Treatment Pain Level 4  -JA         Total Minutes    97573 - PT Therapeutic Exercise Minutes 40  -JA         Exercise 1    Exercise Name 1 nustep  -JA      Time 1 D/C  -JA      Additional Comments pt c/o irritation of pudendal pain after last visit  -JA         Exercise 2    Exercise Name 2 LTR  -JA      Reps 2 10  -JA      Time 2 3 sec  -JA         Exercise 3    Exercise Name 3 Piriformis stretch in HL  -JA      Cueing 3 Verbal  -JA      Reps 3 3B  -JA      Time 3 20sec  -JA      Additional Comments used towel  -JA         Exercise 4    Exercise Name 4 SKC  -JA      Cueing 4 Verbal  -JA      Reps 4 3B  -JA      Time 4 20s  -JA      Additional Comments used towel  -JA         Exercise 5    Exercise Name 5 Ham 90/90 gentle w/towel  -JA      Reps 5 3  -JA      Time 5 5-10 sec due to fear avoidance muscle guarding  -JA         Exercise 8    Exercise Name 8 seated swiss ball roll out  -JA      Cueing 8 Verbal;Tactile;Demo  -JA      Sets 8 1  -JA       Reps 8 15  -JA      Time 8 2s  -JA      Additional Comments very small, pain free range. Cueing to allow body weight into the ball to limit back overactivation  -JA         Exercise 9    Exercise Name 9 HL passive PPT w/ TrA in SMALL range  -JA      Cueing 9 Verbal;Tactile  -JA      Sets 9 1  -JA      Reps 9 15  -JA      Time 9 3s  -JA      Additional Comments SMALL range-achieve with passive gentle push through feet to target lowest lumbar segments  -JA         Exercise 10    Exercise Name 10 TA in HL, very gentle to avoid engaging pelvic floor  -JA      Reps 10 10  -JA      Time 10 3sec  -JA         Exercise 11    Exercise Name 11 hip hinge  -JA      Reps 11 10 (nonconsecutive)  -JA      Time 11 Required copious cueing verbal and tactile to reduce flexing forward with the spine, great difficulty tipping from hips  -                User Key  (r) = Recorded By, (t) = Taken By, (c) = Cosigned By      Initials Name Provider Type    Kirstin Chao, PT Physical Therapist                                            Time Calculation:     Start Time: 1400  Stop Time: 1445  Time Calculation (min): 45 min  Timed Charges  31986 - PT Therapeutic Exercise Minutes: 40  Total Minutes  Timed Charges Total Minutes: 40   Total Minutes: 40     Therapy Charges for Today       Code Description Service Date Service Provider Modifiers Qty    11198880759 HC PT THER PROC EA 15 MIN 11/4/2024 Kirstin Dale, PT GP 3                      Kirstin Dale PT  11/4/2024

## 2024-11-07 ENCOUNTER — APPOINTMENT (OUTPATIENT)
Dept: PHYSICAL THERAPY | Facility: HOSPITAL | Age: 59
End: 2024-11-07
Payer: COMMERCIAL

## 2024-11-11 ENCOUNTER — HOSPITAL ENCOUNTER (OUTPATIENT)
Dept: PHYSICAL THERAPY | Facility: HOSPITAL | Age: 59
Setting detail: THERAPIES SERIES
Discharge: HOME | End: 2024-11-11
Payer: COMMERCIAL

## 2024-11-11 DIAGNOSIS — M54.50 CHRONIC LOW BACK PAIN, UNSPECIFIED BACK PAIN LATERALITY, UNSPECIFIED WHETHER SCIATICA PRESENT: Primary | ICD-10-CM

## 2024-11-11 DIAGNOSIS — M62.81 MUSCLE WEAKNESS OF LOWER EXTREMITY: ICD-10-CM

## 2024-11-11 DIAGNOSIS — G89.29 CHRONIC LOW BACK PAIN, UNSPECIFIED BACK PAIN LATERALITY, UNSPECIFIED WHETHER SCIATICA PRESENT: Primary | ICD-10-CM

## 2024-11-11 DIAGNOSIS — M25.60 DECREASED RANGE OF MOTION: ICD-10-CM

## 2024-11-11 PROCEDURE — 97110 THERAPEUTIC EXERCISES: CPT

## 2024-11-11 NOTE — THERAPY TREATMENT NOTE
Outpatient Physical Therapy Ortho Treatment Note  [facility]     Patient Name: Jose Maria Judge  : 1965  MRN: [id number]  Today's Date: 2024      Visit Date: 2024    Visit Dx:    ICD-10-CM ICD-9-CM   1. Chronic low back pain, unspecified back pain laterality, unspecified whether sciatica present  M54.50 724.2    G89.29 338.29   2. Muscle weakness of lower extremity  M62.81 728.87   3. Decreased range of motion  M25.60 719.50       Patient Active Problem List   Diagnosis    Bilateral hip pain    Trochanteric bursitis    Tear of acetabular labrum    Anal burning    Hamstring strain    Hypertension    Neuritis of foot    Rectal pain    Herpes zoster    Varicose veins of bilateral lower extremities with pain    Epigastric pain    Olfaction disorder    Chronic bilateral low back pain    Nausea    Arthropathy of lumbar facet joint    Pudendal neuralgia    Tinnitus    Rib pain    Lymphadenopathy    Shoulder pain    Chronic pain of both feet    Metatarsalgia of both feet    Paresthesia of foot    Vertigo    Varicocele    Tinea corporis    Thoracic back pain    Swelling of structure of right eye    Snoring    Other specified diseases of anus and rectum    Night sweats    Neuropathy    Muscle spasm of back    Miller's metatarsalgia    Mass of axilla    Male pattern alopecia    Low back strain    Irritable bowel syndrome    Intolerant of cold    Insomnia    Gastroesophageal reflux disease    Ganglion    Elevated low density lipoprotein (LDL) cholesterol level    Disorder of gallbladder    Degeneration of lumbar intervertebral disc    Carpal tunnel syndrome    Benign prostatic hyperplasia    Allergic rhinitis    Acute anal fissure    Body aches    Melena    RUQ abdominal pain    Hand arthritis        Past Medical History:   Diagnosis Date    Acid reflux     HX    Anal fistula     Anxiety     R/T PAIN    Arthritis of back     can't remember    Carpal tunnel syndrome, left     DDD (degenerative disc disease),  lumbar     Frozen shoulder     Hemorrhoids     Hodgkin's lymphoma     left axillary adenopathy leading to a biopsy that was nondiagnostic but suspicious and eventually an excisional biopsy that was eventually felt to be consistent with follicular hyperplasia and progressive transformation of germinal centers with 2 foci suspicious for early involvement by nodules lymphocyte predominant Hodgkin's lymphoma.  This was likely treated by excision    Hyperlipidemia     Hyperosmia     Labral tear of hip joint     RIGHT    Low back strain     Male pattern alopecia     Periarthritis of shoulder     need to check file    Postnasal drip     Pudendal neuralgia     Tinnitus of left ear     Trochanteric bursitis 08/25/2016    Varicose vein of leg     left        Past Surgical History:   Procedure Laterality Date    ABSCESS DRAINAGE  08/2012    ANAL FISTULA REPAIR N/A 09/2012, 10/2012    AXILLARY LYMPH NODE BIOPSY/EXCISION Left 11/09/2018    Procedure: AXILLARY LYMPH NODE BIOPSY/EXCISION;  Surgeon: Amando Nguyễn MD;  Location: [facility];  Service: General    COLONOSCOPY N/A 2013    done at [facility]    COLONOSCOPY N/A 07/10/2019    Procedure: COLONOSCOPY to CECUM;  Surgeon: Amando Nguyễn MD;  Location: [facility] ENDOSCOPY;  Service: General    ENDOSCOPY N/A 01/09/2019    Procedure: ESOPHAGOGASTRODUODENOSCOPY;  Surgeon: Amando Nguyễn MD;  Location: [facility] ENDOSCOPY;  Service: General    ENDOSCOPY N/A 03/30/2023    Procedure: ESOPHAGOGASTRODUODENOSCOPY WITH BIOPSY;  Surgeon: Amando Nguyễn MD;  Location: [facility];  Service: Gastroenterology;  Laterality: N/A;  Gastric biopsy; Normal    FINE NEEDLE ASPIRATION Left 08/09/2018    Left axillary lymph node FNA    FLEXIBLE SIGMOIDOSCOPY N/A 06/25/2003    Normal-Dr. Cezar Aviles    HEMORRHOIDECTOMY N/A 1996    HIP ARTHROSCOPY W/ LABRAL REPAIR Right 04/03/2017    Dr. Lonnie Lemons    HIP SURGERY  2017    right labral repair    MEDIAL BRANCH BLOCK Bilateral 03/12/2021     Procedure: BILATERAL MEDIAL BRANCH BLOCK W/MAC;  Surgeon: Francisco Velez MD;  Location: [facility] MAIN OR;  Service: Pain Management;  Laterality: Bilateral;    SIGMOIDOSCOPY N/A 09/23/2020    Procedure: FLEXIBLE SIGMOIDOSCOPY WITH BIOPSY;  Surgeon: Shena Ring MD;  Location: [facility] ENDOSCOPY;  Service: Gastroenterology;  Laterality: N/A;  pre- anal/rectal pain  post- hemorrhoids    TONSILLECTOMY Bilateral                         PT Assessment/Plan       Row Name 11/11/24 1500          PT Assessment    Assessment Comments Jose Maria reports a little flared up, he denies doing any home repairs or yard work although he did go fishing recently. He tolerated stretching with less apprehension and pain today. We continued to discussed fear avoidance and worked to engage TA without the intensity that recruited pelvic floor. Progressed with mild versions of bridge and HL TA march. Will assess response next visit and adjust accordingly.  -JA        PT Plan    PT Plan Comments Assess response to last visit, continue to address fear avoidance through small comfortable movements to reinforce ability to move without increasing his pain; introduce movements in sitting or standing  -LISET               User Key  (r) = Recorded By, (t) = Taken By, (c) = Cosigned By      Initials Name Provider Type    Kirstin Chao, PT Physical Therapist                       OP Exercises       Row Name 11/11/24 1400             Subjective    Subjective Comments Feeling more flrared up. But i did notice my backk isn't as tired, getting a little stronger.  -JA         Subjective Pain    Able to rate subjective pain? yes  -JA      Post-Treatment Pain Level 4  -JA         Total Minutes    90349 - PT Therapeutic Exercise Minutes 38  -JA         Exercise 1    Exercise Name 1 nustep  -JA      Time 1 D/C, pt c/o irritation of pudendal pain after last visit  -JA         Exercise 2    Exercise Name 2 LTR  -JA      Reps 2 10  -JA      Time 2 3 sec   -JA         Exercise 3    Exercise Name 3 Piriformis stretch in HL  -JA      Cueing 3 Verbal  -JA      Reps 3 3B  -JA      Time 3 20sec  -JA      Additional Comments used a towel  -JA         Exercise 4    Exercise Name 4 SKC  -JA      Cueing 4 Verbal  -JA      Reps 4 3B  -JA      Time 4 20s  -JA      Additional Comments used a towel  -JA         Exercise 5    Exercise Name 5 Ham 90/90 gentle w/towel  -JA      Reps 5 3  -JA      Time 5 5-10 sec due to fear avoidance muscle guarding  -JA         Exercise 6    Exercise Name 6 beginner bridge  -JA      Cueing 6 Verbal;Demo  -JA      Reps 6 10  -JA         Exercise 7    Exercise Name 7 small TA march in HL  -JA      Sets 7 1  -JA      Reps 7 10  -JA         Exercise 8    Exercise Name 8 seated swiss ball roll out  -JA      Cueing 8 Verbal;Tactile;Demo  -JA      Sets 8 1  -JA      Reps 8 10  -JA      Time 8 2s  -JA         Exercise 9    Exercise Name 9 HL passive PPT w/ TrA in SMALL range  -JA      Cueing 9 Verbal;Tactile  -JA      Sets 9 1  -JA      Reps 9 15  -JA      Time 9 3s  -JA         Exercise 10    Exercise Name 10 TA in HL, very gentle to avoid engaging pelvic floor  -JA      Reps 10 10  -JA      Time 10 3sec  -JA      Additional Comments discussed difference between tightening TA and engaging pelvic  -JA         Exercise 11    Exercise Name 11 hip hinge  -JA      Reps 11 10 (nonconsecutive)  -JA      Time 11 Required copious cueing verbal and tactile to reduce flexing forward with the spine, great difficulty tipping from hips  -JA                User Key  (r) = Recorded By, (t) = Taken By, (c) = Cosigned By      Initials Name Provider Type    Kirstin Chao, PT Physical Therapist                                                    Time Calculation:   Start Time: 1446  Stop Time: 1530  Time Calculation (min): 44 min  Timed Charges  10259 - PT Therapeutic Exercise Minutes: 38  Total Minutes  Timed Charges Total Minutes: 38   Total Minutes: 38  Therapy  Charges for Today       Code Description Service Date Service Provider Modifiers Qty    33153605428 HC PT THER PROC EA 15 MIN 11/11/2024 Kirstin Dale, PT GP 3                      Kirstin Dale, PT  11/11/2024

## 2024-11-14 ENCOUNTER — APPOINTMENT (OUTPATIENT)
Dept: PHYSICAL THERAPY | Facility: HOSPITAL | Age: 59
End: 2024-11-14
Payer: COMMERCIAL

## 2024-11-18 ENCOUNTER — HOSPITAL ENCOUNTER (OUTPATIENT)
Dept: PHYSICAL THERAPY | Facility: HOSPITAL | Age: 59
Setting detail: THERAPIES SERIES
Discharge: HOME | End: 2024-11-18
Payer: COMMERCIAL

## 2024-11-18 DIAGNOSIS — M54.50 CHRONIC LOW BACK PAIN, UNSPECIFIED BACK PAIN LATERALITY, UNSPECIFIED WHETHER SCIATICA PRESENT: Primary | ICD-10-CM

## 2024-11-18 DIAGNOSIS — M25.60 DECREASED RANGE OF MOTION: ICD-10-CM

## 2024-11-18 DIAGNOSIS — M62.81 MUSCLE WEAKNESS OF LOWER EXTREMITY: ICD-10-CM

## 2024-11-18 DIAGNOSIS — G89.29 CHRONIC LOW BACK PAIN, UNSPECIFIED BACK PAIN LATERALITY, UNSPECIFIED WHETHER SCIATICA PRESENT: Primary | ICD-10-CM

## 2024-11-18 PROCEDURE — 97110 THERAPEUTIC EXERCISES: CPT

## 2024-11-18 PROCEDURE — 97530 THERAPEUTIC ACTIVITIES: CPT

## 2024-11-18 NOTE — THERAPY TREATMENT NOTE
Outpatient Physical Therapy Ortho Treatment Note  [facility]     Patient Name: Jose Maria Judge  : 1965  MRN: [id number]  Today's Date: 2024      Visit Date: 2024    Visit Dx:    ICD-10-CM ICD-9-CM   1. Chronic low back pain, unspecified back pain laterality, unspecified whether sciatica present  M54.50 724.2    G89.29 338.29   2. Muscle weakness of lower extremity  M62.81 728.87   3. Decreased range of motion  M25.60 719.50       Patient Active Problem List   Diagnosis    Bilateral hip pain    Trochanteric bursitis    Tear of acetabular labrum    Anal burning    Hamstring strain    Hypertension    Neuritis of foot    Rectal pain    Herpes zoster    Varicose veins of bilateral lower extremities with pain    Epigastric pain    Olfaction disorder    Chronic bilateral low back pain    Nausea    Arthropathy of lumbar facet joint    Pudendal neuralgia    Tinnitus    Rib pain    Lymphadenopathy    Shoulder pain    Chronic pain of both feet    Metatarsalgia of both feet    Paresthesia of foot    Vertigo    Varicocele    Tinea corporis    Thoracic back pain    Swelling of structure of right eye    Snoring    Other specified diseases of anus and rectum    Night sweats    Neuropathy    Muscle spasm of back    Miller's metatarsalgia    Mass of axilla    Male pattern alopecia    Low back strain    Irritable bowel syndrome    Intolerant of cold    Insomnia    Gastroesophageal reflux disease    Ganglion    Elevated low density lipoprotein (LDL) cholesterol level    Disorder of gallbladder    Degeneration of lumbar intervertebral disc    Carpal tunnel syndrome    Benign prostatic hyperplasia    Allergic rhinitis    Acute anal fissure    Body aches    Melena    RUQ abdominal pain    Hand arthritis        Past Medical History:   Diagnosis Date    Acid reflux     HX    Anal fistula     Anxiety     R/T PAIN    Arthritis of back     can't remember    Carpal tunnel syndrome, left     DDD (degenerative disc disease),  lumbar     Frozen shoulder     Hemorrhoids     Hodgkin's lymphoma     left axillary adenopathy leading to a biopsy that was nondiagnostic but suspicious and eventually an excisional biopsy that was eventually felt to be consistent with follicular hyperplasia and progressive transformation of germinal centers with 2 foci suspicious for early involvement by nodules lymphocyte predominant Hodgkin's lymphoma.  This was likely treated by excision    Hyperlipidemia     Hyperosmia     Labral tear of hip joint     RIGHT    Low back strain     Male pattern alopecia     Periarthritis of shoulder     need to check file    Postnasal drip     Pudendal neuralgia     Tinnitus of left ear     Trochanteric bursitis 08/25/2016    Varicose vein of leg     left        Past Surgical History:   Procedure Laterality Date    ABSCESS DRAINAGE  08/2012    ANAL FISTULA REPAIR N/A 09/2012, 10/2012    AXILLARY LYMPH NODE BIOPSY/EXCISION Left 11/09/2018    Procedure: AXILLARY LYMPH NODE BIOPSY/EXCISION;  Surgeon: Amando Nguyễn MD;  Location: [facility] OR [facility] [place];  Service: General    COLONOSCOPY N/A 2013    done at [facility]    COLONOSCOPY N/A 07/10/2019    Procedure: COLONOSCOPY to CECUM;  Surgeon: Amando Nguyễn MD;  Location: [facility] ENDOSCOPY;  Service: General    ENDOSCOPY N/A 01/09/2019    Procedure: ESOPHAGOGASTRODUODENOSCOPY;  Surgeon: Amando Nguyễn MD;  Location: [facility] ENDOSCOPY;  Service: General    ENDOSCOPY N/A 03/30/2023    Procedure: ESOPHAGOGASTRODUODENOSCOPY WITH BIOPSY;  Surgeon: Amando Nguyễn MD;  Location: [facility];  Service: Gastroenterology;  Laterality: N/A;  Gastric biopsy; Normal    FINE NEEDLE ASPIRATION Left 08/09/2018    Left axillary lymph node FNA    FLEXIBLE SIGMOIDOSCOPY N/A 06/25/2003    Normal-Dr. Cezar Aviles    HEMORRHOIDECTOMY N/A 1996    HIP ARTHROSCOPY W/ LABRAL REPAIR Right 04/03/2017    Dr. Lonnie Lemons    HIP SURGERY  2017    right labral repair    MEDIAL BRANCH BLOCK Bilateral 03/12/2021     Procedure: BILATERAL MEDIAL BRANCH BLOCK W/MAC;  Surgeon: Francisco Velez MD;  Location: [facility] [place] [facility];  Service: Pain Management;  Laterality: Bilateral;    SIGMOIDOSCOPY N/A 09/23/2020    Procedure: FLEXIBLE SIGMOIDOSCOPY WITH BIOPSY;  Surgeon: Shena Ring MD;  Location: [facility] ENDOSCOPY;  Service: Gastroenterology;  Laterality: N/A;  pre- anal/rectal pain  post- hemorrhoids    TONSILLECTOMY Bilateral                         PT Assessment/Plan       Row Name 11/18/24 1600          PT Assessment    Assessment Comments Worked on approach to exercise and activity, to expect some discomfort as his body is learning to move in ways that he has fear-avoidance response to. He is apprehensive due to severe pain in the past with pudendal nerve neuralgia and worries that abdominal and gluteal activation will exacerbate his symptoms. He has weakness in hip girdle/LEs and core muscles and we are working to strengthen in supported positions and a few unsupported positions when tolerated. Worked on hip hinge and the movement coming from hip v spine; required copiuos cuing. He will benefit from continuing skilled therapy services.  -LISET        PT Plan    PT Plan Comments Assess response to last visit-did he try the mini/partial lunge or supported lunge?, continue to address fear avoidance through small comfortable movements to reinforce ability to move without increasing his pain; introduce movements in sitting or standing  -LISET               User Key  (r) = Recorded By, (t) = Taken By, (c) = Cosigned By      Initials Name Provider Type    Kirstin Chao, PT Physical Therapist                       OP Exercises       Row Name 11/18/24 1400             Subjective    Subjective Comments I missed last visit because my throat was hurting.  -LISET         Subjective Pain    Able to rate subjective pain? yes  -LISET      Pre-Treatment Pain Level 4  -LISET      Post-Treatment Pain Level 5  -LISTE         Total Minutes    78190 -  PT Therapeutic Exercise Minutes 40  -JA      14223 - PT Therapeutic Activity Minutes 13  -JA         Exercise 1    Exercise Name 1 seated swiss ball roll out  -JA      Cueing 1 Verbal  -JA      Reps 1 10  -JA      Additional Comments blue swiss ball  -JA         Exercise 2    Exercise Name 2 LTR  -JA      Reps 2 10  -JA      Time 2 3 sec  -JA         Exercise 3    Exercise Name 3 Piriformis stretch in HL  -JA      Cueing 3 Verbal  -JA      Reps 3 3B  -JA      Time 3 20sec  -JA      Additional Comments used a towel  -JA         Exercise 4    Exercise Name 4 SKC  -JA      Cueing 4 Verbal  -JA      Reps 4 3B  -JA      Time 4 20s  -JA      Additional Comments used a towel  -JA         Exercise 5    Exercise Name 5 Ham 90/90 gentle w/towel  -JA      Reps 5 1 ea  -JA      Time 5 10 DF/PF  -JA      Additional Comments used a towel  -JA         Exercise 6    Exercise Name 6 beginner bridge  -JA      Cueing 6 Verbal;Demo  -JA      Reps 6 skipped due to inc irritation  -JA         Exercise 7    Exercise Name 7 small TA march in HL  -JA      Sets 7 1  -JA      Reps 7 10  -JA         Exercise 8    Exercise Name 8 DKTC  -JA      Reps 8 10  -JA      Additional Comments blue swiss ball  -JA         Exercise 9    Exercise Name 9 HL passive PPT w/ TrA in SMALL range  -JA      Cueing 9 Verbal;Tactile  -JA      Sets 9 1  -JA      Reps 9 15  -JA      Time 9 3s  -JA         Exercise 10    Exercise Name 10 TA in HL, very gentle to avoid engaging pelvic floor  -JA      Reps 10 10  -JA      Time 10 3sec  -JA         Exercise 11    Exercise Name 11 hip hinge  -JA      Reps 11 declined due to increasing irritation by end of session and also fear avoidance  -JA      Time 11 --  -JA         Exercise 12    Exercise Name 12 Discussed working to gradually move outside comfort zone with ther ex and ADLs incrementally and gradually.  -JA         Exercise 13    Exercise Name 13 reviewed standing HA w/YTB  -JA         Exercise 14    Exercise Name 14  demo'd partial/mini lunge and suppported lowering  -LISET      Reps 14 pt to trial at home and be consistent  -LISET                User Key  (r) = Recorded By, (t) = Taken By, (c) = Cosigned By      Initials Name Provider Type    Kirstin Chao, PT Physical Therapist                                     Therapy Education  Education Details: Discussed gradually habituating his lower body to movements such as partial lunge (with end goal of 1/2 kneeling), perform a movement consistently through a week or more to allow improved confidence and it to become more natural and reduce a fear avoidance response.  Given: Symptoms/condition management, Posture/body mechanics, Mobility training  Program: Reinforced  How Provided: Verbal, Demonstration  Provided to: Patient  Level of Understanding: Verbalized, Demonstrated              Time Calculation:   Start Time: 1445  Stop Time: 1540  Time Calculation (min): 55 min  Timed Charges  85795 - PT Therapeutic Exercise Minutes: 40  47111 - PT Therapeutic Activity Minutes: 13  Total Minutes  Timed Charges Total Minutes: 53   Total Minutes: 53  Therapy Charges for Today       Code Description Service Date Service Provider Modifiers Qty    74176921517  PT THER PROC EA 15 MIN 11/18/2024 Kristin Dale, PT GP 3    [id number]  PT THERAPEUTIC ACT EA 15 MIN 11/18/2024 Kirstin Dale, PT GP 1                      Kirstin Dale PT  11/18/2024

## 2024-11-21 ENCOUNTER — HOSPITAL ENCOUNTER (OUTPATIENT)
Dept: PHYSICAL THERAPY | Facility: HOSPITAL | Age: 59
Setting detail: THERAPIES SERIES
Discharge: HOME | End: 2024-11-21
Payer: COMMERCIAL

## 2024-11-21 DIAGNOSIS — M25.60 DECREASED RANGE OF MOTION: ICD-10-CM

## 2024-11-21 DIAGNOSIS — M62.81 MUSCLE WEAKNESS OF LOWER EXTREMITY: ICD-10-CM

## 2024-11-21 DIAGNOSIS — G89.29 CHRONIC LOW BACK PAIN, UNSPECIFIED BACK PAIN LATERALITY, UNSPECIFIED WHETHER SCIATICA PRESENT: Primary | ICD-10-CM

## 2024-11-21 DIAGNOSIS — M54.50 CHRONIC LOW BACK PAIN, UNSPECIFIED BACK PAIN LATERALITY, UNSPECIFIED WHETHER SCIATICA PRESENT: Primary | ICD-10-CM

## 2024-11-21 PROCEDURE — 97110 THERAPEUTIC EXERCISES: CPT

## 2024-11-21 NOTE — THERAPY TREATMENT NOTE
Outpatient Physical Therapy Ortho Treatment Note  [facility]     Patient Name: Jose Maria Judge  : 1965  MRN: [id number]  Today's Date: 2024      Visit Date: 2024    Visit Dx:    ICD-10-CM ICD-9-CM   1. Chronic low back pain, unspecified back pain laterality, unspecified whether sciatica present  M54.50 724.2    G89.29 338.29   2. Muscle weakness of lower extremity  M62.81 728.87   3. Decreased range of motion  M25.60 719.50       Patient Active Problem List   Diagnosis    Bilateral hip pain    Trochanteric bursitis    Tear of acetabular labrum    Anal burning    Hamstring strain    Hypertension    Neuritis of foot    Rectal pain    Herpes zoster    Varicose veins of bilateral lower extremities with pain    Epigastric pain    Olfaction disorder    Chronic bilateral low back pain    Nausea    Arthropathy of lumbar facet joint    Pudendal neuralgia    Tinnitus    Rib pain    Lymphadenopathy    Shoulder pain    Chronic pain of both feet    Metatarsalgia of both feet    Paresthesia of foot    Vertigo    Varicocele    Tinea corporis    Thoracic back pain    Swelling of structure of right eye    Snoring    Other specified diseases of anus and rectum    Night sweats    Neuropathy    Muscle spasm of back    Miller's metatarsalgia    Mass of axilla    Male pattern alopecia    Low back strain    Irritable bowel syndrome    Intolerant of cold    Insomnia    Gastroesophageal reflux disease    Ganglion    Elevated low density lipoprotein (LDL) cholesterol level    Disorder of gallbladder    Degeneration of lumbar intervertebral disc    Carpal tunnel syndrome    Benign prostatic hyperplasia    Allergic rhinitis    Acute anal fissure    Body aches    Melena    RUQ abdominal pain    Hand arthritis        Past Medical History:   Diagnosis Date    Acid reflux     HX    Anal fistula     Anxiety     R/T PAIN    Arthritis of back     can't remember    Carpal tunnel syndrome, left     DDD (degenerative disc disease),  lumbar     Frozen shoulder     Hemorrhoids     Hodgkin's lymphoma     left axillary adenopathy leading to a biopsy that was nondiagnostic but suspicious and eventually an excisional biopsy that was eventually felt to be consistent with follicular hyperplasia and progressive transformation of germinal centers with 2 foci suspicious for early involvement by nodules lymphocyte predominant Hodgkin's lymphoma.  This was likely treated by excision    Hyperlipidemia     Hyperosmia     Labral tear of hip joint     RIGHT    Low back strain     Male pattern alopecia     Periarthritis of shoulder     need to check file    Postnasal drip     Pudendal neuralgia     Tinnitus of left ear     Trochanteric bursitis 08/25/2016    Varicose vein of leg     left        Past Surgical History:   Procedure Laterality Date    ABSCESS DRAINAGE  08/2012    ANAL FISTULA REPAIR N/A 09/2012, 10/2012    AXILLARY LYMPH NODE BIOPSY/EXCISION Left 11/09/2018    Procedure: AXILLARY LYMPH NODE BIOPSY/EXCISION;  Surgeon: Amando Nguyễn MD;  Location: [facility] [place];  Service: General    COLONOSCOPY N/A 2013    done at [facility]    COLONOSCOPY N/A 07/10/2019    Procedure: COLONOSCOPY to CECUM;  Surgeon: Amando Nguyễn MD;  Location: [facility] ENDOSCOPY;  Service: General    ENDOSCOPY N/A 01/09/2019    Procedure: ESOPHAGOGASTRODUODENOSCOPY;  Surgeon: Amando Nguyễn MD;  Location: [facility] ENDOSCOPY;  Service: General    ENDOSCOPY N/A 03/30/2023    Procedure: ESOPHAGOGASTRODUODENOSCOPY WITH BIOPSY;  Surgeon: Amando Nguyễn MD;  Location: [facility];  Service: Gastroenterology;  Laterality: N/A;  Gastric biopsy; Normal    FINE NEEDLE ASPIRATION Left 08/09/2018    Left axillary lymph node FNA    FLEXIBLE SIGMOIDOSCOPY N/A 06/25/2003    Normal-Dr. Cezar Aviles    HEMORRHOIDECTOMY N/A 1996    HIP ARTHROSCOPY W/ LABRAL REPAIR Right 04/03/2017    Dr. Lonnie Lemons    HIP SURGERY  2017    right labral repair    MEDIAL BRANCH BLOCK Bilateral 03/12/2021     Procedure: BILATERAL MEDIAL BRANCH BLOCK W/MAC;  Surgeon: Francisco Velez MD;  Location: [facility] [place]IN OR;  Service: Pain Management;  Laterality: Bilateral;    SIGMOIDOSCOPY N/A 09/23/2020    Procedure: FLEXIBLE SIGMOIDOSCOPY WITH BIOPSY;  Surgeon: Shena Ring MD;  Location: [facility] ENDOSCOPY;  Service: Gastroenterology;  Laterality: N/A;  pre- anal/rectal pain  post- hemorrhoids    TONSILLECTOMY Bilateral                         PT Assessment/Plan       Row Name 11/21/24 1600          PT Assessment    Assessment Comments Jose Maria demonstrates slowly improving hip hinge v lumbar extension however requires copious cuing. Progressed with standing resisted TA LAYNE today and he will add resisted row at home, both 2x/wk. During ther ex he noted fatigue in quads and we discussed importance of strengthening to improve strength/tolerance. He will benefit from continuing skilled therapy serives.  -JA        PT Plan    PT Plan Comments assess resposne to last visit, did he do TA HA w/YTB and ROWs; consider STS with TA and goblet hold, cont to encourage increased activity  -LISET               User Key  (r) = Recorded By, (t) = Taken By, (c) = Cosigned By      Initials Name Provider Type    Kirstin Chao, PT Physical Therapist                       OP Exercises       Row Name 11/21/24 1500             Subjective    Subjective Comments not sure I'm feeling the strengthening improvement like I did.  -JA         Subjective Pain    Able to rate subjective pain? yes  -JA      Pre-Treatment Pain Level 4  -JA         Total Minutes    71850 - PT Therapeutic Exercise Minutes 40  -JA         Exercise 1    Exercise Name 1 seated swiss ball roll out  -JA      Cueing 1 Verbal  -JA      Sets 1 2  -JA      Reps 1 10  -JA      Additional Comments blue swiss ball  -JA         Exercise 2    Exercise Name 2 LTR  -JA      Reps 2 10  -JA      Time 2 3 sec  -JA      Additional Comments tried with SB however irritated sx  -JA          Exercise 3    Exercise Name 3 Piriformis stretch in HL  -JA      Cueing 3 Verbal  -JA      Reps 3 3B  -JA      Time 3 20sec  -JA      Additional Comments towel  -JA         Exercise 4    Exercise Name 4 SKC  -JA      Cueing 4 Verbal  -JA      Reps 4 3B  -JA      Time 4 20s  -JA         Exercise 5    Exercise Name 5 Ham 90/90 gentle w/towel  -JA      Reps 5 3ea  -JA      Time 5 10 DF/PF  -JA         Exercise 6    Exercise Name 6 beginner bridge  -JA      Cueing 6 Verbal;Demo  -JA      Reps 6 skipped due to inc irritation  -JA         Exercise 7    Exercise Name 7 small TA march in HL  -JA      Sets 7 --  -JA      Reps 7 resume next visit  -JA         Exercise 8    Exercise Name 8 DKTC  -JA      Reps 8 resume next visit  -JA      Additional Comments --  -JA         Exercise 9    Exercise Name 9 HL passive PPT w/ TrA in SMALL range  -JA      Cueing 9 Verbal;Tactile  -JA      Sets 9 1  -JA      Reps 9 15  -JA      Time 9 3s  -JA         Exercise 10    Exercise Name 10 TA in HL, very gentle to avoid engaging pelvic floor  -JA      Reps 10 10  -JA      Time 10 3sec  -JA         Exercise 11    Exercise Name 11 hip hinge  -JA      Reps 11 declined due to increasing irritation by end of session and also fear avoidance  -JA         Exercise 12    Exercise Name 12 add resisted rows  -JA      Reps 12 discussion only, pt familiar and  will start at home  -JA         Exercise 13    Exercise Name 13 reviewed standing HA w/YTB  -JA      Sets 13 1  -JA      Reps 13 10  -JA         Exercise 14    Exercise Name 14 demo'd partial/mini lunge and suppported lowering  -JA      Reps 14 pt to trial at home and be consistent  -                User Key  (r) = Recorded By, (t) = Taken By, (c) = Cosigned By      Initials Name Provider Type    Kirstin Chao, PT Physical Therapist                                  PT OP Goals       Row Name 11/21/24 1600          PT Short Term Goals    STG Date to Achieve 09/18/24  -LISET     STG 1 Patient  will be independent and compliant with initial HEP  -     STG 1 Progress Met  -     STG 2 Pt will demonstrate heel strike to toe off gait to improve jerri.  -     STG 2 Progress Progressing  -     STG 3 Pt will demonstrate increased LB and hip ROM/flexibility to facilitate ease of donning socks and shoes.  -     STG 3 Progress Progressing  -        Long Term Goals    LTG Date to Achieve 10/18/24  -JA     LTG 1 Pt will be independent with advanced HEP for hip girdle/LE and core strengthening and hip flexibility.  -JA     LTG 1 Progress Ongoing  -     LTG 2 Pt will demonstrate improved ease of sit to stand with minimal to no pain and without assist of UEs.  -     LTG 2 Progress Progressing  -     LTG 3 Pt will be able to ascend/descend stairs with normal reciprocal gait.  -     LTG 3 Progress New  -     LTG 3 Progress Comments NT  -     LTG 4 Patient will demonstrate functional trunk range of motion enabling him to perform regular ADLs of dressing and household chores.  -     LTG 4 Progress New  -     LTG 4 Progress Comments NT  -     LTG 5 Pt will score 54% on ALANAN indicating decrease in perceived functional disability.  -     LTG 5 Progress New  -     LTG 5 Progress Comments NT  -               User Key  (r) = Recorded By, (t) = Taken By, (c) = Cosigned By      Initials Name Provider Type    Kirstin Chao, PT Physical Therapist                                   Time Calculation:   Start Time: 1450  Stop Time: 1533  Time Calculation (min): 43 min  Timed Charges  88992 - PT Therapeutic Exercise Minutes: 40  Total Minutes  Timed Charges Total Minutes: 40   Total Minutes: 40  Therapy Charges for Today       Code Description Service Date Service Provider Modifiers Qty    [id number] HC PT THER PROC EA 15 MIN 11/21/2024 Kirstin Dale, PT GP 3                      Kirstin Dale PT  11/21/2024

## 2024-11-25 ENCOUNTER — HOSPITAL ENCOUNTER (OUTPATIENT)
Dept: PHYSICAL THERAPY | Facility: HOSPITAL | Age: 59
Setting detail: THERAPIES SERIES
Discharge: HOME | End: 2024-11-25
Payer: COMMERCIAL

## 2024-11-25 DIAGNOSIS — M62.81 MUSCLE WEAKNESS OF LOWER EXTREMITY: ICD-10-CM

## 2024-11-25 DIAGNOSIS — M54.50 CHRONIC LOW BACK PAIN, UNSPECIFIED BACK PAIN LATERALITY, UNSPECIFIED WHETHER SCIATICA PRESENT: Primary | ICD-10-CM

## 2024-11-25 DIAGNOSIS — M25.60 DECREASED RANGE OF MOTION: ICD-10-CM

## 2024-11-25 DIAGNOSIS — G89.29 CHRONIC LOW BACK PAIN, UNSPECIFIED BACK PAIN LATERALITY, UNSPECIFIED WHETHER SCIATICA PRESENT: Primary | ICD-10-CM

## 2024-11-25 PROCEDURE — 97110 THERAPEUTIC EXERCISES: CPT

## 2024-11-25 NOTE — THERAPY TREATMENT NOTE
Outpatient Physical Therapy Ortho Treatment Note  [facility]     Patient Name: Jose Maria Judge  : 1965  MRN: [id number]  Today's Date: 2024      Visit Date: 2024    Visit Dx:    ICD-10-CM ICD-9-CM   1. Chronic low back pain, unspecified back pain laterality, unspecified whether sciatica present  M54.50 724.2    G89.29 338.29   2. Muscle weakness of lower extremity  M62.81 728.87   3. Decreased range of motion  M25.60 719.50       Patient Active Problem List   Diagnosis    Bilateral hip pain    Trochanteric bursitis    Tear of acetabular labrum    Anal burning    Hamstring strain    Hypertension    Neuritis of foot    Rectal pain    Herpes zoster    Varicose veins of bilateral lower extremities with pain    Epigastric pain    Olfaction disorder    Chronic bilateral low back pain    Nausea    Arthropathy of lumbar facet joint    Pudendal neuralgia    Tinnitus    Rib pain    Lymphadenopathy    Shoulder pain    Chronic pain of both feet    Metatarsalgia of both feet    Paresthesia of foot    Vertigo    Varicocele    Tinea corporis    Thoracic back pain    Swelling of structure of right eye    Snoring    Other specified diseases of anus and rectum    Night sweats    Neuropathy    Muscle spasm of back    Miller's metatarsalgia    Mass of axilla    Male pattern alopecia    Low back strain    Irritable bowel syndrome    Intolerant of cold    Insomnia    Gastroesophageal reflux disease    Ganglion    Elevated low density lipoprotein (LDL) cholesterol level    Disorder of gallbladder    Degeneration of lumbar intervertebral disc    Carpal tunnel syndrome    Benign prostatic hyperplasia    Allergic rhinitis    Acute anal fissure    Body aches    Melena    RUQ abdominal pain    Hand arthritis        Past Medical History:   Diagnosis Date    Acid reflux     HX    Anal fistula     Anxiety     R/T PAIN    Arthritis of back     can't remember    Carpal tunnel syndrome, left     DDD (degenerative disc disease),  lumbar     Frozen shoulder     Hemorrhoids     Hodgkin's lymphoma     left axillary adenopathy leading to a biopsy that was nondiagnostic but suspicious and eventually an excisional biopsy that was eventually felt to be consistent with follicular hyperplasia and progressive transformation of germinal centers with 2 foci suspicious for early involvement by nodules lymphocyte predominant Hodgkin's lymphoma.  This was likely treated by excision    Hyperlipidemia     Hyperosmia     Labral tear of hip joint     RIGHT    Low back strain     Male pattern alopecia     Periarthritis of shoulder     need to check file    Postnasal drip     Pudendal neuralgia     Tinnitus of left ear     Trochanteric bursitis 08/25/2016    Varicose vein of leg     left        Past Surgical History:   Procedure Laterality Date    ABSCESS DRAINAGE  08/2012    ANAL FISTULA REPAIR N/A 09/2012, 10/2012    AXILLARY LYMPH NODE BIOPSY/EXCISION Left 11/09/2018    Procedure: AXILLARY LYMPH NODE BIOPSY/EXCISION;  Surgeon: Amando Nguyễn MD;  [place] [facility] [place] [facility] [place]: [place]    COLONOSCOPY N/A 2013    done at [facility]    COLONOSCOPY N/A 07/10/2019    Procedure: COLONOSCOPY to CECUM;  Surgeon: Amando Nguyễn MD;  Location: [facility] ENDOSCOPY;  Service: General    ENDOSCOPY N/A 01/09/2019    Procedure: ESOPHAGOGASTRODUODENOSCOPY;  Surgeon: Amando Nguyễn MD;  Location: [facility] ENDOSCOPY;  Service: General    ENDOSCOPY N/A 03/30/2023    Procedure: ESOPHAGOGASTRODUODENOSCOPY WITH BIOPSY;  Surgeon: Amando Nguyễn MD;  Location: [facility];  Service: Gastroenterology;  Laterality: N/A;  Gastric biopsy; Normal    FINE NEEDLE ASPIRATION Left 08/09/2018    Left axillary lymph node FNA    FLEXIBLE SIGMOIDOSCOPY N/A 06/25/2003    Normal-Dr. Cezar Aviles    HEMORRHOIDECTOMY N/A 1996    HIP ARTHROSCOPY W/ LABRAL REPAIR Right 04/03/2017    Dr. Lonnie Lemons    HIP SURGERY  2017    right labral repair    MEDIAL BRANCH BLOCK Bilateral 03/12/2021     Procedure: BILATERAL MEDIAL BRANCH BLOCK W/MAC;  Surgeon: Francisco Velez MD;  [place] [facility] [place]  Service: Pain Management;  Laterality: Bilateral;    SIGMOIDOSCOPY N/A 09/23/2020    Procedure: FLEXIBLE SIGMOIDOSCOPY WITH BIOPSY;  Surgeon: Shena Ring MD;  Location: [facility] ENDOSCOPY;  Service: Gastroenterology;  Laterality: N/A;  pre- anal/rectal pain  post- hemorrhoids    TONSILLECTOMY Bilateral                         PT Assessment/Plan       Row Name 11/25/24 1700          PT Assessment    Assessment Comments Jose Maria reports limited follow through with ther ex due to fear-avoidance. We focused on body mechanics of transitional movements with cuing required and with functional mobility (step up and STS). At times he was able to perform correctly however no more than 2 consecutive reps in a row. Jose Maria did note when he performed the movements correctly he had minimal to no increase in pain. He will benefit from continuing skilled therapy services.  -JA        PT Plan    PT Plan Comments assess response to last visit, assess for carryover with STS (for hip hinge), step up, and backward walking), cont with functional mobility training and consider qped as well as poss floor transfers  -JA               User Key  (r) = Recorded By, (t) = Taken By, (c) = Cosigned By      Initials Name Provider Type    Kirstin Chao, PT Physical Therapist                       OP Exercises       Row Name 11/25/24 1400             Subjective    Subjective Comments I wonder if it is my bed (causing pain to stay about the same).  -JA         Subjective Pain    Able to rate subjective pain? yes  -JA      Pre-Treatment Pain Level 5  -JA         Total Minutes    72258 - PT Therapeutic Exercise Minutes 40  -JA         Exercise 1    Exercise Name 1 seated swiss ball roll out  -JA      Cueing 1 Verbal  -JA      Sets 1 2  -JA      Reps 1 10  -JA      Time 1 blue swiss ball  -JA      Additional Comments HOLD for now  after doing today--not  able to perform w/o irritating LB  -JA         Exercise 2    Exercise Name 2 hands on barre and walk feet backward working to hip hinge  -JA      Reps 2 5  -JA      Time 2 3 sec  -JA      Additional Comments HOLD/DC not able to perform hip hinge without extending or flexing spine  -JA         Exercise 3    Exercise Name 3 Piriformis stretch in HL  -JA      Cueing 3 Verbal  -JA      Reps 3 3B  -JA      Time 3 20sec  -JA      Additional Comments performed w/o towel  -JA         Exercise 4    Exercise Name 4 SKC  -JA      Cueing 4 Verbal  -JA      Reps 4 3B  -JA      Time 4 20s  -JA      Additional Comments performed w/o towel  -JA         Exercise 5    Exercise Name 5 Ham 90/90 gentle w/towel  -JA      Reps 5 3ea  -JA      Time 5 10 DF/PF  -JA         Exercise 6    Exercise Name 6 HL passive PPT w/ TrA in SMALL range  -JA      Cueing 6 Verbal;Demo  -JA      Reps 6 10  -JA      Time 6 3sec  -JA         Exercise 7    Exercise Name 7 small TA march in HL  -JA      Reps 7 resume next visit  -JA         Exercise 8    Exercise Name 8 Hip hinge: mix of STS at elevated mat table and partial squat at mirror  -JA      Sets 8 3  -JA      Reps 8 10  -JA      Additional Comments performed multiple reps with slowly improving understanding of movement from hips and not spine  -JA         Exercise 9    Exercise Name 9 reverse walk  -JA      Cueing 9 --  -JA      Sets 9 --  -JA      Reps 9 3 laps down and back  -JA      Time 9 --  -JA         Exercise 10    Exercise Name 10 step up  -JA      Reps 10 5 reps leading with R and then w/L  -JA      Time 10 --  -JA         Exercise 11    Exercise Name 11 log roll  -JA      Reps 11 2x, cues req  -JA         Exercise 12    Exercise Name 12 add resisted rows  -JA      Reps 12 next visit  -JA         Exercise 13    Exercise Name 13 --  -JA      Sets 13 --  -JA      Reps 13 --  -JA         Exercise 14    Exercise Name 14 --  -JA      Reps 14 --  -JA                User  Key  (r) = Recorded By, (t) = Taken By, (c) = Cosigned By      Initials Name Provider Type    Kirstin Chao, PT Physical Therapist                                  PT OP Goals       Row Name 11/25/24 1800          PT Short Term Goals    STG Date to Achieve 09/18/24  -     STG 1 Patient will be independent and compliant with initial HEP  -     STG 1 Progress Met  -     STG 2 Pt will demonstrate heel strike to toe off gait to improve jerri.  -LISET     STG 2 Progress Progressing  -LISET     STG 2 Progress Comments improving for short distances  -     STG 3 Pt will demonstrate increased LB and hip ROM/flexibility to facilitate ease of donning socks and shoes.  -     STG 3 Progress Progressing  -     STG 3 Progress Comments improving technique for hip rotation stretches  -        Long Term Goals    LTG Date to Achieve 10/18/24  -LISET     LTG 1 Pt will be independent with advanced HEP for hip girdle/LE and core strengthening and hip flexibility.  -     LTG 1 Progress Ongoing  -     LTG 2 Pt will demonstrate improved ease of sit to stand with minimal to no pain and without assist of UEs.  -     LTG 2 Progress Progressing  -     LTG 3 Pt will be able to ascend/descend stairs with normal reciprocal gait.  -     LTG 3 Progress New  -     LTG 4 Patient will demonstrate functional trunk range of motion enabling him to perform regular ADLs of dressing and household chores.  -     LTG 4 Progress New  -     LTG 5 Pt will score 54% on ALANNA indicating decrease in perceived functional disability.  -     LTG 5 Progress New  -               User Key  (r) = Recorded By, (t) = Taken By, (c) = Cosigned By      Initials Name Provider Type    Kirstin Chao PT Physical Therapist                    Therapy Education  Education Details: [id number]  STS/partial squats, backward walking, and forward step ups; encouraged beginning in small comfortable range to develop coordination and reduce protective  muscle guarding (fear-avoidance)  Given: HEP, Posture/body mechanics, Mobility training, Pain management  Program: New  How Provided: Verbal, Demonstration, Written  Provided to: Patient  Level of Understanding: Verbalized, Demonstrated              Time Calculation:   Start Time: 1447  Stop Time: 1530  Time Calculation (min): 43 min  Timed Charges  47163 - PT Therapeutic Exercise Minutes: 40  Total Minutes  Timed Charges Total Minutes: 40   Total Minutes: 40  Therapy Charges for Today       Code Description Service Date Service Provider Modifiers Qty    79996899789  PT THER PROC EA 15 MIN 11/25/2024 Kirstin Dale, PT GP 3                      Kirstin Dale, PT  11/25/2024

## 2024-11-27 ENCOUNTER — APPOINTMENT (OUTPATIENT)
Dept: PHYSICAL THERAPY | Facility: HOSPITAL | Age: 59
End: 2024-11-27
Payer: COMMERCIAL

## 2024-12-12 ENCOUNTER — HOSPITAL ENCOUNTER (OUTPATIENT)
Dept: PHYSICAL THERAPY | Facility: HOSPITAL | Age: 59
Setting detail: THERAPIES SERIES
Discharge: HOME | End: 2024-12-12
Payer: COMMERCIAL

## 2024-12-12 DIAGNOSIS — M62.81 MUSCLE WEAKNESS OF LOWER EXTREMITY: ICD-10-CM

## 2024-12-12 DIAGNOSIS — M25.60 DECREASED RANGE OF MOTION: ICD-10-CM

## 2024-12-12 DIAGNOSIS — G89.29 CHRONIC LOW BACK PAIN, UNSPECIFIED BACK PAIN LATERALITY, UNSPECIFIED WHETHER SCIATICA PRESENT: Primary | ICD-10-CM

## 2024-12-12 DIAGNOSIS — M54.50 CHRONIC LOW BACK PAIN, UNSPECIFIED BACK PAIN LATERALITY, UNSPECIFIED WHETHER SCIATICA PRESENT: Primary | ICD-10-CM

## 2024-12-12 PROCEDURE — 97110 THERAPEUTIC EXERCISES: CPT

## 2024-12-12 NOTE — THERAPY PROGRESS REPORT/RE-CERT
Outpatient Physical Therapy Ortho Progress Note  [facility]     Patient Name: Jose Maria Judge  : 1965  MRN: [id number]  Today's Date: 2024      Visit Date: 2024    Patient Active Problem List   Diagnosis    Bilateral hip pain    Trochanteric bursitis    Tear of acetabular labrum    Anal burning    Hamstring strain    Hypertension    Neuritis of foot    Rectal pain    Herpes zoster    Varicose veins of bilateral lower extremities with pain    Epigastric pain    Olfaction disorder    Chronic bilateral low back pain    Nausea    Arthropathy of lumbar facet joint    Pudendal neuralgia    Tinnitus    Rib pain    Lymphadenopathy    Shoulder pain    Chronic pain of both feet    Metatarsalgia of both feet    Paresthesia of foot    Vertigo    Varicocele    Tinea corporis    Thoracic back pain    Swelling of structure of right eye    Snoring    Other specified diseases of anus and rectum    Night sweats    Neuropathy    Muscle spasm of back    Miller's metatarsalgia    Mass of axilla    Male pattern alopecia    Low back strain    Irritable bowel syndrome    Intolerant of cold    Insomnia    Gastroesophageal reflux disease    Ganglion    Elevated low density lipoprotein (LDL) cholesterol level    Disorder of gallbladder    Degeneration of lumbar intervertebral disc    Carpal tunnel syndrome    Benign prostatic hyperplasia    Allergic rhinitis    Acute anal fissure    Body aches    Melena    RUQ abdominal pain    Hand arthritis        Past Medical History:   Diagnosis Date    Acid reflux     HX    Anal fistula     Anxiety     R/T PAIN    Arthritis of back     can't remember    Carpal tunnel syndrome, left     DDD (degenerative disc disease), lumbar     Frozen shoulder     Hemorrhoids     Hodgkin's lymphoma     left axillary adenopathy leading to a biopsy that was nondiagnostic but suspicious and eventually an excisional biopsy that was eventually felt to be consistent with follicular hyperplasia and  progressive transformation of germinal centers with 2 foci suspicious for early involvement by nodules lymphocyte predominant Hodgkin's lymphoma.  This was likely treated by excision    Hyperlipidemia     Hyperosmia     Labral tear of hip joint     RIGHT    Low back strain     Male pattern alopecia     Periarthritis of shoulder     need to check file    Postnasal drip     Pudendal neuralgia     Tinnitus of left ear     Trochanteric bursitis 08/25/2016    Varicose vein of leg     left        Past Surgical History:   Procedure Laterality Date    ABSCESS DRAINAGE  08/2012    ANAL FISTULA REPAIR N/A 09/2012, 10/2012    AXILLARY LYMPH NODE BIOPSY/EXCISION Left 11/09/2018    Procedure: AXILLARY LYMPH NODE BIOPSY/EXCISION;  Surgeon: Amando Nguyễn MD;  [facility];  Service: General    COLONOSCOPY N/A 2013    done at [facility]    COLONOSCOPY N/A 07/10/2019    Procedure: COLONOSCOPY to CECUM;  Surgeon: Amando Nguyễn MD;  [facility];  Service: General    ENDOSCOPY N/A 01/09/2019    Procedure: ESOPHAGOGASTRODUODENOSCOPY;  Surgeon: Amando Nguyễn MD;  [facility];  Service: General    ENDOSCOPY N/A 03/30/2023    Procedure: ESOPHAGOGASTRODUODENOSCOPY WITH BIOPSY;  Surgeon: Amando Nguyễn MD;  Location: [facility] OR;  Service: Gastroenterology;  Laterality: N/A;  Gastric biopsy; Normal    FINE NEEDLE ASPIRATION Left 08/09/2018    Left axillary lymph node FNA    FLEXIBLE SIGMOIDOSCOPY N/A 06/25/2003    Normal-Dr. Cezar Aviles    HEMORRHOIDECTOMY N/A 1996    HIP ARTHROSCOPY W/ LABRAL REPAIR Right 04/03/2017    Dr. Lonnie Lemons    HIP SURGERY  2017    right labral repair    MEDIAL BRANCH BLOCK Bilateral 03/12/2021    Procedure: BILATERAL MEDIAL BRANCH BLOCK W/MAC;  Surgeon: Francisco Velez MD;  [place] [facility] [place];  Service: Pain Management;  Laterality: Bilateral;    SIGMOIDOSCOPY N/A 09/23/2020    Procedure: FLEXIBLE SIGMOIDOSCOPY WITH BIOPSY;  Surgeon: María Elena  Shena ORELLANA MD;  Location: [facility] ENDOSCOPY;  Service: Gastroenterology;  Laterality: N/A;  pre- anal/rectal pain  post- hemorrhoids    TONSILLECTOMY Bilateral        Visit Dx:     ICD-10-CM ICD-9-CM   1. Chronic low back pain, unspecified back pain laterality, unspecified whether sciatica present  M54.50 724.2    G89.29 338.29   2. Muscle weakness of lower extremity  M62.81 728.87   3. Decreased range of motion  M25.60 719.50              PT Ortho       Row Name 12/12/24 1400       Myotomal Screen- Lower Quarter Clearing    Hip flexion (L2) Right:;3+ (Fair +);Left:;4 (Good)  Right side was pain limited  -JA       MMT Right Lower Ext    Rt Hip Flexion MMT, Gross Movement (4/5) good  -JA    Rt Hip Internal (Medial) Rotation MMT, Gross Movement (4/5) good  -JA    Rt Hip External (Lateral) Rotation MMT, Gross Movement (4-/5) good minus  -JA       MMT Left Lower Ext    Lt Hip Flexion MMT, Gross Movement (3+/5) fair plus  -JA    Lt Hip Internal (Medial) Rotation MMT, Gross Movement (4-/5) good minus  -JA    Lt Hip External (Lateral) Rotation MMT, Gross Movement (3+/5) fair plus  -JA              User Key  (r) = Recorded By, (t) = Taken By, (c) = Cosigned By      Initials Name Provider Type    Kirstin Chao, PT Physical Therapist                                       PT OP Goals       Row Name 12/12/24 1400          PT Short Term Goals    STG Date to Achieve 09/18/24  -LISET     STG 1 Patient will be independent and compliant with initial HEP  -LISET     STG 1 Progress Met  -LISET     STG 2 Pt will demonstrate heel strike to toe off gait to improve jerri.  -LISET     STG 2 Progress Progressing;Partially Met  -LISET     STG 3 Pt will demonstrate increased LB and hip ROM/flexibility to facilitate ease of donning socks and shoes.  -LISET     STG 3 Progress Progressing  -LISET        Long Term Goals    LTG Date to Achieve 10/18/24  -LISET     LTG 1 Pt will be independent with advanced HEP for hip girdle/LE and core strengthening and hip  flexibility.  -LISET     LTG 1 Progress Progressing  -LISET     LTG 2 Pt will demonstrate improved ease of sit to stand with minimal to no pain and without assist of UEs.  -LISET     LTG 2 Progress Partially Met  -LISET     LTG 3 Pt will be able to ascend/descend stairs with normal reciprocal gait.  -LISET     LTG 3 Progress Ongoing  -LISET     LTG 4 Patient will demonstrate functional trunk range of motion enabling him to perform regular ADLs of dressing and household chores.  -LISET     LTG 4 Progress Ongoing  -LISET     LTG 5 Pt will score 54% on ALANNA indicating decrease in perceived functional disability.  -LISET     LTG 5 Progress Ongoing  -LISET               User Key  (r) = Recorded By, (t) = Taken By, (c) = Cosigned By      Initials Name Provider Type    Kirstin Chao, PT Physical Therapist                     PT Assessment/Plan       Row Name 12/12/24 1600          PT Assessment    Assessment Comments Jose Maria Judge has been seen for a total of 10 visits for evaluation and treatment of chronic bilateral lumbar pain exacerbation that began in May of this year when he moved a stackable washer and dryer.  He presents today with complaints of gradually increasing right hip pain, he has a history of labral tear and surgery.  He noted pain and hip flexor with testing of seated hip flexion, pain limited to 3+/5.  We modified his HEP to reduce any increased strain right hip.  Progress toward goals is slow, 2 of 3 STGs met/partially met, 1 of 5 LTGs met.  He will benefit from continuing skilled therapy services.  -LISET        PT Plan    PT Plan Comments Assess response to modification of HEP, how is right hip flexor pain?  -LISET               User Key  (r) = Recorded By, (t) = Taken By, (c) = Cosigned By      Initials Name Provider Type    Kirstin Chao, PT Physical Therapist                       OP Exercises       Row Name 12/12/24 1400             Subjective    Subjective Comments My low back pain is 7 out of 10 when I first wake  up but after about 30 minutes of moving around it decreases to 1-2. I have noticed my right hip has been bothering me more in the last 2 to 3 months. That is the hip I had the labral surgery on.  -JA         Subjective Pain    Able to rate subjective pain? yes  -JA      Pre-Treatment Pain Level 5  -JA         Total Minutes    26665 - PT Therapeutic Exercise Minutes 40  -JA         Exercise 1    Exercise Name 1 seated swiss ball roll out  -JA      Cueing 1 Verbal  -JA      Sets 1 2  -JA      Reps 1 10  -JA      Time 1 blue swiss ball  -JA         Exercise 2    Exercise Name 2 side stepping  -JA      Sets 2 3 down/back  -JA      Reps 2 5' distance  -JA         Exercise 3    Exercise Name 3 Piriformis stretch in HL  -JA      Cueing 3 Verbal  -JA      Sets 3 held for now due to c/o R hip increased irritation  -JA      Reps 3 --  -JA      Time 3 --  -JA         Exercise 4    Exercise Name 4 SKC  -JA      Cueing 4 Verbal  -JA      Reps 4 3B  -JA      Time 4 20s  -JA         Exercise 5    Exercise Name 5 Ham 90/90 gentle  -JA      Reps 5 3ea  -JA      Time 5 10 DF/PF  -JA         Exercise 6    Exercise Name 6 HL passive PPT w/ TrA in SMALL range  -JA      Cueing 6 Verbal;Demo  -JA      Reps 6 10  -JA      Time 6 3sec  -JA         Exercise 7    Exercise Name 7 small TA march in HL  -JA      Sets 7 2  -JA      Reps 7 10  -JA      Additional Comments pt didn't feel ready to add opposite UEs  -JA         Exercise 8    Exercise Name 8 side stepping  -JA      Sets 8 3  -JA      Reps 8 5 feet  -JA         Exercise 9    Exercise Name 9 reverse walk  -JA      Reps 9 3 laps down and back  -JA      Time 9 10 feet  -JA         Exercise 10    Exercise Name 10 step up  -JA      Reps 10 5 reps leading with R and then w/L; added 5 extra on L due to weakness  -JA         Exercise 11    Exercise Name 11 STS  -JA      Cueing 11 Verbal;Demo  -JA      Reps 11 11  -JA      Additional Comments elevated mat  -JA         Exercise 12    Exercise  Name 12 add resisted rows  -LISET      Reps 12 next visit  -LISET         Exercise 13    Exercise Name 13 discussed stopping LTR at home to see whether his R hip is less irritable  -LISET                User Key  (r) = Recorded By, (t) = Taken By, (c) = Cosigned By      Initials Name Provider Type    Kirstin Chao, PT Physical Therapist                                            Time Calculation:     Start Time: 1400  Stop Time: 1445  Time Calculation (min): 45 min  Timed Charges  59951 - PT Therapeutic Exercise Minutes: 40  Total Minutes  Timed Charges Total Minutes: 40   Total Minutes: 40     Therapy Charges for Today       Code Description Service Date Service Provider Modifiers Qty    20476375307 HC PT THER PROC EA 15 MIN 12/12/2024 Kirstin Dale, PT GP 3                      Kirstin Dale PT  12/12/2024

## 2024-12-16 ENCOUNTER — DOCUMENTATION (OUTPATIENT)
Dept: FAMILY MEDICINE CLINIC | Facility: CLINIC | Age: 59
End: 2024-12-16
Payer: COMMERCIAL

## 2024-12-16 ENCOUNTER — HOSPITAL ENCOUNTER (OUTPATIENT)
Dept: PHYSICAL THERAPY | Facility: HOSPITAL | Age: 59
Setting detail: THERAPIES SERIES
Discharge: HOME | End: 2024-12-16
Payer: COMMERCIAL

## 2024-12-16 DIAGNOSIS — G89.29 CHRONIC LOW BACK PAIN, UNSPECIFIED BACK PAIN LATERALITY, UNSPECIFIED WHETHER SCIATICA PRESENT: Primary | ICD-10-CM

## 2024-12-16 DIAGNOSIS — M62.81 MUSCLE WEAKNESS OF LOWER EXTREMITY: ICD-10-CM

## 2024-12-16 DIAGNOSIS — M25.60 DECREASED RANGE OF MOTION: ICD-10-CM

## 2024-12-16 DIAGNOSIS — M54.50 CHRONIC LOW BACK PAIN, UNSPECIFIED BACK PAIN LATERALITY, UNSPECIFIED WHETHER SCIATICA PRESENT: Primary | ICD-10-CM

## 2024-12-16 PROCEDURE — 97110 THERAPEUTIC EXERCISES: CPT

## 2024-12-17 NOTE — THERAPY TREATMENT NOTE
Outpatient Physical Therapy Ortho Treatment Note  [facility]     Patient Name: Jose Maria Judge  : 1965  MRN: [id number]  Today's Date: 2024      Visit Date: 2024    Visit Dx:    ICD-10-CM ICD-9-CM   1. Chronic low back pain, unspecified back pain laterality, unspecified whether sciatica present  M54.50 724.2    G89.29 338.29   2. Muscle weakness of lower extremity  M62.81 728.87   3. Decreased range of motion  M25.60 719.50       Patient Active Problem List   Diagnosis    Bilateral hip pain    Trochanteric bursitis    Tear of acetabular labrum    Anal burning    Hamstring strain    Hypertension    Neuritis of foot    Rectal pain    Herpes zoster    Varicose veins of bilateral lower extremities with pain    Epigastric pain    Olfaction disorder    Chronic bilateral low back pain    Nausea    Arthropathy of lumbar facet joint    Pudendal neuralgia    Tinnitus    Rib pain    Lymphadenopathy    Shoulder pain    Chronic pain of both feet    Metatarsalgia of both feet    Paresthesia of foot    Vertigo    Varicocele    Tinea corporis    Thoracic back pain    Swelling of structure of right eye    Snoring    Other specified diseases of anus and rectum    Night sweats    Neuropathy    Muscle spasm of back    Miller's metatarsalgia    Mass of axilla    Male pattern alopecia    Low back strain    Irritable bowel syndrome    Intolerant of cold    Insomnia    Gastroesophageal reflux disease    Ganglion    Elevated low density lipoprotein (LDL) cholesterol level    Disorder of gallbladder    Degeneration of lumbar intervertebral disc    Carpal tunnel syndrome    Benign prostatic hyperplasia    Allergic rhinitis    Acute anal fissure    Body aches    Melena    RUQ abdominal pain    Hand arthritis        Past Medical History:   Diagnosis Date    Acid reflux     HX    Anal fistula     Anxiety     R/T PAIN    Arthritis of back     can't remember    Carpal tunnel syndrome, left     DDD (degenerative disc disease),  lumbar     Frozen shoulder     Hemorrhoids     Hodgkin's lymphoma     left axillary adenopathy leading to a biopsy that was nondiagnostic but suspicious and eventually an excisional biopsy that was eventually felt to be consistent with follicular hyperplasia and progressive transformation of germinal centers with 2 foci suspicious for early involvement by nodules lymphocyte predominant Hodgkin's lymphoma.  This was likely treated by excision    Hyperlipidemia     Hyperosmia     Labral tear of hip joint     RIGHT    Low back strain     Male pattern alopecia     Periarthritis of shoulder     need to check file    Postnasal drip     Pudendal neuralgia     Tinnitus of left ear     Trochanteric bursitis 08/25/2016    Varicose vein of leg     left        Past Surgical History:   Procedure Laterality Date    ABSCESS DRAINAGE  08/2012    ANAL FISTULA REPAIR N/A 09/2012, 10/2012    AXILLARY LYMPH NODE BIOPSY/EXCISION Left 11/09/2018    Procedure: AXILLARY LYMPH NODE BIOPSY/EXCISION;  Surgeon: Amando Nguyễn MD;  [place] [facility] [place] [facility] [place]: General    COLONOSCOPY N/A 2013    done at [facility]    COLONOSCOPY N/A 07/10/2019    Procedure: COLONOSCOPY to CECUM;  Surgeon: Amando Nguyễn MD;  Location: [facility] ENDOSCOPY;  Service: General    ENDOSCOPY N/A 01/09/2019    Procedure: ESOPHAGOGASTRODUODENOSCOPY;  Surgeon: Amando Nguyễn MD;  Location: [facility] ENDOSCOPY;  Service: General    ENDOSCOPY N/A 03/30/2023    Procedure: ESOPHAGOGASTRODUODENOSCOPY WITH BIOPSY;  Surgeon: Amando Nguyễn MD;  Location: [facility];  Service: Gastroenterology;  Laterality: N/A;  Gastric biopsy; Normal    FINE NEEDLE ASPIRATION Left 08/09/2018    Left axillary lymph node FNA    FLEXIBLE SIGMOIDOSCOPY N/A 06/25/2003    Normal-Dr. Cezar Aviles    HEMORRHOIDECTOMY N/A 1996    HIP ARTHROSCOPY W/ LABRAL REPAIR Right 04/03/2017    Dr. Lonnie Lemons    HIP SURGERY  2017    right labral repair    MEDIAL BRANCH BLOCK Bilateral 03/12/2021     Procedure: BILATERAL MEDIAL BRANCH BLOCK W/MAC;  Surgeon: Francisco Velez MD;  [place] [facility] [place];  Service: Pain Management;  Laterality: Bilateral;    SIGMOIDOSCOPY N/A 09/23/2020    Procedure: FLEXIBLE SIGMOIDOSCOPY WITH BIOPSY;  Surgeon: Shena Ring MD;  Location: [facility] ENDOSCOPY;  Service: Gastroenterology;  Laterality: N/A;  pre- anal/rectal pain  post- hemorrhoids    TONSILLECTOMY Bilateral                         PT Assessment/Plan       Row Name 12/16/24 1600          PT Assessment    Assessment Comments Jose Maria reports R anterior hip pain stopped completely one day over the weekend then began again for unknown reason. He later reported he may have gone up/down steps more times but unsure. We attempted gentle hip flexor stretch for R hip due to limited R hip extension, difficult to achieve due to irritability of symptoms. Continued with hip strengthening/stabilizaiton including focus on R glute med. Highly recommended side stepping and reverse walking regularly at home to follow through with HEP. Jose Maria reports he is looking into scheduling a PET scan that was recommended sometime in the past to follow up on enlarged iliac lymph nodes as he is concerned they may be causing irritation. He will benefit from continuing skilled therapy services.  -LISET        PT Plan    PT Plan Comments did he do HEP ?, how is R hip flexor ? did he have PET scan  -LISET               User Key  (r) = Recorded By, (t) = Taken By, (c) = Cosigned By      Initials Name Provider Type    Kirstin Chao, PT Physical Therapist                       OP Exercises       Row Name 12/16/24 1400             Subjective    Subjective Comments I had a lot of pain this morning but it was in my back on the L side. The front of the R hip had stopped hurting completely this weekend but started again, I don't know why.  -LISET         Subjective Pain    Able to rate subjective pain? yes  -LISET      Pre-Treatment Pain Level 8  -LISET       Subjective Pain Comment R side more painful today in the low back  -JA         Total Minutes    60363 - PT Therapeutic Exercise Minutes 40  -JA         Exercise 1    Exercise Name 1 seated swiss ball roll out  -JA      Cueing 1 Verbal  -JA      Sets 1 2  -JA      Reps 1 10  -JA      Time 1 blue swiss ball  -JA         Exercise 2    Exercise Name 2 --  -JA      Sets 2 --  -JA      Reps 2 --  -JA         Exercise 3    Exercise Name 3 Piriformis stretch in HL  -JA      Cueing 3 Verbal  -JA      Sets 3 held for now due to c/o R hip increased irritation  -JA      Reps 3 cont to hold for now due to pain  -JA         Exercise 4    Exercise Name 4 SKC  -JA      Cueing 4 Verbal  -JA      Reps 4 3B  -JA      Time 4 20s  -JA         Exercise 5    Exercise Name 5 Ham 90/90 gentle  -JA      Reps 5 3ea  -JA      Time 5 10 DF/PF  -JA         Exercise 6    Exercise Name 6 HL passive PPT w/ TrA in SMALL range  -JA      Cueing 6 Verbal;Demo  -JA      Reps 6 10  -JA      Time 6 3sec  -JA         Exercise 7    Exercise Name 7 small TA march  -JA      Sets 7 --  -JA      Reps 7 not able today 2/2 pain  -JA         Exercise 8    Exercise Name 8 side stepping  -JA      Sets 8 5  -JA      Reps 8 5 feet  -JA         Exercise 9    Exercise Name 9 reverse walk (distance)  -JA      Sets 9 3  -JA      Reps 9 retro walk in //bars  -JA      Time 9 1 min ea  -JA      Additional Comments repeated a 1min BW 3x, cued to keep stride length small and not a stretch, work toward natural rhythm  -JA         Exercise 10    Exercise Name 10 step up  -JA      Reps 10 5 reps leading with R and then w/L; added 5 extra on L due to weakness  -JA         Exercise 11    Exercise Name 11 STS  -JA      Cueing 11 Verbal;Demo  -JA      Reps 11 resume next visit  -JA         Exercise 12    Exercise Name 12 resisted retro steps  -JA      Reps 12 3-4 steps x 10  -JA      Time 12 RTB  -JA         Exercise 13    Exercise Name 13 gentle supine hip flexor stretch in HL with  target leg extended; attempted standing in runner stretch but unable to target tissue  -LISET      Reps 13 3  -JA      Time 13 15sec  -JA                User Key  (r) = Recorded By, (t) = Taken By, (c) = Cosigned By      Initials Name Provider Type    Kirstin Chao, PT Physical Therapist                                                    Time Calculation:   Start Time: 1445  Stop Time: 1530  Time Calculation (min): 45 min  Timed Charges  13617 - PT Therapeutic Exercise Minutes: 40  Total Minutes  Timed Charges Total Minutes: 40   Total Minutes: 40  Therapy Charges for Today       Code Description Service Date Service Provider Modifiers Qty    [id number]  PT THER PROC EA 15 MIN 12/16/2024 Kirstin Dale, PT GP 3                      Kirstin Dale PT  12/16/2024

## 2024-12-30 ENCOUNTER — TELEPHONE (OUTPATIENT)
Dept: ONCOLOGY | Facility: CLINIC | Age: 59
End: 2024-12-30
Payer: COMMERCIAL

## 2024-12-30 NOTE — TELEPHONE ENCOUNTER
I called back the patient, he is wanting to know if the CT scan will tell him if he has cancer. I told the patient that it could indicate that he could have cancer but it would not definitively tell him that he has cancer. Reinforced this statement multiple times without clear understand from the patient. I told him the only way to get the diagnosis of cancer would be to get a biopsy. Pt states he doesn't want to get a biopsy out of fear that it could make the cancer worse. I tried to educate that getting a biopsy would not make the possible cancer worse.

## 2024-12-30 NOTE — TELEPHONE ENCOUNTER
Hub staff attempted to follow warm transfer process and was unsuccessful     Caller: Jose Maria Judge    Relationship to patient: Self    Best call back number: [phone]    Patient is needing: PATIENT IS NEEDING MARTI TO CALL HIM BACK.

## 2024-12-30 NOTE — TELEPHONE ENCOUNTER
Caller: Jose Maria Judge    Relationship: Self    Best call back number: [phone]     What is the best time to reach you: ANYTIME    Who are you requesting to speak with (clinical staff, provider,  specific staff member): CLINICAL    What was the call regarding: PLEASE CALL PATIENT TO DISCUSS A FEW QUESTIONS HE HAS ABOUT HIS 12/31 PET SCAN.     ALSO HAS A FEW QUESTIONS REGARDING HIS 8/20 CT SCAN RESULTS.

## 2024-12-31 ENCOUNTER — HOSPITAL ENCOUNTER (OUTPATIENT)
Dept: PET IMAGING | Facility: HOSPITAL | Age: 59
Discharge: HOME | End: 2024-12-31
Payer: COMMERCIAL

## 2024-12-31 DIAGNOSIS — R59.1 LYMPHADENOPATHY: ICD-10-CM

## 2024-12-31 LAB — GLUCOSE BLDC GLUCOMTR-MCNC: 79 MG/DL (ref 70–130)

## 2024-12-31 PROCEDURE — 78815 PET IMAGE W/CT SKULL-THIGH: CPT

## 2024-12-31 PROCEDURE — A9552 F18 FDG: HCPCS | Performed by: INTERNAL MEDICINE

## 2024-12-31 PROCEDURE — 82948 REAGENT STRIP/BLOOD GLUCOSE: CPT

## 2024-12-31 PROCEDURE — 34310000005 FLUDEOXYGLUCOSE F18 SOLUTION: Performed by: INTERNAL MEDICINE

## 2024-12-31 RX ADMIN — FLUDEOXYGLUCOSE F 18 1 DOSE: 200 INJECTION, SOLUTION INTRAVENOUS at 08:54

## 2025-01-09 ENCOUNTER — TELEPHONE (OUTPATIENT)
Dept: ONCOLOGY | Facility: CLINIC | Age: 60
End: 2025-01-09

## 2025-01-09 NOTE — TELEPHONE ENCOUNTER
Caller: Jose Maria Judge    Relationship to patient: Self    Best call back number: [phone]       Caller requesting test results: YES    What test was performed: PET SCAN    When was the test performed: 12/31/24    Where was the test performed: [facility]

## 2025-01-10 DIAGNOSIS — R59.1 LYMPHADENOPATHY: Primary | ICD-10-CM

## 2025-01-13 ENCOUNTER — TELEPHONE (OUTPATIENT)
Dept: FAMILY MEDICINE CLINIC | Facility: CLINIC | Age: 60
End: 2025-01-13

## 2025-01-13 NOTE — TELEPHONE ENCOUNTER
Caller: Nu Jose Maria SHARON    Relationship: Self    Best call back number: [phone]*    Who is your current provider: DR. ANETTE HONG    Is your current provider offboarding? NO    Who would you like your new provider to be: DR. ABBEY JAMES    What are your reasons for transferring care: FEELS MORE COMFORTABLE WITH A MALE PRIMARY CARE PHYSICIAN    Additional notes: PATIENT REQUEST A CALL BACK TO ADVISE.PATIENT STATES THAT HE USED TO BE A PATIENT OF DR. ZHU. THE PATIENT STATES THAT HE THINKS DR. HONG IS A WONDERFUL PRIMARY CARE PROVIDER, BUT JUST WOULD FEEL MORE COMFORTABLE WITH A MALE PROVIDER.

## 2025-01-13 NOTE — TELEPHONE ENCOUNTER
"Relay     \"Transfer to Dr. Solomon approved. Please schedule 30 min appt to establish care. \"                  "

## 2025-01-14 NOTE — TELEPHONE ENCOUNTER
Name: Jose Maria Judge      Relationship: Self      Best Callback Number: [phone]       HUB PROVIDED THE RELAY MESSAGE FROM THE OFFICE      PATIENT: SCHEDULED PER NOTE

## 2025-01-16 ENCOUNTER — OFFICE VISIT (OUTPATIENT)
Dept: FAMILY MEDICINE CLINIC | Facility: CLINIC | Age: 60
End: 2025-01-16
Payer: COMMERCIAL

## 2025-01-16 VITALS
WEIGHT: 218.2 LBS | RESPIRATION RATE: 16 BRPM | TEMPERATURE: 96.8 F | SYSTOLIC BLOOD PRESSURE: 122 MMHG | HEART RATE: 61 BPM | BODY MASS INDEX: 31.24 KG/M2 | HEIGHT: 70 IN | DIASTOLIC BLOOD PRESSURE: 70 MMHG | OXYGEN SATURATION: 96 %

## 2025-01-16 DIAGNOSIS — R10.31 GROIN PAIN, RIGHT: ICD-10-CM

## 2025-01-16 DIAGNOSIS — R59.0 PELVIC LYMPHADENOPATHY: ICD-10-CM

## 2025-01-16 DIAGNOSIS — Z00.00 HEALTHCARE MAINTENANCE: Primary | ICD-10-CM

## 2025-01-16 DIAGNOSIS — R59.0 ABDOMINAL LYMPHADENOPATHY: ICD-10-CM

## 2025-01-16 PROCEDURE — 99215 OFFICE O/P EST HI 40 MIN: CPT | Performed by: STUDENT IN AN ORGANIZED HEALTH CARE EDUCATION/TRAINING PROGRAM

## 2025-01-16 PROCEDURE — 1159F MED LIST DOCD IN RCRD: CPT | Performed by: STUDENT IN AN ORGANIZED HEALTH CARE EDUCATION/TRAINING PROGRAM

## 2025-01-16 PROCEDURE — 3078F DIAST BP <80 MM HG: CPT | Performed by: STUDENT IN AN ORGANIZED HEALTH CARE EDUCATION/TRAINING PROGRAM

## 2025-01-16 PROCEDURE — 3074F SYST BP LT 130 MM HG: CPT | Performed by: STUDENT IN AN ORGANIZED HEALTH CARE EDUCATION/TRAINING PROGRAM

## 2025-01-16 PROCEDURE — 1160F RVW MEDS BY RX/DR IN RCRD: CPT | Performed by: STUDENT IN AN ORGANIZED HEALTH CARE EDUCATION/TRAINING PROGRAM

## 2025-01-16 PROCEDURE — 1126F AMNT PAIN NOTED NONE PRSNT: CPT | Performed by: STUDENT IN AN ORGANIZED HEALTH CARE EDUCATION/TRAINING PROGRAM

## 2025-01-16 NOTE — PROGRESS NOTES
New Patient Office Visit      Patient Name: Jose Maria Judge  : 1965   MRN: [id number]   Care Team: Patient Care Team:  Triston Solomon MD as PCP - General (Internal Medicine)  Amando Nguyễn MD as Referring Physician (General Surgery)  Bryan Alvarado MD as Consulting Physician (Hematology and Oncology)  Jacobo Gonzalez MD as Consulting Physician (Gastroenterology)    Chief Complaint:    Chief Complaint   Patient presents with    Establish Care       History of Present Illness: Jose Maria Jugde is a 59 y.o. male     History of Present Illness  The patient is a 59-year-old male with hypertension, hyperlipidemia, GERD, BPH, and a history of axillary lymphadenopathy that was concerning for potential lymphoproliferative disorder according to oncology. He presents today to establish care, follow up on chronic health concerns, and address a new concern of pain in his groin area.    He reports experiencing groin pain, which he attributes to irritation following a labral repair surgery on his right hip in 2017. The pain is exacerbated when lifting his leg, resulting in a sensation of heaviness and difficulty maintaining the lifted position. He has no recent injuries to the area but mentions a small hernia. He does not report any palpable masses. He recalls a previous incident where a lymph node mass under his left arm grew following a biopsy, which was later excised and found to be non-cancerous. He expresses concern about the potential for a biopsy to cause cancer spread. He also notes that 2 additional lymph nodes have appeared, which were not present a few years ago. He has an upcoming appointment with Dr. High at the end of next month but has not been contacted to schedule a biopsy, as it was deemed non-urgent. He has been dealing with this issue since  or , with slow growth. He has had falls but not directly on his hip. He used to experience a burning sensation when sleeping on his  right side, which has subsided over the past 4 to 6 months. He recalls discomfort from wearing a belt in 2018 or 2019.    He has discontinued pantoprazole as he did not need it. His GERD is under control.    FAMILY HISTORY  His brother had non-Hodgkin's lymphoma and passed away a couple of years ago.    MEDICATIONS  Current: Tylenol  Discontinued: pantoprazole       Subjective      Past Medical History:   Past Medical History:   Diagnosis Date    Acid reflux     HX    Anal fistula     Anxiety     R/T PAIN    Arthritis of back     can't remember    Carpal tunnel syndrome, left     DDD (degenerative disc disease), lumbar     Frozen shoulder     Hemorrhoids     Hodgkin's lymphoma     left axillary adenopathy leading to a biopsy that was nondiagnostic but suspicious and eventually an excisional biopsy that was eventually felt to be consistent with follicular hyperplasia and progressive transformation of germinal centers with 2 foci suspicious for early involvement by nodules lymphocyte predominant Hodgkin's lymphoma.  This was likely treated by excision    Hyperlipidemia     Hyperosmia     Labral tear of hip joint     RIGHT    Low back strain     Male pattern alopecia     Periarthritis of shoulder     need to check file    Postnasal drip     Pudendal neuralgia     Tinnitus of left ear     Trochanteric bursitis 08/25/2016    Varicose vein of leg     left       Past Surgical History:   Past Surgical History:   Procedure Laterality Date    ABSCESS DRAINAGE  08/2012    ANAL FISTULA REPAIR N/A 09/2012, 10/2012    AXILLARY LYMPH NODE BIOPSY/EXCISION Left 11/09/2018    Procedure: AXILLARY LYMPH NODE BIOPSY/EXCISION;  Surgeon: Amando Nguyễn MD;  [place] [facility] [place] [facility] [place]  Service: General    COLONOSCOPY N/A 2013    done at [facility]    COLONOSCOPY N/A 07/10/2019    Procedure: COLONOSCOPY to CECUM;  Surgeon: Amando Nguyễn MD;  Location: [facility] ENDOSCOPY;  Service: General    ENDOSCOPY N/A 01/09/2019    Procedure:  ESOPHAGOGASTRODUODENOSCOPY;  Surgeon: Amando Nguyễn MD;  Location:  [facility] ENDOSCOPY;  Service: General    ENDOSCOPY N/A 03/30/2023    Procedure: ESOPHAGOGASTRODUODENOSCOPY WITH BIOPSY;  Surgeon: Amando Nguyễn MD;  Location:  [facility] OR;  Service: Gastroenterology;  Laterality: N/A;  Gastric biopsy; Normal    FINE NEEDLE ASPIRATION Left 08/09/2018    Left axillary lymph node FNA    FLEXIBLE SIGMOIDOSCOPY N/A 06/25/2003    Normal-Dr. Cezar Aviles    HEMORRHOIDECTOMY N/A 1996    HIP ARTHROSCOPY W/ LABRAL REPAIR Right 04/03/2017    Dr. Lonnie Lemons    HIP SURGERY  2017    right labral repair    MEDIAL BRANCH BLOCK Bilateral 03/12/2021    Procedure: BILATERAL MEDIAL BRANCH BLOCK W/MAC;  Surgeon: Francisco Velez MD;  [place] [facility] [place];  Service: Pain Management;  Laterality: Bilateral;    SIGMOIDOSCOPY N/A 09/23/2020    Procedure: FLEXIBLE SIGMOIDOSCOPY WITH BIOPSY;  Surgeon: Shena Ring MD;  Location:  [facility] ENDOSCOPY;  Service: Gastroenterology;  Laterality: N/A;  pre- anal/rectal pain  post- hemorrhoids    TONSILLECTOMY Bilateral        Family History:   Family History   Problem Relation Age of Onset    Arthritis Mother     Atrial fibrillation Father     Aneurysm Father     Hypertension Father     Dementia Father     Lymphoma Brother     Cancer Brother     Malig Hyperthermia Neg Hx     Colon cancer Neg Hx     Colon polyps Neg Hx     Crohn's disease Neg Hx     Irritable bowel syndrome Neg Hx     Ulcerative colitis Neg Hx        Social History:   Social History     Socioeconomic History    Marital status: Single    Number of children: 0    Years of education: COLLEGE    Highest education level: Not asked   Tobacco Use    Smoking status: Never     Passive exposure: Past    Smokeless tobacco: Never   Vaping Use    Vaping status: Never Used   Substance and Sexual Activity    Alcohol use: Never    Drug use: Never    Sexual activity: Not Currently     Partners: Female       Tobacco History:   Social  "History     Tobacco Use   Smoking Status Never    Passive exposure: Past   Smokeless Tobacco Never       Medications:     Current Outpatient Medications:     acetaminophen ([facility] Acetaminophen) 325 MG tablet, Take 1 tablet by mouth Every 6 (Six) Hours As Needed for Mild Pain., Disp: 90 tablet, Rfl: 1    fluticasone (FLONASE) 50 MCG/ACT nasal spray, Administer 1 spray into the nostril(s) as directed by provider every night at bedtime. (Patient not taking: Reported on 10/14/2024), Disp: 9.9 mL, Rfl: 1    pantoprazole (PROTONIX) 20 MG EC tablet, Take 1 tablet by mouth Daily., Disp: 30 tablet, Rfl: 1    Allergies:   Allergies   Allergen Reactions    Bactrim [Sulfamethoxazole-Trimethoprim] Shortness Of Breath    Hydrocodone Shortness Of Breath    Naproxen Shortness Of Breath    Sulfa Antibiotics Shortness Of Breath     TROUBLE BREATHING    Alprazolam Other (See Comments)     Annotation - 06Jan2016: Patient stated he can not take this medication because it \"makes him feel weird.\"      Diclofenac Nausea Only and Other (See Comments)     Severe heartburn    Erythromycin GI Intolerance    Promethazine Tinnitus    Doxycycline GI Intolerance    Tramadol Headache    Ceftin [Cefuroxime] Other (See Comments)     cough    Levaquin [Levofloxacin] Unknown - Low Severity     Made tendons tight    Mobic [Meloxicam] Nausea Only and Other (See Comments)     Severe heartburn       Objective     Physical Exam:  Vital Signs:   Vitals:    01/16/25 1249   BP: 122/70   BP Location: Left arm   Patient Position: Sitting   Cuff Size: Adult   Pulse: 61   Resp: 16   Temp: 96.8 °F (36 °C)   TempSrc: Temporal   SpO2: 96%   Weight: 99 kg (218 lb 3.2 oz)   Height: 177.8 cm (70\")   PainSc: 0-No pain     Body mass index is 31.31 kg/m².     Physical Exam  Constitutional:       General: He is not in acute distress.  HENT:      Head: Normocephalic and atraumatic.   Pulmonary:      Effort: Pulmonary effort is normal. No respiratory distress. "   Musculoskeletal:      Right lower leg: No edema.      Left lower leg: No edema.   Skin:     Findings: No rash.   Neurological:      General: No focal deficit present.      Mental Status: He is alert and oriented to person, place, and time.   Psychiatric:         Mood and Affect: Mood normal.         Behavior: Behavior normal.         Thought Content: Thought content normal.         Judgment: Judgment normal.         Assessment / Plan      Assessment/Plan:   Problems Addressed This Visit    Assessment & Plan  1. Right groin pain.  The pain is likely due to the enlargement of lymph nodes in the area. A nuclear medicine PET CT scan on 12/31/2024, showed lower abdominal and pelvic adenopathy that was hypermetabolic, with an index right external iliac node that has increased in size since 05/20/2020, likely indicating malignancy or neoplastic involvement. He has an appointment with Dr. Crespo, hematology/oncology, at the end of next month. He has been reassured that a biopsy will not cause cancer to spread. He is advised to follow up with hematology/oncology and proceed with the recommended biopsy to determine the nature of the lymph node enlargement and potential treatment options. If the biopsy results do not explain the hip pain, physical therapy may be considered.    2. Gastroesophageal reflux disease (GERD).  His GERD is currently under control, and he is not taking pantoprazole (Protonix) anymore.    Follow-up  The patient will follow up in 3 months, during which fasting labs will be conducted.    PROCEDURE  The patient underwent a labral repair surgery on his right hip in 2017.         Plan of care reviewed with patient at the conclusion of today's visit. Education was provided regarding diagnosis and management.  Patient verbalizes understanding of and agreement with management plan.      Follow Up:   No follow-ups on file.    I spent 42 minutes caring for Jose Maria on this date of service. This time includes time  spent by me in the following activities:preparing for the visit, reviewing tests, obtaining and/or reviewing a separately obtained history, performing a medically appropriate examination and/or evaluation , counseling and educating the patient/family/caregiver, ordering medications, tests, or procedures, documenting information in the medical record, independently interpreting results and communicating that information with the patient/family/caregiver, and care coordination      Triston Solomon MD  [facility] PRIMARY [facility]ARE  [id number] [place] [phone]  Fax [phone]  Phone [phone]    Patient or patient representative verbalized consent for the use of Ambient Listening during the visit with  Triston Solomon MD for chart documentation. 1/16/2025  14:08 EST

## 2025-01-21 ENCOUNTER — TRANSCRIBE ORDERS (OUTPATIENT)
Dept: PHYSICAL THERAPY | Facility: HOSPITAL | Age: 60
End: 2025-01-21
Payer: COMMERCIAL

## 2025-01-21 DIAGNOSIS — G89.29 CHRONIC BILATERAL LOW BACK PAIN, UNSPECIFIED WHETHER SCIATICA PRESENT: Primary | ICD-10-CM

## 2025-01-21 DIAGNOSIS — M54.50 CHRONIC BILATERAL LOW BACK PAIN, UNSPECIFIED WHETHER SCIATICA PRESENT: Primary | ICD-10-CM

## 2025-02-06 NOTE — PROGRESS NOTES
"  Patient: Jose Maria Judge  YOB: 1965  Date of Service: 2/6/2025    Chief Complaints: Left shoulder pain    Subjective:    History of Present Illness: Pt is seen in the office today with complaints of left shoulder pain I last saw him about a year and a half ago for both shoulders she states his left 1 is really the 1 is bothering him now is in the shoulder down of the biceps is been ongoing over the last several weeks not 1 particular injury or event that he can recall she also complaining of some back issues and wants to see her back doctor        Allergies:   Allergies   Allergen Reactions    Bactrim [Sulfamethoxazole-Trimethoprim] Shortness Of Breath    Hydrocodone Shortness Of Breath    Naproxen Shortness Of Breath    Sulfa Antibiotics Shortness Of Breath     TROUBLE BREATHING    Alprazolam Other (See Comments)     Annotation - 06Jan2016: Patient stated he can not take this medication because it \"makes him feel weird.\"      Diclofenac Nausea Only and Other (See Comments)     Severe heartburn    Erythromycin GI Intolerance    Promethazine Tinnitus    Doxycycline GI Intolerance    Tramadol Headache    Ceftin [Cefuroxime] Other (See Comments)     cough    Levaquin [Levofloxacin] Unknown - Low Severity     Made tendons tight    Mobic [Meloxicam] Nausea Only and Other (See Comments)     Severe heartburn       Medications:   Home Medications:  Current Outpatient Medications on File Prior to Visit   Medication Sig    acetaminophen ([facility] Acetaminophen) 325 MG tablet Take 1 tablet by mouth Every 6 (Six) Hours As Needed for Mild Pain.     No current facility-administered medications on file prior to visit.     Current Medications:  Scheduled Meds:  Continuous Infusions:No current facility-administered medications for this visit.    PRN Meds:.    I have reviewed the patient's medical history in detail and updated the computerized patient record.  Review and summarization of old records include:    Past Medical " History:   Diagnosis Date    Acid reflux     HX    Anal fistula     Anxiety     R/T PAIN    Arthritis of back     can't remember    Carpal tunnel syndrome, left     DDD (degenerative disc disease), lumbar     Frozen shoulder     Hemorrhoids     Hodgkin's lymphoma     left axillary adenopathy leading to a biopsy that was nondiagnostic but suspicious and eventually an excisional biopsy that was eventually felt to be consistent with follicular hyperplasia and progressive transformation of germinal centers with 2 foci suspicious for early involvement by nodules lymphocyte predominant Hodgkin's lymphoma.  This was likely treated by excision    Hyperlipidemia     Hyperosmia     Labral tear of hip joint     RIGHT    Low back strain     Male pattern alopecia     Periarthritis of shoulder     need to check file    Postnasal drip     Pudendal neuralgia     Tinnitus of left ear     Trochanteric bursitis 08/25/2016    Varicose vein of leg     left        Past Surgical History:   Procedure Laterality Date    ABSCESS DRAINAGE  08/2012    ANAL FISTULA REPAIR N/A 09/2012, 10/2012    AXILLARY LYMPH NODE BIOPSY/EXCISION Left 11/09/2018    Procedure: AXILLARY LYMPH NODE BIOPSY/EXCISION;  Surgeon: Amando Nguyễn MD;  [place] [facility] [place] [facility] [place]  Service: General    COLONOSCOPY N/A 2013    done at Health system    COLONOSCOPY N/A 07/10/2019    Procedure: COLONOSCOPY to CECUM;  Surgeon: Amando Nguyễn MD;  Location: [facility] ENDOSCOPY;  Service: General    ENDOSCOPY N/A 01/09/2019    Procedure: ESOPHAGOGASTRODUODENOSCOPY;  Surgeon: Amando Nguyễn MD;  Location: [facility] ENDOSCOPY;  Service: General    ENDOSCOPY N/A 03/30/2023    Procedure: ESOPHAGOGASTRODUODENOSCOPY WITH BIOPSY;  Surgeon: Amando Nguyễn MD;  Location: [facility];  Service: Gastroenterology;  Laterality: N/A;  Gastric biopsy; Normal    FINE NEEDLE ASPIRATION Left 08/09/2018    Left axillary lymph node FNA    FLEXIBLE SIGMOIDOSCOPY N/A 06/25/2003    Normal-Dr. Robb  "Traci    HEMORRHOIDECTOMY N/A 1996    HIP ARTHROSCOPY W/ LABRAL REPAIR Right 04/03/2017    Dr. Lonnie Lemons    HIP SURGERY  2017    right labral repair    MEDIAL BRANCH BLOCK Bilateral 03/12/2021    Procedure: BILATERAL MEDIAL BRANCH BLOCK W/MAC;  Surgeon: Francisco Velez MD;  Location: [facility] [place] [facility];  Service: Pain Management;  Laterality: Bilateral;    SIGMOIDOSCOPY N/A 09/23/2020    Procedure: FLEXIBLE SIGMOIDOSCOPY WITH BIOPSY;  Surgeon: Shena Ring MD;  Location: [facility] ENDOSCOPY;  Service: Gastroenterology;  Laterality: N/A;  pre- anal/rectal pain  post- hemorrhoids    TONSILLECTOMY Bilateral         Social History     Occupational History    Occupation: disabled     Comment: carpentry, plumbing and remodeling   Tobacco Use    Smoking status: Never     Passive exposure: Past    Smokeless tobacco: Never   Vaping Use    Vaping status: Never Used   Substance and Sexual Activity    Alcohol use: Never    Drug use: Never    Sexual activity: Not Currently     Partners: Female      Social History     Social History Narrative    Not on file        Family History   Problem Relation Age of Onset    Arthritis Mother     Atrial fibrillation Father     Aneurysm Father     Hypertension Father     Dementia Father     Lymphoma Brother     Cancer Brother     Malig Hyperthermia Neg Hx     Colon cancer Neg Hx     Colon polyps Neg Hx     Crohn's disease Neg Hx     Irritable bowel syndrome Neg Hx     Ulcerative colitis Neg Hx        ROS: 14 point review of systems was performed and was negative except for documented findings in HPI and today's encounter.     Allergies:   Allergies   Allergen Reactions    Bactrim [Sulfamethoxazole-Trimethoprim] Shortness Of Breath    Hydrocodone Shortness Of Breath    Naproxen Shortness Of Breath    Sulfa Antibiotics Shortness Of Breath     TROUBLE BREATHING    Alprazolam Other (See Comments)     Annotation - 06Jan2016: Patient stated he can not take this medication because it \"makes " "him feel weird.\"      Diclofenac Nausea Only and Other (See Comments)     Severe heartburn    Erythromycin GI Intolerance    Promethazine Tinnitus    Doxycycline GI Intolerance    Tramadol Headache    Ceftin [Cefuroxime] Other (See Comments)     cough    Levaquin [Levofloxacin] Unknown - Low Severity     Made tendons tight    Mobic [Meloxicam] Nausea Only and Other (See Comments)     Severe heartburn     Constitutional:  Denies fever, shaking or chills   Eyes:  Denies change in visual acuity   HENT:  Denies nasal congestion or sore throat   Respiratory:  Denies cough or shortness of breath   Cardiovascular:  Denies chest pain or severe LE edema   GI:  Denies abdominal pain, nausea, vomiting, bloody stools or diarrhea   Musculoskeletal:  Numbness, tingling, or loss of motor function only as noted above in history of present illness.  : Denies painful urination or hematuria  Integument:  Denies rash, lesion or ulceration   Neurologic:  Denies headache or focal weakness  Endocrine:  Denies lymphadenopathy  Psych:  Denies confusion or change in mental status   Hem:  Denies active bleeding      Physical Exam: 59 y.o. male  Wt Readings from Last 3 Encounters:   01/16/25 99 kg (218 lb 3.2 oz)   10/14/24 97.5 kg (215 lb)   09/22/24 97.5 kg (215 lb)       There is no height or weight on file to calculate BMI.    There were no vitals filed for this visit.  Vital signs reviewed.   General Appearance:    Alert, cooperative, in no acute distress                    Ortho exam    Physical exam of the left shoulder reveals no overlying skin changes no lymphedema no lymphadenopathy.  Patient has active flexion 180 with mild symptoms abduction is similar external rotation is to 50 and internal rotation to the upper lumbar spine with mild symptoms.  Patient has good rotator cuff strength 4+ over 5 with isometric strength testing with pain.  Patient has a positive impingement and a positive Greco sign.  Patient has good cervical " range of motion which is full and asymptomatic no radicular symptoms.  Patient has a normal elbow exam.  Good distal pulses are presentPatient has pain with overhead activity and a positive Neer sign and a positive empty can sign  They have a positive drop arm any definitive painful arc          X-rays AP scapular Y and x-ray lateral left shoulder were taken to evaluate his symptoms and compared to x-rays done previously I have compared x-rays done in 2023 and they are the same there is no change she has some vascular clips in the axilla some acromioclavicular arthritis no other acute pathology    Assessment: Left shoulder pain I think this is more impingement talked about injection he does not want to do that he wants to get to physical therapy which is fine the right 1 is starting to bother him a little bit we can have therapy work on that he is having some pain in the low back in the area of the latissimus dorsi on the right we can have therapy work on that of his back persist we will have him see Jacquelyn    Plan: As above  Follow up as indicated.  Ice, elevate, and rest as needed.  Discussed conservative measures of pain control including ice, bracing.  Also talked about the importance of strengthening and maintaining ideal body weight    Ronit Lynn M.D.

## 2025-02-07 ENCOUNTER — OFFICE VISIT (OUTPATIENT)
Dept: ORTHOPEDIC SURGERY | Facility: CLINIC | Age: 60
End: 2025-02-07
Payer: COMMERCIAL

## 2025-02-07 VITALS — BODY MASS INDEX: 30.72 KG/M2 | HEIGHT: 71 IN | WEIGHT: 219.4 LBS | TEMPERATURE: 98.3 F

## 2025-02-07 DIAGNOSIS — M75.42 IMPINGEMENT SYNDROME OF LEFT SHOULDER: Primary | ICD-10-CM

## 2025-02-07 PROCEDURE — 99213 OFFICE O/P EST LOW 20 MIN: CPT | Performed by: ORTHOPAEDIC SURGERY

## 2025-02-26 ENCOUNTER — TELEPHONE (OUTPATIENT)
Dept: ONCOLOGY | Facility: CLINIC | Age: 60
End: 2025-02-26
Payer: COMMERCIAL

## 2025-02-26 ENCOUNTER — HOSPITAL ENCOUNTER (OUTPATIENT)
Dept: PHYSICAL THERAPY | Facility: HOSPITAL | Age: 60
Setting detail: THERAPIES SERIES
Discharge: HOME | End: 2025-02-26
Payer: COMMERCIAL

## 2025-02-26 DIAGNOSIS — M25.512 ACUTE PAIN OF LEFT SHOULDER: Primary | ICD-10-CM

## 2025-02-26 DIAGNOSIS — M62.81 MUSCLE WEAKNESS OF LEFT UPPER EXTREMITY: ICD-10-CM

## 2025-02-26 DIAGNOSIS — M25.60 DECREASED RANGE OF MOTION: ICD-10-CM

## 2025-02-26 PROCEDURE — 97161 PT EVAL LOW COMPLEX 20 MIN: CPT

## 2025-02-26 NOTE — TELEPHONE ENCOUNTER
Caller: Jose Maria Judge    Relationship to patient: Self    Best call back number: [phone]    Chief complaint: RESCHEDULE    Type of visit: LAB AND FOLLOW UP     Requested date: MIDDLE TO LATE MARCH     If rescheduling, when is the original appointment: 2-27     Additional notes:PLEASE ADVISE

## 2025-02-28 NOTE — THERAPY EVALUATION
Outpatient Physical Therapy Ortho Initial Evaluation  [facility]     Patient Name: Jose Maria Judge  : 1965  MRN: [id number]  Today's Date: 2025      Visit Date: 2025    Patient Active Problem List   Diagnosis    Bilateral hip pain    Trochanteric bursitis    Tear of acetabular labrum    Anal burning    Hamstring strain    Hypertension    Neuritis of foot    Rectal pain    Herpes zoster    Varicose veins of bilateral lower extremities with pain    Epigastric pain    Olfaction disorder    Chronic bilateral low back pain    Nausea    Arthropathy of lumbar facet joint    Pudendal neuralgia    Tinnitus    Rib pain    Lymphadenopathy    Shoulder pain    Chronic pain of both feet    Metatarsalgia of both feet    Paresthesia of foot    Vertigo    Varicocele    Tinea corporis    Thoracic back pain    Swelling of structure of right eye    Snoring    Other specified diseases of anus and rectum    Night sweats    Neuropathy    Muscle spasm of back    Miller's metatarsalgia    Mass of axilla    Male pattern alopecia    Low back strain    Irritable bowel syndrome    Intolerant of cold    Insomnia    Gastroesophageal reflux disease    Ganglion    Elevated low density lipoprotein (LDL) cholesterol level    Disorder of gallbladder    Degeneration of lumbar intervertebral disc    Carpal tunnel syndrome    Benign prostatic hyperplasia    Allergic rhinitis    Acute anal fissure    Body aches    Melena    RUQ abdominal pain    Hand arthritis        Past Medical History:   Diagnosis Date    Acid reflux     HX    Anal fistula     Anxiety     R/T PAIN    Arthritis of back     can't remember    Carpal tunnel syndrome, left     DDD (degenerative disc disease), lumbar     Frozen shoulder     Hemorrhoids     Hodgkin's lymphoma     left axillary adenopathy leading to a biopsy that was nondiagnostic but suspicious and eventually an excisional biopsy that was eventually felt to be consistent with follicular hyperplasia and  progressive transformation of germinal centers with 2 foci suspicious for early involvement by nodules lymphocyte predominant Hodgkin's lymphoma.  This was likely treated by excision    Hyperlipidemia     Hyperosmia     Labral tear of hip joint     RIGHT    Low back strain     Male pattern alopecia     Periarthritis of shoulder     need to check file    Postnasal drip     Pudendal neuralgia     Tinnitus of left ear     Trochanteric bursitis 08/25/2016    Varicose vein of leg     left        Past Surgical History:   Procedure Laterality Date    ABSCESS DRAINAGE  08/2012    ANAL FISTULA REPAIR N/A 09/2012, 10/2012    AXILLARY LYMPH NODE BIOPSY/EXCISION Left 11/09/2018    Procedure: AXILLARY LYMPH NODE BIOPSY/EXCISION;  Surgeon: Amando Nguyễn MD;  Location[facility];  Service: General    COLONOSCOPY N/A 2013    done at [facility]    COLONOSCOPY N/A 07/10/2019    Procedure: COLONOSCOPY to CECUM;  Surgeon: Amando Nguyễn MD;  [facility];  Service: General    ENDOSCOPY N/A 01/09/2019    Procedure: ESOPHAGOGASTRODUODENOSCOPY;  Surgeon: Amando Nguyễn MD;  [facility];  Service: General    ENDOSCOPY N/A 03/30/2023    Procedure: ESOPHAGOGASTRODUODENOSCOPY WITH BIOPSY;  Surgeon: Amando Nguyễn MD;  Location: [facility] OR;  Service: Gastroenterology;  Laterality: N/A;  Gastric biopsy; Normal    FINE NEEDLE ASPIRATION Left 08/09/2018    Left axillary lymph node FNA    FLEXIBLE SIGMOIDOSCOPY N/A 06/25/2003    Normal-Dr. Cezar Aviles    HEMORRHOIDECTOMY N/A 1996    HIP ARTHROSCOPY W/ LABRAL REPAIR Right 04/03/2017    Dr. Lonnie Lemons    HIP SURGERY  2017    right labral repair    MEDIAL BRANCH BLOCK Bilateral 03/12/2021    Procedure: BILATERAL MEDIAL BRANCH BLOCK W/MAC;  Surgeon: Francisco Velez MD;  Location: [facility] MAIN OR;  Service: Pain Management;  Laterality: Bilateral;    SIGMOIDOSCOPY N/A 09/23/2020    Procedure: FLEXIBLE SIGMOIDOSCOPY WITH BIOPSY;  Surgeon: María Elena  Shena ORELLANA MD;  Location: [facility] ENDOSCOPY;  Service: Gastroenterology;  Laterality: N/A;  pre- anal/rectal pain  post- hemorrhoids    TONSILLECTOMY Bilateral        Visit Dx:     ICD-10-CM ICD-9-CM   1. Acute pain of left shoulder  M25.512 719.41   2. Decreased range of motion  M25.60 719.50   3. Muscle weakness of left upper extremity  M62.81 728.87          Patient History       Row Name 02/26/25 1400             History    Chief Complaint Pain;Difficulty with daily activities  -JA      Type of Pain Shoulder pain  L  -JA      Brief Description of Current Complaint L shoulder had been fine since therapy but when we had the big snow he stepped out the back door and almost fell and it yanked his arm back (balance response) then walked to front of house and fell on the snow. Not sure how he landed. L shoulder is okay at times and at others reaching back can hurt.  -JA      Patient/Caregiver Goals Relieve pain  -JA      Hand Dominance right-handed  -JA      What clinical tests have you had for this problem? X-ray  -JA         Pain     Pain Location Shoulder  L  -JA      Pain at Present 3  -JA      Pain at Worst 5  got to 8/10 if he sleeps on either side but L>R  -JA      Pain Frequency Constant/continuous  -JA      What Performance Factors Make the Current Problem(s) WORSE? sleeping on L/same side or the R  -JA      Pain Comments needed tylenol for pain but not now  -JA         Fall Risk Assessment    Any falls in the past year: Yes  -JA      Number of falls reported in the last 12 months 1  -JA      Factors that contributed to the fall: Slippery surface  snow/ice  -JA         Services    Prior Rehab/Home Health Experiences No  -JA         Daily Activities    Primary Language English  -JA      Are you able to read Yes  -JA      Are you able to write Yes  -JA      How does patient learn best? Listening;Reading;Pictures/Video  -JA      Teaching needs identified Home Exercise Program;Management of Condition  -JA       Recommended Referrals Physical Therapy  -      Pt Participated in POC and Goals Yes  -         Safety    Are you being hurt, hit, or frightened by anyone at home or in your life? No  -                User Key  (r) = Recorded By, (t) = Taken By, (c) = Cosigned By      Initials Name Provider Type    Kirstin Chao, PT Physical Therapist                     PT Ortho       Row Name 02/26/25 1400       Subjective    Subjective Comments initial eval  -       Precautions and Contraindications    Precautions instructed pt to avoid overhead lifting and lifting with outstretched arm  -       Subjective Pain    Able to rate subjective pain? yes  -    Pre-Treatment Pain Level 3  -       Posture/Observations    Alignment Options Forward head;Cervical lordosis;Thoracic kyphosis;Rounded shoulders;Scapular winging  -    Forward Head Increased;Mild  -    Cervical Lordosis Decreased  -    Thoracic Kyphosis Mild;Increased  -    Rounded Shoulders Increased;Mild;Moderate  -    Scapular winging --  -       Special Tests/Palpation    Special Tests/Palpation Shoulder  -       Shoulder Impingement/Rotator Cuff Special Tests    [place] Test (RC Lesion vs. Bursitis) Left:  mildly positive  -    Neer Impingement Test (RC Lesion vs. Bursitis) Left:;Positive  -    Empty Can Test (RC Lesion) Left:  -       Shoulder Girdle Palpation    Shoulder Girdle Palpation? --  L post RC tendons, supraspinatus and LHB tenderness  -       General ROM    RT Upper Ext Rt Shoulder ABduction;Rt Shoulder Flexion;Rt Shoulder External Rotation;Rt Shoulder Internal Rotation;Rt Elbow Extension/Flexion  -    LT Upper Ext Lt Shoulder ABduction;Lt Shoulder Flexion;Lt Shoulder External Rotation;Lt Shoulder Internal Rotation;Lt Elbow Extension/Flexion  -       Right Upper Ext    Rt Shoulder Abduction AROM WNL/WFL  -    Rt Shoulder Flexion AROM WNL/WFL  -JA    Rt Shoulder External Rotation AROM WNL  -JA    Rt Shoulder  Internal Rotation AROM WNL  -JA       Left Upper Ext    Lt Shoulder Abduction AROM 105  -JA    Lt Shoulder Flexion AROM 135  -JA    Lt Shoulder External Rotation AROM MARIEL behind C5  -JA    Lt Shoulder Internal Rotation AROM FIR WFL  -JA    Lt Upper Extremity Comments  pt reports not as painful as when had seen Dr Shane CATALAN       MMT (Manual Muscle Testing)    Rt Upper Ext Rt Shoulder Flexion;Rt Shoulder ABduction;Rt Shoulder Internal Rotation;Rt Shoulder External Rotation;Rt Elbow Flexion;Rt Elbow Extension  -JA    Lt Upper Ext Lt Shoulder Flexion;Lt Shoulder ABduction;Lt Shoulder Internal Rotation;Lt Shoulder External Rotation;Lt Elbow Extension;Lt Elbow Flexion  -JA       MMT Right Upper Ext    Rt Shoulder Flexion MMT, Gross Movement (4+/5) good plus  -JA    Rt Shoulder ABduction MMT, Gross Movement (4+/5) good plus  -JA    Rt Shoulder Internal Rotation MMT, Gross Movement (4+/5) good plus  -JA    Rt Shoulder External Rotation MMT, Gross Movement (4+/5) good plus  -JA    Rt Elbow Flexion MMT, Gross Movement: (4+/5) good plus  -JA    Rt Elbow Extension MMT, Gross Movement: (4+/5) good plus  -JA       MMT Left Upper Ext    Lt Shoulder Flexion MMT, Gross Movement (4/5) good  -JA    Lt Shoulder ABduction MMT, Gross Movement (4-/5) good minus  pain limited  -JA    Lt Shoulder Internal Rotation MMT, Gross Movement (4+/5) good plus  -JA    Lt Shoulder External Rotation MMT, Gross Movement (4-/5) good minus  -JA    Lt Elbow Flexion MMT, Gross Movement (4-/5) good minus  -JA    Lt Elbow Extension MMT, Gross Movement (4-/5) good minus  -JA              User Key  (r) = Recorded By, (t) = Taken By, (c) = Cosigned By      Initials Name Provider Type    Kirstin Chao N, PT Physical Therapist                       02/26/25 1800   PT Short Term Goals   STG Date to Achieve 03/28/25   STG 1 Pt will be independent with initial HEP (pendulum, elbow, wrist, hand ROM)   STG 1 Progress New   STG 2 Pt will demonstrate good  posture in sitting and standing to reduce shoulder/scapular regiong irritation.   STG 2 Progress New   STG 3 Pt will demonstrate 0-140 PROM flexion and abduction L shoulder   STG 3 Progress New   STG 4 Pt will report decreased L shoulder pain by 50% from initial.   STG 4 Progress New   Long Term Goals   LTG Date to Achieve 04/27/25   LTG 1 Pt will be independent with shoulder AROM and scapular stabilization HEP.   LTG 1 Progress New   LTG 2 Pt will report decrease in L shoulder pain by 75% to facilitate return to PLOF.   LTG 2 Progress New   LTG 3 Pt will demonstrate increased AROM to WFL all planes to complete dressing/grooming, household ADLs.   LTG 3 Progress New   LTG 4 Pt will demonstrate increased L shoulder strength to 4/5-4+/5 or better.   LTG 4 Progress New   LTG 5 Pt will score 35 or less on QuickDASH indicating decrease in perceived functional disability.   LTG 5 Progress New   Time Calculation   PT Goal Re-Cert Due Date 05/27/25 02/26/25 1400   Subjective   Subjective Comments initial eval   Subjective Pain   Able to rate subjective pain? yes   Pre-Treatment Pain Level 3   Exercise 1   Exercise Name 1 Discussed plan to begin scapular stabilization for better suport of shoulder girdle and ROM for shoulder   Additional Comments Next visit will begin:   Exercise 2   Exercise Name 2 shrugs   Exercise 3   Exercise Name 3 Rev shld rolls   Exercise 4   Exercise Name 4 Scap ret   Exercise 5   Exercise Name 5 AROM ER/IR   Exercise 6   Exercise Name 6 consider wall wash or sup AAROM w/dowel          02/26/25 1600   PT Assessment   Functional Limitations Limitation in home management;Limitations in community activities;Limitations in functional capacity and performance;Performance in leisure activities;Performance in self-care ADL   Impairments Range of motion;Muscle strength;Posture;Sensation;Poor body mechanics;Pain   Assessment Comments Jose Maria Judge is a 59 y.o. male referred to outpatient physical  therapy for evaluation and treatment of left shoulder pain that began after a near-fall and a fall when slipping on snow and ice in January. He demonstrates decreased L shoulder ROM, weakness in L shoulder and parascap muscles, poor posture with forward head and rounded shoulders and poor shoulder-scap positioning, point tenderness to palpation L posterior RC, LHB, and SS tendons. Patient's symptoms have decreased since the initial injury and has stopped taking Tylenol for pain now. Signs and symptoms are consistent with referring diagnosis of shoulder impingement.  Pertinent comorbidities and personal factors that may affect progress include, but are not limited to, hx of shoulder pain, chronic pain due to pudendal neuralgia.  This condition is stable. Recommend skilled PT to address functional deficits. Thank you for this referral.   Please refer to paper survey for additional self-reported information No   Rehab Potential Good   Patient/caregiver participated in establishment of treatment plan and goals Yes   Patient would benefit from skilled therapy intervention Yes   PT Plan   PT Frequency 2x/week;1x/week   Predicted Duration of Therapy Intervention (PT) 8 visits   Planned CPT's? PT EVAL LOW COMPLEXITY: 46223;PT RE-EVAL: 60694;PT THER PROC EA 15 MIN: 51085;PT THER ACT EA 15 MIN: 87602;PT MANUAL THERAPY EA 15 MIN: 16714;PT NEUROMUSC RE-EDUCATION EA 15 MIN: 67518;PT SELF CARE/HOME MGMT/TRAIN EA 15: 20234   PT Plan Comments Begin with gentle shrugs for contract-relax of UTs, rev rolls, scap ret, and AROM ER/IR in small/pain-free range; may consider wall wash or sup AAROM w/dowel depending on tolerance to positioning; PROM       Therapy Education  Education Details: [id number] - begin HEP next visit, posture cuing  Given: HEP, Posture/body mechanics  Program: New  How Provided: Verbal, Demonstration, Written  Provided to: Patient  Level of Understanding: Verbalized, Demonstrated                                  Outcome Measure Options: Quick DASH  Quick DASH  Open a tight or new jar.: Moderate Difficulty  Do heavy household chores (e.g., wash walls, wash floors): Severe Difficulty  Carry a shopping bag or briefcase: Severe Difficulty  Wash your back: Mild Difficulty  Use a knife to cut food: No Difficulty  Recreational activities in which you take some force or impact through your arm, should or hand (e.g. golf, hammering, tennis, etc.): Severe Difficulty  During the past week, to what extent has your arm, shoulder, or hand problem interfered with your normal social activites with family, friends, neighbors or groups?: Moderately  During the past week, were you limited in your work or other regular daily activities as a result of your arm, shoulder or hand problem?: Moderately Limited  Arm, Shoulder, or hand pain: Moderate  Tingling (pins and needles) in your arm, shoulder, or hand: Moderate  During the past week, how much difficulty have you had sleeping because of the pain in your arm, shoulder or hand?: Moderate Difficiculty  Number of Questions Answered: 11  Quick DASH Score: 50         Time Calculation:     Start Time: 1445  Stop Time: 1530  Time Calculation (min): 45 min  Untimed Charges  PT Eval/Re-eval Minutes: 45  Total Minutes  Untimed Charges Total Minutes: 45   Total Minutes: 45     Therapy Charges for Today       Code Description Service Date Service Provider Modifiers Qty    72058059845 HC PT EVAL LOW COMPLEXITY 3 2/26/2025 Kirstin Dale, PT GP 1            PT G-Codes  Outcome Measure Options: Quick DASH  Quick DASH Score: 50         Kirstin Dale PT  2/26/2025

## 2025-03-05 ENCOUNTER — APPOINTMENT (OUTPATIENT)
Dept: PHYSICAL THERAPY | Facility: HOSPITAL | Age: 60
End: 2025-03-05
Payer: COMMERCIAL

## 2025-03-07 NOTE — PROGRESS NOTES
Patient Name: Jose Maria Judge   YOB: 1965  Referring Primary Care Physician: Triston Solomon MD      Chief Complaint:    Chief Complaint   Patient presents with    Lumbar Spine - Initial Evaluation, Pain        Previous Treatment:   [facility] per [facility]    PT for back pain? Yes effective? NO     MRI:   07/09/2024 - MRI of L-Spine W & W/O ()     Neurosurgery:   09/17/2021 - [facility] [place] - Eli Solomon PA   07/25/2019 - 06/09/2020 - [facility] NEUROSURGERY - Garcia Roberts MD    Pain Management:   08/29/2018 - 08/09/2021 - [facility] PAIN MANAGEMENT - Francisco Velez MD         Back Pain  This is a chronic problem. The current episode started more than 1 year ago. The problem occurs intermittently. The problem has been worse since onset. The pain is present in the lumbar spine. The quality of the pain is described as aching and burning (thighness). The pain is at a severity of 7/10. The pain is severe. The pain is Worse during the day. Exacerbated by: walking, Prolonged activity. Stiffness is present In the morning. Pertinent negatives include no leg pain, numbness, tingling or weakness. He has tried ice for the symptoms. The treatment provided mild relief.        HPI:  Jose Maria Judge is a 59 y.o. male who presents to [facility] for evaluation of above complaints.  Complaining primarily of right mid back pain from the inferior medial scapular border down to the mid to lower thoracic spine.  He denies associated injury.  He used to be a  and says that his left rhomboid lies lower than the right.  He denies any pain in her weakness in upper or lower extremities.  He does get some pain and paresthesias in bilateral feet and has been worked up through vascular and with Dr. Fink.  He has also seen multiple specialist for his back pain including [facility] neurosurgery, [facility] and  Dr. Roberts.  No bowel or bladder dysfunction.  This is an established patient to the practice, new to me.  This is an established patient to the practice, new to me.  He is unaccompanied today.  Prior pertinent records were reviewed.    PFSH:  See attached    ROS: As per HPI, otherwise negative    Objective:      59 y.o. male  Body mass index is 30.77 kg/m²., 100 kg (220 lb 9.6 oz), @HT@  Vitals:    03/10/25 1316   Temp: 97.8 °F (36.6 °C)     Pain Score    03/10/25 1316   PainSc: 7    PainLoc: Back            Spine Musculoskeletal Exam    Gait    Gait is normal.    Inspection    Coronal balance: no imbalance    Sagittal balance: no imbalance    Palpation    Thoracolumbar    Tenderness: present      Paraspinous: right    Strength    Thoracolumbar    Thoracolumbar motor exam is normal.       Sensory    Thoracolumbar    Thoracolumbar sensation is normal.    Reflexes    Right      Quadriceps: 1/4      Achilles: 2/4     Left      Quadriceps: 1/4      Achilles: 2/4    Special Tests    Thoracolumbar      Right      SLR: no back or leg pain      Left      SLR: no back or leg pain    General      Constitutional: well-developed and well-nourished    Scleral icterus: no    Labored breathing: no    Psychiatric: normal mood and affect and no acute distress    Neurological: alert and oriented x3    Skin: intact        IMAGING:     Indication: pain related symptoms,  Views: 2V AP&LAT lumbar  Findings: No fractures or subluxation, fairly well-maintained lordosis and disc spaces, lower lumbar facet hypertrophy  Comparison views: Lumbar MRI 07/09/2024 revealed disc bulge with mild to moderate right foraminal narrowing otherwise normal age-related degenerative disc changes.  Agree with report.      Official radiology interpretation will follow and be available in the patient medical records. Patient will be notified of any pertinent discrepancies.    Addendum 03/14/2025:  No compression deformity, spondylolysis or  spondylolisthesis.  There is disc space narrowing demonstrated at L2-3. Lower lumbar facet  hypertrophy seen.  Assessment:           Diagnoses and all orders for this visit:    1. Periscapular pain of right shoulder (Primary)  -     Ambulatory Referral to Physical Therapy for Evaluation & Treatment             Plan:  His pain seems more muscular in nature as he has tenderness to palpate the inferior scapular border and paraspinal musculature.  He has no numbness in any thoracic dermatomes or loss of nerve function on exam.  He has been going through physical therapy for various orthopedic complaints.  Will ask the therapist to also focus on the parascapular pain and mid back pain with massage, TENS unit and perhaps dry needling.  We discussed possibly getting a thoracic MRI, however he is extremely claustrophobic and would need sedation so since it will not change the current plan of treatment will hold off for now.  Offered to follow-up as needed but he would like to return in 3 months to see how he is progressing.  Call sooner with questions or concerns.      Return if symptoms worsen or fail to improve.    EMR Dragon/Transcription Disclaimer:   Much of this encounter note is an electronic transcription/translation of spoken language to printed text. The electronic translation of spoken language may permit erroneous, or at times, nonsensical words or phrases to be inadvertently transcribed; Although I have reviewed the note for such errors, some may still exist.  Red flags have been discussed at this or previous visits to include but not limited weakness in extremities, worsening pain that does not respond to conservative treatment and bowel or bladder dysfunction. These are reasons to present to ER and patient has been informed.    The diagnosis(es), natural history, pathophysiology and treatment for diagnosis(es) were discussed. Opportunity given and questions answered. Biomechanics of pertinent body areas  discussed.    EXERCISES:  Advice on benefits of, and types of regular/moderate exercise pertaining to diagnosis.  Continue HEP. For back or neck pain, recommend pilates and or yoga as tolerated. Generally it is best to start any new exercise under the guidance of a  or therapist.   MEDICATIONS:  When prescribe, the risks, benefits, warnings,side effects and alternatives of medications discussed. Advised against long term use of narcotics.   PAIN CONTROL:  Cold, heat, OTC lidocaine patches and/or ointment as needed. Avoid direct skin contact with ice. Ice 15-20 minutes 3-4 times daily as needed. For SI joint pain, recommend ice bath in water about 50 degrees for 5 consecutive days, add ice slowly to help with adjustment and may cover with warm towel or robe to help with cold tolerance. If using lidocaine, do not apply heat in conjunction as this can cause a burn.   MEDICAL RECORDS reviewed from other provider(s) for past and current medical history pertinent to this visit..

## 2025-03-10 ENCOUNTER — OFFICE VISIT (OUTPATIENT)
Dept: ORTHOPEDIC SURGERY | Facility: CLINIC | Age: 60
End: 2025-03-10
Payer: COMMERCIAL

## 2025-03-10 VITALS — WEIGHT: 220.6 LBS | TEMPERATURE: 97.8 F | BODY MASS INDEX: 30.88 KG/M2 | HEIGHT: 71 IN

## 2025-03-10 DIAGNOSIS — M25.511 PERISCAPULAR PAIN OF RIGHT SHOULDER: Primary | ICD-10-CM

## 2025-03-10 PROCEDURE — 99213 OFFICE O/P EST LOW 20 MIN: CPT | Performed by: NURSE PRACTITIONER

## 2025-03-10 PROCEDURE — 1160F RVW MEDS BY RX/DR IN RCRD: CPT | Performed by: NURSE PRACTITIONER

## 2025-03-10 PROCEDURE — 1159F MED LIST DOCD IN RCRD: CPT | Performed by: NURSE PRACTITIONER

## 2025-03-14 ENCOUNTER — HOSPITAL ENCOUNTER (OUTPATIENT)
Dept: PHYSICAL THERAPY | Facility: HOSPITAL | Age: 60
Setting detail: THERAPIES SERIES
Discharge: HOME | End: 2025-03-14
Payer: COMMERCIAL

## 2025-03-14 DIAGNOSIS — M25.512 ACUTE PAIN OF LEFT SHOULDER: Primary | ICD-10-CM

## 2025-03-14 DIAGNOSIS — M62.81 MUSCLE WEAKNESS OF LEFT UPPER EXTREMITY: ICD-10-CM

## 2025-03-14 DIAGNOSIS — M25.60 DECREASED RANGE OF MOTION: ICD-10-CM

## 2025-03-14 PROCEDURE — 97110 THERAPEUTIC EXERCISES: CPT

## 2025-03-14 PROCEDURE — 97140 MANUAL THERAPY 1/> REGIONS: CPT

## 2025-03-14 NOTE — THERAPY TREATMENT NOTE
Outpatient Physical Therapy Ortho Treatment Note  [facility]     Patient Name: Jose Maria Judge  : 1965  MRN: [id number]  Today's Date: 3/14/2025      Visit Date: 2025    Visit Dx:    ICD-10-CM ICD-9-CM   1. Acute pain of left shoulder  M25.512 719.41   2. Decreased range of motion  M25.60 719.50   3. Muscle weakness of left upper extremity  M62.81 728.87       Patient Active Problem List   Diagnosis    Bilateral hip pain    Trochanteric bursitis    Tear of acetabular labrum    Anal burning    Hamstring strain    Hypertension    Neuritis of foot    Rectal pain    Herpes zoster    Varicose veins of bilateral lower extremities with pain    Epigastric pain    Olfaction disorder    Chronic bilateral low back pain    Nausea    Arthropathy of lumbar facet joint    Pudendal neuralgia    Tinnitus    Rib pain    Lymphadenopathy    Shoulder pain    Chronic pain of both feet    Metatarsalgia of both feet    Paresthesia of foot    Vertigo    Varicocele    Tinea corporis    Thoracic back pain    Swelling of structure of right eye    Snoring    Other specified diseases of anus and rectum    Night sweats    Neuropathy    Muscle spasm of back    Miller's metatarsalgia    Mass of axilla    Male pattern alopecia    Low back strain    Irritable bowel syndrome    Intolerant of cold    Insomnia    Gastroesophageal reflux disease    Ganglion    Elevated low density lipoprotein (LDL) cholesterol level    Disorder of gallbladder    Degeneration of lumbar intervertebral disc    Carpal tunnel syndrome    Benign prostatic hyperplasia    Allergic rhinitis    Acute anal fissure    Body aches    Melena    RUQ abdominal pain    Hand arthritis        Past Medical History:   Diagnosis Date    Acid reflux     HX    Anal fistula     Anxiety     R/T PAIN    Arthritis of back     can't remember    Carpal tunnel syndrome, left     DDD (degenerative disc disease), lumbar     Frozen shoulder     Hemorrhoids     Hodgkin's lymphoma     left  axillary adenopathy leading to a biopsy that was nondiagnostic but suspicious and eventually an excisional biopsy that was eventually felt to be consistent with follicular hyperplasia and progressive transformation of germinal centers with 2 foci suspicious for early involvement by nodules lymphocyte predominant Hodgkin's lymphoma.  This was likely treated by excision    Hyperlipidemia     Hyperosmia     Labral tear of hip joint     RIGHT    Low back strain     Male pattern alopecia     Periarthritis of shoulder     need to check file    Postnasal drip     Pudendal neuralgia     Tinnitus of left ear     Trochanteric bursitis 08/25/2016    Varicose vein of leg     left        Past Surgical History:   Procedure Laterality Date    ABSCESS DRAINAGE  08/2012    ANAL FISTULA REPAIR N/A 09/2012, 10/2012    AXILLARY LYMPH NODE BIOPSY/EXCISION Left 11/09/2018    Procedure: AXILLARY LYMPH NODE BIOPSY/EXCISION;  Surgeon: Amando Nguyễn MD;  Location: [facility];  Service: General    COLONOSCOPY N/A 2013    done at [facility]    COLONOSCOPY N/A 07/10/2019    Procedure: COLONOSCOPY to CECUM;  Surgeon: Amando Nguyễn MD;  Location: [facility] ENDOSCOPY;  Service: General    ENDOSCOPY N/A 01/09/2019    Procedure: ESOPHAGOGASTRODUODENOSCOPY;  Surgeon: Amando Nguyễn MD;  Location: [facility] ENDOSCOPY;  Service: General    ENDOSCOPY N/A 03/30/2023    Procedure: ESOPHAGOGASTRODUODENOSCOPY WITH BIOPSY;  Surgeon: Amando Nguyễn MD;  Location: [facility];  Service: Gastroenterology;  Laterality: N/A;  Gastric biopsy; Normal    FINE NEEDLE ASPIRATION Left 08/09/2018    Left axillary lymph node FNA    FLEXIBLE SIGMOIDOSCOPY N/A 06/25/2003    Normal-Dr. Cezar Aviles    HEMORRHOIDECTOMY N/A 1996    HIP ARTHROSCOPY W/ LABRAL REPAIR Right 04/03/2017    Dr. Lonnie Lemons    HIP SURGERY  2017    right labral repair    MEDIAL BRANCH BLOCK Bilateral 03/12/2021    Procedure: BILATERAL MEDIAL BRANCH BLOCK W/MAC;  Surgeon: Francisco Velez  MD BRADY;  [place] [facility];  Service: Pain Management;  Laterality: Bilateral;    SIGMOIDOSCOPY N/A 09/23/2020    Procedure: FLEXIBLE SIGMOIDOSCOPY WITH BIOPSY;  Surgeon: Shena Ring MD;  Location: [facility] ENDOSCOPY;  Service: Gastroenterology;  Laterality: N/A;  pre- anal/rectal pain  post- hemorrhoids    TONSILLECTOMY Bilateral                         PT Assessment/Plan       Row Name 03/14/25 1500          PT Assessment    Assessment Comments Jose Maria returns to PT for his first f/u for L shoulder pain following initial evaluation. He denies any adverse effects following initial HEP, no significant changes. He does however report onset of R sides midback pain, specially reporting tightness of Lat muscle that is impacting overall function at this time. Reviewed all initial HEP exercises and continued with emphasis on gentle mobility this date with addition of vertical wall slides, gentle door way pec stretch as well as initiated manual interventions. He has notable tightness throughout L UT with palpable taught tissue bands. He is moderately tender to mild/moderate pressure, reduced tolerance for sustained stretching. He demos elevated shoulder on L side. Did not update HEP at this time to assess response to increased activity, did encourage ice following today's session for any tissue inflammation, he is agreeable. Will plan to progress as appropriate and tolerable at next session.  -MO        PT Plan    PT Plan Comments tolerance to last session? supine over towel roll, gentle AAROM w/ dowel, gentle chest press with dowel. Update HEP if all tolerable. Consider L counter stretch for lat  -MO               User Key  (r) = Recorded By, (t) = Taken By, (c) = Cosigned By      Initials Name Provider Type    Josey Manriquez, PT Physical Therapist                       OP Exercises       Row Name 03/14/25 1400             Subjective    Subjective Comments no significant changes  -MO         Total Minutes     01782 - PT Therapeutic Exercise Minutes 28  -MO      77688 - PT Manual Therapy Minutes 10  -MO         Exercise 1    Exercise Name 1 SB roll out  -MO      Cueing 1 Verbal  -MO      Sets 1 1e  -MO      Reps 1 10  -MO         Exercise 2    Exercise Name 2 shrugs  -MO      Sets 2 2  -MO      Reps 2 10  -MO      Time 2 3s  -MO      Additional Comments cueing for full retraction  -MO         Exercise 3    Exercise Name 3 Rev shld rolls  -MO      Cueing 3 Verbal  -MO      Sets 3 1  -MO      Reps 3 20  -MO         Exercise 4    Exercise Name 4 Scap ret  -MO      Cueing 4 Verbal  -MO      Sets 4 1  -MO      Reps 4 20  -MO         Exercise 6    Exercise Name 6 wall slides: vertical  -MO      Cueing 6 Verbal  -MO      Sets 6 1  -MO      Reps 6 10  -MO      Time 6 5s  -MO      Additional Comments cueing for painfree range  -MO         Exercise 7    Exercise Name 7 low arm doorway stretch  -MO      Cueing 7 Verbal;Demo  -MO      Sets 7 1  -MO      Reps 7 10  -MO      Time 7 2s  -MO      Additional Comments gently rocking in<> out,  no sustained hold, painfree range  -MO                User Key  (r) = Recorded By, (t) = Taken By, (c) = Cosigned By      Initials Name Provider Type    Josey Manriquez, PT Physical Therapist                             Manual Rx (Last 36 Hours)       Manual Treatments       Row Name 03/14/25 1500 03/14/25 1400          Total Minutes    76515 - PT Manual Therapy Minutes -- 10  -MO        Manual Rx 1    Manual Rx 1 Location L shoulder  -MO --     Manual Rx 1 Type LAD with gentle oscillation  -MO --        Manual Rx 2    Manual Rx 2 Location STM L UT/LS complex  -MO --               User Key  (r) = Recorded By, (t) = Taken By, (c) = Cosigned By      Initials Name Provider Type    Josey Manriquez, PT Physical Therapist                     PT OP Goals       Row Name 03/14/25 1400          PT Short Term Goals    STG Date to Achieve 03/28/25  -MO     STG 1 Pt will be independent with initial HEP (pendulum,  elbow, wrist, hand ROM)  -MO     STG 1 Progress Ongoing  -MO     STG 2 Pt will demonstrate good posture in sitting and standing to reduce shoulder/scapular regiong irritation.  -MO     STG 2 Progress Ongoing  -MO     STG 3 Pt will demonstrate 0-140 PROM flexion and abduction L shoulder  -MO     STG 3 Progress Ongoing  -MO     STG 4 Pt will report decreased L shoulder pain by 50% from initial.  -MO     STG 4 Progress Ongoing  -MO        Long Term Goals    LTG Date to Achieve 04/27/25  -MO     LTG 1 Pt will be independent with shoulder AROM and scapular stabilization HEP.  -MO     LTG 1 Progress Ongoing  -MO     LTG 2 Pt will report decrease in L shoulder pain by 75% to facilitate return to PLOF.  -MO     LTG 2 Progress Ongoing  -MO     LTG 3 Pt will demonstrate increased AROM to WFL all planes to complete dressing/grooming, household ADLs.  -MO     LTG 3 Progress Ongoing  -MO     LTG 4 Pt will demonstrate increased L shoulder strength to 4/5-4+/5 or better.  -MO     LTG 4 Progress Ongoing  -MO     LTG 5 Pt will score 35 or less on QuickDASH indicating decrease in perceived functional disability.  -MO     LTG 5 Progress Ongoing  -MO               User Key  (r) = Recorded By, (t) = Taken By, (c) = Cosigned By      Initials Name Provider Type    Josey Manriquez PT Physical Therapist                                   Time Calculation:   Start Time: 1400  Stop Time: 1438  Time Calculation (min): 38 min  Timed Charges  97672 - PT Therapeutic Exercise Minutes: 28  16176 - PT Manual Therapy Minutes: 10  Total Minutes  Timed Charges Total Minutes: 38   Total Minutes: 38  Therapy Charges for Today       Code Description Service Date Service Provider Modifiers Qty    22112094435 HC PT THER PROC EA 15 MIN 3/14/2025 Josey Black PT GP 2    56686227243 HC PT MANUAL THERAPY EA 15 MIN 3/14/2025 Josey Black PT GP 1                      Josey Black PT  3/14/2025

## 2025-03-21 ENCOUNTER — APPOINTMENT (OUTPATIENT)
Dept: PHYSICAL THERAPY | Facility: HOSPITAL | Age: 60
End: 2025-03-21
Payer: COMMERCIAL

## 2025-03-25 ENCOUNTER — TELEPHONE (OUTPATIENT)
Dept: ORTHOPEDIC SURGERY | Facility: CLINIC | Age: 60
End: 2025-03-25
Payer: COMMERCIAL

## 2025-03-25 DIAGNOSIS — M54.9 MID BACK PAIN, CHRONIC: ICD-10-CM

## 2025-03-25 DIAGNOSIS — M75.42 IMPINGEMENT SYNDROME OF LEFT SHOULDER: ICD-10-CM

## 2025-03-25 DIAGNOSIS — G89.29 MID BACK PAIN, CHRONIC: ICD-10-CM

## 2025-03-25 DIAGNOSIS — R52 PAIN: ICD-10-CM

## 2025-03-25 DIAGNOSIS — M25.511 RIGHT SHOULDER PAIN, UNSPECIFIED CHRONICITY: ICD-10-CM

## 2025-03-25 DIAGNOSIS — M25.511 PERISCAPULAR PAIN OF RIGHT SHOULDER: Primary | ICD-10-CM

## 2025-03-25 NOTE — TELEPHONE ENCOUNTER
Caller: Jose Maria Judge    Relationship: Self    Best call back number: [phone]    What was the call regarding: PT CALLING TO GET AN UPDATE ON PHYSICAL THERAPY REFERRAL FROM 03/10/25 FROM [facility]. PT STATES HE HAS NOT BEEN CONTACTED TO SCHEDULE YET. PLEASE INVESTIGATE AND CONTACT PT TO DISCUSS IF REFERRAL IS READY TO SCHEDULE.

## 2025-03-25 NOTE — TELEPHONE ENCOUNTER
"Please advise   I called pt and advised pt referral has already been placed. Pt stated he needs referral for upper/mid back pain. (T-spine) I advised pt in the note's of the referral \"Eval and treat right parascapular pain/right mid thoracic pain. Seems musculoskeletal. Try dry needling, TENs, and other modalities per provider discretion.\" Pt then asked me to call PT for him. I called PT and they stated new referral needs to be placed with new diagnosis selected it can't just be in the notes.     "

## 2025-03-27 ENCOUNTER — APPOINTMENT (OUTPATIENT)
Dept: PHYSICAL THERAPY | Facility: HOSPITAL | Age: 60
End: 2025-03-27
Payer: COMMERCIAL

## 2025-04-01 ENCOUNTER — TELEPHONE (OUTPATIENT)
Dept: FAMILY MEDICINE CLINIC | Facility: CLINIC | Age: 60
End: 2025-04-01

## 2025-04-01 NOTE — TELEPHONE ENCOUNTER
Hub staff attempted to follow warm transfer process and was unsuccessful     Caller: Jose Maria Judge    Relationship to patient: Self    Best call back number: [phone]     Patient is needing: PATIENT IS WANTING TO SEE IF THEY CAN SCHEDULE A TELEHEATH VISIT WITH DR JAMES. WAS SEEN AT  OVER THE WEEKEND. HAVING SORE THROAT AND SWOLLEN GLANDS ON AND OFF FOR A WHILE

## 2025-04-02 ENCOUNTER — TELEMEDICINE (OUTPATIENT)
Dept: FAMILY MEDICINE CLINIC | Facility: CLINIC | Age: 60
End: 2025-04-02
Payer: COMMERCIAL

## 2025-04-02 DIAGNOSIS — R49.0 CHRONIC HOARSENESS: Primary | ICD-10-CM

## 2025-04-02 DIAGNOSIS — R43.8 BAD TASTE IN MOUTH: ICD-10-CM

## 2025-04-02 DIAGNOSIS — R49.9 HOARSENESS OR CHANGING VOICE: ICD-10-CM

## 2025-04-02 DIAGNOSIS — R09.89 CHRONIC THROAT CLEARING: ICD-10-CM

## 2025-04-02 PROCEDURE — 99213 OFFICE O/P EST LOW 20 MIN: CPT | Performed by: STUDENT IN AN ORGANIZED HEALTH CARE EDUCATION/TRAINING PROGRAM

## 2025-04-02 PROCEDURE — 1160F RVW MEDS BY RX/DR IN RCRD: CPT | Performed by: STUDENT IN AN ORGANIZED HEALTH CARE EDUCATION/TRAINING PROGRAM

## 2025-04-02 PROCEDURE — 1126F AMNT PAIN NOTED NONE PRSNT: CPT | Performed by: STUDENT IN AN ORGANIZED HEALTH CARE EDUCATION/TRAINING PROGRAM

## 2025-04-02 PROCEDURE — 1159F MED LIST DOCD IN RCRD: CPT | Performed by: STUDENT IN AN ORGANIZED HEALTH CARE EDUCATION/TRAINING PROGRAM

## 2025-04-02 NOTE — PROGRESS NOTES
Chief Complaint  No chief complaint on file.    Subjective    History of Present Illness  The patient is a 59-year-old male who presents via virtual visit for evaluation of a sore throat.    He sought medical attention at an urgent care facility on 03/30/2025 due to a persistent sore throat, which has been a recurring issue for the past 2 years. The patient reports that his throat becomes dry, fatigued, and irritated after approximately 10 minutes of conversation, either in person or over the phone. Prolonged talking exacerbates the irritation, leading to a scratchy sensation. He does not experience any fevers or hemoptysis. Previous treatments for suspected upper respiratory infections, including antibiotics and saltwater gargles, have provided temporary relief lasting 1 to 2 months. He initiated antibiotic therapy on Sunday night but discontinued it on Tuesday due to adverse effects. However, he resumed the antibiotics last night after experiencing chest heaviness and postnasal drip around 2:30 AM. After taking half a Mucinex 600 mg tablet and consuming a small amount of cream of wheat, he reported feeling better within 30 to 40 minutes. He also propped himself up with pillows and fell back asleep, further improving his condition.    The patient has a history of acid reflux spanning 10 to 15 years, characterized by occasional awakenings at night due to a bitter taste in his mouth. He does not experience heartburn during the day and avoids tomato-based foods. His primary care physician first suggested the possibility of acid reflux 15 years ago when he presented with non-specific illness without fever. At that time, he did not fully understand the potential link between acid reflux and throat irritation.    The patient has a lifelong history of postnasal drip, which has not responded to various over-the-counter treatments. Approximately 20 years ago, he found relief with a [employer] brand allergy medication, but this  "was only effective for one season. A subsequent trial of Bromfed PSE DM provided significant relief, but he discontinued it after 1.5 years due to worsening tinnitus in his left ear. He has previously consulted with [facility] in [place] and attempted allergy injections, but these were discontinued due to adverse reactions.    The patient also reports an episode of feeling hot and flushed, with a sensation of elevated blood pressure, after using a space heater for an extended period. He did not measure his blood pressure at the time but managed the symptoms by applying cold rags to his ears.    MEDICATIONS  Current: Mucinex  Past: Bromfed PSE DM       Jose Maria Judge presents to [facility]  History of Present Illness    Objective   Vital Signs:  There were no vitals taken for this visit.  Estimated body mass index is 30.61 kg/m² as calculated from the following:    Height as of 4/9/25: 180.3 cm (71\").    Weight as of 4/3/25: 99.6 kg (219 lb 8 oz).            Physical Exam  Pulmonary:      Effort: Pulmonary effort is normal.   Neurological:      Mental Status: He is alert and oriented to person, place, and time.        Result Review :                Assessment and Plan   Diagnoses and all orders for this visit:    1. Chronic hoarseness (Primary)    2. Chronic throat clearing  -     Ambulatory Referral to Gastroenterology    3. Bad taste in mouth  -     Ambulatory Referral to Gastroenterology    4. Hoarseness or changing voice  -     Ambulatory Referral to Gastroenterology        Assessment & Plan  1. Pharyngitis.  The patient's symptoms of throat irritation and dryness, particularly after talking, may be related to chronic acid reflux or allergies rather than a bacterial infection, given the absence of fever. He is advised to complete the current course of antibiotics. A referral to a gastroenterologist will be made for further evaluation of potential acid reflux.    2. " Gastroesophageal Reflux Disease (GERD).  The patient has a history of acid reflux presenting with symptoms such as a bitter taste in the mouth and throat irritation. He is advised to avoid foods that may trigger reflux, such as tomatoes. A referral to a gastroenterologist will be made for further evaluation and potential endoscopy to assess the severity of GERD.    3. Postnasal Drip.  The patient has a long-standing history of postnasal drip, which may be contributing to his throat symptoms. He is advised to consider using an allergy pill to manage postnasal drainage. If symptoms persist, further evaluation by an allergist may be necessary.    4. Elevated blood pressure.  The patient also reports an episode of feeling hot and flushed, with a sensation of elevated blood pressure, after using a space heater for an extended period. He is advised to monitor his blood pressure and heart rate during such episodes and report any abnormalities.          I spent 25 minutes caring for Jose Maria on this date of service. This time includes time spent by me in the following activities:preparing for the visit, reviewing tests, obtaining and/or reviewing a separately obtained history, performing a medically appropriate examination and/or evaluation , counseling and educating the patient/family/caregiver, ordering medications, tests, or procedures, referring and communicating with other health care professionals , documenting information in the medical record, independently interpreting results and communicating that information with the patient/family/caregiver, and care coordination  Follow Up   No follow-ups on file.  Patient was given instructions and counseling regarding his condition or for health maintenance advice. Please see specific information pulled into the AVS if appropriate.     Patient or patient representative verbalized consent for the use of Ambient Listening during the visit with  Guerita Paredes MD for chart  documentation. 4/13/2025  09:49 EDT    Mode of Visit: Video  Location of patient: -HOME-  Location of provider: +[facility]+  You have chosen to receive care through a telehealth visit.  The patient has signed the video visit consent form.  The visit included audio and video interaction. No technical issues occurred during this visit.

## 2025-04-02 NOTE — TELEPHONE ENCOUNTER
"Relay     \"Dr. Solomon is out of the office until Monday. Please schedule with an alternate provider if you need to be seen before then, or please schedule a visit with him for next week. \"                  "

## 2025-04-03 ENCOUNTER — OFFICE VISIT (OUTPATIENT)
Dept: FAMILY MEDICINE CLINIC | Facility: CLINIC | Age: 60
End: 2025-04-03
Payer: COMMERCIAL

## 2025-04-03 VITALS
SYSTOLIC BLOOD PRESSURE: 122 MMHG | HEIGHT: 71 IN | BODY MASS INDEX: 30.73 KG/M2 | DIASTOLIC BLOOD PRESSURE: 70 MMHG | TEMPERATURE: 96.8 F | RESPIRATION RATE: 16 BRPM | HEART RATE: 65 BPM | OXYGEN SATURATION: 96 % | WEIGHT: 219.5 LBS

## 2025-04-03 DIAGNOSIS — J02.9 PHARYNGITIS, UNSPECIFIED ETIOLOGY: Primary | ICD-10-CM

## 2025-04-03 PROCEDURE — 3078F DIAST BP <80 MM HG: CPT | Performed by: STUDENT IN AN ORGANIZED HEALTH CARE EDUCATION/TRAINING PROGRAM

## 2025-04-03 PROCEDURE — 1159F MED LIST DOCD IN RCRD: CPT | Performed by: STUDENT IN AN ORGANIZED HEALTH CARE EDUCATION/TRAINING PROGRAM

## 2025-04-03 PROCEDURE — 1126F AMNT PAIN NOTED NONE PRSNT: CPT | Performed by: STUDENT IN AN ORGANIZED HEALTH CARE EDUCATION/TRAINING PROGRAM

## 2025-04-03 PROCEDURE — 1160F RVW MEDS BY RX/DR IN RCRD: CPT | Performed by: STUDENT IN AN ORGANIZED HEALTH CARE EDUCATION/TRAINING PROGRAM

## 2025-04-03 PROCEDURE — 99213 OFFICE O/P EST LOW 20 MIN: CPT | Performed by: STUDENT IN AN ORGANIZED HEALTH CARE EDUCATION/TRAINING PROGRAM

## 2025-04-03 PROCEDURE — 3074F SYST BP LT 130 MM HG: CPT | Performed by: STUDENT IN AN ORGANIZED HEALTH CARE EDUCATION/TRAINING PROGRAM

## 2025-04-03 RX ORDER — AMOXICILLIN 875 MG/1
875 TABLET, COATED ORAL 2 TIMES DAILY
Qty: 14 TABLET | Refills: 0 | Status: SHIPPED | OUTPATIENT
Start: 2025-04-03

## 2025-04-09 DIAGNOSIS — J02.9 PHARYNGITIS, UNSPECIFIED ETIOLOGY: Primary | ICD-10-CM

## 2025-04-10 ENCOUNTER — TELEPHONE (OUTPATIENT)
Dept: FAMILY MEDICINE CLINIC | Facility: CLINIC | Age: 60
End: 2025-04-10

## 2025-04-10 NOTE — TELEPHONE ENCOUNTER
Spoke with Mr Judge advised that there is no need for an appt today since the ref has already been placed unless there is something else that was needed.  Pt stated that was okay.  Also advised of where the ref was sent pt did not want to go there spoke with Ms Romero who advised me to provide pt with Dr Alex Moreland @ [facility] @ [phone].  Pt will contact them to get an appt.

## 2025-04-13 NOTE — PROGRESS NOTES
Chief Complaint  Sore Throat    Subjective    History of Present Illness  The patient is a 59-year-old male who presents for acute follow-up.    He reports experiencing intermittent episodes of hot ears, which he describes as a sensation of internal fever. These episodes have been occurring for the past 2 weeks. He also reports a persistent sore throat, which he rates as a 6 to 7 on the pain scale. Despite taking Mucinex and an antibiotic, he has not experienced any relief. He describes a sensation of swelling in his throat and the need to clear his airway due to postnasal drainage. He also reports a feeling of glandular swelling. He expresses concern about the recurrent nature of his sore throat, which has been present for at least 2 weeks. He notes that the symptoms temporarily subside after gargling but then return. He is particularly worried about the swelling inside his throat and the need to clear his airway. He reports no expectoration of chunks. He has been taking amoxicillin 500 mg twice daily for 3 days but reports no improvement in his symptoms. He also reports loose bowel movements, which he attributes to the amoxicillin. He has not consumed yogurt for the past 10 years due to its tendency to exacerbate his postnasal drainage. He has previously tried probiotics, but they also increased his postnasal drainage. He always keeps Mucinex in the house because most of the days he does not have it bad, but when it does come, it starts to choke him and so he takes it to keep it loose. He received a Medrol injection at an urgent care facility a few days ago, which he reports caused a burning sensation. He did not experience this with a previous Medrol injection administered in September 2024. He reports no shortness of breath. He was tested for strep and mono on Sunday, both of which were negative.    MEDICATIONS  Current: Mucinex, amoxicillin       Jose Maria Judge presents to [facility]  "CARE  History of Present Illness    Objective   Vital Signs:  /70 (BP Location: Left arm, Patient Position: Sitting, Cuff Size: Adult)   Pulse 65   Temp 96.8 °F (36 °C) (Temporal)   Resp 16   Ht 180.3 cm (70.98\")   Wt 99.6 kg (219 lb 8 oz)   SpO2 96%   BMI 30.63 kg/m²   Estimated body mass index is 30.63 kg/m² as calculated from the following:    Height as of this encounter: 180.3 cm (70.98\").    Weight as of this encounter: 99.6 kg (219 lb 8 oz).            Physical Exam   Result Review :                Assessment and Plan   Diagnoses and all orders for this visit:    1. Pharyngitis, unspecified etiology (Primary)  -     amoxicillin (AMOXIL) 875 MG tablet; Take 1 tablet by mouth 2 (Two) Times a Day.  Dispense: 14 tablet; Refill: 0        Assessment & Plan  1. Sore throat.  The patient's sore throat is likely due to allergies, as evidenced by the absence of fever and the presence of postnasal drainage. The discomfort may be exacerbated by early morning dryness and mouth breathing during sleep. The hot ears and sensation of internal fever suggest an ongoing viral infection, which the body is combating by raising its temperature. This is not indicative of an allergic reaction to medication. The current treatment plan includes increasing the dosage of amoxicillin from 500 mg to 875 mg for an additional 5 days. A referral to a gastroenterologist has been made to evaluate potential reflux issues. If the increased dosage of amoxicillin proves ineffective, he is advised to contact the office.            Follow Up   No follow-ups on file.  Patient was given instructions and counseling regarding his condition or for health maintenance advice. Please see specific information pulled into the AVS if appropriate.     Patient or patient representative verbalized consent for the use of Ambient Listening during the visit with  Guerita Paredes MD for chart documentation. 4/13/2025  10:47 EDT            "

## 2025-04-15 ENCOUNTER — TELEPHONE (OUTPATIENT)
Dept: FAMILY MEDICINE CLINIC | Facility: CLINIC | Age: 60
End: 2025-04-15

## 2025-04-15 NOTE — TELEPHONE ENCOUNTER
"Relay     \"No other lab appts available at this location. If needed, please utilize the walk in lab at   [facility]; [address] [address] [facility]. They are open Monday-Friday, 7:30am-8:00pm.  \"                  "

## 2025-04-15 NOTE — TELEPHONE ENCOUNTER
Hub staff attempted to follow warm transfer process and was unsuccessful     Caller: Jose Maria Judge    Relationship to patient: Self    Best call back number: [phone]     Patient is needing: REQUESTS CALL BACK TO RESCHEDULE LAB APPOINTMENT

## 2025-04-22 ENCOUNTER — TELEPHONE (OUTPATIENT)
Dept: FAMILY MEDICINE CLINIC | Facility: CLINIC | Age: 60
End: 2025-04-22

## 2025-04-22 NOTE — TELEPHONE ENCOUNTER
Hub staff attempted to follow warm transfer process and was unsuccessful     Caller: Jose Maria Judge    Relationship to patient: Self    Best call back number: [phone]     Patient is needing: PATIENT IS NEEDING TO SCHEDULE A LAB APPOINTMENT.

## 2025-05-12 ENCOUNTER — TELEPHONE (OUTPATIENT)
Dept: FAMILY MEDICINE CLINIC | Facility: CLINIC | Age: 60
End: 2025-05-12
Payer: COMMERCIAL

## 2025-05-12 DIAGNOSIS — Z00.00 HEALTHCARE MAINTENANCE: Primary | ICD-10-CM

## 2025-05-12 NOTE — TELEPHONE ENCOUNTER
Patient is calling to see if he can get Vitamin D and vitamin B12 checked on his next lab appointment coming up on 5/20/25.  Please call patient back if this is able to get added [phone]

## 2025-05-19 ENCOUNTER — TELEPHONE (OUTPATIENT)
Dept: FAMILY MEDICINE CLINIC | Facility: CLINIC | Age: 60
End: 2025-05-19
Payer: COMMERCIAL

## 2025-05-27 ENCOUNTER — TELEPHONE (OUTPATIENT)
Dept: FAMILY MEDICINE CLINIC | Facility: CLINIC | Age: 60
End: 2025-05-27

## 2025-05-27 NOTE — TELEPHONE ENCOUNTER
Hub staff attempted to follow warm transfer process and was unsuccessful     Caller: Jose Maria Judge    Relationship to patient: Self    Best call back number: [phone]     Patient is needing: PATIENT IS REQUESTING TO SCHEDULE LAB APPOINTMENT.

## 2025-06-18 ENCOUNTER — TELEMEDICINE (OUTPATIENT)
Dept: FAMILY MEDICINE CLINIC | Facility: CLINIC | Age: 60
End: 2025-06-18
Payer: COMMERCIAL

## 2025-06-18 DIAGNOSIS — R19.7 DIARRHEA, UNSPECIFIED TYPE: ICD-10-CM

## 2025-06-18 DIAGNOSIS — F19.982 DRUG-INDUCED INSOMNIA: Primary | ICD-10-CM

## 2025-06-18 PROCEDURE — 1126F AMNT PAIN NOTED NONE PRSNT: CPT

## 2025-06-18 PROCEDURE — 99213 OFFICE O/P EST LOW 20 MIN: CPT

## 2025-06-18 NOTE — PROGRESS NOTES
"Chief Complaint  Fatigue, Insomnia, and Diarrhea (Also states they are \"smelly\")    Subjective      Jose Maria Judge presents to [facility]  History of Present Illness    Patient is here for poor sleep since starting amoxicillin which was start in the ER a few days ago. He reports that he has worsening odor and softening of his bowel movements. He also reports that his \"nasal passages are higher\"   He reports that he feels his nose his hot.     Objective   Vital Signs:  There were no vitals taken for this visit.  Estimated body mass index is 30.61 kg/m² as calculated from the following:    Height as of 4/9/25: 180.3 cm (71\").    Weight as of 4/3/25: 99.6 kg (219 lb 8 oz).    Physical Exam   Result Review :        Appears to have no acute illness. No erythema was present on face. No shortness of breath was appreciated on the exam.       Assessment & Plan  Drug-induced insomnia  Patient was recently given methyl prednisone injection.   Recommended     Diarrhea, unspecified type  Patient reports diarrhea after having started antibiotics. He has had no resolution of his symptoms. He was recommended to discontinue the antibiotic as he has had worsening bowel movements.        Follow Up   No follow-ups on file.  Patient was given instructions and counseling regarding his condition or for health maintenance advice. Please see specific information pulled into the AVS if appropriate.     Mode of Visit: Video  Location of patient: -HOME-  Location of provider: +[facility]+  You have chosen to receive care through a telehealth visit.  The patient has signed the video visit consent form.  The visit included audio and video interaction. No technical issues occurred during this visit.      Diagnoses and all orders for this visit:    1. Drug-induced insomnia (Primary)    2. Diarrhea, unspecified type               "

## 2025-06-23 ENCOUNTER — OFFICE VISIT (OUTPATIENT)
Dept: FAMILY MEDICINE CLINIC | Facility: CLINIC | Age: 60
End: 2025-06-23
Payer: COMMERCIAL

## 2025-06-23 ENCOUNTER — TELEPHONE (OUTPATIENT)
Dept: FAMILY MEDICINE CLINIC | Facility: CLINIC | Age: 60
End: 2025-06-23

## 2025-06-23 VITALS
HEIGHT: 71 IN | TEMPERATURE: 96.9 F | OXYGEN SATURATION: 97 % | WEIGHT: 216.2 LBS | DIASTOLIC BLOOD PRESSURE: 82 MMHG | SYSTOLIC BLOOD PRESSURE: 112 MMHG | BODY MASS INDEX: 30.27 KG/M2 | HEART RATE: 65 BPM | RESPIRATION RATE: 16 BRPM

## 2025-06-23 DIAGNOSIS — R09.82 POST-NASAL DRIP: ICD-10-CM

## 2025-06-23 DIAGNOSIS — R53.83 MALAISE AND FATIGUE: ICD-10-CM

## 2025-06-23 DIAGNOSIS — R50.9 SUBJECTIVE FEVER: Primary | ICD-10-CM

## 2025-06-23 DIAGNOSIS — R53.81 MALAISE AND FATIGUE: ICD-10-CM

## 2025-06-23 PROCEDURE — 1159F MED LIST DOCD IN RCRD: CPT | Performed by: STUDENT IN AN ORGANIZED HEALTH CARE EDUCATION/TRAINING PROGRAM

## 2025-06-23 PROCEDURE — 3074F SYST BP LT 130 MM HG: CPT | Performed by: STUDENT IN AN ORGANIZED HEALTH CARE EDUCATION/TRAINING PROGRAM

## 2025-06-23 PROCEDURE — 1160F RVW MEDS BY RX/DR IN RCRD: CPT | Performed by: STUDENT IN AN ORGANIZED HEALTH CARE EDUCATION/TRAINING PROGRAM

## 2025-06-23 PROCEDURE — 99214 OFFICE O/P EST MOD 30 MIN: CPT | Performed by: STUDENT IN AN ORGANIZED HEALTH CARE EDUCATION/TRAINING PROGRAM

## 2025-06-23 PROCEDURE — 3079F DIAST BP 80-89 MM HG: CPT | Performed by: STUDENT IN AN ORGANIZED HEALTH CARE EDUCATION/TRAINING PROGRAM

## 2025-06-23 PROCEDURE — 1126F AMNT PAIN NOTED NONE PRSNT: CPT | Performed by: STUDENT IN AN ORGANIZED HEALTH CARE EDUCATION/TRAINING PROGRAM

## 2025-06-23 NOTE — TELEPHONE ENCOUNTER
Hub staff attempted to follow warm transfer process and was unsuccessful.     Caller: Jose Maria Judge    Relationship: Self    Best call back number: [phone]      What is the best time to reach you: ANYTIME    Who are you requesting to speak with (clinical staff, provider,  specific staff member): CLINICAL STAFF    What was the call regarding:     PATIENT JUST SAW DR. JAMES IN THE OFFICE TODAY AND HE FORGOT TO MENTION THE FOLLOWING:    HE REPORTS THAT HIS HEAD HURTS WHEN HE STANDS OUT IN THE SUN, HE CANNOT IN DIRECT SUNLIGHT-CHRONIC ISSUE.   PATIENT STATES HE HAS TO STAY UNDER A TREE OR USE AN UMBRELLA.   PATIENT WANTS TO KNOW IF FURTHER LAB WORK WOULD HELP DETERMINE THE CAUSE/ISSUE RELATED TO THE PAIN.    Is it okay if the provider responds through MyChart: NO

## 2025-06-23 NOTE — PROGRESS NOTES
Office Note     Name: Jose Maria Judge    : 1965     MRN: [id number]     Chief Complaint  Illness    Subjective     History of Present Illness:  Jose Maria Judge is a 60 y.o. male     History of Present Illness  The patient is a 60-year-old male with a history of hypertension, allergic rhinitis, and multiple chronic complaints, presenting with persistent rhinosinusitis.    Rhinosinusitis  - Initially evaluated in urgent care on 2025 and prescribed amoxicillin.  - Experienced diarrhea on 2025, suspected to be a side effect of amoxicillin, and was advised to discontinue the antibiotic during a telehealth consultation.  - Returned to urgent care on 2025 with ongoing nasal congestion and facial pain, diagnosed with recurrent sinusitis.  - Prescribed cephalexin (Keflex), which is not typically indicated for bacterial rhinosinusitis.  - Reports no improvement in symptoms since then.  - Onset of sinus symptoms was on 2025, following exposure to his sister who had myalgia.  - Developed a sensation of heat in his nose, associated with sinus irritation.  - Received a corticosteroid injection and was advised to take an antibiotic if symptoms did not improve.  - Symptoms worsened, including heat sensation in face, eyes, and ears, low-grade fever of 99.9°F, fatigue, mild chills, and myalgia.  - Tested negative for COVID-19.  - Denies shortness of breath, chest pain, or headaches.  - Reports significant nasal congestion last week, which has since improved.  - Has a history of sinus issues and finds fluticasone nasal spray (Flonase) effective.    Hyperosmia  - Extremely sensitive to odors, which has previously led to upper respiratory infections.  - Consulted an otolaryngologist and was prescribed medication that caused nasal dryness.  - Unable to take over-the-counter allergy medications due to exacerbation of postnasal drip.    Symptom Management  - Using guaifenesin (Mucinex) and acetaminophen  (Tylenol) to manage symptoms.  - Reports no productive cough or excessive sputum.  - Gargling with saline solution twice daily to prevent pharyngitis.    Gallbladder and Nausea  - Has not completed fasting blood work due to nausea, which he attributes to his gallbladder.    Supplemental information: The patient is requesting tests for vitamin D and B12 levels.       Past Medical History:   Past Medical History:   Diagnosis Date    Acid reflux     HX    Anal fistula     Anxiety     R/T PAIN    Arthritis of back     can't remember    Carpal tunnel syndrome, left     DDD (degenerative disc disease), lumbar     Frozen shoulder     Hemorrhoids     Hodgkin's lymphoma     left axillary adenopathy leading to a biopsy that was nondiagnostic but suspicious and eventually an excisional biopsy that was eventually felt to be consistent with follicular hyperplasia and progressive transformation of germinal centers with 2 foci suspicious for early involvement by nodules lymphocyte predominant Hodgkin's lymphoma.  This was likely treated by excision    Hyperlipidemia     Hyperosmia     Labral tear of hip joint     RIGHT    Low back strain     Male pattern alopecia     Periarthritis of shoulder     need to check file    Postnasal drip     Pudendal neuralgia     Tinnitus of left ear     Trochanteric bursitis 08/25/2016    Varicose vein of leg     left       Past Surgical History:   Past Surgical History:   Procedure Laterality Date    ABSCESS DRAINAGE  08/2012    ANAL FISTULA REPAIR N/A 09/2012, 10/2012    AXILLARY LYMPH NODE BIOPSY/EXCISION Left 11/09/2018    Procedure: AXILLARY LYMPH NODE BIOPSY/EXCISION;  Surgeon: Amando Nguyễn MD;  [place] [facility] [place] [facility]  Service: General    COLONOSCOPY N/A 2013    done at Health system    COLONOSCOPY N/A 07/10/2019    Procedure: COLONOSCOPY to CECUM;  Surgeon: Amando Nguyễn MD;  Location: [facility] ENDOSCOPY;  Service: General    ENDOSCOPY N/A 01/09/2019    Procedure:  ESOPHAGOGASTRODUODENOSCOPY;  Surgeon: Amando Nguyễn MD;  Location:  [facility];  Service: General    ENDOSCOPY N/A 03/30/2023    Procedure: ESOPHAGOGASTRODUODENOSCOPY WITH BIOPSY;  Surgeon: Amando Nguyễn MD;  Location:  [facility] OR;  Service: Gastroenterology;  Laterality: N/A;  Gastric biopsy; Normal    FINE NEEDLE ASPIRATION Left 08/09/2018    Left axillary lymph node FNA    FLEXIBLE SIGMOIDOSCOPY N/A 06/25/2003    Normal-Dr. Cezar Aviles    HEMORRHOIDECTOMY N/A 1996    HIP ARTHROSCOPY W/ LABRAL REPAIR Right 04/03/2017    Dr. Lonnie Lemons    HIP SURGERY  2017    right labral repair    MEDIAL BRANCH BLOCK Bilateral 03/12/2021    Procedure: BILATERAL MEDIAL BRANCH BLOCK W/MAC;  Surgeon: Francisco Velez MD;  [place] [facility];  Service: Pain Management;  Laterality: Bilateral;    SIGMOIDOSCOPY N/A 09/23/2020    Procedure: FLEXIBLE SIGMOIDOSCOPY WITH BIOPSY;  Surgeon: Shena Ring MD;  Location:  [facility];  Service: Gastroenterology;  Laterality: N/A;  pre- anal/rectal pain  post- hemorrhoids    TONSILLECTOMY Bilateral        Immunizations:   Immunization History   Administered Date(s) Administered    Tdap 01/01/2013        Medications:     Current Outpatient Medications:     acetaminophen ([facility] Acetaminophen) 325 MG tablet, Take 1 tablet by mouth Every 6 (Six) Hours As Needed for Mild Pain., Disp: 90 tablet, Rfl: 1    benzonatate (TESSALON) 200 MG capsule, Take 1 capsule by mouth 3 (Three) Times a Day As Needed for Cough., Disp: 30 capsule, Rfl: 0    cephalexin (KEFLEX) 500 MG capsule, Take 1 capsule by mouth 3 (Three) Times a Day., Disp: 21 capsule, Rfl: 0    Allergies:   Allergies   Allergen Reactions    Bactrim [Sulfamethoxazole-Trimethoprim] Shortness Of Breath    Hydrocodone Shortness Of Breath    Hydrocodone-Acetaminophen Shortness Of Breath and Unknown (See Comments)     Bloating, sleeplessness, difficulty breathing    Levaquin [Levofloxacin] Unknown - Low Severity     Made  "tendons tight    Medrol [Methylprednisolone] Unknown - High Severity     Rectal bleeding    Naproxen Shortness Of Breath    Sulfa Antibiotics Shortness Of Breath     TROUBLE BREATHING    Alprazolam Other (See Comments)     Annotation - 06Jan2016: Patient stated he can not take this medication because it \"makes him feel weird.\"      Diclofenac Nausea Only and Other (See Comments)     Severe heartburn    Erythromycin GI Intolerance    Promethazine Tinnitus    Doxycycline GI Intolerance    Tramadol Headache    Ceftin [Cefuroxime] Other (See Comments)     cough    Mobic [Meloxicam] Nausea Only and Other (See Comments)     Severe heartburn       Family History:   Family History   Problem Relation Age of Onset    Arthritis Mother     Atrial fibrillation Father     Aneurysm Father     Hypertension Father     Dementia Father     Lymphoma Brother     Cancer Brother     Malig Hyperthermia Neg Hx     Colon cancer Neg Hx     Colon polyps Neg Hx     Crohn's disease Neg Hx     Irritable bowel syndrome Neg Hx     Ulcerative colitis Neg Hx        Social History:   Social History     Socioeconomic History    Marital status: Single    Number of children: 0    Years of education: COLLEGE    Highest education level: Not asked   Tobacco Use    Smoking status: Never     Passive exposure: Never    Smokeless tobacco: Never   Vaping Use    Vaping status: Never Used   Substance and Sexual Activity    Alcohol use: Never    Drug use: Never    Sexual activity: Not Currently     Partners: Female         Objective     Vital Signs  Resp 16   Ht 180.3 cm (70.98\")   BMI 29.30 kg/m²   Estimated body mass index is 29.3 kg/m² as calculated from the following:    Height as of this encounter: 180.3 cm (70.98\").    Weight as of 6/21/25: 95.3 kg (210 lb).            Physical Exam  Constitutional:       General: He is not in acute distress.     Appearance: He is not toxic-appearing.   HENT:      Right Ear: Hearing, tympanic membrane and ear canal normal. "      Left Ear: Hearing, tympanic membrane and ear canal normal.      Nose:      Right Sinus: No maxillary sinus tenderness or frontal sinus tenderness.      Left Sinus: No maxillary sinus tenderness or frontal sinus tenderness.      Mouth/Throat:      Pharynx: Uvula midline. Posterior oropharyngeal erythema and postnasal drip present. No oropharyngeal exudate or uvula swelling.   Cardiovascular:      Rate and Rhythm: Normal rate and regular rhythm.   Pulmonary:      Effort: No respiratory distress.   Neurological:      Mental Status: He is alert.          Assessment and Plan     Assessment & Plan  1. Rhinosinusitis: Likely Viral.  - Symptoms suggest a viral upper respiratory tract infection, causing sinus inflammation and postnasal drip, leading to throat irritation, sore throat, and dry cough.  - Absence of significant pain in the sinuses, fevers >100.4°F, body aches, and muscle aches reduces the likelihood of a bacterial sinus infection.  - Advised to use Flonase nasal spray, 2 puffs in each nostril nightly before bed, and if necessary, twice daily.  - Recommended nasal saline rinse prior to using Flonase.  - Referral to ENT specialist for further evaluation of hyperosmia.  - Discontinue previously prescribed Keflex as patient's symptoms correlate more with viral rhinosinusitis versus bacterial and given the fact that first generation cephalosporins are not indicated for treatment of bacterial rhinosinusitis.    2. Health maintenance.  - Declined Prevnar-20 vaccine at this time but will consider it after reviewing blood work results.  - Complete blood count will be conducted today to check for any ongoing infection.    Follow-up  - Follow-up appointment scheduled for 6 months from now.         Follow Up  No follow-ups on file.      I spent 35 minutes caring for Jose Maria on this date of service. This time includes time spent by me in the following activities:preparing for the visit, reviewing tests, obtaining and/or  reviewing a separately obtained history, performing a medically appropriate examination and/or evaluation , counseling and educating the patient/family/caregiver, ordering medications, tests, or procedures, referring and communicating with other health care professionals , documenting information in the medical record, independently interpreting results and communicating that information with the patient/family/caregiver, and care coordination      Triston Solomon MD   [facility]  [address] [address] [address] [phone]    Patient or patient representative verbalized consent for the use of Ambient Listening during the visit with  Triston Solomon MD for chart documentation. 6/23/2025  12:59 EDT

## 2025-06-24 ENCOUNTER — TELEPHONE (OUTPATIENT)
Dept: FAMILY MEDICINE CLINIC | Facility: CLINIC | Age: 60
End: 2025-06-24
Payer: COMMERCIAL

## 2025-06-24 LAB
BASOPHILS # BLD AUTO: 0 X10E3/UL (ref 0–0.2)
BASOPHILS NFR BLD AUTO: 1 %
EOSINOPHIL # BLD AUTO: 0 X10E3/UL (ref 0–0.4)
EOSINOPHIL NFR BLD AUTO: 1 %
ERYTHROCYTE [DISTWIDTH] IN BLOOD BY AUTOMATED COUNT: 12.5 % (ref 11.6–15.4)
HCT VFR BLD AUTO: 48.7 % (ref 37.5–51)
HGB BLD-MCNC: 15.4 G/DL (ref 13–17.7)
HIV 1+2 AB+HIV1 P24 AG SERPL QL IA: NON REACTIVE
IMM GRANULOCYTES # BLD AUTO: 0 X10E3/UL (ref 0–0.1)
IMM GRANULOCYTES NFR BLD AUTO: 0 %
LYMPHOCYTES # BLD AUTO: 1.7 X10E3/UL (ref 0.7–3.1)
LYMPHOCYTES NFR BLD AUTO: 28 %
Lab: NORMAL
Lab: NORMAL
MCH RBC QN AUTO: 29.6 PG (ref 26.6–33)
MCHC RBC AUTO-ENTMCNC: 31.6 G/DL (ref 31.5–35.7)
MCV RBC AUTO: 94 FL (ref 79–97)
MONOCYTES # BLD AUTO: 0.5 X10E3/UL (ref 0.1–0.9)
MONOCYTES NFR BLD AUTO: 9 %
NEUTROPHILS # BLD AUTO: 3.7 X10E3/UL (ref 1.4–7)
NEUTROPHILS NFR BLD AUTO: 61 %
PLATELET # BLD AUTO: 182 X10E3/UL (ref 150–450)
RBC # BLD AUTO: 5.2 X10E6/UL (ref 4.14–5.8)
WBC # BLD AUTO: 6 X10E3/UL (ref 3.4–10.8)

## 2025-06-24 NOTE — TELEPHONE ENCOUNTER
Hub staff attempted to follow warm transfer process and was unsuccessful     Caller: Jose Maria Judge    Relationship to patient: Self    Best call back number: [phone]     Patient is needing: PATIENT IS CALLING IN FOR A SAME DAY APPOINTMENT. PATIENT WOULD LIKE FOR IT TO BE A VIRTUAL VISIT. [facility] ATTEMPTED TO SCHEDULE BUT THERE WAS NO AVAILABILITY TODAY. PATIENT IS NEEDING TO SCHEDULE AN APPOINTMENT TODAY FOR AN ONGOING ISSUE WITH A SINUS INFECTION. PATIENT STATED HE IS NOT FEELING WELL TODAY, HE IS EXPERIENCING CHILLS AND NAUSEA. PLEASE CALL PATIENT TO SCHEDULE.

## 2025-06-24 NOTE — TELEPHONE ENCOUNTER
Spoke with pt stated that he only wants to do a mychart visit.  Stated that he is still having the same problems as yesterday.  Pt decline c       System kicked me out!    Pt declined stated that he will go to the urgent care.

## 2025-06-26 LAB
25(OH)D3+25(OH)D2 SERPL-MCNC: 73.1 NG/ML (ref 30–100)
ALBUMIN SERPL-MCNC: 4.7 G/DL (ref 3.9–4.9)
ALP SERPL-CCNC: 64 IU/L (ref 44–121)
ALT SERPL-CCNC: 20 IU/L (ref 0–44)
AST SERPL-CCNC: 21 IU/L (ref 0–40)
BILIRUB SERPL-MCNC: 0.3 MG/DL (ref 0–1.2)
BUN SERPL-MCNC: 16 MG/DL (ref 8–27)
BUN/CREAT SERPL: 16 (ref 10–24)
CALCIUM SERPL-MCNC: 9.8 MG/DL (ref 8.6–10.2)
CHLORIDE SERPL-SCNC: 101 MMOL/L (ref 96–106)
CHOLEST SERPL-MCNC: 171 MG/DL (ref 100–199)
CO2 SERPL-SCNC: 19 MMOL/L (ref 20–29)
CREAT SERPL-MCNC: 1.02 MG/DL (ref 0.76–1.27)
EGFRCR SERPLBLD CKD-EPI 2021: 84 ML/MIN/1.73
GLOBULIN SER CALC-MCNC: 2.8 G/DL (ref 1.5–4.5)
GLUCOSE SERPL-MCNC: 83 MG/DL (ref 70–99)
HBA1C MFR BLD: 5 % (ref 4.8–5.6)
HDLC SERPL-MCNC: 42 MG/DL
LDLC SERPL CALC-MCNC: 115 MG/DL (ref 0–99)
LDLC/HDLC SERPL: 2.7 RATIO (ref 0–3.6)
POTASSIUM SERPL-SCNC: 4.6 MMOL/L (ref 3.5–5.2)
PROT SERPL-MCNC: 7.5 G/DL (ref 6–8.5)
SODIUM SERPL-SCNC: 139 MMOL/L (ref 134–144)
TRIGL SERPL-MCNC: 74 MG/DL (ref 0–149)
TSH SERPL DL<=0.005 MIU/L-ACNC: 1.49 UIU/ML (ref 0.45–4.5)
VIT B12 SERPL-MCNC: 787 PG/ML (ref 232–1245)
VLDLC SERPL CALC-MCNC: 14 MG/DL (ref 5–40)
WRITTEN AUTHORIZATION: NORMAL

## 2025-07-21 ENCOUNTER — OFFICE VISIT (OUTPATIENT)
Dept: FAMILY MEDICINE CLINIC | Facility: CLINIC | Age: 60
End: 2025-07-21
Payer: COMMERCIAL

## 2025-07-21 ENCOUNTER — TELEPHONE (OUTPATIENT)
Dept: FAMILY MEDICINE CLINIC | Facility: CLINIC | Age: 60
End: 2025-07-21

## 2025-07-21 ENCOUNTER — PRIOR AUTHORIZATION (OUTPATIENT)
Dept: FAMILY MEDICINE CLINIC | Facility: CLINIC | Age: 60
End: 2025-07-21

## 2025-07-21 VITALS
BODY MASS INDEX: 30.94 KG/M2 | HEIGHT: 71 IN | WEIGHT: 221 LBS | RESPIRATION RATE: 17 BRPM | DIASTOLIC BLOOD PRESSURE: 66 MMHG | OXYGEN SATURATION: 96 % | TEMPERATURE: 98.3 F | SYSTOLIC BLOOD PRESSURE: 107 MMHG | HEART RATE: 73 BPM

## 2025-07-21 DIAGNOSIS — L73.9 FOLLICULITIS: Primary | ICD-10-CM

## 2025-07-21 DIAGNOSIS — I83.813 VARICOSE VEINS OF BILATERAL LOWER EXTREMITIES WITH PAIN: ICD-10-CM

## 2025-07-21 PROCEDURE — 99214 OFFICE O/P EST MOD 30 MIN: CPT | Performed by: STUDENT IN AN ORGANIZED HEALTH CARE EDUCATION/TRAINING PROGRAM

## 2025-07-21 PROCEDURE — 1159F MED LIST DOCD IN RCRD: CPT | Performed by: STUDENT IN AN ORGANIZED HEALTH CARE EDUCATION/TRAINING PROGRAM

## 2025-07-21 PROCEDURE — 3074F SYST BP LT 130 MM HG: CPT | Performed by: STUDENT IN AN ORGANIZED HEALTH CARE EDUCATION/TRAINING PROGRAM

## 2025-07-21 PROCEDURE — 3078F DIAST BP <80 MM HG: CPT | Performed by: STUDENT IN AN ORGANIZED HEALTH CARE EDUCATION/TRAINING PROGRAM

## 2025-07-21 PROCEDURE — 1126F AMNT PAIN NOTED NONE PRSNT: CPT | Performed by: STUDENT IN AN ORGANIZED HEALTH CARE EDUCATION/TRAINING PROGRAM

## 2025-07-21 PROCEDURE — 1160F RVW MEDS BY RX/DR IN RCRD: CPT | Performed by: STUDENT IN AN ORGANIZED HEALTH CARE EDUCATION/TRAINING PROGRAM

## 2025-07-21 RX ORDER — MUPIROCIN CALCIUM 20 MG/G
1 CREAM TOPICAL 3 TIMES DAILY
Qty: 15 G | Refills: 0 | Status: SHIPPED | OUTPATIENT
Start: 2025-07-21

## 2025-07-21 NOTE — PROGRESS NOTES
Office Note     Name: Jose Maria Judge    : 1965     MRN: [id number]     Chief Complaint  Rash (Red spots on legs)    Subjective     History of Present Illness:  Jose Maria Judge is a 60 y.o. male     History of Present Illness  The patient is a 60-year-old male with a medical history significant for hypertension, chronic pain syndrome, and hypercholesterolemia, presenting with concerns regarding a potential dermatological condition on his left lower extremity.    Varicose Veins and Dermatological Condition  - The patient reports the presence of varicose veins in his legs, accompanied by the development of erythematous spots and localized edema.  - Approximately 2.5 to 3 years ago, he underwent vein cauterization, which did not achieve the desired therapeutic outcome.  - The patient describes intermittent stinging sensations upon contact with water during showers, with a progressive worsening of the lesions.  - Pruritus is also noted intermittently.  - He has been self-administering topical applications of a mixture of olive oil and coconut oil to the affected area.  - The patient adheres to a strict hygiene regimen, showering nightly and utilizing Dial soap for thorough cleansing.    Lower Extremity Edema and Foot Discomfort  - For comfort, he wears diabetic socks, which he finds less restrictive compared to conventional socks, and owns three pairs of compression stockings.  - He experiences significant lower extremity edema upon prolonged standing, resulting in discomfort.  - Multiple consultations with podiatric specialists have been sought due to challenges in finding appropriate footwear.  - He reports a burning and stinging sensation in his feet upon donning shoes, attributed to venous stasis despite adequate peripheral circulation.  - He was advised to seek further evaluation from a vascular surgeon, Dr. Lynn, who previously performed the cauterization procedure.  - The patient is requesting a referral  to a vascular surgeon for management of his varicose veins and associated leg swelling.    Intolerance to Temperature Extremes  - The patient reports a longstanding intolerance to both cold and heat, persisting for at least 15 years.  - He experiences difficulty tolerating direct sunlight and requires the use of an umbrella for outdoor activities such as fishing or working.    Social History:  Hobbies: Fishing    PAST SURGICAL HISTORY:  Vein cauterization approximately 2.5 to 3 years ago.       Past Medical History:   Past Medical History:   Diagnosis Date    Acid reflux     HX    Anal fistula     Anxiety     R/T PAIN    Arthritis of back     can't remember    Carpal tunnel syndrome, left     DDD (degenerative disc disease), lumbar     Frozen shoulder     Hemorrhoids     Hodgkin's lymphoma     left axillary adenopathy leading to a biopsy that was nondiagnostic but suspicious and eventually an excisional biopsy that was eventually felt to be consistent with follicular hyperplasia and progressive transformation of germinal centers with 2 foci suspicious for early involvement by nodules lymphocyte predominant Hodgkin's lymphoma.  This was likely treated by excision    Hyperlipidemia     Hyperosmia     Labral tear of hip joint     RIGHT    Low back strain     Male pattern alopecia     Periarthritis of shoulder     need to check file    Postnasal drip     Pudendal neuralgia     Tinnitus of left ear     Trochanteric bursitis 08/25/2016    Varicose vein of leg     left       Past Surgical History:   Past Surgical History:   Procedure Laterality Date    ABSCESS DRAINAGE  08/2012    ANAL FISTULA REPAIR N/A 09/2012, 10/2012    AXILLARY LYMPH NODE BIOPSY/EXCISION Left 11/09/2018    Procedure: AXILLARY LYMPH NODE BIOPSY/EXCISION;  Surgeon: Amando Nguyễn MD;  Location: [facility] OR [facility];  Service: General    COLONOSCOPY N/A 2013    done at [facility]    COLONOSCOPY N/A 07/10/2019    Procedure: COLONOSCOPY to CECUM;  Surgeon:  Amando Nguyễn MD;  Location:  [facility];  Service: General    ENDOSCOPY N/A 01/09/2019    Procedure: ESOPHAGOGASTRODUODENOSCOPY;  Surgeon: Amando Nguyễn MD;  Location:  [facility];  Service: General    ENDOSCOPY N/A 03/30/2023    Procedure: ESOPHAGOGASTRODUODENOSCOPY WITH BIOPSY;  Surgeon: Amando Nguyễn MD;  Location: [facility] OR;  Service: Gastroenterology;  Laterality: N/A;  Gastric biopsy; Normal    FINE NEEDLE ASPIRATION Left 08/09/2018    Left axillary lymph node FNA    FLEXIBLE SIGMOIDOSCOPY N/A 06/25/2003    Normal-Dr. Cezar Aviles    HEMORRHOIDECTOMY N/A 1996    HIP ARTHROSCOPY W/ LABRAL REPAIR Right 04/03/2017    Dr. Lonnie Lemons    HIP SURGERY  2017    right labral repair    MEDIAL BRANCH BLOCK Bilateral 03/12/2021    Procedure: BILATERAL MEDIAL BRANCH BLOCK W/MAC;  Surgeon: Francisco Velez MD;  [facility]: [facility];  Service: Pain Management;  Laterality: Bilateral;    SIGMOIDOSCOPY N/A 09/23/2020    Procedure: FLEXIBLE SIGMOIDOSCOPY WITH BIOPSY;  Surgeon: Shena Ring MD;  [facility];  Service: Gastroenterology;  Laterality: N/A;  pre- anal/rectal pain  post- hemorrhoids    TONSILLECTOMY Bilateral        Immunizations:   Immunization History   Administered Date(s) Administered    Tdap 01/01/2013        Medications:     Current Outpatient Medications:     acetaminophen ([facility] Acetaminophen) 325 MG tablet, Take 1 tablet by mouth Every 6 (Six) Hours As Needed for Mild Pain., Disp: 90 tablet, Rfl: 1    benzonatate (TESSALON) 200 MG capsule, Take 1 capsule by mouth 3 (Three) Times a Day As Needed for Cough. (Patient not taking: Reported on 7/21/2025), Disp: 30 capsule, Rfl: 0    Allergies:   Allergies   Allergen Reactions    Bactrim [Sulfamethoxazole-Trimethoprim] Shortness Of Breath    Hydrocodone Shortness Of Breath    Hydrocodone-Acetaminophen Shortness Of Breath and Unknown (See Comments)     Bloating, sleeplessness, difficulty breathing    Levaquin  "[Levofloxacin] Unknown - Low Severity     Made tendons tight    Medrol [Methylprednisolone] Unknown - High Severity     Rectal bleeding    Naproxen Shortness Of Breath    Sulfa Antibiotics Shortness Of Breath     TROUBLE BREATHING    Alprazolam Other (See Comments)     Annotation - 06Jan2016: Patient stated he can not take this medication because it \"makes him feel weird.\"      Diclofenac Nausea Only and Other (See Comments)     Severe heartburn    Erythromycin GI Intolerance    Promethazine Tinnitus    Doxycycline GI Intolerance    Tramadol Headache    Ceftin [Cefuroxime] Other (See Comments)     cough    Mobic [Meloxicam] Nausea Only and Other (See Comments)     Severe heartburn       Family History:   Family History   Problem Relation Age of Onset    Arthritis Mother     Atrial fibrillation Father     Aneurysm Father     Hypertension Father     Dementia Father     Lymphoma Brother     Cancer Brother     Malig Hyperthermia Neg Hx     Colon cancer Neg Hx     Colon polyps Neg Hx     Crohn's disease Neg Hx     Irritable bowel syndrome Neg Hx     Ulcerative colitis Neg Hx        Social History:   Social History     Socioeconomic History    Marital status: Single    Number of children: 0    Years of education: COLLEGE    Highest education level: Not asked   Tobacco Use    Smoking status: Never     Passive exposure: Never    Smokeless tobacco: Never   Vaping Use    Vaping status: Never Used   Substance and Sexual Activity    Alcohol use: Never    Drug use: Never    Sexual activity: Not Currently     Partners: Female         Objective     Vital Signs  /66 (BP Location: Right arm, Patient Position: Sitting, Cuff Size: Adult)   Pulse 73   Temp 98.3 °F (36.8 °C) (Oral)   Resp 17   Ht 180.3 cm (70.98\")   Wt 100 kg (221 lb)   SpO2 96%   BMI 30.84 kg/m²   Estimated body mass index is 30.84 kg/m² as calculated from the following:    Height as of this encounter: 180.3 cm (70.98\").    Weight as of this encounter: 100 " kg (221 lb).            Physical Exam  Constitutional:       General: He is not in acute distress.     Appearance: He is not ill-appearing.      Comments: Patient wearing facemask and unseasonably dressed with several layers   Cardiovascular:      Rate and Rhythm: Normal rate.   Pulmonary:      Effort: Pulmonary effort is normal. No respiratory distress.   Skin:     Comments: Folliculitis noted on bilateral lower extremities.  Prominent varicose veins noted on bilateral lower extremities.   Neurological:      General: No focal deficit present.      Mental Status: He is oriented to person, place, and time.          Assessment and Plan     Assessment & Plan  1. Folliculitis:  - Symptoms consistent with folliculitis rather than petechiae  - Compression socks may increase risk due to close contact with skin, leading to sweat or oil buildup and subsequent infection of hair follicles  - Prescription for mupirocin cream sent to pharmacy  - Apply cream three times daily for seven days and maintain cleanliness by washing the area daily  - Olive oil use discouraged; coconut oil may be continued  - Referral to a vascular surgeon made, with preference for a different physician within the practice    2. Varicose veins:  - History of varicose veins with previous cauterization procedure that did not yield desired results  - Significant swelling in legs, especially when standing  - Advised to wear compression socks, keep legs elevated above heart level when sitting, and remain as active as possible  - Referral to a vascular surgeon made for further evaluation and management    3. Cold and heat intolerance:  - Reports long-standing issues with cold and heat intolerance, ongoing for at least 15 years  - Previous labs were normal  - Further diagnostic studies will be considered during the next appointment later this year         Follow Up  No follow-ups on file.      I spent 30 minutes caring for Jose Maria on this date of service. This  time includes time spent by me in the following activities:preparing for the visit, reviewing tests, obtaining and/or reviewing a separately obtained history, performing a medically appropriate examination and/or evaluation , counseling and educating the patient/family/caregiver, ordering medications, tests, or procedures, referring and communicating with other health care professionals , documenting information in the medical record, independently interpreting results and communicating that information with the patient/family/caregiver, and care coordination      Triston Solomon MD   [facility]  [address] [place] [id number] [phone]    Patient or patient representative verbalized consent for the use of Ambient Listening during the visit with  Triston Solomon MD for chart documentation. 7/21/2025  13:08 EDT

## 2025-07-22 NOTE — TELEPHONE ENCOUNTER
PA approved, patient and pharmacy informed.     Approved on July 21 by [place] [employer] 2017  The request has been approved. The authorization is effective from 07/21/2025 to 07/21/2026, as long as the member is enrolled in their current health plan. A written notification letter will follow with additional details.  Effective Date: 7/21/2025

## 2025-07-29 ENCOUNTER — TELEPHONE (OUTPATIENT)
Dept: FAMILY MEDICINE CLINIC | Facility: CLINIC | Age: 60
End: 2025-07-29

## 2025-07-30 ENCOUNTER — OFFICE VISIT (OUTPATIENT)
Age: 60
End: 2025-07-30
Payer: COMMERCIAL

## 2025-07-30 ENCOUNTER — TELEPHONE (OUTPATIENT)
Dept: SURGERY | Facility: CLINIC | Age: 60
End: 2025-07-30
Payer: COMMERCIAL

## 2025-07-30 VITALS
DIASTOLIC BLOOD PRESSURE: 75 MMHG | BODY MASS INDEX: 30.94 KG/M2 | SYSTOLIC BLOOD PRESSURE: 122 MMHG | RESPIRATION RATE: 17 BRPM | HEART RATE: 63 BPM | WEIGHT: 221 LBS | HEIGHT: 71 IN

## 2025-07-30 DIAGNOSIS — I83.813 VARICOSE VEINS OF BILATERAL LOWER EXTREMITIES WITH PAIN: Primary | ICD-10-CM

## 2025-07-30 DIAGNOSIS — I87.2 VENOUS (PERIPHERAL) INSUFFICIENCY: ICD-10-CM

## 2025-07-30 DIAGNOSIS — I87.323 CHRONIC VENOUS HYPERTENSION WITH INFLAMMATION INVOLVING BOTH SIDES: ICD-10-CM

## 2025-07-30 DIAGNOSIS — I87.8 VENOUS STASIS: ICD-10-CM

## 2025-07-30 PROBLEM — I87.329 CHRONIC VENOUS HYPERTENSION WITH INFLAMMATION: Status: ACTIVE | Noted: 2025-07-30

## 2025-07-30 NOTE — PROGRESS NOTES
Patient Name: Jose Maria Judge    MRN: [id number] Encounter Date: 07/30/2025      Consulting Service: Vascular Surgery    Referring Provider: Triston Solomon MD       CHIEF COMPLAINT:  Chief Complaint   Patient presents with    Varicose Veins       Subjective    HPI: Jose Maria Judge is a 60 y.o. male being seen for evaluation/management of complaints of symptomatic varicose veins, venous insufficiency, and lower extremity edema of bilateral lower extremity.   Symptoms include Beckett symptoms: Edema/Swelling, Varicose veins, Spider veins, Burning, Rash/Irritation, Pigmentation, Bulging veins, Pain/Aches, Heaviness/Fullness, and Throbbing.  Patient describes the discomfort from the veins as affecting their daily life.   Patient has positive family history of the varicose veins and negative family history of DVT.  At this point in time attempts at symptomatic control using elevation, compression and nonsteroidals have been attempted.  Prior venous interventions include: EVLA.    PAST MEDICAL HISTORY:   Past Medical History:   Diagnosis Date    Acid reflux     HX    Anal fistula     Anxiety     R/T PAIN    Arthritis of back     can't remember    Carpal tunnel syndrome, left     DDD (degenerative disc disease), lumbar     Frozen shoulder     Hemorrhoids     Hodgkin's lymphoma     left axillary adenopathy leading to a biopsy that was nondiagnostic but suspicious and eventually an excisional biopsy that was eventually felt to be consistent with follicular hyperplasia and progressive transformation of germinal centers with 2 foci suspicious for early involvement by nodules lymphocyte predominant Hodgkin's lymphoma.  This was likely treated by excision    Hyperlipidemia     Hyperosmia     Labral tear of hip joint     RIGHT    Low back strain     Male pattern alopecia     Periarthritis of shoulder     need to check file    Postnasal drip     Pudendal neuralgia     Tinnitus of left ear     Trochanteric bursitis 08/25/2016     Varicose vein of leg     left      PAST SURGICAL HISTORY:   Past Surgical History:   Procedure Laterality Date    ABSCESS DRAINAGE  08/2012    ANAL FISTULA REPAIR N/A 09/2012, 10/2012    AXILLARY LYMPH NODE BIOPSY/EXCISION Left 11/09/2018    Procedure: AXILLARY LYMPH NODE BIOPSY/EXCISION;  Surgeon: Amando Nguyễn MD;  [facility];  Service: General    COLONOSCOPY N/A 2013    done at [facility]    COLONOSCOPY N/A 07/10/2019    Procedure: COLONOSCOPY to CECUM;  Surgeon: Amando Nguyễn MD;  [facility];  Service: General    ENDOSCOPY N/A 01/09/2019    Procedure: ESOPHAGOGASTRODUODENOSCOPY;  Surgeon: Amando Nguyễn MD;  [facility];  Service: General    ENDOSCOPY N/A 03/30/2023    Procedure: ESOPHAGOGASTRODUODENOSCOPY WITH BIOPSY;  Surgeon: Amando Nguyễn MD;  Location: [facility] OR;  Service: Gastroenterology;  Laterality: N/A;  Gastric biopsy; Normal    FINE NEEDLE ASPIRATION Left 08/09/2018    Left axillary lymph node FNA    FLEXIBLE SIGMOIDOSCOPY N/A 06/25/2003    Normal-Dr. Cezar Aviles    HEMORRHOIDECTOMY N/A 1996    HIP ARTHROSCOPY W/ LABRAL REPAIR Right 04/03/2017    Dr. Lonnie Lemons    HIP SURGERY  2017    right labral repair    MEDIAL BRANCH BLOCK Bilateral 03/12/2021    Procedure: BILATERAL MEDIAL BRANCH BLOCK W/MAC;  Surgeon: Francisco Velez MD;  Location: [facility] OR;  Service: Pain Management;  Laterality: Bilateral;    SIGMOIDOSCOPY N/A 09/23/2020    Procedure: FLEXIBLE SIGMOIDOSCOPY WITH BIOPSY;  Surgeon: Shena Ring MD;  Location: [facility];  Service: Gastroenterology;  Laterality: N/A;  pre- anal/rectal pain  post- hemorrhoids    TONSILLECTOMY Bilateral       FAMILY HISTORY:   Family History   Problem Relation Age of Onset    Arthritis Mother     Atrial fibrillation Father     Aneurysm Father     Hypertension Father     Dementia Father     Lymphoma Brother     Cancer Brother     Malig Hyperthermia Neg Hx     Colon cancer Neg Hx  "    Colon polyps Neg Hx     Crohn's disease Neg Hx     Irritable bowel syndrome Neg Hx     Ulcerative colitis Neg Hx       SOCIAL HISTORY:   Social History     Tobacco Use    Smoking status: Never     Passive exposure: Never    Smokeless tobacco: Never   Vaping Use    Vaping status: Never Used   Substance Use Topics    Alcohol use: Never    Drug use: Never      MEDICATIONS:   Current Outpatient Medications on File Prior to Visit   Medication Sig Dispense Refill    acetaminophen ([facility] Acetaminophen) 325 MG tablet Take 1 tablet by mouth Every 6 (Six) Hours As Needed for Mild Pain. 90 tablet 1    mupirocin (BACTROBAN) 2 % cream Apply 1 Application topically to the appropriate area as directed 3 (Three) Times a Day. Apply to affected area 3 times per day for 7 days. 15 g 0     No current facility-administered medications on file prior to visit.       ALLERGIES: Bactrim [sulfamethoxazole-trimethoprim], Hydrocodone, Hydrocodone-acetaminophen, Levaquin [levofloxacin], Medrol [methylprednisolone], Naproxen, Sulfa antibiotics, Alprazolam, Diclofenac, Erythromycin, Promethazine, Doxycycline, Tramadol, Ceftin [cefuroxime], and Mobic [meloxicam]       Objective   Vitals:    07/30/25 1214   BP: 122/75   BP Location: Right arm   Patient Position: Sitting   Cuff Size: Large Adult   Pulse: 63   Resp: 17   Weight: 100 kg (221 lb)   Height: 180 cm (70.87\")     Body mass index is 30.94 kg/m².          PHYSICAL EXAM:   Physical Exam  Constitutional:       Appearance: Normal appearance.   HENT:      Head: Normocephalic and atraumatic.      Nose: Nose normal.   Eyes:      Extraocular Movements: Extraocular movements intact.      Pupils: Pupils are equal, round, and reactive to light.   Cardiovascular:      Rate and Rhythm: Normal rate.      Pulses: Normal pulses.      Heart sounds: Normal heart sounds.      Comments: Severe varicosities left leg medial thigh posterior thigh medial and lateral calf that are recurrent.  His right veins " really are in the right medial calf and these occurred shortly after his left leg was treated.  Pulmonary:      Effort: Pulmonary effort is normal.      Breath sounds: Normal breath sounds.   Abdominal:      General: Abdomen is flat. Bowel sounds are normal.      Palpations: Abdomen is soft.   Musculoskeletal:         General: Normal range of motion.      Cervical back: Normal range of motion and neck supple.      Right lower le+ Edema present.      Left lower le+ Edema present.   Skin:     General: Skin is warm and dry.   Neurological:      General: No focal deficit present.      Mental Status: He is alert and oriented to person, place, and time. Mental status is at baseline.   Psychiatric:         Mood and Affect: Mood normal.         Thought Content: Thought content normal.          Result Review   LABS:    CBC          2025    13:41   CBC   WBC 6.0    RBC 5.20    Hemoglobin 15.4    Hematocrit 48.7    MCV 94    MCH 29.6    MCHC 31.6    RDW 12.5    Platelets 182      BMP          2025    13:41   BMP   BUN 16    Creatinine 1.02    Sodium 139    Potassium 4.6    Chloride 101    CO2 19    Calcium 9.8      Lipid Panel          2025    13:41   Lipid Panel   Total Cholesterol 171    Triglycerides 74    HDL Cholesterol 42    VLDL Cholesterol 14    LDL Cholesterol  115    LDL/HDL Ratio 2.7          A1C Last 3 Results          2025    13:41   HGBA1C Last 3 Results   Hemoglobin A1C 5.0         Results Review:       I reviewed the patient's new clinical results.    The following radiologic or non-invasive studies have been reviewed by me: Prior treatments in the vein center over 6 years ago not available for review  No results found for this or any previous visit from the past 365 days.     No radiology results for the last 30 days.                ASSESSMENT/PLAN:   Diagnoses and all orders for this visit:    1. Varicose veins of bilateral lower extremities with pain (Primary)  -     Venous w  Reflux Lower Extremity - Unilateral CAR; Future    2. Venous stasis  -     Venous w Reflux Lower Extremity - Unilateral CAR; Future    3. Venous (peripheral) insufficiency  -     Venous w Reflux Lower Extremity - Unilateral CAR; Future    4. Chronic venous hypertension with inflammation involving both sides  -     Venous w Reflux Lower Extremity - Unilateral CAR; Future       60 y.o. male with what appears to be recurrent varicosities left greater than right.  The patient actually states that his veins never went away after his initial ablation on the left leg back in 2018 with Dr. Lynn.  He feels that the right leg actually within 6 months got significantly worse than it was as well which I assured him was not directly related to the treatment and more likely related to Venous insufficiency in time and gravity.  Regardless figuring out what we can do to improve these severe varicosities left greater than right and the stasis injuries that are occurring is important organ to get a class II mapping on the left leg and see him back to go over these results.  Eventual right class I mapping will be needed as well.  The left leg bothers him tremendously worse and we will concentrate on that to start.  In time working to give him a pair of thigh-high 20 to 30 mm tort compression and set him up for a left class II mapping ASAP    I discussed the plan with the patient who is agreeable to the plan of care at this point. Thank you for this consult.   Follow Up  Return in about 6 weeks (around 9/10/2025).    Lonnie Beckett MD   07/30/25

## 2025-07-30 NOTE — TELEPHONE ENCOUNTER
Pt went to see Lonnie Justin today and was wanting to ask your opinion on this provider. You had previously recommended Alice Christianson but she is retired now. Pt is wanting a vascular recommendation and call back. Thank you.

## (undated) DEVICE — NDL SPINE 22G 31/2IN BLK

## (undated) DEVICE — Device: Brand: DEFENDO AIR/WATER/SUCTION AND BIOPSY VALVE

## (undated) DEVICE — TUBING, SUCTION, 1/4" X 10', STRAIGHT: Brand: MEDLINE

## (undated) DEVICE — ADAPT CLN BIOGUARD AIR/H2O DISP

## (undated) DEVICE — ELECTRD BLD EDGE/INSUL1P 2.4X5.1MM STRL

## (undated) DEVICE — LN SMPL CO2 SHTRM SD STREAM W/M LUER

## (undated) DEVICE — PK PROC MINOR TOWER 40

## (undated) DEVICE — SINGLE-USE BIOPSY FORCEPS: Brand: RADIAL JAW 4

## (undated) DEVICE — CANN NASL CO2 TRULINK W/O2 A/

## (undated) DEVICE — ENCORE® LATEX ORTHO SIZE 7.5, STERILE LATEX POWDER-FREE SURGICAL GLOVE: Brand: ENCORE

## (undated) DEVICE — CANN NASL O2 INF

## (undated) DEVICE — Device

## (undated) DEVICE — THE TORRENT IRRIGATION SCOPE CONNECTOR IS USED WITH THE TORRENT IRRIGATION TUBING TO PROVIDE IRRIGATION FLUIDS SUCH AS STERILE WATER DURING GASTROINTESTINAL ENDOSCOPIC PROCEDURES WHEN USED IN CONJUNCTION WITH AN IRRIGATION PUMP (OR ELECTROSURGICAL UNIT).: Brand: TORRENT

## (undated) DEVICE — BITEBLOCK OMNI BLOC

## (undated) DEVICE — KT ORCA ORCAPOD DISP STRL

## (undated) DEVICE — THE BITE BLOCK MAXI, LATEX FREE STRAP IS USED TO PROTECT THE ENDOSCOPE INSERTION TUBE FROM BEING BITTEN BY THE PATIENT.

## (undated) DEVICE — SUT VIC 3/0 TIES 18IN J110T

## (undated) DEVICE — SKIN PREP TRAY W/CHG: Brand: MEDLINE INDUSTRIES, INC.

## (undated) DEVICE — VIAL FORMALIN CAP 10P 40ML

## (undated) DEVICE — JACKSON-PRATT 100CC BULB RESERVOIR: Brand: CARDINAL HEALTH

## (undated) DEVICE — GLV SURG TRIUMPH PF LTX 7.5 STRL

## (undated) DEVICE — SUT VIC 5/0 PS2 18IN J495H

## (undated) DEVICE — SAFELINER SUCTION CANISTER 1000CC: Brand: DEROYAL

## (undated) DEVICE — EPIDURAL TRAY: Brand: MEDLINE INDUSTRIES, INC.

## (undated) DEVICE — CANN O2 ETCO2 FITS ALL CONN CO2 SMPL A/ 7IN DISP LF

## (undated) DEVICE — BW-412T DISP COMBO CLEANING BRUSH: Brand: SINGLE USE COMBINATION CLEANING BRUSH

## (undated) DEVICE — FRCP BX RADJAW4 NDL 2.8 240CM LG OG BX40

## (undated) DEVICE — SENSR O2 OXIMAX FNGR A/ 18IN NONSTR

## (undated) DEVICE — SYR LL 3CC

## (undated) DEVICE — SUT VIC 3/0 SH 27IN J416H

## (undated) DEVICE — SUT ETHLN 3/0 PS1 18IN 1663H